# Patient Record
Sex: FEMALE | Race: WHITE | Employment: OTHER
[De-identification: names, ages, dates, MRNs, and addresses within clinical notes are randomized per-mention and may not be internally consistent; named-entity substitution may affect disease eponyms.]

---

## 2017-01-30 ENCOUNTER — OFFICE VISIT (OUTPATIENT)
Dept: CARDIOLOGY | Facility: CLINIC | Age: 82
End: 2017-01-30

## 2017-01-30 VITALS
SYSTOLIC BLOOD PRESSURE: 157 MMHG | HEIGHT: 60 IN | BODY MASS INDEX: 19.04 KG/M2 | DIASTOLIC BLOOD PRESSURE: 84 MMHG | WEIGHT: 97 LBS | RESPIRATION RATE: 16 BRPM | HEART RATE: 62 BPM | OXYGEN SATURATION: 98 %

## 2017-01-30 DIAGNOSIS — Z79.899 ENCOUNTER FOR MONITORING AMIODARONE THERAPY: Primary | ICD-10-CM

## 2017-01-30 DIAGNOSIS — E03.9 HYPOTHYROIDISM, UNSPECIFIED TYPE: ICD-10-CM

## 2017-01-30 DIAGNOSIS — I50.32 CHRONIC DIASTOLIC HF (HEART FAILURE) (HCC): ICD-10-CM

## 2017-01-30 DIAGNOSIS — Z51.81 ENCOUNTER FOR MONITORING AMIODARONE THERAPY: Primary | ICD-10-CM

## 2017-01-30 PROCEDURE — G8427 DOCREV CUR MEDS BY ELIG CLIN: HCPCS | Performed by: FAMILY MEDICINE

## 2017-01-30 PROCEDURE — 4040F PNEUMOC VAC/ADMIN/RCVD: CPT | Performed by: FAMILY MEDICINE

## 2017-01-30 PROCEDURE — 1036F TOBACCO NON-USER: CPT | Performed by: FAMILY MEDICINE

## 2017-01-30 PROCEDURE — G8484 FLU IMMUNIZE NO ADMIN: HCPCS | Performed by: FAMILY MEDICINE

## 2017-01-30 PROCEDURE — G8419 CALC BMI OUT NRM PARAM NOF/U: HCPCS | Performed by: FAMILY MEDICINE

## 2017-01-30 PROCEDURE — 1123F ACP DISCUSS/DSCN MKR DOCD: CPT | Performed by: FAMILY MEDICINE

## 2017-01-30 PROCEDURE — 99213 OFFICE O/P EST LOW 20 MIN: CPT | Performed by: FAMILY MEDICINE

## 2017-01-30 PROCEDURE — 1090F PRES/ABSN URINE INCON ASSESS: CPT | Performed by: FAMILY MEDICINE

## 2017-01-30 PROCEDURE — G8400 PT W/DXA NO RESULTS DOC: HCPCS | Performed by: FAMILY MEDICINE

## 2017-02-02 ENCOUNTER — TELEPHONE (OUTPATIENT)
Dept: CARDIOLOGY | Facility: CLINIC | Age: 82
End: 2017-02-02

## 2017-02-02 RX ORDER — LEVOTHYROXINE SODIUM 0.05 MG/1
50 TABLET ORAL DAILY
Qty: 14 TABLET | Refills: 0 | Status: SHIPPED | COMMUNITY
Start: 2017-02-02 | End: 2017-02-02 | Stop reason: SDUPTHER

## 2017-02-02 RX ORDER — LEVOTHYROXINE SODIUM 0.05 MG/1
50 TABLET ORAL DAILY
Qty: 90 TABLET | Refills: 3 | Status: SHIPPED | OUTPATIENT
Start: 2017-02-02 | End: 2018-01-11

## 2017-02-03 ENCOUNTER — TELEPHONE (OUTPATIENT)
Dept: CARDIOLOGY | Facility: CLINIC | Age: 82
End: 2017-02-03

## 2017-02-07 ENCOUNTER — TELEPHONE (OUTPATIENT)
Dept: CARDIOLOGY | Facility: CLINIC | Age: 82
End: 2017-02-07

## 2017-03-06 ENCOUNTER — OFFICE VISIT (OUTPATIENT)
Dept: PULMONOLOGY | Facility: CLINIC | Age: 82
End: 2017-03-06

## 2017-03-06 VITALS
HEIGHT: 60 IN | RESPIRATION RATE: 16 BRPM | TEMPERATURE: 97.3 F | DIASTOLIC BLOOD PRESSURE: 84 MMHG | OXYGEN SATURATION: 98 % | WEIGHT: 100 LBS | BODY MASS INDEX: 19.63 KG/M2 | SYSTOLIC BLOOD PRESSURE: 145 MMHG | HEART RATE: 64 BPM

## 2017-03-06 DIAGNOSIS — J47.9 BRONCHIECTASIS WITHOUT COMPLICATION (HCC): ICD-10-CM

## 2017-03-06 DIAGNOSIS — R05.3 CHRONIC COUGH: Primary | ICD-10-CM

## 2017-03-06 DIAGNOSIS — A31.0 MYCOBACTERIUM AVIUM-INTRACELLULARE COMPLEX (HCC): ICD-10-CM

## 2017-03-06 DIAGNOSIS — K21.9 GASTROESOPHAGEAL REFLUX DISEASE, ESOPHAGITIS PRESENCE NOT SPECIFIED: ICD-10-CM

## 2017-03-06 DIAGNOSIS — J30.1 ALLERGIC RHINITIS DUE TO POLLEN, UNSPECIFIED RHINITIS SEASONALITY: ICD-10-CM

## 2017-03-06 DIAGNOSIS — T17.908S CHRONIC PULMONARY ASPIRATION, SEQUELA: ICD-10-CM

## 2017-03-06 PROCEDURE — 4040F PNEUMOC VAC/ADMIN/RCVD: CPT | Performed by: INTERNAL MEDICINE

## 2017-03-06 PROCEDURE — 1036F TOBACCO NON-USER: CPT | Performed by: INTERNAL MEDICINE

## 2017-03-06 PROCEDURE — G8484 FLU IMMUNIZE NO ADMIN: HCPCS | Performed by: INTERNAL MEDICINE

## 2017-03-06 PROCEDURE — 99214 OFFICE O/P EST MOD 30 MIN: CPT | Performed by: INTERNAL MEDICINE

## 2017-03-06 PROCEDURE — 1090F PRES/ABSN URINE INCON ASSESS: CPT | Performed by: INTERNAL MEDICINE

## 2017-03-06 PROCEDURE — G8419 CALC BMI OUT NRM PARAM NOF/U: HCPCS | Performed by: INTERNAL MEDICINE

## 2017-03-06 PROCEDURE — G8400 PT W/DXA NO RESULTS DOC: HCPCS | Performed by: INTERNAL MEDICINE

## 2017-03-06 PROCEDURE — 1123F ACP DISCUSS/DSCN MKR DOCD: CPT | Performed by: INTERNAL MEDICINE

## 2017-03-06 PROCEDURE — G8427 DOCREV CUR MEDS BY ELIG CLIN: HCPCS | Performed by: INTERNAL MEDICINE

## 2017-03-06 RX ORDER — NITROFURANTOIN 25; 75 MG/1; MG/1
CAPSULE ORAL
COMMUNITY
Start: 2017-02-23 | End: 2017-10-19 | Stop reason: ALTCHOICE

## 2017-03-06 RX ORDER — BUDESONIDE 0.5 MG/2ML
500 INHALANT ORAL 2 TIMES DAILY
Qty: 60 AMPULE | Refills: 11 | Status: SHIPPED | OUTPATIENT
Start: 2017-03-06 | End: 2018-03-19 | Stop reason: SDUPTHER

## 2017-03-06 RX ORDER — FORMOTEROL FUMARATE 20 UG/2ML
20 SOLUTION RESPIRATORY (INHALATION) 2 TIMES DAILY
Qty: 120 ML | Refills: 11 | Status: SHIPPED | OUTPATIENT
Start: 2017-03-06 | End: 2018-03-19 | Stop reason: SDUPTHER

## 2017-07-17 ENCOUNTER — OFFICE VISIT (OUTPATIENT)
Dept: CARDIOLOGY | Age: 82
End: 2017-07-17
Payer: MEDICARE

## 2017-07-17 VITALS
BODY MASS INDEX: 18.46 KG/M2 | WEIGHT: 97.8 LBS | DIASTOLIC BLOOD PRESSURE: 68 MMHG | SYSTOLIC BLOOD PRESSURE: 128 MMHG | HEIGHT: 61 IN | OXYGEN SATURATION: 97 % | RESPIRATION RATE: 16 BRPM | HEART RATE: 64 BPM

## 2017-07-17 DIAGNOSIS — I48.0 PAROXYSMAL ATRIAL FIBRILLATION (HCC): ICD-10-CM

## 2017-07-17 DIAGNOSIS — I20.9 ANGINA, CLASS III (HCC): Primary | ICD-10-CM

## 2017-07-17 PROCEDURE — 99214 OFFICE O/P EST MOD 30 MIN: CPT | Performed by: FAMILY MEDICINE

## 2017-07-17 PROCEDURE — 4040F PNEUMOC VAC/ADMIN/RCVD: CPT | Performed by: FAMILY MEDICINE

## 2017-07-17 PROCEDURE — G8400 PT W/DXA NO RESULTS DOC: HCPCS | Performed by: FAMILY MEDICINE

## 2017-07-17 PROCEDURE — 1123F ACP DISCUSS/DSCN MKR DOCD: CPT | Performed by: FAMILY MEDICINE

## 2017-07-17 PROCEDURE — G8427 DOCREV CUR MEDS BY ELIG CLIN: HCPCS | Performed by: FAMILY MEDICINE

## 2017-07-17 PROCEDURE — 1090F PRES/ABSN URINE INCON ASSESS: CPT | Performed by: FAMILY MEDICINE

## 2017-07-17 PROCEDURE — G8419 CALC BMI OUT NRM PARAM NOF/U: HCPCS | Performed by: FAMILY MEDICINE

## 2017-07-17 PROCEDURE — 1036F TOBACCO NON-USER: CPT | Performed by: FAMILY MEDICINE

## 2017-07-17 PROCEDURE — G8599 NO ASA/ANTIPLAT THER USE RNG: HCPCS | Performed by: FAMILY MEDICINE

## 2017-07-31 ENCOUNTER — HOSPITAL ENCOUNTER (OUTPATIENT)
Dept: NON INVASIVE DIAGNOSTICS | Age: 82
Discharge: HOME | End: 2017-07-31
Payer: MEDICARE

## 2017-07-31 DIAGNOSIS — I20.9 ANGINA, CLASS III (HCC): ICD-10-CM

## 2017-07-31 PROCEDURE — 78452 HT MUSCLE IMAGE SPECT MULT: CPT

## 2017-07-31 PROCEDURE — A9500 TC99M SESTAMIBI: HCPCS | Performed by: FAMILY MEDICINE

## 2017-07-31 PROCEDURE — 93017 CV STRESS TEST TRACING ONLY: CPT

## 2017-07-31 PROCEDURE — 3430000000 HC RX DIAGNOSTIC RADIOPHARMACEUTICAL: Performed by: FAMILY MEDICINE

## 2017-07-31 PROCEDURE — 6360000002 HC RX W HCPCS: Performed by: FAMILY MEDICINE

## 2017-07-31 RX ADMIN — Medication 10.5 MILLICURIE: at 07:35

## 2017-08-14 PROCEDURE — 6360000002 HC RX W HCPCS: Performed by: FAMILY MEDICINE

## 2017-08-14 RX ADMIN — REGADENOSON 0.4 MG: 0.08 INJECTION, SOLUTION INTRAVENOUS at 10:45

## 2017-08-14 RX ADMIN — Medication 29.1 MILLICURIE: at 10:45

## 2017-08-17 ENCOUNTER — TELEPHONE (OUTPATIENT)
Dept: CARDIOLOGY | Age: 82
End: 2017-08-17

## 2017-09-11 ENCOUNTER — HOSPITAL ENCOUNTER (OUTPATIENT)
Dept: WOMENS IMAGING | Age: 82
Discharge: HOME | End: 2017-09-11
Payer: MEDICARE

## 2017-09-11 DIAGNOSIS — Z12.31 ENCOUNTER FOR SCREENING MAMMOGRAM FOR BREAST CANCER: ICD-10-CM

## 2017-09-11 PROCEDURE — G0202 SCR MAMMO BI INCL CAD: HCPCS

## 2017-09-18 ENCOUNTER — HOSPITAL ENCOUNTER (OUTPATIENT)
Dept: GENERAL RADIOLOGY | Age: 82
Discharge: HOME | End: 2017-09-18
Payer: MEDICARE

## 2017-09-18 ENCOUNTER — OFFICE VISIT (OUTPATIENT)
Dept: PULMONOLOGY | Age: 82
End: 2017-09-18
Payer: MEDICARE

## 2017-09-18 ENCOUNTER — HOSPITAL ENCOUNTER (OUTPATIENT)
Age: 82
Discharge: HOME | End: 2017-09-18
Payer: MEDICARE

## 2017-09-18 VITALS
SYSTOLIC BLOOD PRESSURE: 161 MMHG | DIASTOLIC BLOOD PRESSURE: 69 MMHG | TEMPERATURE: 97.1 F | RESPIRATION RATE: 16 BRPM | OXYGEN SATURATION: 98 % | WEIGHT: 96 LBS | BODY MASS INDEX: 18.12 KG/M2 | HEIGHT: 61 IN | HEART RATE: 64 BPM

## 2017-09-18 DIAGNOSIS — T17.908S CHRONIC PULMONARY ASPIRATION, SEQUELA: ICD-10-CM

## 2017-09-18 DIAGNOSIS — R09.82 POST-NASAL DRIP: ICD-10-CM

## 2017-09-18 DIAGNOSIS — J30.1 ALLERGIC RHINITIS DUE TO POLLEN, UNSPECIFIED RHINITIS SEASONALITY: ICD-10-CM

## 2017-09-18 DIAGNOSIS — J47.9 BRONCHIECTASIS WITHOUT COMPLICATION (HCC): ICD-10-CM

## 2017-09-18 DIAGNOSIS — A31.0 MYCOBACTERIUM AVIUM-INTRACELLULARE COMPLEX (HCC): ICD-10-CM

## 2017-09-18 DIAGNOSIS — R05.3 CHRONIC COUGH: Primary | ICD-10-CM

## 2017-09-18 DIAGNOSIS — K21.9 GASTROESOPHAGEAL REFLUX DISEASE, ESOPHAGITIS PRESENCE NOT SPECIFIED: ICD-10-CM

## 2017-09-18 DIAGNOSIS — R05.3 CHRONIC COUGH: ICD-10-CM

## 2017-09-18 PROCEDURE — 1123F ACP DISCUSS/DSCN MKR DOCD: CPT | Performed by: INTERNAL MEDICINE

## 2017-09-18 PROCEDURE — G8400 PT W/DXA NO RESULTS DOC: HCPCS | Performed by: INTERNAL MEDICINE

## 2017-09-18 PROCEDURE — 99215 OFFICE O/P EST HI 40 MIN: CPT | Performed by: INTERNAL MEDICINE

## 2017-09-18 PROCEDURE — 1036F TOBACCO NON-USER: CPT | Performed by: INTERNAL MEDICINE

## 2017-09-18 PROCEDURE — 1090F PRES/ABSN URINE INCON ASSESS: CPT | Performed by: INTERNAL MEDICINE

## 2017-09-18 PROCEDURE — 71020 XR CHEST STANDARD TWO VW: CPT

## 2017-09-18 PROCEDURE — 4040F PNEUMOC VAC/ADMIN/RCVD: CPT | Performed by: INTERNAL MEDICINE

## 2017-09-18 PROCEDURE — G8599 NO ASA/ANTIPLAT THER USE RNG: HCPCS | Performed by: INTERNAL MEDICINE

## 2017-09-18 PROCEDURE — G8418 CALC BMI BLW LOW PARAM F/U: HCPCS | Performed by: INTERNAL MEDICINE

## 2017-09-18 PROCEDURE — G8427 DOCREV CUR MEDS BY ELIG CLIN: HCPCS | Performed by: INTERNAL MEDICINE

## 2017-09-18 RX ORDER — FLUTICASONE PROPIONATE 50 MCG
2 SPRAY, SUSPENSION (ML) NASAL DAILY
Qty: 1 BOTTLE | Refills: 11 | Status: SHIPPED | OUTPATIENT
Start: 2017-09-18 | End: 2017-10-11

## 2017-10-11 ENCOUNTER — OFFICE VISIT (OUTPATIENT)
Dept: CARDIOLOGY | Age: 82
End: 2017-10-11
Payer: MEDICARE

## 2017-10-11 VITALS
RESPIRATION RATE: 20 BRPM | HEIGHT: 61 IN | OXYGEN SATURATION: 98 % | BODY MASS INDEX: 17.94 KG/M2 | WEIGHT: 95 LBS | DIASTOLIC BLOOD PRESSURE: 70 MMHG | HEART RATE: 62 BPM | SYSTOLIC BLOOD PRESSURE: 155 MMHG

## 2017-10-11 DIAGNOSIS — R55 SYNCOPE AND COLLAPSE: ICD-10-CM

## 2017-10-11 DIAGNOSIS — G45.4 TRANSIENT GLOBAL AMNESIA: Primary | ICD-10-CM

## 2017-10-11 DIAGNOSIS — I48.0 PAROXYSMAL ATRIAL FIBRILLATION (HCC): ICD-10-CM

## 2017-10-11 PROCEDURE — G8599 NO ASA/ANTIPLAT THER USE RNG: HCPCS | Performed by: FAMILY MEDICINE

## 2017-10-11 PROCEDURE — 1090F PRES/ABSN URINE INCON ASSESS: CPT | Performed by: FAMILY MEDICINE

## 2017-10-11 PROCEDURE — 99215 OFFICE O/P EST HI 40 MIN: CPT | Performed by: FAMILY MEDICINE

## 2017-10-11 PROCEDURE — 4040F PNEUMOC VAC/ADMIN/RCVD: CPT | Performed by: FAMILY MEDICINE

## 2017-10-11 PROCEDURE — G8427 DOCREV CUR MEDS BY ELIG CLIN: HCPCS | Performed by: FAMILY MEDICINE

## 2017-10-11 PROCEDURE — G8400 PT W/DXA NO RESULTS DOC: HCPCS | Performed by: FAMILY MEDICINE

## 2017-10-11 PROCEDURE — G8418 CALC BMI BLW LOW PARAM F/U: HCPCS | Performed by: FAMILY MEDICINE

## 2017-10-11 PROCEDURE — 93000 ELECTROCARDIOGRAM COMPLETE: CPT | Performed by: FAMILY MEDICINE

## 2017-10-11 PROCEDURE — 1123F ACP DISCUSS/DSCN MKR DOCD: CPT | Performed by: FAMILY MEDICINE

## 2017-10-11 PROCEDURE — G8484 FLU IMMUNIZE NO ADMIN: HCPCS | Performed by: FAMILY MEDICINE

## 2017-10-11 PROCEDURE — 1036F TOBACCO NON-USER: CPT | Performed by: FAMILY MEDICINE

## 2017-10-12 ENCOUNTER — HOSPITAL ENCOUNTER (OUTPATIENT)
Dept: CT IMAGING | Age: 82
Discharge: HOME | End: 2017-10-12
Payer: MEDICARE

## 2017-10-12 ENCOUNTER — HOSPITAL ENCOUNTER (OUTPATIENT)
Dept: VASCULAR LAB | Age: 82
Discharge: HOME | End: 2017-10-12
Payer: MEDICARE

## 2017-10-12 DIAGNOSIS — I48.0 PAROXYSMAL ATRIAL FIBRILLATION (HCC): ICD-10-CM

## 2017-10-12 DIAGNOSIS — G45.4 TRANSIENT GLOBAL AMNESIA: ICD-10-CM

## 2017-10-12 PROCEDURE — 93880 EXTRACRANIAL BILAT STUDY: CPT

## 2017-10-12 PROCEDURE — 70450 CT HEAD/BRAIN W/O DYE: CPT

## 2017-10-12 NOTE — PROGRESS NOTES
findings 12    Normal sinus rhythm with frequent PACs in a trigeminy pattern    Abnormal resting ECG findings 6/10/2013    Severe sinus bradycardia at 50 bpm.     Anxiety     Chronic back pain     Pt. c/o upper back pain,,,,\"On the sides of my lungs\"    Hiatal hernia     Holter monitor, abnormal 12    Multiple episodes of SVT between 100 and 130 beats per minute most consistent with atrial fibrillation and/or atrial flutter with variable block.  Hypothyroidism     Insomnia     MAC (mycobacterium avium-intracellulare complex)     Normal cardiac stress test 12    low risk study.  Paroxysmal atrial fibrillation ([facility] [address] 2012    Found on holter monitor  and      Severe sinus bradycardia 6/10/13    At least partially drug induced.  Subdural hematoma (HCC)        CURRENT ALLERGIES: Sulfa antibiotics REVIEW OF SYSTEMS: 14 systems were reviewed. Pertinent positives and negatives as above, all else negative. Past Surgical History:   Procedure Laterality Date    BRAIN SURGERY      Had a blood clot on the brain. Fell 10 feet.  CATARACT REMOVAL Bilateral      SECTION      x1    FOOT SURGERY      THYROID SURGERY      tumor removed    Social History:  Social History   Substance Use Topics    Smoking status: Never Smoker    Smokeless tobacco: Never Used    Alcohol use No        CURRENT MEDICATIONS:  Outpatient Prescriptions Marked as Taking for the 10/11/17 encounter (Office Visit) with Gigi Marcelo MD   Medication Sig Dispense Refill    Fluticasone Furoate-Vilanterol (BREO ELLIPTA) 200-25 MCG/INH AEPB Inhale 1 puff into the lungs daily 1 each 11    amiodarone (CORDARONE) 200 MG tablet Take 1 tablet by mouth daily 90 tablet 3    docusate sodium (COLACE) 100 MG capsule Take 1 capsule by mouth 2 times daily 90 capsule 3    nitroGLYCERIN (NITROSTAT) 0.4 MG SL tablet Place 1 tablet under the tongue every 5 minutes as needed for Chest pain 1 bottle.  25 tablet 3    patient,and/or arranging care with nursing and other members of the care team. All of the patients questions were answered. FOLLOW UP:  I told Ms. Serg Arana to call my office if she had any problems, but otherwise told her to Return in about 4 weeks (around 11/8/2017). However, I would be happy to see her sooner should the need arise. Once again, thank you for allowing me to participate in this patients care. Please do not hesitate to contact me could I be of further assistance. Sincerely,  Derick Angulo MD, MS, F.A.C.C.   [facility]ist  [facility], [address] [place] [address]  Phone: [phone], Fax: [phone]

## 2017-10-19 ENCOUNTER — HOSPITAL ENCOUNTER (OUTPATIENT)
Dept: CARDIAC CATH/INVASIVE PROCEDURES | Age: 82
Discharge: HOME | End: 2017-10-19
Payer: MEDICARE

## 2017-10-19 VITALS
RESPIRATION RATE: 16 BRPM | HEART RATE: 69 BPM | OXYGEN SATURATION: 98 % | HEIGHT: 61 IN | WEIGHT: 95.02 LBS | BODY MASS INDEX: 17.94 KG/M2 | TEMPERATURE: 96.7 F | DIASTOLIC BLOOD PRESSURE: 82 MMHG | SYSTOLIC BLOOD PRESSURE: 129 MMHG

## 2017-10-19 PROCEDURE — 33282 HC IMPANTABLE LOOP RECORDER: CPT | Performed by: FAMILY MEDICINE

## 2017-10-19 PROCEDURE — 6370000000 HC RX 637 (ALT 250 FOR IP): Performed by: FAMILY MEDICINE

## 2017-10-19 PROCEDURE — C1764 EVENT RECORDER, CARDIAC: HCPCS

## 2017-10-19 RX ORDER — SODIUM CHLORIDE 0.9 % (FLUSH) 0.9 %
10 SYRINGE (ML) INJECTION EVERY 12 HOURS SCHEDULED
Status: DISCONTINUED | OUTPATIENT
Start: 2017-10-19 | End: 2017-10-20 | Stop reason: HOSPADM

## 2017-10-19 RX ORDER — HYDROCODONE BITARTRATE AND ACETAMINOPHEN 5; 325 MG/1; MG/1
1 TABLET ORAL EVERY 6 HOURS PRN
Qty: 3 TABLET | Refills: 0 | OUTPATIENT
Start: 2017-10-19 | End: 2017-10-26

## 2017-10-19 RX ORDER — ACETAMINOPHEN 325 MG/1
650 TABLET ORAL EVERY 4 HOURS PRN
Status: DISCONTINUED | OUTPATIENT
Start: 2017-10-19 | End: 2017-10-20 | Stop reason: HOSPADM

## 2017-10-19 RX ORDER — OXYCODONE HYDROCHLORIDE AND ACETAMINOPHEN 5; 325 MG/1; MG/1
2 TABLET ORAL EVERY 4 HOURS PRN
Status: DISCONTINUED | OUTPATIENT
Start: 2017-10-19 | End: 2017-10-20 | Stop reason: HOSPADM

## 2017-10-19 RX ORDER — HYDROCODONE BITARTRATE AND ACETAMINOPHEN 5; 325 MG/1; MG/1
1 TABLET ORAL EVERY 6 HOURS PRN
Status: DISCONTINUED | OUTPATIENT
Start: 2017-10-19 | End: 2017-10-19 | Stop reason: CLARIF

## 2017-10-19 RX ORDER — ONDANSETRON 2 MG/ML
4 INJECTION INTRAMUSCULAR; INTRAVENOUS EVERY 6 HOURS PRN
Status: DISCONTINUED | OUTPATIENT
Start: 2017-10-19 | End: 2017-10-20 | Stop reason: HOSPADM

## 2017-10-19 RX ORDER — DOCUSATE SODIUM 100 MG/1
100 CAPSULE, LIQUID FILLED ORAL 2 TIMES DAILY
Status: DISCONTINUED | OUTPATIENT
Start: 2017-10-19 | End: 2017-10-20 | Stop reason: HOSPADM

## 2017-10-19 RX ORDER — OXYCODONE HYDROCHLORIDE AND ACETAMINOPHEN 5; 325 MG/1; MG/1
1 TABLET ORAL EVERY 4 HOURS PRN
Status: DISCONTINUED | OUTPATIENT
Start: 2017-10-19 | End: 2017-10-20 | Stop reason: HOSPADM

## 2017-10-19 RX ORDER — SODIUM CHLORIDE 0.9 % (FLUSH) 0.9 %
10 SYRINGE (ML) INJECTION PRN
Status: DISCONTINUED | OUTPATIENT
Start: 2017-10-19 | End: 2017-10-20 | Stop reason: HOSPADM

## 2017-10-19 RX ADMIN — Medication: at 11:04

## 2017-10-19 ASSESSMENT — PAIN SCALES - GENERAL
PAINLEVEL_OUTOF10: 2
PAINLEVEL_OUTOF10: 7

## 2017-10-19 NOTE — OP NOTE
OPERATIVE REPORT    Patient: Reno Fischer   YOB: 1933       Attending: Colin Brown M.D. Date of Surgery: 10/19/2017   PCP Phys: Maribel Coates MD       SURGEON:  Colin Brown M.D. PROCEDURE:  [employer] LINQ loop recorder    INDICATION:  Recurrent Syncope    NARRATIVE SUMMARY:  Ms. Wendy Rizo was brought to the pre-operative waiting room and after explaining the risks, benefits and alternatives of the procedure and informed written consent was obtained. The patient was prepped and draped in the standard surgical fashion. After adequate sedation Marcaine was used to anesthetize the area over the planned loop recorder placement. An incision was made and once an adequate sized pocket was formed using a dilator. The device was placed subcutaneously in a 10 and 4 O'clock position with the device lettering facing up. Finally, the pocket was closed with demabond. Overall the patient tolerated the procedure well and there were no complications. Blood loss was minimal.    DEVICE DETAIL:  [email or web address] REVEAL LINQ loop recorder, [id number], serial number [id number]. Nelida Angulo MD, MS, F.A.C.C.   [facility]  [address] [place] [address], [facility] [place] [address], [address] [facility]  Phone: [phone], Fax: [phone]

## 2017-10-25 ENCOUNTER — NURSE ONLY (OUTPATIENT)
Dept: CARDIOLOGY | Age: 82
End: 2017-10-25

## 2017-10-25 DIAGNOSIS — Z51.89 VISIT FOR WOUND CHECK: Primary | ICD-10-CM

## 2017-11-09 ENCOUNTER — TELEPHONE (OUTPATIENT)
Dept: CARDIOLOGY | Age: 82
End: 2017-11-09

## 2017-11-09 PROCEDURE — 93299 PR INTERROGATION EVALUATION REMOTE </30 D ILR SYS: CPT | Performed by: FAMILY MEDICINE

## 2017-11-09 PROCEDURE — 93298 REM INTERROG DEV EVAL SCRMS: CPT | Performed by: FAMILY MEDICINE

## 2017-11-16 DIAGNOSIS — Z45.09 ENCOUNTER FOR LOOP RECORDER CHECK: Primary | ICD-10-CM

## 2017-11-16 DIAGNOSIS — R00.1 SEVERE SINUS BRADYCARDIA: ICD-10-CM

## 2017-11-16 DIAGNOSIS — I48.0 PAROXYSMAL ATRIAL FIBRILLATION (HCC): ICD-10-CM

## 2017-11-20 ENCOUNTER — OFFICE VISIT (OUTPATIENT)
Dept: CARDIOLOGY | Age: 82
End: 2017-11-20
Payer: MEDICARE

## 2017-11-20 VITALS
DIASTOLIC BLOOD PRESSURE: 81 MMHG | WEIGHT: 97.8 LBS | RESPIRATION RATE: 20 BRPM | BODY MASS INDEX: 18.46 KG/M2 | OXYGEN SATURATION: 98 % | HEIGHT: 61 IN | HEART RATE: 60 BPM | SYSTOLIC BLOOD PRESSURE: 172 MMHG

## 2017-11-20 DIAGNOSIS — Z51.81 ANTICOAGULATION MANAGEMENT ENCOUNTER: ICD-10-CM

## 2017-11-20 DIAGNOSIS — Z79.01 ANTICOAGULATION MANAGEMENT ENCOUNTER: ICD-10-CM

## 2017-11-20 DIAGNOSIS — I48.0 PAROXYSMAL ATRIAL FIBRILLATION (HCC): Primary | ICD-10-CM

## 2017-11-20 PROCEDURE — G8427 DOCREV CUR MEDS BY ELIG CLIN: HCPCS | Performed by: FAMILY MEDICINE

## 2017-11-20 PROCEDURE — G8400 PT W/DXA NO RESULTS DOC: HCPCS | Performed by: FAMILY MEDICINE

## 2017-11-20 PROCEDURE — 1036F TOBACCO NON-USER: CPT | Performed by: FAMILY MEDICINE

## 2017-11-20 PROCEDURE — G8484 FLU IMMUNIZE NO ADMIN: HCPCS | Performed by: FAMILY MEDICINE

## 2017-11-20 PROCEDURE — G8598 ASA/ANTIPLAT THER USED: HCPCS | Performed by: FAMILY MEDICINE

## 2017-11-20 PROCEDURE — G8418 CALC BMI BLW LOW PARAM F/U: HCPCS | Performed by: FAMILY MEDICINE

## 2017-11-20 PROCEDURE — 1090F PRES/ABSN URINE INCON ASSESS: CPT | Performed by: FAMILY MEDICINE

## 2017-11-20 PROCEDURE — 99214 OFFICE O/P EST MOD 30 MIN: CPT | Performed by: FAMILY MEDICINE

## 2017-11-20 PROCEDURE — 4040F PNEUMOC VAC/ADMIN/RCVD: CPT | Performed by: FAMILY MEDICINE

## 2017-11-20 PROCEDURE — 1123F ACP DISCUSS/DSCN MKR DOCD: CPT | Performed by: FAMILY MEDICINE

## 2017-11-20 NOTE — PROGRESS NOTES
Abnormal resting ECG findings 6/10/2013    Severe sinus bradycardia at 50 bpm.     Anxiety     Chronic back pain     Pt. c/o upper back pain,,,,\"On the sides of my lungs\"    Hiatal hernia     Holter monitor, abnormal 12    Multiple episodes of SVT between 100 and 130 beats per minute most consistent with atrial fibrillation and/or atrial flutter with variable block.  Hypothyroidism     Insomnia     MAC (mycobacterium avium-intracellulare complex)     Normal cardiac stress test 12    low risk study.  Paroxysmal atrial fibrillation ([address] 2012    Found on holter monitor  and      Severe sinus bradycardia 6/10/13    At least partially drug induced.  Status post placement of implantable loop recorder 10/19/2017    LINQ IMPLANTED. [employer]    Subdural hematoma (HCC)        CURRENT ALLERGIES: Sulfa antibiotics REVIEW OF SYSTEMS: 14 systems were reviewed. Pertinent positives and negatives as above, all else negative. Past Surgical History:   Procedure Laterality Date    BRAIN SURGERY      Had a blood clot on the brain. Fell 10 feet.      CATARACT REMOVAL Bilateral      SECTION      x1    FOOT SURGERY      INSERTABLE CARDIAC MONITOR Left 10/19/2017    [facility]) Patience/Dr Angulo    THYROID SURGERY      tumor removed    Social History:  Social History   Substance Use Topics    Smoking status: Never Smoker    Smokeless tobacco: Never Used    Alcohol use No        CURRENT MEDICATIONS:  Outpatient Prescriptions Marked as Taking for the 17 encounter (Office Visit) with Sin Angeles MD   Medication Sig Dispense Refill    budesonide (PULMICORT) 0.5 MG/2ML nebulizer suspension Take 2 mLs by nebulization 2 times daily 60 ampule 11    levothyroxine (SYNTHROID) 50 MCG tablet Take 1 tablet by mouth Daily 90 tablet 3    Fluticasone Furoate-Vilanterol (BREO ELLIPTA) 200-25 MCG/INH AEPB Inhale 1 puff into the lungs daily 1 each 11    amiodarone (CORDARONE) 200 MG tablet Take 1 tablet by mouth daily 90 tablet 3    docusate sodium (COLACE) 100 MG capsule Take 1 capsule by mouth 2 times daily 90 capsule 3    nitroGLYCERIN (NITROSTAT) 0.4 MG SL tablet Place 1 tablet under the tongue every 5 minutes as needed for Chest pain 1 bottle. 25 tablet 3    isosorbide mononitrate (IMDUR) 30 MG CR tablet Take 30 mg by mouth daily.  aspirin  MG EC tablet Take 1 tablet by mouth daily. 30 tablet 3    omeprazole (PRILOSEC) 20 MG capsule Take 20 mg by mouth daily.  hydrocodone-chlorpheniramine (TUSSIONEX PENNKINETIC ER) 10-8 MG/5ML LQCR Take 5 mLs by mouth every 12 hours as needed for Other (severe cough). (Patient taking differently: Take 5 mLs by mouth as needed (severe cough) ) 380 mL 1    folic acid (FOLVITE) 1 MG tablet Take 1 mg by mouth daily.  fludrocortisone (FLORINEF) 0.1 MG tablet Take 0.1 mg by mouth daily. FAMILY HISTORY: family history includes Stroke in her father and mother. PHYSICAL EXAM:   BP (!) 172/81 (Site: Left Arm, Position: Sitting, Cuff Size: Medium Adult)   Pulse 60   Resp 20   Ht 5' 1\" (1.549 m)   Wt 97 lb 12.8 oz (44.4 kg)   SpO2 98%   BMI 18.48 kg/m²  Body mass index is 18.48 kg/m². Constitutional: She is oriented to person, place, and time. She appears well-developed and well-nourished. In no acute distress. HEENT: Normocephalic and atraumatic. No significant JVD was present. Carotid bruit is not present. No mass and no thyromegaly present. No lymphadenopathy present. Cardiovascular: Normal rate and regular rhythm. Normal heart sounds and intact distal pulses. Exam reveals no gallop and no friction rub. A I/VI systolic murmur was heard at the base of the heart without significant radiation. Pulmonary/Chest: Effort normal and breath sounds normal. No respiratory distress. She has no wheezes, rhonchi or rales. Abdominal: Soft, non-tender.  Bowel sounds and aorta are normal. She exhibits no organomegaly, mass or bruit. Extremities: No significant lower extremity edema. No cyanosis, or clubbing. Pulses are 2+ radial/carotid/dorsalis pedis and posterior tibial bilaterally. Neurological: She is alert and oriented to person, place, and time. No evidence of gross cranial nerve deficit. Coordination appeared normal.   Skin: Skin is warm and dry. There is no rash or diaphoresis. Psychiatric: She has a normal mood and affect. Her speech is normal and behavior is normal.      MOST RECENT LABS ON RECORD:   Lab Results   Component Value Date    WBC 5.7 01/30/2017    HGB 13.1 01/30/2017    HCT 40.4 01/30/2017     01/30/2017    ALT 10 01/30/2017    AST 14 01/30/2017     (H) 01/30/2017    K 4.5 01/30/2017     01/30/2017    CREATININE 0.87 01/30/2017    BUN 18 01/30/2017    CO2 28 01/30/2017    TSH 0.09 (L) 01/30/2017       ASSESSMENT:  1. Paroxysmal atrial fibrillation (HCC)    2. Anticoagulation management encounter         PLAN:  · Paroxysmal Atrial Fibrillation: Rhythm Control-LINQ report had shown evidence of Atrial Fibrillation   · Rate Control Agent:(s): · Beta Blocker Therapy: Not indicated due to bradycardia   · Calcium Channel Blocker: Not indicated   · Antianginal medication: Continue Isosorbide Mononitrate (Imdur) 30 mg daily   · Rhythm Control Agent: Continue Amiodarone  200 mg once daily  · Monitoring: Amiodarone Since she is being maintained on Amiodarone, I told her that we will need to closely monitor her for potential side effects. These include monitoring LFTs and TSH at least every 6 months as well as chest x-rays, pulmonary function tests, and eye exams at least on a yearly basis. · Antiplatelet: Stop  mg daily   · Her CHADS2 score is greater than 3 (3.2% stroke risk)  · Anticoagulation: Start Apixaban (Eliquis)2.5 mg twice daily. Because of Ms. Goodwin's CHADS2-VASc2 score of 3/9, I am moderately concerned about her longterm risk of stroke.  Therefore I discussed with her at length the risks, benefits and alternatives to treating with anticoagulation including no treatment, aspirin therapy, Warfarin, Pradaxa, Xarelto and Eliquis. I explained that although any treatments would increase her bleeding risk which unfortunately occasionally can be fatal, treated with anticoagulation would likely cut her ischemic stroke risk in half. After discussing the risks and benefits of each of the medications we ultimately settled on Eliquis 2.5 mg b.i.d. I instructed her to watch for any signs of blood in her stool, urine or black tarry stools and if this developed to stop the medication immediately and call my or your office and/or go to nearest emergency room. Ms. Mariaelena Bolaños verbalized understanding. Ms. Mariaelena Bolaños asked that I talked to her daughter before making any decisions. Therefore I did talk with her daughter over the phone for a couple of minutes about the risk, benefits and alternatives of this medication. Her daughter was in agreement with this medication and says that she should go ahead and start this medication. I also told Ms. Mariaelena Bolaños  to watch for signs of blood in her stool or black tarry stools and stop the medication immediately if this develops as this could be life threatening. · Additional Testing: Continue to monitor LINQ via home monitoring     FOLLOW UP:  I told Ms. Mariaelena Bolaños to call my office if she had any problems, but otherwise told her to Return in about 5 weeks (around 12/25/2017). However, I would be happy to see her sooner should the need arise. Once again, thank you for allowing me to participate in this patients care. Please do not hesitate to contact me could I be of further assistance. Sincerely,  Jeronimo Echeverria. Kev DAIGLE, MS, F.A.C.C. [facility] Cardiology Specialist  [address] [facility], [address] [place] [address]  Phone: [phone], Fax: [phone]    I believe that the risk of significant morbidity and mortality related to the patient's current medical conditions are: Intermediate. 25 minutes were spent with the patient and all of her questions were answered. The documentation recorded by the scribe, accurately and completely reflects the services I personally performed and the decisions made by me. Clark Alvarez.  Lissette Main MD, MS, F.A.C.C. 11/20/2017

## 2017-11-28 ENCOUNTER — TELEPHONE (OUTPATIENT)
Dept: CARDIOLOGY | Age: 82
End: 2017-11-28

## 2017-11-28 DIAGNOSIS — R53.83 FATIGUE, UNSPECIFIED TYPE: Primary | ICD-10-CM

## 2017-11-30 ENCOUNTER — HOSPITAL ENCOUNTER (OUTPATIENT)
Age: 82
Discharge: HOME | End: 2017-11-30
Payer: MEDICARE

## 2017-11-30 DIAGNOSIS — R53.83 FATIGUE, UNSPECIFIED TYPE: ICD-10-CM

## 2017-11-30 LAB
HCT VFR BLD CALC: 36.5 % (ref 36–46)
HEMOGLOBIN: 12 G/DL (ref 12–16)
MCH RBC QN AUTO: 30.2 PG (ref 26–34)
MCHC RBC AUTO-ENTMCNC: 32.8 G/DL (ref 31–37)
MCV RBC AUTO: 92.1 FL (ref 80–100)
PDW BLD-RTO: 14.8 % (ref 12.1–15.2)
PLATELET # BLD: 242 K/UL (ref 140–450)
PMV BLD AUTO: 7.1 FL (ref 6–12)
RBC # BLD: 3.96 M/UL (ref 4–5.2)
WBC # BLD: 6.1 K/UL (ref 3.5–11)

## 2017-11-30 PROCEDURE — 85027 COMPLETE CBC AUTOMATED: CPT

## 2017-11-30 PROCEDURE — 36415 COLL VENOUS BLD VENIPUNCTURE: CPT

## 2017-12-05 ENCOUNTER — TELEPHONE (OUTPATIENT)
Dept: CARDIOLOGY | Age: 82
End: 2017-12-05

## 2017-12-05 NOTE — TELEPHONE ENCOUNTER
Per  labs on 11/30/17 were normal. On 11/28/17 Eliquis was stopped and lab work ordered. Should pt resume Eliquis?

## 2017-12-06 NOTE — TELEPHONE ENCOUNTER
Please let patient know that her bloodwork was fine, no signs of any bleeding, so I really do not think that the Eliquis should be causing any fatigue. However, if she does not want to restart the Eliquis, I can switch her to Xarelto 20 mg daily. Thanks.

## 2017-12-08 PROCEDURE — 93291 INTERROG DEV EVAL SCRMS IP: CPT | Performed by: FAMILY MEDICINE

## 2017-12-11 ENCOUNTER — TELEPHONE (OUTPATIENT)
Dept: CARDIOLOGY | Age: 82
End: 2017-12-11

## 2017-12-14 DIAGNOSIS — I48.0 PAROXYSMAL ATRIAL FIBRILLATION (HCC): ICD-10-CM

## 2017-12-14 DIAGNOSIS — Z45.09 ENCOUNTER FOR LOOP RECORDER CHECK: Primary | ICD-10-CM

## 2017-12-14 DIAGNOSIS — R00.1 SEVERE SINUS BRADYCARDIA: ICD-10-CM

## 2017-12-18 ENCOUNTER — TELEPHONE (OUTPATIENT)
Dept: CARDIOLOGY | Age: 82
End: 2017-12-18

## 2017-12-18 NOTE — TELEPHONE ENCOUNTER
1 month sample supply of Eliquis 2.5 mg given to pt. Pt was told per  to stop by the office to  samples.

## 2018-01-08 ENCOUNTER — OFFICE VISIT (OUTPATIENT)
Dept: CARDIOLOGY | Age: 83
End: 2018-01-08
Payer: MEDICARE

## 2018-01-08 ENCOUNTER — HOSPITAL ENCOUNTER (OUTPATIENT)
Age: 83
Discharge: HOME | End: 2018-01-08
Payer: MEDICARE

## 2018-01-08 VITALS
RESPIRATION RATE: 20 BRPM | OXYGEN SATURATION: 98 % | HEART RATE: 61 BPM | SYSTOLIC BLOOD PRESSURE: 177 MMHG | WEIGHT: 98 LBS | HEIGHT: 60 IN | BODY MASS INDEX: 19.24 KG/M2 | DIASTOLIC BLOOD PRESSURE: 76 MMHG

## 2018-01-08 DIAGNOSIS — Z51.81 ENCOUNTER FOR MONITORING AMIODARONE THERAPY: ICD-10-CM

## 2018-01-08 DIAGNOSIS — I48.0 PAROXYSMAL ATRIAL FIBRILLATION (HCC): ICD-10-CM

## 2018-01-08 DIAGNOSIS — Z79.899 ENCOUNTER FOR MONITORING AMIODARONE THERAPY: ICD-10-CM

## 2018-01-08 DIAGNOSIS — Z79.01 ANTICOAGULATION MANAGEMENT ENCOUNTER: ICD-10-CM

## 2018-01-08 DIAGNOSIS — Z51.81 ANTICOAGULATION MANAGEMENT ENCOUNTER: ICD-10-CM

## 2018-01-08 DIAGNOSIS — R53.83 FATIGUE, UNSPECIFIED TYPE: ICD-10-CM

## 2018-01-08 DIAGNOSIS — R53.83 OTHER FATIGUE: ICD-10-CM

## 2018-01-08 DIAGNOSIS — R53.83 FATIGUE, UNSPECIFIED TYPE: Primary | ICD-10-CM

## 2018-01-08 LAB
ALBUMIN SERPL-MCNC: 4 G/DL (ref 3.5–5.2)
ALBUMIN/GLOBULIN RATIO: 1.3 (ref 1–2.5)
ALP BLD-CCNC: 61 U/L (ref 35–104)
ALT SERPL-CCNC: 16 U/L (ref 5–33)
AST SERPL-CCNC: 20 U/L
BILIRUB SERPL-MCNC: 0.27 MG/DL (ref 0.3–1.2)
BILIRUBIN DIRECT: <0.08 MG/DL
BILIRUBIN, INDIRECT: ABNORMAL MG/DL (ref 0–1)
GLOBULIN: ABNORMAL G/DL (ref 1.5–3.8)
HCT VFR BLD CALC: 38 % (ref 36–46)
HEMOGLOBIN: 12.2 G/DL (ref 12–16)
MCH RBC QN AUTO: 30.1 PG (ref 26–34)
MCHC RBC AUTO-ENTMCNC: 32.1 G/DL (ref 31–37)
MCV RBC AUTO: 93.5 FL (ref 80–100)
PDW BLD-RTO: 15.4 % (ref 12.1–15.2)
PLATELET # BLD: 285 K/UL (ref 140–450)
PMV BLD AUTO: 8 FL (ref 6–12)
RBC # BLD: 4.06 M/UL (ref 4–5.2)
THYROXINE, FREE: 0.82 NG/DL (ref 0.93–1.7)
TOTAL PROTEIN: 7 G/DL (ref 6.4–8.3)
TSH SERPL DL<=0.05 MIU/L-ACNC: 53.06 MIU/L (ref 0.3–5)
WBC # BLD: 6.9 K/UL (ref 3.5–11)

## 2018-01-08 PROCEDURE — 85027 COMPLETE CBC AUTOMATED: CPT

## 2018-01-08 PROCEDURE — 1123F ACP DISCUSS/DSCN MKR DOCD: CPT | Performed by: FAMILY MEDICINE

## 2018-01-08 PROCEDURE — G8427 DOCREV CUR MEDS BY ELIG CLIN: HCPCS | Performed by: FAMILY MEDICINE

## 2018-01-08 PROCEDURE — 36415 COLL VENOUS BLD VENIPUNCTURE: CPT

## 2018-01-08 PROCEDURE — G8420 CALC BMI NORM PARAMETERS: HCPCS | Performed by: FAMILY MEDICINE

## 2018-01-08 PROCEDURE — 4040F PNEUMOC VAC/ADMIN/RCVD: CPT | Performed by: FAMILY MEDICINE

## 2018-01-08 PROCEDURE — 84439 ASSAY OF FREE THYROXINE: CPT

## 2018-01-08 PROCEDURE — G8400 PT W/DXA NO RESULTS DOC: HCPCS | Performed by: FAMILY MEDICINE

## 2018-01-08 PROCEDURE — 84443 ASSAY THYROID STIM HORMONE: CPT

## 2018-01-08 PROCEDURE — 99214 OFFICE O/P EST MOD 30 MIN: CPT | Performed by: FAMILY MEDICINE

## 2018-01-08 PROCEDURE — G8484 FLU IMMUNIZE NO ADMIN: HCPCS | Performed by: FAMILY MEDICINE

## 2018-01-08 PROCEDURE — 1090F PRES/ABSN URINE INCON ASSESS: CPT | Performed by: FAMILY MEDICINE

## 2018-01-08 PROCEDURE — 80076 HEPATIC FUNCTION PANEL: CPT

## 2018-01-08 PROCEDURE — 1036F TOBACCO NON-USER: CPT | Performed by: FAMILY MEDICINE

## 2018-01-08 RX ORDER — AMLODIPINE BESYLATE 5 MG/1
5 TABLET ORAL DAILY
Qty: 90 TABLET | Refills: 3 | Status: SHIPPED | OUTPATIENT
Start: 2018-01-08 | End: 2018-07-09 | Stop reason: ALTCHOICE

## 2018-01-08 NOTE — PROGRESS NOTES
sides of my lungs\"    Hiatal hernia     Holter monitor, abnormal 12    Multiple episodes of SVT between 100 and 130 beats per minute most consistent with atrial fibrillation and/or atrial flutter with variable block.  Hypothyroidism     Insomnia     MAC (mycobacterium avium-intracellulare complex)     Normal cardiac stress test 12    low risk study.  Paroxysmal atrial fibrillation ([address] 2012    Found on holter monitor  and      Severe sinus bradycardia 6/10/13    At least partially drug induced.  Status post placement of implantable loop recorder 10/19/2017    LINQ IMPLANTED. [employer]    Subdural hematoma (HCC)        CURRENT ALLERGIES: Sulfa antibiotics REVIEW OF SYSTEMS: 14 systems were reviewed. Pertinent positives and negatives as above, all else negative. Past Surgical History:   Procedure Laterality Date    BRAIN SURGERY      Had a blood clot on the brain. Fell 10 feet.      CATARACT REMOVAL Bilateral      SECTION      x1    FOOT SURGERY      INSERTABLE CARDIAC MONITOR Left 10/19/2017    [facility]) [facility]/Dr Angulo    THYROID SURGERY      tumor removed    Social History:  Social History   Substance Use Topics    Smoking status: Never Smoker    Smokeless tobacco: Never Used    Alcohol use No        CURRENT MEDICATIONS:  Outpatient Prescriptions Marked as Taking for the 18 encounter (Office Visit) with Christy Longo MD   Medication Sig Dispense Refill    amLODIPine (NORVASC) 5 MG tablet Take 1 tablet by mouth daily 90 tablet 3    apixaban (ELIQUIS) 2.5 MG TABS tablet Take 1 tablet by mouth 2 times daily 180 tablet 3    budesonide (PULMICORT) 0.5 MG/2ML nebulizer suspension Take 2 mLs by nebulization 2 times daily 60 ampule 11    levothyroxine (SYNTHROID) 50 MCG tablet Take 1 tablet by mouth Daily 90 tablet 3    Fluticasone Furoate-Vilanterol (BREO ELLIPTA) 200-25 MCG/INH AEPB Inhale 1 puff into the lungs daily 1 each 11    amiodarone

## 2018-01-09 ENCOUNTER — TELEPHONE (OUTPATIENT)
Dept: CARDIOLOGY | Age: 83
End: 2018-01-09

## 2018-01-10 ENCOUNTER — TELEPHONE (OUTPATIENT)
Dept: CARDIOLOGY | Age: 83
End: 2018-01-10

## 2018-01-11 PROCEDURE — 93298 REM INTERROG DEV EVAL SCRMS: CPT | Performed by: FAMILY MEDICINE

## 2018-01-11 PROCEDURE — 93299 PR INTERROGATION EVALUATION REMOTE </30 D ILR SYS: CPT | Performed by: FAMILY MEDICINE

## 2018-01-11 RX ORDER — LEVOTHYROXINE SODIUM 0.1 MG/1
100 TABLET ORAL DAILY
Qty: 90 TABLET | Refills: 3 | Status: SHIPPED | OUTPATIENT
Start: 2018-01-11 | End: 2018-07-09 | Stop reason: SDUPTHER

## 2018-01-11 NOTE — TELEPHONE ENCOUNTER
Please let patient know that I can start her on the starting dose of thyroxine, the thyroid replacement medication for her problem. I will call in there Rx. This should help improve her problem over the next days to weeks. Thanks.

## 2018-01-15 ENCOUNTER — TELEPHONE (OUTPATIENT)
Dept: CARDIOLOGY | Age: 83
End: 2018-01-15

## 2018-01-16 DIAGNOSIS — I48.0 PAROXYSMAL ATRIAL FIBRILLATION (HCC): ICD-10-CM

## 2018-01-16 DIAGNOSIS — Z45.09 ENCOUNTER FOR LOOP RECORDER CHECK: Primary | ICD-10-CM

## 2018-03-19 ENCOUNTER — OFFICE VISIT (OUTPATIENT)
Dept: PULMONOLOGY | Age: 83
End: 2018-03-19
Payer: MEDICARE

## 2018-03-19 VITALS
BODY MASS INDEX: 16.8 KG/M2 | DIASTOLIC BLOOD PRESSURE: 57 MMHG | WEIGHT: 89 LBS | RESPIRATION RATE: 16 BRPM | HEART RATE: 73 BPM | TEMPERATURE: 96.7 F | SYSTOLIC BLOOD PRESSURE: 124 MMHG | OXYGEN SATURATION: 99 % | HEIGHT: 61 IN

## 2018-03-19 DIAGNOSIS — T17.908S CHRONIC PULMONARY ASPIRATION, SEQUELA: ICD-10-CM

## 2018-03-19 DIAGNOSIS — K21.9 GASTROESOPHAGEAL REFLUX DISEASE, ESOPHAGITIS PRESENCE NOT SPECIFIED: ICD-10-CM

## 2018-03-19 DIAGNOSIS — R09.82 POST-NASAL DRIP: ICD-10-CM

## 2018-03-19 DIAGNOSIS — R05.3 CHRONIC COUGH: ICD-10-CM

## 2018-03-19 DIAGNOSIS — A31.0 MYCOBACTERIUM AVIUM-INTRACELLULARE COMPLEX (HCC): ICD-10-CM

## 2018-03-19 DIAGNOSIS — J47.9 BRONCHIECTASIS WITHOUT COMPLICATION (HCC): Primary | ICD-10-CM

## 2018-03-19 PROCEDURE — G8427 DOCREV CUR MEDS BY ELIG CLIN: HCPCS | Performed by: INTERNAL MEDICINE

## 2018-03-19 PROCEDURE — G8400 PT W/DXA NO RESULTS DOC: HCPCS | Performed by: INTERNAL MEDICINE

## 2018-03-19 PROCEDURE — 4040F PNEUMOC VAC/ADMIN/RCVD: CPT | Performed by: INTERNAL MEDICINE

## 2018-03-19 PROCEDURE — G8484 FLU IMMUNIZE NO ADMIN: HCPCS | Performed by: INTERNAL MEDICINE

## 2018-03-19 PROCEDURE — G8419 CALC BMI OUT NRM PARAM NOF/U: HCPCS | Performed by: INTERNAL MEDICINE

## 2018-03-19 PROCEDURE — 1123F ACP DISCUSS/DSCN MKR DOCD: CPT | Performed by: INTERNAL MEDICINE

## 2018-03-19 PROCEDURE — 1036F TOBACCO NON-USER: CPT | Performed by: INTERNAL MEDICINE

## 2018-03-19 PROCEDURE — 99215 OFFICE O/P EST HI 40 MIN: CPT | Performed by: INTERNAL MEDICINE

## 2018-03-19 PROCEDURE — 1090F PRES/ABSN URINE INCON ASSESS: CPT | Performed by: INTERNAL MEDICINE

## 2018-03-19 RX ORDER — BUDESONIDE 0.5 MG/2ML
500 INHALANT ORAL 2 TIMES DAILY
Qty: 60 AMPULE | Refills: 11 | Status: SHIPPED | OUTPATIENT
Start: 2018-03-19 | End: 2018-03-20 | Stop reason: SDUPTHER

## 2018-03-19 RX ORDER — ALBUTEROL SULFATE 90 UG/1
2 AEROSOL, METERED RESPIRATORY (INHALATION) EVERY 6 HOURS PRN
Qty: 1 INHALER | Refills: 3 | Status: SHIPPED | OUTPATIENT
Start: 2018-03-19 | End: 2018-08-20

## 2018-03-19 RX ORDER — FORMOTEROL FUMARATE 20 UG/2ML
20 SOLUTION RESPIRATORY (INHALATION) 2 TIMES DAILY
Qty: 120 ML | Refills: 11 | Status: SHIPPED | OUTPATIENT
Start: 2018-03-19 | End: 2018-08-20

## 2018-03-19 NOTE — PROGRESS NOTES
daily., Disp: , Rfl:     Fluticasone Furoate-Vilanterol (BREO ELLIPTA) 200-25 MCG/INH AEPB, Inhale 1 puff into the lungs daily, Disp: 1 each, Rfl: 11      Objective:    Physical Exam:  Vitals: BP (!) 124/57   Pulse 73   Temp 96.7 °F (35.9 °C)   Resp 16   Ht 5' 1\" (1.549 m)   Wt 89 lb (40.4 kg)   SpO2 99%   BMI 16.82 kg/m²   Last 3 weights: Wt Readings from Last 3 Encounters:   03/19/18 89 lb (40.4 kg)   01/08/18 98 lb (44.5 kg)   11/20/17 97 lb 12.8 oz (44.4 kg)     Body mass index is 16.82 kg/m². Physical Examination:   PHYSICAL EXAMINATION:  Vitals:    03/19/18 1119   BP: (!) 124/57   Pulse: 73   Resp: 16   Temp: 96.7 °F (35.9 °C)   SpO2: 99%   Weight: 89 lb (40.4 kg)   Height: 5' 1\" (1.549 m)     Constitutional: This is a well developed, well nourished, 17-18.4 - Mild malnutrition / Protein energy malnutrition Grade I 80y.o. year old female who is alert, oriented, cooperative and in no apparent distress. Head:normocephalic and atraumatic. EENT:   MINNIE. No conjunctival injections. Septum was midline, mucosa was without erythema, exudates or cobblestoning. No thrush was noted. Mallampati I (soft palate, uvula, fauces, tonsillar pillars visible)  Neck: Supple without thyromegaly. No elevated JVP. Trachea was midline. No carotid bruits were auscultated. Respiratory: Chest was symmetrical without dullness to percussion. Breath sounds bilaterally were clear to auscultation. There were no wheezes, rhonchi or rales. There is no intercostal retraction or use of accessory muscles. No egophony noted. Cardiovascular: Regular without murmur, clicks, gallops or rubs. There is no left or right ventricular heave. Abdomen: Slightly rounded and soft without organomegaly. No rebound, rigidity or guarding was appreciated. Lymphatic: No lymphadenopathy. Musculoskeletal: Normal curvature of the spine. No gross muscle weakness.     Extremities:  No lower extremity edema, ulcerations, tenderness, varicosities or erythema. Muscle size, tone and strength are normal.  No involuntary movements are noted. Skin:  Warm and dry. Good color, turgor and pigmentation. No lesions or scars. Neurological/Psychiatric: The patient's general behavior, level of consciousness, thought content and emotional status is normal.        Labs:      Chest x-ray in September with no acute process        Assessment:    1. Bronchiectasis without complication ([facility] [address]    2. Mycobacterium avium-intracellulare complex ([facility] [address]    3. Chronic pulmonary aspiration, sequela    4. Chronic cough    5. Gastroesophageal reflux disease, esophagitis presence not specified    6. Post-nasal drip          PLAN:    Refills were provided -Nebulizer machine, budesonide 0.5 mg, formoterol and albuterol MDI  Educated and clarified the medication use. Prilosec to be continued. Continue to use Flonase. Impressed upon the patient importance of using the medications to help improve her symptomatology  Recommend flu vaccination in the fall annually. Patient claims to have had the flu vaccination for the season. Recommendations given regarding pneumococcal vaccinations. Patient is up-to-date with vaccinations from pulmonary perspective. Maintain an active lifestyle. Questions  Patient had were answered to her satisfaction. Home O2 evaluation to be done. Supplemental oxygen was not needed. Chest x-ray was reviewed. We'll see the patient back in 6 months  Thank you for having us involved in the care of your patient. Please call us if you have any questions or concerns.       Afshin Chambers MD             3/19/2018, 12:03 PM

## 2018-03-20 ENCOUNTER — TELEPHONE (OUTPATIENT)
Dept: PULMONOLOGY | Age: 83
End: 2018-03-20

## 2018-03-20 DIAGNOSIS — J44.9 CHRONIC OBSTRUCTIVE PULMONARY DISEASE, UNSPECIFIED COPD TYPE (HCC): Primary | ICD-10-CM

## 2018-03-20 DIAGNOSIS — J47.9 BRONCHIECTASIS WITHOUT COMPLICATION (HCC): ICD-10-CM

## 2018-03-20 RX ORDER — BUDESONIDE 0.5 MG/2ML
500 INHALANT ORAL 2 TIMES DAILY
Qty: 60 AMPULE | Refills: 11 | Status: SHIPPED | OUTPATIENT
Start: 2018-03-20 | End: 2018-08-20

## 2018-04-16 PROCEDURE — 93299 PR REM INTERROG ICPMS/SCRMS <30 D TECH REVIEW: CPT | Performed by: FAMILY MEDICINE

## 2018-04-16 PROCEDURE — 93298 REM INTERROG DEV EVAL SCRMS: CPT | Performed by: FAMILY MEDICINE

## 2018-04-17 ENCOUNTER — TELEPHONE (OUTPATIENT)
Dept: CARDIOLOGY | Age: 83
End: 2018-04-17

## 2018-04-18 ENCOUNTER — NURSE ONLY (OUTPATIENT)
Dept: CARDIOLOGY | Age: 83
End: 2018-04-18
Payer: MEDICARE

## 2018-04-18 DIAGNOSIS — Z45.09 ENCOUNTER FOR LOOP RECORDER CHECK: Primary | ICD-10-CM

## 2018-04-18 DIAGNOSIS — I48.0 PAROXYSMAL ATRIAL FIBRILLATION (HCC): ICD-10-CM

## 2018-05-18 ENCOUNTER — HOSPITAL ENCOUNTER (EMERGENCY)
Age: 83
Discharge: HOME | End: 2018-05-18
Payer: MEDICARE

## 2018-05-18 VITALS
RESPIRATION RATE: 16 BRPM | SYSTOLIC BLOOD PRESSURE: 189 MMHG | HEART RATE: 77 BPM | OXYGEN SATURATION: 98 % | TEMPERATURE: 98.5 F | DIASTOLIC BLOOD PRESSURE: 95 MMHG

## 2018-05-18 DIAGNOSIS — M66.0 RUPTURED BAKERS CYST: Primary | ICD-10-CM

## 2018-05-18 PROCEDURE — 99283 EMERGENCY DEPT VISIT LOW MDM: CPT

## 2018-05-18 PROCEDURE — 6370000000 HC RX 637 (ALT 250 FOR IP): Performed by: PHYSICIAN ASSISTANT

## 2018-05-18 RX ORDER — TRAMADOL HYDROCHLORIDE 50 MG/1
50 TABLET ORAL ONCE
Status: COMPLETED | OUTPATIENT
Start: 2018-05-18 | End: 2018-05-18

## 2018-05-18 RX ORDER — TRAMADOL HYDROCHLORIDE 50 MG/1
50 TABLET ORAL EVERY 6 HOURS PRN
Qty: 20 TABLET | Refills: 0 | Status: SHIPPED | OUTPATIENT
Start: 2018-05-18 | End: 2018-05-23

## 2018-05-18 RX ADMIN — TRAMADOL HYDROCHLORIDE 50 MG: 50 TABLET, FILM COATED ORAL at 21:40

## 2018-05-18 ASSESSMENT — PAIN DESCRIPTION - ORIENTATION: ORIENTATION: RIGHT;LOWER

## 2018-05-18 ASSESSMENT — PAIN DESCRIPTION - DESCRIPTORS: DESCRIPTORS: TIGHTNESS

## 2018-05-18 ASSESSMENT — ENCOUNTER SYMPTOMS
SHORTNESS OF BREATH: 0
CHOKING: 0

## 2018-05-18 ASSESSMENT — PAIN DESCRIPTION - LOCATION: LOCATION: LEG

## 2018-05-18 ASSESSMENT — PAIN SCALES - GENERAL
PAINLEVEL_OUTOF10: 10
PAINLEVEL_OUTOF10: 10

## 2018-05-18 ASSESSMENT — PAIN DESCRIPTION - PAIN TYPE: TYPE: ACUTE PAIN

## 2018-07-06 PROCEDURE — 93298 REM INTERROG DEV EVAL SCRMS: CPT | Performed by: FAMILY MEDICINE

## 2018-07-06 PROCEDURE — 93299 PR INTERROGATION EVALUATION REMOTE </30 D ILR SYS: CPT | Performed by: FAMILY MEDICINE

## 2018-07-09 ENCOUNTER — TELEPHONE (OUTPATIENT)
Dept: CARDIOLOGY | Age: 83
End: 2018-07-09

## 2018-07-09 ENCOUNTER — HOSPITAL ENCOUNTER (OUTPATIENT)
Age: 83
Discharge: HOME | End: 2018-07-09
Payer: MEDICARE

## 2018-07-09 ENCOUNTER — OFFICE VISIT (OUTPATIENT)
Dept: CARDIOLOGY | Age: 83
End: 2018-07-09
Payer: MEDICARE

## 2018-07-09 VITALS
OXYGEN SATURATION: 98 % | BODY MASS INDEX: 19.03 KG/M2 | HEIGHT: 61 IN | DIASTOLIC BLOOD PRESSURE: 75 MMHG | HEART RATE: 66 BPM | SYSTOLIC BLOOD PRESSURE: 170 MMHG | RESPIRATION RATE: 16 BRPM | WEIGHT: 100.8 LBS

## 2018-07-09 DIAGNOSIS — I10 UNCONTROLLED HYPERTENSION: ICD-10-CM

## 2018-07-09 DIAGNOSIS — Z79.01 CURRENT USE OF LONG TERM ANTICOAGULATION: ICD-10-CM

## 2018-07-09 DIAGNOSIS — I48.0 PAF (PAROXYSMAL ATRIAL FIBRILLATION) (HCC): Primary | ICD-10-CM

## 2018-07-09 DIAGNOSIS — Z79.899 ENCOUNTER FOR MONITORING AMIODARONE THERAPY: ICD-10-CM

## 2018-07-09 DIAGNOSIS — Z51.81 ENCOUNTER FOR MONITORING AMIODARONE THERAPY: ICD-10-CM

## 2018-07-09 DIAGNOSIS — I48.0 PAF (PAROXYSMAL ATRIAL FIBRILLATION) (HCC): ICD-10-CM

## 2018-07-09 LAB
THYROXINE, FREE: 0.76 NG/DL (ref 0.93–1.7)
TSH SERPL DL<=0.05 MIU/L-ACNC: 36.92 MIU/L (ref 0.3–5)

## 2018-07-09 PROCEDURE — 84443 ASSAY THYROID STIM HORMONE: CPT

## 2018-07-09 PROCEDURE — G8400 PT W/DXA NO RESULTS DOC: HCPCS | Performed by: FAMILY MEDICINE

## 2018-07-09 PROCEDURE — G8420 CALC BMI NORM PARAMETERS: HCPCS | Performed by: FAMILY MEDICINE

## 2018-07-09 PROCEDURE — 1090F PRES/ABSN URINE INCON ASSESS: CPT | Performed by: FAMILY MEDICINE

## 2018-07-09 PROCEDURE — 36415 COLL VENOUS BLD VENIPUNCTURE: CPT

## 2018-07-09 PROCEDURE — 84439 ASSAY OF FREE THYROXINE: CPT

## 2018-07-09 PROCEDURE — 93000 ELECTROCARDIOGRAM COMPLETE: CPT | Performed by: FAMILY MEDICINE

## 2018-07-09 PROCEDURE — G8428 CUR MEDS NOT DOCUMENT: HCPCS | Performed by: FAMILY MEDICINE

## 2018-07-09 PROCEDURE — 1123F ACP DISCUSS/DSCN MKR DOCD: CPT | Performed by: FAMILY MEDICINE

## 2018-07-09 PROCEDURE — 4040F PNEUMOC VAC/ADMIN/RCVD: CPT | Performed by: FAMILY MEDICINE

## 2018-07-09 PROCEDURE — 99214 OFFICE O/P EST MOD 30 MIN: CPT | Performed by: FAMILY MEDICINE

## 2018-07-09 PROCEDURE — 1036F TOBACCO NON-USER: CPT | Performed by: FAMILY MEDICINE

## 2018-07-09 RX ORDER — LEVOTHYROXINE SODIUM 112 UG/1
112 TABLET ORAL DAILY
Qty: 90 TABLET | Refills: 3 | Status: SHIPPED | OUTPATIENT
Start: 2018-07-09 | End: 2018-07-16 | Stop reason: SDUPTHER

## 2018-07-09 RX ORDER — DILTIAZEM HYDROCHLORIDE 120 MG/1
120 CAPSULE, COATED, EXTENDED RELEASE ORAL DAILY
Qty: 90 CAPSULE | Refills: 3 | Status: SHIPPED | OUTPATIENT
Start: 2018-07-09 | End: 2018-08-20

## 2018-07-12 ENCOUNTER — NURSE ONLY (OUTPATIENT)
Dept: CARDIOLOGY | Age: 83
End: 2018-07-12
Payer: MEDICARE

## 2018-07-12 DIAGNOSIS — Z45.09 ENCOUNTER FOR LOOP RECORDER CHECK: Primary | ICD-10-CM

## 2018-07-12 DIAGNOSIS — I48.0 PAROXYSMAL ATRIAL FIBRILLATION (HCC): ICD-10-CM

## 2018-07-17 ENCOUNTER — TELEPHONE (OUTPATIENT)
Dept: CARDIOLOGY | Age: 83
End: 2018-07-17

## 2018-07-17 RX ORDER — LEVOTHYROXINE SODIUM 112 UG/1
112 TABLET ORAL DAILY
Qty: 90 TABLET | Refills: 3 | Status: ON HOLD | OUTPATIENT
Start: 2018-07-17 | End: 2019-01-02 | Stop reason: DRUGHIGH

## 2018-07-19 ENCOUNTER — TELEPHONE (OUTPATIENT)
Dept: CARDIOLOGY | Age: 83
End: 2018-07-19

## 2018-07-19 RX ORDER — FLUDROCORTISONE ACETATE 0.1 MG/1
0.1 TABLET ORAL DAILY
COMMUNITY
End: 2018-08-27 | Stop reason: ALTCHOICE

## 2018-07-19 RX ORDER — ISOSORBIDE MONONITRATE 30 MG/1
30 TABLET, EXTENDED RELEASE ORAL DAILY
Status: ON HOLD | COMMUNITY
End: 2018-07-27 | Stop reason: HOSPADM

## 2018-07-19 RX ORDER — FOLIC ACID 1 MG/1
1 TABLET ORAL DAILY
COMMUNITY
End: 2018-08-20

## 2018-07-19 NOTE — TELEPHONE ENCOUNTER
Per  ok for Serene to stop Amiodarone but would like her to come in for Tikosyn Loading next wek.  Spoke with Jennifer Thompson and she is coming in Tuesday July 24 at 3pm.

## 2018-07-24 ENCOUNTER — HOSPITAL ENCOUNTER (INPATIENT)
Age: 83
LOS: 3 days | Discharge: HOME | DRG: 310 | End: 2018-07-27
Attending: FAMILY MEDICINE | Admitting: FAMILY MEDICINE
Payer: MEDICARE

## 2018-07-24 PROBLEM — I48.0 PAF (PAROXYSMAL ATRIAL FIBRILLATION) (HCC): Status: ACTIVE | Noted: 2018-07-24

## 2018-07-24 LAB
EKG ATRIAL RATE: 56 BPM
EKG ATRIAL RATE: 59 BPM
EKG P AXIS: 85 DEGREES
EKG P AXIS: 93 DEGREES
EKG P-R INTERVAL: 192 MS
EKG P-R INTERVAL: 206 MS
EKG Q-T INTERVAL: 468 MS
EKG Q-T INTERVAL: 495 MS
EKG QRS DURATION: 82 MS
EKG QRS DURATION: 86 MS
EKG QTC CALCULATION (BAZETT): 463 MS
EKG QTC CALCULATION (BAZETT): 478 MS
EKG R AXIS: 138 DEGREES
EKG R AXIS: 60 DEGREES
EKG T AXIS: 126 DEGREES
EKG T AXIS: 53 DEGREES
EKG VENTRICULAR RATE: 56 BPM
EKG VENTRICULAR RATE: 59 BPM

## 2018-07-24 PROCEDURE — 2580000003 HC RX 258: Performed by: FAMILY MEDICINE

## 2018-07-24 PROCEDURE — 6370000000 HC RX 637 (ALT 250 FOR IP): Performed by: FAMILY MEDICINE

## 2018-07-24 PROCEDURE — 94664 DEMO&/EVAL PT USE INHALER: CPT

## 2018-07-24 PROCEDURE — 99222 1ST HOSP IP/OBS MODERATE 55: CPT | Performed by: FAMILY MEDICINE

## 2018-07-24 PROCEDURE — 93005 ELECTROCARDIOGRAM TRACING: CPT

## 2018-07-24 PROCEDURE — 1200000000 HC SEMI PRIVATE

## 2018-07-24 PROCEDURE — 6360000002 HC RX W HCPCS: Performed by: FAMILY MEDICINE

## 2018-07-24 RX ORDER — DOFETILIDE 0.25 MG/1
500 CAPSULE ORAL EVERY 12 HOURS SCHEDULED
Status: DISCONTINUED | OUTPATIENT
Start: 2018-07-24 | End: 2018-07-25

## 2018-07-24 RX ORDER — OMEPRAZOLE 20 MG/1
20 CAPSULE, DELAYED RELEASE ORAL DAILY
Status: DISCONTINUED | OUTPATIENT
Start: 2018-07-25 | End: 2018-07-27 | Stop reason: HOSPADM

## 2018-07-24 RX ORDER — BUDESONIDE 0.5 MG/2ML
500 INHALANT ORAL 2 TIMES DAILY
Status: DISCONTINUED | OUTPATIENT
Start: 2018-07-24 | End: 2018-07-24

## 2018-07-24 RX ORDER — LEVOTHYROXINE SODIUM 112 UG/1
112 TABLET ORAL DAILY
Status: DISCONTINUED | OUTPATIENT
Start: 2018-07-24 | End: 2018-07-27 | Stop reason: HOSPADM

## 2018-07-24 RX ORDER — FLUDROCORTISONE ACETATE 0.1 MG/1
0.1 TABLET ORAL DAILY
Status: DISCONTINUED | OUTPATIENT
Start: 2018-07-24 | End: 2018-07-24

## 2018-07-24 RX ORDER — FORMOTEROL FUMARATE 20 UG/2ML
20 SOLUTION RESPIRATORY (INHALATION) 2 TIMES DAILY
Status: DISCONTINUED | OUTPATIENT
Start: 2018-07-24 | End: 2018-07-24

## 2018-07-24 RX ORDER — NITROGLYCERIN 0.4 MG/1
0.4 TABLET SUBLINGUAL EVERY 5 MIN PRN
Status: DISCONTINUED | OUTPATIENT
Start: 2018-07-24 | End: 2018-07-27 | Stop reason: HOSPADM

## 2018-07-24 RX ORDER — ISOSORBIDE MONONITRATE 30 MG/1
30 TABLET, EXTENDED RELEASE ORAL DAILY
Status: DISCONTINUED | OUTPATIENT
Start: 2018-07-24 | End: 2018-07-25

## 2018-07-24 RX ORDER — FLUTICASONE FUROATE AND VILANTEROL 200; 25 UG/1; UG/1
1 POWDER RESPIRATORY (INHALATION) DAILY
Status: DISCONTINUED | OUTPATIENT
Start: 2018-07-24 | End: 2018-07-24 | Stop reason: CLARIF

## 2018-07-24 RX ORDER — FOLIC ACID 1 MG/1
1 TABLET ORAL DAILY
Status: DISCONTINUED | OUTPATIENT
Start: 2018-07-24 | End: 2018-07-27 | Stop reason: HOSPADM

## 2018-07-24 RX ORDER — PANTOPRAZOLE SODIUM 20 MG/1
20 TABLET, DELAYED RELEASE ORAL
Status: DISCONTINUED | OUTPATIENT
Start: 2018-07-25 | End: 2018-07-24

## 2018-07-24 RX ORDER — HYDROCODONE BITARTRATE AND ACETAMINOPHEN 5; 325 MG/1; MG/1
1 TABLET ORAL EVERY 6 HOURS PRN
COMMUNITY
End: 2018-08-12

## 2018-07-24 RX ORDER — DOCUSATE SODIUM 100 MG/1
100 CAPSULE, LIQUID FILLED ORAL 2 TIMES DAILY
Status: DISCONTINUED | OUTPATIENT
Start: 2018-07-24 | End: 2018-07-27 | Stop reason: HOSPADM

## 2018-07-24 RX ORDER — SODIUM CHLORIDE 0.9 % (FLUSH) 0.9 %
10 SYRINGE (ML) INJECTION PRN
Status: DISCONTINUED | OUTPATIENT
Start: 2018-07-24 | End: 2018-07-27 | Stop reason: HOSPADM

## 2018-07-24 RX ORDER — SODIUM CHLORIDE 0.9 % (FLUSH) 0.9 %
10 SYRINGE (ML) INJECTION EVERY 12 HOURS SCHEDULED
Status: DISCONTINUED | OUTPATIENT
Start: 2018-07-24 | End: 2018-07-27 | Stop reason: HOSPADM

## 2018-07-24 RX ORDER — FLUDROCORTISONE ACETATE 0.1 MG/1
0.1 TABLET ORAL DAILY
Status: DISCONTINUED | OUTPATIENT
Start: 2018-07-24 | End: 2018-07-27 | Stop reason: HOSPADM

## 2018-07-24 RX ORDER — ALBUTEROL SULFATE 90 UG/1
2 AEROSOL, METERED RESPIRATORY (INHALATION) EVERY 6 HOURS PRN
Status: DISCONTINUED | OUTPATIENT
Start: 2018-07-24 | End: 2018-07-27 | Stop reason: HOSPADM

## 2018-07-24 RX ADMIN — DOCUSATE SODIUM 100 MG: 100 CAPSULE, LIQUID FILLED ORAL at 20:48

## 2018-07-24 RX ADMIN — APIXABAN 2.5 MG: 2.5 TABLET, FILM COATED ORAL at 12:47

## 2018-07-24 RX ADMIN — ISOSORBIDE MONONITRATE 30 MG: 30 TABLET, EXTENDED RELEASE ORAL at 18:44

## 2018-07-24 RX ADMIN — Medication 10 ML: at 20:48

## 2018-07-24 RX ADMIN — ENOXAPARIN SODIUM 40 MG: 40 INJECTION, SOLUTION INTRAVENOUS; SUBCUTANEOUS at 11:08

## 2018-07-24 RX ADMIN — FOLIC ACID 1 MG: 1 TABLET ORAL at 12:47

## 2018-07-24 RX ADMIN — MOMETASONE FUROATE AND FORMOTEROL FUMARATE DIHYDRATE 2 PUFF: 200; 5 AEROSOL RESPIRATORY (INHALATION) at 20:39

## 2018-07-24 RX ADMIN — DOFETILIDE 500 MCG: 0.25 CAPSULE ORAL at 11:03

## 2018-07-24 RX ADMIN — DOFETILIDE 500 MCG: 0.25 CAPSULE ORAL at 20:48

## 2018-07-24 RX ADMIN — FLUDROCORTISONE ACETATE 0.1 MG: 0.1 TABLET ORAL at 18:44

## 2018-07-24 RX ADMIN — APIXABAN 2.5 MG: 2.5 TABLET, FILM COATED ORAL at 20:48

## 2018-07-24 RX ADMIN — Medication 10 ML: at 11:28

## 2018-07-24 RX ADMIN — LEVOTHYROXINE SODIUM 112 MCG: 112 TABLET ORAL at 12:47

## 2018-07-24 ASSESSMENT — PAIN SCALES - GENERAL: PAINLEVEL_OUTOF10: 0

## 2018-07-24 NOTE — PLAN OF CARE
Called [facility] mail order about medications. Discussed it with Dr Michelle Collado. List complete.  Patient unable to give accurate list.  Sai Goldman

## 2018-07-24 NOTE — PROGRESS NOTES
[facility] to see naz Pate.

## 2018-07-24 NOTE — H&P
Jerel Ramos CMA am scribing for and in the presence of Dr. Reinaldo Latif    Patient: Babita Murrieta  : 1933  Date of Admission: 2018  Primary Care Physician: Raina Schultz  Today's Date: 2018    REASON FOR CONSULTATION: paroxysmal atrial fibrillation with failed amiodarone therapy    HPI:  Ms. Yakelin Moctezuma is a 80 y.o. female who has been electively admitted to the hospital for dofetilide loading because of recurrent, symptomatic, paroxysmal atrial fibrillation. She had been taking Amiodarone up until last week but has since been stopped due to side effects including uncontrolled hypothyroidism. Exercise Tolerance: She reports having a a good exercise tolerance. Ms. Yakelin Moctezuma says that she can walk more than 1 block without developing chest discomfort and/or significant shortness of breath. Ms. Yakelin Moctezuma has a history of paroxysmal atrial fibrillation for several years and has had past hospitalizations for this in the past. Having said this, Ms. Yakelin Moctezuma denies any current symptoms of chest pain, chest pressure or other symptoms other than some tiredness. She denied any current or recent chest pain, abdominal pain, bleeding problems, problems with her medications or any other concerns at this time. Past Medical History:   Diagnosis Date    Abnormal resting ECG findings 12    Normal sinus rhythm with frequent PACs in a trigeminy pattern    Abnormal resting ECG findings 6/10/2013    Severe sinus bradycardia at 50 bpm.     Anxiety     Chronic back pain     Pt. c/o upper back pain,,,,\"On the sides of my lungs\"    Hiatal hernia     Holter monitor, abnormal 12    Multiple episodes of SVT between 100 and 130 beats per minute most consistent with atrial fibrillation and/or atrial flutter with variable block.  Hypothyroidism     Insomnia     MAC (mycobacterium avium-intracellulare complex)     Normal cardiac stress test 12    low risk study.     [facility]    Found on holter monitor  and      Severe sinus bradycardia 6/10/13    At least partially drug induced.  Status post placement of implantable loop recorder 10/19/2017    LINQ IMPLANTED. [employer]    Subdural hematoma (HCC)        CURRENT ALLERGIES: Sulfa antibiotics REVIEW OF SYSTEMS: 14 systems were reviewed. Pertinent positives and negatives as above, all else negative. Past Surgical History:   Procedure Laterality Date    BRAIN SURGERY      Had a blood clot on the brain. Fell 10 feet.  CATARACT REMOVAL Bilateral      SECTION      x1    FOOT SURGERY      INSERTABLE CARDIAC MONITOR Left 10/19/2017    [facility]) [facility]/Dr Angulo    THYROID SURGERY      tumor removed    Social History:  Social History   Substance Use Topics    Smoking status: Never Smoker    Smokeless tobacco: Never Used    Alcohol use No        CURRENT MEDICATIONS:  No outpatient prescriptions have been marked as taking for the 18 encounter [facility] Encounter). FAMILY HISTORY: family history includes Stroke in her father and mother. PHYSICAL EXAM:   There were no vitals taken for this visit. There is no height or weight on file to calculate BMI. Constitutional: She is oriented to person, place, and time. She appears well-developed and well-nourished. In no acute distress. HEENT: Normocephalic and atraumatic. No JVD present. Carotid bruit is not present. No mass and no thyromegaly present. No lymphadenopathy present. Cardiovascular: Tachycardic with an irregularly, irregular rhythm, normal heart sounds and intact distal pulses. Exam reveals no gallop and no friction rub. A I/VI systolic murmur was heard at the apex of the heart without significant radiation. Pulmonary/Chest: Effort normal and breath sounds normal. No respiratory distress. She has no wheezes, rhonchi or rales. Abdominal: Soft, non-tender.  Bowel sounds and aorta are normal. She exhibits no organomegaly, less than 500 ms. If his QTC goes over 500 after the first dose will plan on making a dosing adjustment, but after that we will have to discontinue Tikosyn loading if her QTc goes over 500 ms. We will plan on loading the medication over 3 days. Finally, I would like to have her BMP and magnesium levels repeated daily. Once again, thank you for allowing me to participate in this patients care. Please do not hesitate to contact me could I be of further assistance. Sincerely,  Shira Shaw. Kev DAIGLE, MS, F.A.C.C. [facility]   [address]  [address] [facility]  Phone: [phone], Fax: [phone]    I believe the potential risk of significant complications, morbidity, or mortality from the patients current medical problems are: intermediate-high    The documentation recorded by the scribe, accurately and completely reflects the services I personally performed and the decisions made by me. Davis Reyes.  Corey Anton MD, MS, F.A.C.C. 7/24/2018

## 2018-07-25 LAB
ANION GAP SERPL CALCULATED.3IONS-SCNC: 11 MMOL/L (ref 9–17)
BUN BLDV-MCNC: 17 MG/DL (ref 8–23)
BUN/CREAT BLD: 18 (ref 9–20)
CALCIUM SERPL-MCNC: 8.9 MG/DL (ref 8.6–10.4)
CHLORIDE BLD-SCNC: 107 MMOL/L (ref 98–107)
CO2: 28 MMOL/L (ref 20–31)
CREAT SERPL-MCNC: 0.93 MG/DL (ref 0.5–0.9)
EKG ATRIAL RATE: 61 BPM
EKG ATRIAL RATE: 62 BPM
EKG P AXIS: 69 DEGREES
EKG P AXIS: 86 DEGREES
EKG P-R INTERVAL: 206 MS
EKG P-R INTERVAL: 214 MS
EKG Q-T INTERVAL: 490 MS
EKG Q-T INTERVAL: 495 MS
EKG QRS DURATION: 84 MS
EKG QRS DURATION: 90 MS
EKG QTC CALCULATION (BAZETT): 498 MS
EKG QTC CALCULATION (BAZETT): 499 MS
EKG R AXIS: 46 DEGREES
EKG R AXIS: 51 DEGREES
EKG T AXIS: 53 DEGREES
EKG T AXIS: 58 DEGREES
EKG VENTRICULAR RATE: 61 BPM
EKG VENTRICULAR RATE: 62 BPM
GFR AFRICAN AMERICAN: >60 ML/MIN
GFR NON-AFRICAN AMERICAN: 57 ML/MIN
GFR SERPL CREATININE-BSD FRML MDRD: ABNORMAL ML/MIN/{1.73_M2}
GFR SERPL CREATININE-BSD FRML MDRD: ABNORMAL ML/MIN/{1.73_M2}
GLUCOSE BLD-MCNC: 107 MG/DL (ref 70–99)
MAGNESIUM: 2.3 MG/DL (ref 1.6–2.6)
POTASSIUM SERPL-SCNC: 3.5 MMOL/L (ref 3.7–5.3)
SODIUM BLD-SCNC: 146 MMOL/L (ref 135–144)

## 2018-07-25 PROCEDURE — 2580000003 HC RX 258: Performed by: FAMILY MEDICINE

## 2018-07-25 PROCEDURE — 83735 ASSAY OF MAGNESIUM: CPT

## 2018-07-25 PROCEDURE — 93005 ELECTROCARDIOGRAM TRACING: CPT

## 2018-07-25 PROCEDURE — 94640 AIRWAY INHALATION TREATMENT: CPT

## 2018-07-25 PROCEDURE — 80048 BASIC METABOLIC PNL TOTAL CA: CPT

## 2018-07-25 PROCEDURE — 6370000000 HC RX 637 (ALT 250 FOR IP): Performed by: FAMILY MEDICINE

## 2018-07-25 PROCEDURE — 36415 COLL VENOUS BLD VENIPUNCTURE: CPT

## 2018-07-25 PROCEDURE — 1200000000 HC SEMI PRIVATE

## 2018-07-25 PROCEDURE — 99233 SBSQ HOSP IP/OBS HIGH 50: CPT | Performed by: FAMILY MEDICINE

## 2018-07-25 RX ORDER — ACETAMINOPHEN 500 MG
1000 TABLET ORAL EVERY 6 HOURS PRN
Status: DISCONTINUED | OUTPATIENT
Start: 2018-07-25 | End: 2018-07-27 | Stop reason: HOSPADM

## 2018-07-25 RX ORDER — DOFETILIDE 0.25 MG/1
250 CAPSULE ORAL EVERY 12 HOURS SCHEDULED
Status: DISCONTINUED | OUTPATIENT
Start: 2018-07-25 | End: 2018-07-27 | Stop reason: HOSPADM

## 2018-07-25 RX ORDER — IBUPROFEN 400 MG/1
400 TABLET ORAL EVERY 6 HOURS PRN
Status: DISCONTINUED | OUTPATIENT
Start: 2018-07-25 | End: 2018-07-27 | Stop reason: HOSPADM

## 2018-07-25 RX ADMIN — ISOSORBIDE MONONITRATE 30 MG: 30 TABLET, EXTENDED RELEASE ORAL at 09:17

## 2018-07-25 RX ADMIN — MOMETASONE FUROATE AND FORMOTEROL FUMARATE DIHYDRATE 2 PUFF: 200; 5 AEROSOL RESPIRATORY (INHALATION) at 09:10

## 2018-07-25 RX ADMIN — FOLIC ACID 1 MG: 1 TABLET ORAL at 09:16

## 2018-07-25 RX ADMIN — DOFETILIDE 250 MCG: 0.25 CAPSULE ORAL at 09:16

## 2018-07-25 RX ADMIN — DOCUSATE SODIUM 100 MG: 100 CAPSULE, LIQUID FILLED ORAL at 21:04

## 2018-07-25 RX ADMIN — LEVOTHYROXINE SODIUM 112 MCG: 112 TABLET ORAL at 06:42

## 2018-07-25 RX ADMIN — APIXABAN 2.5 MG: 2.5 TABLET, FILM COATED ORAL at 21:03

## 2018-07-25 RX ADMIN — DOFETILIDE 250 MCG: 0.25 CAPSULE ORAL at 21:04

## 2018-07-25 RX ADMIN — FLUDROCORTISONE ACETATE 0.1 MG: 0.1 TABLET ORAL at 09:16

## 2018-07-25 RX ADMIN — Medication 10 ML: at 09:19

## 2018-07-25 RX ADMIN — ACETAMINOPHEN 1000 MG: 500 TABLET ORAL at 23:21

## 2018-07-25 RX ADMIN — APIXABAN 2.5 MG: 2.5 TABLET, FILM COATED ORAL at 09:16

## 2018-07-25 RX ADMIN — DOCUSATE SODIUM 100 MG: 100 CAPSULE, LIQUID FILLED ORAL at 09:17

## 2018-07-25 RX ADMIN — MOMETASONE FUROATE AND FORMOTEROL FUMARATE DIHYDRATE 2 PUFF: 200; 5 AEROSOL RESPIRATORY (INHALATION) at 19:26

## 2018-07-25 RX ADMIN — Medication 10 ML: at 21:04

## 2018-07-25 RX ADMIN — ACETAMINOPHEN 1000 MG: 500 TABLET ORAL at 15:39

## 2018-07-25 ASSESSMENT — PAIN DESCRIPTION - LOCATION
LOCATION: HEAD
LOCATION: HEAD

## 2018-07-25 ASSESSMENT — PAIN DESCRIPTION - DESCRIPTORS: DESCRIPTORS: HEADACHE

## 2018-07-25 ASSESSMENT — PAIN SCALES - GENERAL
PAINLEVEL_OUTOF10: 0
PAINLEVEL_OUTOF10: 6
PAINLEVEL_OUTOF10: 10
PAINLEVEL_OUTOF10: 0

## 2018-07-25 ASSESSMENT — PAIN DESCRIPTION - PAIN TYPE
TYPE: ACUTE PAIN
TYPE: ACUTE PAIN

## 2018-07-25 ASSESSMENT — PAIN DESCRIPTION - ORIENTATION: ORIENTATION: POSTERIOR

## 2018-07-25 NOTE — PROGRESS NOTES
Writer showed Dr. Claudia Velarde patients EKG while he was up on the 3rd floor. Verified next dose of Tikosyn.

## 2018-07-25 NOTE — CONSULTS
Mary Rogers am scribing for and in the presence of Dr. Eric Flores    Patient: Nighat Naik   : 1933  Date of Admission: 2018  Primary Care Physician: Hamilton Irene  Today's Date: 2018    REASON FOR CONSULTATION: Paroxysmal atrial fibrillation, Tikosyn loading    SUBJECTIVE: Ms. Jadyn Sesay reports feeling relatively ok today but does say she had a moderate to severe headache this morning that had went down the back of her neck. She denies any chest pain or chest pressure overnight. She denies any problems with her medications or any other concerns this time. OBJECTIVE:    CURRENT HOSPITAL MEDS:    dofetilide (TIKOSYN) capsule 250 mcg 2 times per day   acetaminophen (TYLENOL) tablet 1,000 mg Q6H PRN   pneumococcal 13-valent conjugate (PREVNAR) injection 0.5 mL Once   sodium chloride flush 0.9 % injection 10 mL 2 times per day   sodium chloride flush 0.9 % injection 10 mL PRN   magnesium hydroxide (MILK OF MAGNESIA) 400 MG/5ML suspension 30 mL Daily PRN   nitroGLYCERIN (NITROSTAT) SL tablet 0.4 mg Q5 Min PRN   docusate sodium (COLACE) capsule 100 mg BID   apixaban (ELIQUIS) tablet 2.5 mg BID   albuterol sulfate  (90 Base) MCG/ACT inhaler 2 puff Q6H PRN   levothyroxine (SYNTHROID) tablet 137 mcg Daily   folic acid (FOLVITE) tablet 1 mg Daily   omeprazole (PRILOSEC) delayed release capsule 20 mg Daily   isosorbide mononitrate (IMDUR) extended release tablet 30 mg Daily   fludrocortisone (FLORINEF) tablet 0.1 mg Daily   mometasone-formoterol (DULERA) 200-5 MCG/ACT inhaler 2 puff BID       PREVIOUS OUTPATIENT MEDS:  No current facility-administered medications on file prior to encounter.       Current Outpatient Prescriptions on File Prior to Encounter   Medication Sig Dispense Refill    levothyroxine (SYNTHROID) 112 MCG tablet Take 1 tablet by mouth Daily 90 tablet 3    diltiazem (CARDIZEM CD) 120 MG extended release capsule Take 1 capsule by mouth daily 90 capsule 3    apixaban don't hesitate to contact me could I be of further assistance. · Headache  · Anti-anginal: Hold Imdur to see if this is culprit to her headaches. · Pain Relief: I ordered Tylenol and Aleve to help better control this as needed. Sincerely,  Derek Braden. Kev DAIGLE, MS, F.A.C.C. [facility]  [address] [place] [address], [address] [place] [address]  Phone: [phone], Fax: [phone]    I believe that the risk of significant morbidity and mortality related to the patient's current medical conditions are: intermediate-high    The documentation recorded by the scribe, accurately and completely reflects the services I personally performed and the decisions made by me. Narayan Lee.  Ward Essex MD, MS, F.A.C.C. 7/25/2018

## 2018-07-26 LAB
ANION GAP SERPL CALCULATED.3IONS-SCNC: 9 MMOL/L (ref 9–17)
BUN BLDV-MCNC: 16 MG/DL (ref 8–23)
BUN/CREAT BLD: 21 (ref 9–20)
CALCIUM SERPL-MCNC: 8.7 MG/DL (ref 8.6–10.4)
CHLORIDE BLD-SCNC: 109 MMOL/L (ref 98–107)
CO2: 27 MMOL/L (ref 20–31)
CREAT SERPL-MCNC: 0.75 MG/DL (ref 0.5–0.9)
EKG ATRIAL RATE: 59 BPM
EKG ATRIAL RATE: 64 BPM
EKG P AXIS: 76 DEGREES
EKG P AXIS: 90 DEGREES
EKG P-R INTERVAL: 206 MS
EKG P-R INTERVAL: 208 MS
EKG Q-T INTERVAL: 384 MS
EKG Q-T INTERVAL: 496 MS
EKG QRS DURATION: 90 MS
EKG QRS DURATION: 90 MS
EKG QTC CALCULATION (BAZETT): 380 MS
EKG QTC CALCULATION (BAZETT): 511 MS
EKG R AXIS: 44 DEGREES
EKG R AXIS: 45 DEGREES
EKG T AXIS: 48 DEGREES
EKG T AXIS: 50 DEGREES
EKG VENTRICULAR RATE: 59 BPM
EKG VENTRICULAR RATE: 64 BPM
GFR AFRICAN AMERICAN: >60 ML/MIN
GFR NON-AFRICAN AMERICAN: >60 ML/MIN
GFR SERPL CREATININE-BSD FRML MDRD: ABNORMAL ML/MIN/{1.73_M2}
GFR SERPL CREATININE-BSD FRML MDRD: ABNORMAL ML/MIN/{1.73_M2}
GLUCOSE BLD-MCNC: 87 MG/DL (ref 70–99)
MAGNESIUM: 2.4 MG/DL (ref 1.6–2.6)
POTASSIUM SERPL-SCNC: 3.6 MMOL/L (ref 3.7–5.3)
SODIUM BLD-SCNC: 145 MMOL/L (ref 135–144)

## 2018-07-26 PROCEDURE — 99232 SBSQ HOSP IP/OBS MODERATE 35: CPT | Performed by: FAMILY MEDICINE

## 2018-07-26 PROCEDURE — 93005 ELECTROCARDIOGRAM TRACING: CPT

## 2018-07-26 PROCEDURE — 6370000000 HC RX 637 (ALT 250 FOR IP): Performed by: FAMILY MEDICINE

## 2018-07-26 PROCEDURE — 94640 AIRWAY INHALATION TREATMENT: CPT

## 2018-07-26 PROCEDURE — 2580000003 HC RX 258: Performed by: FAMILY MEDICINE

## 2018-07-26 PROCEDURE — 36415 COLL VENOUS BLD VENIPUNCTURE: CPT

## 2018-07-26 PROCEDURE — 83735 ASSAY OF MAGNESIUM: CPT

## 2018-07-26 PROCEDURE — 1200000000 HC SEMI PRIVATE

## 2018-07-26 PROCEDURE — 80048 BASIC METABOLIC PNL TOTAL CA: CPT

## 2018-07-26 RX ORDER — POTASSIUM CHLORIDE 20 MEQ/1
20 TABLET, EXTENDED RELEASE ORAL 2 TIMES DAILY WITH MEALS
Status: COMPLETED | OUTPATIENT
Start: 2018-07-26 | End: 2018-07-27

## 2018-07-26 RX ADMIN — APIXABAN 2.5 MG: 2.5 TABLET, FILM COATED ORAL at 08:23

## 2018-07-26 RX ADMIN — DOFETILIDE 250 MCG: 0.25 CAPSULE ORAL at 08:23

## 2018-07-26 RX ADMIN — MAGNESIUM HYDROXIDE 30 ML: 400 SUSPENSION ORAL at 07:00

## 2018-07-26 RX ADMIN — Medication 10 ML: at 08:25

## 2018-07-26 RX ADMIN — Medication 10 ML: at 20:58

## 2018-07-26 RX ADMIN — DOFETILIDE 250 MCG: 0.25 CAPSULE ORAL at 20:56

## 2018-07-26 RX ADMIN — LEVOTHYROXINE SODIUM 112 MCG: 112 TABLET ORAL at 06:57

## 2018-07-26 RX ADMIN — POTASSIUM CHLORIDE 20 MEQ: 20 TABLET, EXTENDED RELEASE ORAL at 18:54

## 2018-07-26 RX ADMIN — FOLIC ACID 1 MG: 1 TABLET ORAL at 08:23

## 2018-07-26 RX ADMIN — APIXABAN 2.5 MG: 2.5 TABLET, FILM COATED ORAL at 20:56

## 2018-07-26 RX ADMIN — MOMETASONE FUROATE AND FORMOTEROL FUMARATE DIHYDRATE 2 PUFF: 200; 5 AEROSOL RESPIRATORY (INHALATION) at 20:27

## 2018-07-26 RX ADMIN — MOMETASONE FUROATE AND FORMOTEROL FUMARATE DIHYDRATE 2 PUFF: 200; 5 AEROSOL RESPIRATORY (INHALATION) at 09:54

## 2018-07-26 RX ADMIN — DOCUSATE SODIUM 100 MG: 100 CAPSULE, LIQUID FILLED ORAL at 20:56

## 2018-07-26 RX ADMIN — FLUDROCORTISONE ACETATE 0.1 MG: 0.1 TABLET ORAL at 08:23

## 2018-07-26 RX ADMIN — DOCUSATE SODIUM 100 MG: 100 CAPSULE, LIQUID FILLED ORAL at 08:23

## 2018-07-26 RX ADMIN — IBUPROFEN 400 MG: 400 TABLET, FILM COATED ORAL at 06:57

## 2018-07-26 ASSESSMENT — PAIN DESCRIPTION - ORIENTATION: ORIENTATION: POSTERIOR

## 2018-07-26 ASSESSMENT — PAIN SCALES - GENERAL
PAINLEVEL_OUTOF10: 8
PAINLEVEL_OUTOF10: 0

## 2018-07-26 ASSESSMENT — PAIN DESCRIPTION - PAIN TYPE: TYPE: ACUTE PAIN

## 2018-07-26 ASSESSMENT — PAIN DESCRIPTION - LOCATION: LOCATION: HEAD

## 2018-07-26 ASSESSMENT — PAIN DESCRIPTION - DESCRIPTORS: DESCRIPTORS: HEADACHE

## 2018-07-26 NOTE — PLAN OF CARE
Problem: SAFETY  Goal: Free from accidental physical injury  Outcome: Ongoing  Pt's bed in lowest position, wheels locked, call light and bedside table within reach. Pt is A&O, safely ambulates in room independently. Room remains free of clutter. Pt uses call light appropriately. Problem: DAILY CARE  Goal: Daily care needs are met  Outcome: Ongoing  Pt is independent in room. Problem: PAIN  Goal: Patient's pain/discomfort is manageable  Outcome: Ongoing  Pain assessments done frequently. Pt has some complaints of posterior headache. Medication provided with relief. Will continue to provide pain assessments. Problem: SKIN INTEGRITY  Goal: Skin integrity is maintained or improved  Outcome: Ongoing  Pt can turn and reposition self. Problem: Cardiac Output - Decreased:  Goal: Hemodynamic stability will improve  Hemodynamic stability will improve   Outcome: Ongoing  Foot of bed elevated. Pt's VS, I&O, and telemetry monitored.

## 2018-07-26 NOTE — PLAN OF CARE
Problem: SAFETY  Goal: Free from accidental physical injury  Outcome: Ongoing  Patient is alert and oriented and has demonstrated the use of using the call light for assistance before getting up. Education has been provided to defer the use of the bed alarm and/or restraint free alarm as the patient has shown competency in calling for assistance and verbalizing the risk. Problem: DAILY CARE  Goal: Daily care needs are met  Outcome: Ongoing  Daily care needs are met with assistance with maximum encouragement being given       Problem: PAIN  Goal: Patient's pain/discomfort is manageable  Outcome: Ongoing  Patient is able to verbalize when in pain with the use of a 0-10 scale. Pain medication is administered as ordered with relief. Pain is assessed upon routine assessments and as needed. Problem: SKIN INTEGRITY  Goal: Skin integrity is maintained or improved  Outcome: Ongoing  Patient is able to turn self while in the bed and can self regulate the bed as well. There are no new open areas or redness noted upon assessments. Will continue to assess       Problem: KNOWLEDGE DEFICIT  Goal: Patient/S.O. demonstrates understanding of disease process, treatment plan, medications, and discharge instructions. Outcome: Ongoing  Patient understands disease process, treatment plan, and medications.  Will continue to assess

## 2018-07-26 NOTE — PROGRESS NOTES
Dusty Whitt am scribing for and in the presence of Dr. Corey Anton    Patient: Brenda Hinojosa  : 1933  Date of Admission: 2018  Primary Care Physician: Jarocho Fraga  Today's Date: 2018    REASON FOR CONSULTATION: Paroxysmal atrial fibrillation, Tikosyn loading    SUBJECTIVE: Ms. Casey Donovan reports feeling well today other than she did have a headache again but daughter did say she was a little stressed out because her phone had broken and Ms. Casey Donovan  Confirmed this but says she is feeling better since she got a new phone. She denies any chest pain, palpitations or chest pressure overnight. She denies any problems with her medications or any other concerns this time. OBJECTIVE:    CURRENT HOSPITAL MEDS:    potassium chloride (KLOR-CON M) extended release tablet 20 mEq BID WC   dofetilide (TIKOSYN) capsule 250 mcg 2 times per day   acetaminophen (TYLENOL) tablet 1,000 mg Q6H PRN   ibuprofen (ADVIL;MOTRIN) tablet 400 mg Q6H PRN   pneumococcal 13-valent conjugate (PREVNAR) injection 0.5 mL Once   sodium chloride flush 0.9 % injection 10 mL 2 times per day   sodium chloride flush 0.9 % injection 10 mL PRN   magnesium hydroxide (MILK OF MAGNESIA) 400 MG/5ML suspension 30 mL Daily PRN   nitroGLYCERIN (NITROSTAT) SL tablet 0.4 mg Q5 Min PRN   docusate sodium (COLACE) capsule 100 mg BID   apixaban (ELIQUIS) tablet 2.5 mg BID   albuterol sulfate  (90 Base) MCG/ACT inhaler 2 puff Q6H PRN   levothyroxine (SYNTHROID) tablet 199 mcg Daily   folic acid (FOLVITE) tablet 1 mg Daily   omeprazole (PRILOSEC) delayed release capsule 20 mg Daily   fludrocortisone (FLORINEF) tablet 0.1 mg Daily   mometasone-formoterol (DULERA) 200-5 MCG/ACT inhaler 2 puff BID       PREVIOUS OUTPATIENT MEDS:  No current facility-administered medications on file prior to encounter.       Current Outpatient Prescriptions on File Prior to Encounter   Medication Sig Dispense Refill    levothyroxine (SYNTHROID) 112 MCG gallop and no friction rub. No murmur was heard. Pulmonary/Chest: Effort normal and breath sounds normal. No respiratory distress. She has no wheezes, rhonchi or rales. Abdominal: Soft, non-tender. Bowel sounds and aorta are normal. She exhibits no organomegaly, mass or bruit. Extremities: No edema, cyanosis, or clubbing. Pulses are 2+ radial/carotid/dorsalis pedis and posterior tibial bilaterally. Neurological: She is alert and oriented to person, place, and time. No evidence of gross cranial nerve deficit. Coordination appeared normal.   Skin: Skin is warm and dry. There is no rash or diaphoresis. Psychiatric: She has a normal mood and affect. Her speech is normal and behavior is normal.      MOST RECENT LABS ON RECORD:   Lab Results   Component Value Date    WBC 6.9 01/08/2018    HGB 12.2 01/08/2018    HCT 38.0 01/08/2018     01/08/2018    ALT 16 01/08/2018    AST 20 01/08/2018     (H) 07/26/2018    K 3.6 (L) 07/26/2018     (H) 07/26/2018    CREATININE 0.75 07/26/2018    BUN 16 07/26/2018    CO2 27 07/26/2018    TSH 36.92 (H) 07/09/2018         ASSESSMENT:   Patient Active Problem List   Diagnosis    Paroxysmal atrial fibrillation (HCC)    Chest pressure    Mycobacterium avium-intracellulare complex (HCC)    Chronic cough    Dermatitis    Allergic rhinitis    Severe sinus bradycardia    Abnormal resting ECG findings    Recurrent chest pain    Chronic pulmonary aspiration    GERD (gastroesophageal reflux disease)    Influenza A with respiratory manifestations    Syncope and collapse    Slow transit constipation    Anticoagulation management encounter    Other fatigue    [facility]    Visit for monitoring Tikosyn therapy    Adverse effect of amiodarone     PLAN: From a cardiovascular standpoint I am relatively pleased with Ms. Esmer Olivares  current condition. She appears to be tolerating her Tikosyn well without any significant arrhythmias.  Her corrected QT remains less than 500 ms and therefore will plan on continuing her current dose without changes. Assuming she does not develop any problems between now and tomorrow, I think it should be safe to discharge Ms. Josh Lucas home tomorrow once she has his 7th scheduled dose of Tikosyn around 8 am and has completed her 2 hour post dosing ECG. She did say when she is discharged she would like the Tikosyn sent to her [address]. Once again, thank you for allowing me to explain this patients care. Please don't hesitate to contact me could I be of further assistance. She also had some mild hypokalemia so I gave her 40 meq of KCL orally. Sincerely,  Kane Kirkland. Kev DAIGLE, MS, F.A.C.C. [facility]  [address] [place] [address]  Phone: [phone], Fax: [phone]    I believe that the risk of significant morbidity and mortality related to the patient's current medical conditions are: intermediate-high    The documentation recorded by the scribe, accurately and completely reflects the services I personally performed and the decisions made by me. Kota Adjutant.  Shon Lucas MD, MS, F.A.C.C. 7/26/2018

## 2018-07-26 NOTE — PROGRESS NOTES
Nutrition Assessment    Type and Reason for Visit: Initial    Nutrition Recommendations:   Continue current diet. Malnutrition Assessment:  · Malnutrition Status: At risk for malnutrition  · Context: Acute illness or injury  · Findings of the 6 clinical characteristics of malnutrition (Minimum of 2 out of 6 clinical characteristics is required to make the diagnosis of moderate or severe Protein Calorie Malnutrition based on AND/ASPEN Guidelines):  1. Energy Intake-Greater than 75%,      2. Weight Loss-No significant weight loss,    3. Fat Loss-No significant subcutaneous fat loss,    4. Muscle Loss-Moderate muscle mass loss, Clavicles (pectoralis and deltoids)  5. Fluid Accumulation-No significant fluid accumulation,    6.  Strength-Not measured    Nutrition Diagnosis:   · Problem: No nutrition diagnosis at this time  · Etiology: related to       Signs and symptoms:  as evidenced by      Nutrition Assessment:  · Subjective Assessment: She does not follow a diet at home and declined cardiac diet education. Pt states of typically eating 2 meals per day. She denied any weight changes. NKFA. She reports of eating a lot of vegetables. UBW #.    · Nutrition-Focused Physical Findings: Pt with evident visual S/Sx of malnutrition  · Wound Type: None  · Current Nutrition Therapies:  · Oral Diet Orders: Cardiac   · Oral Diet intake: %, Select  · Anthropometric Measures:  · Ht: 5' 1\" (154.9 cm)   · Current Body Wt: 106 lb 8 oz (48.3 kg)  · Admission Body Wt: 105 lb 1.6 oz (47.7 kg)  · Usual Body Wt: 100 lb (45.4 kg) (#)  · % Weight Change: 17.8#/20% gain,  4 months  · Ideal Body Wt: 105 lb (47.6 kg), % Ideal Body 102%  · BMI Classification: BMI 18.5 - 24.9 Normal Weight  Wt Readings from Last 10 Encounters:   07/26/18 106 lb 8 oz (48.3 kg)   07/09/18 100 lb 12.8 oz (45.7 kg)   03/19/18 89 lb (40.4 kg)   01/08/18 98 lb (44.5 kg)   11/20/17 97 lb 12.8 oz (44.4 kg)   10/19/17 95 lb 0.3 oz (43.1 kg) 10/11/17 95 lb (43.1 kg)   09/18/17 96 lb (43.5 kg)   07/17/17 97 lb 12.8 oz (44.4 kg)   03/06/17 100 lb (45.4 kg)     Recent Labs      07/25/18   1415  07/26/18   0543   NA  146*  145*   K  3.5*  3.6*   CL  107  109*   CO2  28  27   BUN  17  16   CREATININE  0.93*  0.75   GLUCOSE  107*  87   GFR                              Lab Results   Component Value Date    LABALBU 4.0 01/08/2018    Estimated Intake vs Estimated Needs: Intake Improving    Nutrition Risk Level: Moderate  Pt with good PO recorded. Na and Cl are elevated, K+ is low. Pt reports eating protein foods. F/u as needed.    Nutrition Interventions:      Continued Inpatient Monitoring, Education declined, Coordination of Care    Nutrition Evaluation:   · Evaluation: Goals set   · Goals: PO > 75% of meals    · Monitoring: Meal Intake, Weight, Pertinent Labs    Electronically signed by Jaye Sánchez RD, LEANDRO on 7/26/18 at 10:47 AM    Contact Number: [phone]

## 2018-07-27 VITALS
HEIGHT: 61 IN | WEIGHT: 102.2 LBS | TEMPERATURE: 98.1 F | SYSTOLIC BLOOD PRESSURE: 143 MMHG | RESPIRATION RATE: 16 BRPM | OXYGEN SATURATION: 97 % | DIASTOLIC BLOOD PRESSURE: 73 MMHG | BODY MASS INDEX: 19.3 KG/M2 | HEART RATE: 61 BPM

## 2018-07-27 LAB
ANION GAP SERPL CALCULATED.3IONS-SCNC: 8 MMOL/L (ref 9–17)
BUN BLDV-MCNC: 21 MG/DL (ref 8–23)
BUN/CREAT BLD: 28 (ref 9–20)
CALCIUM SERPL-MCNC: 8.7 MG/DL (ref 8.6–10.4)
CHLORIDE BLD-SCNC: 110 MMOL/L (ref 98–107)
CO2: 24 MMOL/L (ref 20–31)
CREAT SERPL-MCNC: 0.75 MG/DL (ref 0.5–0.9)
EKG ATRIAL RATE: 59 BPM
EKG ATRIAL RATE: 65 BPM
EKG P AXIS: 74 DEGREES
EKG P AXIS: 82 DEGREES
EKG P-R INTERVAL: 188 MS
EKG P-R INTERVAL: 216 MS
EKG Q-T INTERVAL: 462 MS
EKG Q-T INTERVAL: 496 MS
EKG QRS DURATION: 82 MS
EKG QRS DURATION: 90 MS
EKG QTC CALCULATION (BAZETT): 480 MS
EKG QTC CALCULATION (BAZETT): 491 MS
EKG R AXIS: 41 DEGREES
EKG R AXIS: 49 DEGREES
EKG T AXIS: 38 DEGREES
EKG T AXIS: 54 DEGREES
EKG VENTRICULAR RATE: 59 BPM
EKG VENTRICULAR RATE: 65 BPM
GFR AFRICAN AMERICAN: >60 ML/MIN
GFR NON-AFRICAN AMERICAN: >60 ML/MIN
GFR SERPL CREATININE-BSD FRML MDRD: ABNORMAL ML/MIN/{1.73_M2}
GFR SERPL CREATININE-BSD FRML MDRD: ABNORMAL ML/MIN/{1.73_M2}
GLUCOSE BLD-MCNC: 81 MG/DL (ref 70–99)
MAGNESIUM: 2.6 MG/DL (ref 1.6–2.6)
POTASSIUM SERPL-SCNC: 4.6 MMOL/L (ref 3.7–5.3)
SODIUM BLD-SCNC: 142 MMOL/L (ref 135–144)

## 2018-07-27 PROCEDURE — 2580000003 HC RX 258: Performed by: FAMILY MEDICINE

## 2018-07-27 PROCEDURE — G0009 ADMIN PNEUMOCOCCAL VACCINE: HCPCS | Performed by: FAMILY MEDICINE

## 2018-07-27 PROCEDURE — 6360000002 HC RX W HCPCS: Performed by: FAMILY MEDICINE

## 2018-07-27 PROCEDURE — 93005 ELECTROCARDIOGRAM TRACING: CPT

## 2018-07-27 PROCEDURE — 6370000000 HC RX 637 (ALT 250 FOR IP): Performed by: FAMILY MEDICINE

## 2018-07-27 PROCEDURE — 80048 BASIC METABOLIC PNL TOTAL CA: CPT

## 2018-07-27 PROCEDURE — 99238 HOSP IP/OBS DSCHRG MGMT 30/<: CPT | Performed by: FAMILY MEDICINE

## 2018-07-27 PROCEDURE — 90670 PCV13 VACCINE IM: CPT | Performed by: FAMILY MEDICINE

## 2018-07-27 PROCEDURE — 94640 AIRWAY INHALATION TREATMENT: CPT

## 2018-07-27 PROCEDURE — 83735 ASSAY OF MAGNESIUM: CPT

## 2018-07-27 PROCEDURE — 36415 COLL VENOUS BLD VENIPUNCTURE: CPT

## 2018-07-27 RX ORDER — DOFETILIDE 0.25 MG/1
250 CAPSULE ORAL 2 TIMES DAILY
Qty: 14 CAPSULE | Refills: 0 | Status: SHIPPED | OUTPATIENT
Start: 2018-07-27 | End: 2018-08-20

## 2018-07-27 RX ORDER — DOFETILIDE 0.25 MG/1
250 CAPSULE ORAL 2 TIMES DAILY
Qty: 180 CAPSULE | Refills: 3 | Status: SHIPPED | OUTPATIENT
Start: 2018-07-27 | End: 2018-08-27 | Stop reason: SDUPTHER

## 2018-07-27 RX ORDER — DOFETILIDE 0.25 MG/1
250 CAPSULE ORAL EVERY 12 HOURS SCHEDULED
Qty: 60 CAPSULE | Refills: 3 | Status: SHIPPED | OUTPATIENT
Start: 2018-07-27 | End: 2018-08-20

## 2018-07-27 RX ADMIN — DOCUSATE SODIUM 100 MG: 100 CAPSULE, LIQUID FILLED ORAL at 09:38

## 2018-07-27 RX ADMIN — FOLIC ACID 1 MG: 1 TABLET ORAL at 09:38

## 2018-07-27 RX ADMIN — FLUDROCORTISONE ACETATE 0.1 MG: 0.1 TABLET ORAL at 09:38

## 2018-07-27 RX ADMIN — Medication 10 ML: at 09:38

## 2018-07-27 RX ADMIN — IBUPROFEN 400 MG: 400 TABLET, FILM COATED ORAL at 07:42

## 2018-07-27 RX ADMIN — POTASSIUM CHLORIDE 20 MEQ: 20 TABLET, EXTENDED RELEASE ORAL at 07:42

## 2018-07-27 RX ADMIN — PNEUMOCOCCAL 13-VALENT CONJUGATE VACCINE 0.5 ML: 2.2; 2.2; 2.2; 2.2; 2.2; 4.4; 2.2; 2.2; 2.2; 2.2; 2.2; 2.2; 2.2 INJECTION, SUSPENSION INTRAMUSCULAR at 09:49

## 2018-07-27 RX ADMIN — MOMETASONE FUROATE AND FORMOTEROL FUMARATE DIHYDRATE 2 PUFF: 200; 5 AEROSOL RESPIRATORY (INHALATION) at 11:22

## 2018-07-27 RX ADMIN — LEVOTHYROXINE SODIUM 112 MCG: 112 TABLET ORAL at 07:42

## 2018-07-27 RX ADMIN — DOFETILIDE 250 MCG: 0.25 CAPSULE ORAL at 09:38

## 2018-07-27 RX ADMIN — APIXABAN 2.5 MG: 2.5 TABLET, FILM COATED ORAL at 09:38

## 2018-07-27 ASSESSMENT — PAIN SCALES - GENERAL
PAINLEVEL_OUTOF10: 3
PAINLEVEL_OUTOF10: 0
PAINLEVEL_OUTOF10: 3

## 2018-07-27 ASSESSMENT — PAIN DESCRIPTION - PAIN TYPE
TYPE: ACUTE PAIN
TYPE: ACUTE PAIN

## 2018-07-27 ASSESSMENT — PAIN DESCRIPTION - LOCATION
LOCATION: HEAD
LOCATION: HEAD

## 2018-07-27 ASSESSMENT — PAIN DESCRIPTION - DESCRIPTORS: DESCRIPTORS: HEADACHE

## 2018-07-27 NOTE — PLAN OF CARE
Problem: SAFETY  Goal: Free from accidental physical injury  Outcome: Ongoing  Bed alarm on. Bed in low position and wheels locked. Call light in reach. Falling star posted on door. Yellow bracelet on. Non skid socks on. Side rails up. Will continue to assess. Problem: DAILY CARE  Goal: Daily care needs are met  Outcome: Ongoing  Daily cares assessed and needs met according to patient condition. Patient reminded to ask for help when needed. Problem: PAIN  Goal: Patient's pain/discomfort is manageable  Assess pain every 4 hours and prn using a 0-10 scale. Teach patient adverse complication of uncontrolled pain. Plan patients day so that aggravating activities coincide with peak of analgesic. Patients should be medicated before procedures and activities that incite pain to prevent spikes in pain. Provide patient with distractions of choice such as TV, music or reading. Discuss with patient what a tolerable pain rating would be and work towards that and not just eliminating all pain. Problem: Cardiac Output - Decreased:  Goal: Hemodynamic stability will improve  Hemodynamic stability will improve   Outcome: Ongoing  Vitals are stable. Monitor shows normal sinus at 74. Will continue to monitor.

## 2018-07-27 NOTE — DISCHARGE SUMMARY
Physician Discharge Summary     Patient ID:  Bettina Mae  [id number]  26 y.o.  2/13/1933    Admit date: 7/24/2018    Discharge date and time: 7/27/2018 12:10 PM     Admitting Physician: Ruddy Rayo MD     Discharge Physician: Dr. Bhupendra Arriaza    Admission Diagnoses: PA[facility]oxysmal [facility] fibrillation) ([facility] [I48.0]  [facility] [[facility]]    Discharge Diagnoses: Persistent atrial fibrillation, symptomatic    Admission Condition: Good    Discharged Condition: Good    Indication for Admission: Persistent symptomatic atrial fibrillation    Hospital Course: The patient was admitted electively for Tikosyn loading. She appeared to tolerate the new medication well without any significant side effects. The patient was discharged to home in good condition. Consults: Cardiology    Significant Diagnostic Studies: None    Discharge Exam:  See progress note.     Disposition: home    Patient Instructions:   Current Facility-Administered Medications   Medication Dose Route Frequency Provider Last Rate Last Dose    dofetilide (TIKOSYN) capsule 250 mcg  250 mcg Oral 2 times per day Ruddy Rayo MD   250 mcg at 07/27/18 3928    acetaminophen (TYLENOL) tablet 1,000 mg  1,000 mg Oral Q6H PRN Ruddy Rayo MD   1,000 mg at 07/25/18 2321    ibuprofen (ADVIL;MOTRIN) tablet 400 mg  400 mg Oral Q6H PRN Ruddy Rayo MD   400 mg at 07/27/18 4319    sodium chloride flush 0.9 % injection 10 mL  10 mL Intravenous 2 times per day Ruddy Rayo MD   10 mL at 07/27/18 5980    sodium chloride flush 0.9 % injection 10 mL  10 mL Intravenous PRN Ruddy Rayo MD   10 mL at 07/24/18 1128    magnesium hydroxide (MILK OF MAGNESIA) 400 MG/5ML suspension 30 mL  30 mL Oral Daily PRN Ruddy Rayo MD   30 mL at 07/26/18 0700    nitroGLYCERIN (NITROSTAT) SL tablet 0.4 mg  0.4 mg Sublingual Q5 Min PRN Ruddy Rayo MD        docusate sodium (COLACE) capsule 100 mg  100 mg Oral BID Ruddy Rayo MD   100 mg at 07/27/18 7304  apixaban (ELIQUIS) tablet 2.5 mg  2.5 mg Oral BID Daisy Ott MD   2.5 mg at 07/27/18 0401    albuterol sulfate  (90 Base) MCG/ACT inhaler 2 puff  2 puff Inhalation Q6H PRN Daisy Ott MD        levothyroxine (SYNTHROID) tablet 112 mcg  112 mcg Oral Daily Daisy Ott MD   112 mcg at 81/98/12 5072    folic acid (FOLVITE) tablet 1 mg  1 mg Oral Daily Daisy Ott MD   1 mg at 07/27/18 0726    omeprazole (PRILOSEC) delayed release capsule 20 mg  20 mg Oral Daily Daisy Ott MD        fludrocortisone (FLORINEF) tablet 0.1 mg  0.1 mg Oral Daily Daisy Ott MD   0.1 mg at 07/27/18 2546    mometasone-formoterol (DULERA) 200-5 MCG/ACT inhaler 2 puff  2 puff Inhalation BID Daisy Ott MD   2 puff at 07/27/18 1122     Current Outpatient Prescriptions   Medication Sig Dispense Refill    dofetilide (TIKOSYN) 250 MCG capsule Take 1 capsule by mouth every 12 hours 60 capsule 3    HYDROcodone-acetaminophen (NORCO) 5-325 MG per tablet Take 1 tablet by mouth every 6 hours as needed for Pain. Hershal Call levothyroxine (SYNTHROID) 112 MCG tablet Take 1 tablet by mouth Daily 90 tablet 3    diltiazem (CARDIZEM CD) 120 MG extended release capsule Take 1 capsule by mouth daily 90 capsule 3    apixaban (ELIQUIS) 2.5 MG TABS tablet Take 1 tablet by mouth 2 times daily 180 tablet 3    docusate sodium (COLACE) 100 MG capsule Take 1 capsule by mouth 2 times daily 90 capsule 3    dofetilide (TIKOSYN) 250 MCG capsule Take 1 capsule by mouth 2 times daily 14 capsule 0    dofetilide (TIKOSYN) 250 MCG capsule Take 1 capsule by mouth 2 times daily 180 capsule 3    folic acid (FOLVITE) 1 MG tablet Take 1 mg by mouth daily      fludrocortisone (FLORINEF) 0.1 MG tablet Take 0.1 mg by mouth daily      budesonide (PULMICORT) 0.5 MG/2ML nebulizer suspension Take 2 mLs by nebulization 2 times daily J47.9 Bronchiectasis without complication 60 ampule 11    formoterol (PERFOROMIST) 20 MCG/2ML nebulizer solution Take 2 mLs by nebulization 2 times daily for 60 doses 120 mL 11    albuterol sulfate HFA (PROAIR HFA) 108 (90 Base) MCG/ACT inhaler Inhale 2 puffs into the lungs every 6 hours as needed for Wheezing 1 Inhaler 3    Fluticasone Furoate-Vilanterol (BREO ELLIPTA) 200-25 MCG/INH AEPB Inhale 1 puff into the lungs daily 1 each 11    omeprazole (PRILOSEC) 20 MG capsule Take 20 mg by mouth daily. Activity: As Tolerated    Follow-up: Dr. Jenn Leonard in 1 month and with Dr. Da Early as previously scheduled.      SignedTerence Ripa  7/27/2018  1:55 PM

## 2018-07-27 NOTE — PROGRESS NOTES
Jac Angelo am scribing for and in the presence of Dr. Rika Altamirano    Patient: Alex Short  : 1933  Date of Admission: 2018  Primary Care Physician: Melissa Cochran  Today's Date: 2018    REASON FOR CONSULTATION: Paroxysmal atrial fibrillation, Tikosyn loading    SUBJECTIVE: Ms. Kenneth Tobias reports feeling well today. She denies any chest pain, palpitations or chest pressure overnight. She reports being ready to go home and denied concerns at this time. OBJECTIVE:    CURRENT HOSPITAL MEDS:    dofetilide (TIKOSYN) capsule 250 mcg 2 times per day   acetaminophen (TYLENOL) tablet 1,000 mg Q6H PRN   ibuprofen (ADVIL;MOTRIN) tablet 400 mg Q6H PRN   pneumococcal 13-valent conjugate (PREVNAR) injection 0.5 mL Once   sodium chloride flush 0.9 % injection 10 mL 2 times per day   sodium chloride flush 0.9 % injection 10 mL PRN   magnesium hydroxide (MILK OF MAGNESIA) 400 MG/5ML suspension 30 mL Daily PRN   nitroGLYCERIN (NITROSTAT) SL tablet 0.4 mg Q5 Min PRN   docusate sodium (COLACE) capsule 100 mg BID   apixaban (ELIQUIS) tablet 2.5 mg BID   albuterol sulfate  (90 Base) MCG/ACT inhaler 2 puff Q6H PRN   levothyroxine (SYNTHROID) tablet 754 mcg Daily   folic acid (FOLVITE) tablet 1 mg Daily   omeprazole (PRILOSEC) delayed release capsule 20 mg Daily   fludrocortisone (FLORINEF) tablet 0.1 mg Daily   mometasone-formoterol (DULERA) 200-5 MCG/ACT inhaler 2 puff BID       PREVIOUS OUTPATIENT MEDS:  No current facility-administered medications on file prior to encounter.       Current Outpatient Prescriptions on File Prior to Encounter   Medication Sig Dispense Refill    levothyroxine (SYNTHROID) 112 MCG tablet Take 1 tablet by mouth Daily 90 tablet 3    diltiazem (CARDIZEM CD) 120 MG extended release capsule Take 1 capsule by mouth daily 90 capsule 3    apixaban (ELIQUIS) 2.5 MG TABS tablet Take 1 tablet by mouth 2 times daily 180 tablet 3    docusate sodium (COLACE) 100 MG capsule Take 1 capsule by mouth 2 times daily 90 capsule 3    folic acid (FOLVITE) 1 MG tablet Take 1 mg by mouth daily      isosorbide mononitrate (IMDUR) 30 MG extended release tablet Take 30 mg by mouth daily      fludrocortisone (FLORINEF) 0.1 MG tablet Take 0.1 mg by mouth daily      budesonide (PULMICORT) 0.5 MG/2ML nebulizer suspension Take 2 mLs by nebulization 2 times daily J47.9 Bronchiectasis without complication 60 ampule 11    formoterol (PERFOROMIST) 20 MCG/2ML nebulizer solution Take 2 mLs by nebulization 2 times daily for 60 doses 120 mL 11    albuterol sulfate HFA (PROAIR HFA) 108 (90 Base) MCG/ACT inhaler Inhale 2 puffs into the lungs every 6 hours as needed for Wheezing 1 Inhaler 3    Fluticasone Furoate-Vilanterol (BREO ELLIPTA) 200-25 MCG/INH AEPB Inhale 1 puff into the lungs daily 1 each 11    nitroGLYCERIN (NITROSTAT) 0.4 MG SL tablet Place 1 tablet under the tongue every 5 minutes as needed for Chest pain 1 bottle. 25 tablet 3    omeprazole (PRILOSEC) 20 MG capsule Take 20 mg by mouth daily. FAMILY HISTORY: Reviewed    PHYSICAL EXAM:   BP (!) 143/73   Pulse 61   Temp 98.1 °F (36.7 °C) (Temporal)   Resp 16   Ht 5' 1\" (1.549 m)   Wt 102 lb 3.2 oz (46.4 kg)   SpO2 97%   BMI 19.31 kg/m²  Body mass index is 19.31 kg/m². Constitutional: She is oriented to person, place, and time. She appears well-developed and well-nourished. In no acute distress. HEENT: Normocephalic and atraumatic. No JVD present. Carotid bruit is not present. No mass and no thyromegaly present. No lymphadenopathy present. Cardiovascular: Normal rate, regular rhythm, normal heart sounds and intact distal pulses. Exam reveals no gallop and no friction rub. No significant heart murmur was heard. Pulmonary/Chest: Effort normal and breath sounds normal. No respiratory distress. She has no wheezes, rhonchi or rales. Abdominal: Soft, non-tender.  Bowel sounds and aorta are normal. She exhibits no organomegaly, mass or bruit. Extremities: No edema, cyanosis, or clubbing. Pulses are 2+ radial/carotid/dorsalis pedis and posterior tibial bilaterally. Neurological: She is alert and oriented to person, place, and time. No evidence of gross cranial nerve deficit. Coordination appeared normal.   Skin: Skin is warm and dry. There is no rash or diaphoresis. Psychiatric: She has a normal mood and affect. Her speech is normal and behavior is normal.      MOST RECENT LABS ON RECORD:   Lab Results   Component Value Date    WBC 6.9 01/08/2018    HGB 12.2 01/08/2018    HCT 38.0 01/08/2018     01/08/2018    ALT 16 01/08/2018    AST 20 01/08/2018     07/27/2018    K 4.6 07/27/2018     (H) 07/27/2018    CREATININE 0.75 07/27/2018    BUN 21 07/27/2018    CO2 24 07/27/2018    TSH 36.92 (H) 07/09/2018         ASSESSMENT:   Patient Active Problem List   Diagnosis    Paroxysmal atrial fibrillation (HCC)    Chest pressure    Mycobacterium avium-intracellulare complex (HCC)    Chronic cough    Dermatitis    Allergic rhinitis    Severe sinus bradycardia    Abnormal resting ECG findings    Recurrent chest pain    Chronic pulmonary aspiration    GERD (gastroesophageal reflux disease)    Influenza A with respiratory manifestations    Syncope and collapse    Slow transit constipation    Anticoagulation management encounter    Other fatigue    PAF ([facility]oxysmal atrial fibrillation) ([facility] [address]    Visit for monitoring Tikosyn therapy    Adverse effect of amiodarone       PLAN: From a cardiovascular standpoint I am relatively pleased with how Ms. Josie Alfonso has tolerated her Tikosyn loading. Her corrected QT remains less than 500 ms and therefore will plan on continuing her current dose of 250 mcg po bid without changes. I also stopped her Imdur as I really do not think that she likely needs this and was having moderate headaches early in her admission.  I asked her to follow up with me in about 1 month and with her primary care physician as previously scheduled. Once again, thank you for allowing me to explain this patients care. Please don't hesitate to contact me could I be of further assistance. Sincerely,  Kane Angulo MD, MS, F.A.C.C. [facility]   [address], [address] [facility]  Phone: [phone], Fax: [phone]    I believe that the risk of significant morbidity and mortality related to the patient's current medical conditions are: intermediate-high    The documentation recorded by the scribe, accurately and completely reflects the services I personally performed and the decisions made by me. Aster Castro.  Shon Lucas MD, MS, F.A.C.C. 7/27/2018

## 2018-08-12 ENCOUNTER — APPOINTMENT (OUTPATIENT)
Dept: CT IMAGING | Age: 83
End: 2018-08-12
Payer: MEDICARE

## 2018-08-12 ENCOUNTER — APPOINTMENT (OUTPATIENT)
Dept: GENERAL RADIOLOGY | Age: 83
End: 2018-08-12
Payer: MEDICARE

## 2018-08-12 ENCOUNTER — HOSPITAL ENCOUNTER (EMERGENCY)
Age: 83
Discharge: ANOTHER HOSPITAL | End: 2018-08-12
Payer: MEDICARE

## 2018-08-12 ENCOUNTER — HOSPITAL ENCOUNTER (INPATIENT)
Age: 83
LOS: 4 days | Discharge: HOME | DRG: 200 | End: 2018-08-16
Attending: EMERGENCY MEDICINE | Admitting: SURGERY
Payer: MEDICARE

## 2018-08-12 VITALS
SYSTOLIC BLOOD PRESSURE: 144 MMHG | TEMPERATURE: 98.5 F | OXYGEN SATURATION: 99 % | DIASTOLIC BLOOD PRESSURE: 64 MMHG | HEART RATE: 78 BPM | RESPIRATION RATE: 17 BRPM

## 2018-08-12 DIAGNOSIS — S22.41XA CLOSED FRACTURE OF MULTIPLE RIBS OF RIGHT SIDE, INITIAL ENCOUNTER: ICD-10-CM
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
## 2018-08-12 DIAGNOSIS — S27.0XXA TRAUMATIC PNEUMOTHORAX, INITIAL ENCOUNTER: ICD-10-CM

## 2018-08-12 DIAGNOSIS — S27.321A CONTUSION OF RIGHT LUNG, INITIAL ENCOUNTER: ICD-10-CM

## 2018-08-12 DIAGNOSIS — S22.41XA CLOSED FRACTURE OF MULTIPLE RIBS OF RIGHT SIDE, INITIAL ENCOUNTER: Primary | ICD-10-CM

## 2018-08-12 PROBLEM — J93.9 PNEUMOTHORAX ON RIGHT: Status: ACTIVE | Noted: 2018-08-12

## 2018-08-12 LAB
ABO/RH: NORMAL
ALLEN TEST: ABNORMAL
ANION GAP SERPL CALCULATED.3IONS-SCNC: 11 MMOL/L (ref 9–17)
ANION GAP SERPL CALCULATED.3IONS-SCNC: 8 MMOL/L (ref 9–17)
ANTIBODY SCREEN: NEGATIVE
ARM BAND NUMBER: NORMAL
BLOOD BANK SPECIMEN: ABNORMAL
BUN BLDV-MCNC: 12 MG/DL (ref 8–23)
BUN BLDV-MCNC: 13 MG/DL (ref 8–23)
BUN/CREAT BLD: 13 (ref 9–20)
CALCIUM SERPL-MCNC: 9.3 MG/DL (ref 8.6–10.4)
CARBOXYHEMOGLOBIN: 0.6 % (ref 0–5)
CHLORIDE BLD-SCNC: 104 MMOL/L (ref 98–107)
CHLORIDE BLD-SCNC: 107 MMOL/L (ref 98–107)
CO2: 24 MMOL/L (ref 20–31)
CO2: 29 MMOL/L (ref 20–31)
CREAT SERPL-MCNC: 0.83 MG/DL (ref 0.5–0.9)
CREAT SERPL-MCNC: 0.98 MG/DL (ref 0.5–0.9)
ETHANOL PERCENT: <0.01 %
ETHANOL: <10 MG/DL
EXPIRATION DATE: NORMAL
FIO2: ABNORMAL
GFR AFRICAN AMERICAN: >60 ML/MIN
GFR AFRICAN AMERICAN: >60 ML/MIN
GFR NON-AFRICAN AMERICAN: 54 ML/MIN
GFR NON-AFRICAN AMERICAN: >60 ML/MIN
GFR SERPL CREATININE-BSD FRML MDRD: ABNORMAL ML/MIN/{1.73_M2}
GLUCOSE BLD-MCNC: 103 MG/DL (ref 70–99)
GLUCOSE BLD-MCNC: 97 MG/DL (ref 70–99)
HCG QUALITATIVE: POSITIVE
HCG QUANTITATIVE: 6 IU/L
HCO3 VENOUS: 27.2 MMOL/L (ref 24–30)
HCT VFR BLD CALC: 34.7 % (ref 36.3–47.1)
HCT VFR BLD CALC: 34.9 % (ref 36.3–47.1)
HEMOGLOBIN: 11 G/DL (ref 11.9–15.1)
HEMOGLOBIN: 11.3 G/DL (ref 11.9–15.1)
INR BLD: 0.9
MCH RBC QN AUTO: 30.4 PG (ref 25.2–33.5)
MCH RBC QN AUTO: 30.8 PG (ref 25.2–33.5)
MCHC RBC AUTO-ENTMCNC: 31.5 G/DL (ref 28.4–34.8)
MCHC RBC AUTO-ENTMCNC: 32.6 G/DL (ref 28.4–34.8)
MCV RBC AUTO: 94.6 FL (ref 82.6–102.9)
MCV RBC AUTO: 96.4 FL (ref 82.6–102.9)
METHEMOGLOBIN: ABNORMAL % (ref 0–1.5)
MODE: ABNORMAL
NEGATIVE BASE EXCESS, VEN: ABNORMAL MMOL/L (ref 0–2)
NOTIFICATION TIME: ABNORMAL
NOTIFICATION: ABNORMAL
NRBC AUTOMATED: 0 PER 100 WBC
NRBC AUTOMATED: 0 PER 100 WBC
O2 DEVICE/FLOW/%: ABNORMAL
O2 SAT, VEN: 40.2 % (ref 60–85)
OXYHEMOGLOBIN: ABNORMAL % (ref 95–98)
PARTIAL THROMBOPLASTIN TIME: 25.4 SEC (ref 20.5–30.5)
PATIENT TEMP: 37
PCO2, VEN, TEMP ADJ: ABNORMAL MMHG (ref 39–55)
PCO2, VEN: 49.2 (ref 39–55)
PDW BLD-RTO: 14.2 % (ref 11.8–14.4)
PDW BLD-RTO: 14.3 % (ref 11.8–14.4)
PEEP/CPAP: ABNORMAL
PH VENOUS: 7.36 (ref 7.32–7.42)
PH, VEN, TEMP ADJ: ABNORMAL (ref 7.32–7.42)
PLATELET # BLD: 192 K/UL (ref 138–453)
PLATELET # BLD: 198 K/UL (ref 138–453)
PMV BLD AUTO: 9.2 FL (ref 8.1–13.5)
PMV BLD AUTO: 9.7 FL (ref 8.1–13.5)
PO2, VEN, TEMP ADJ: ABNORMAL MMHG (ref 30–50)
PO2, VEN: 25.2 (ref 30–50)
POSITIVE BASE EXCESS, VEN: 1.7 MMOL/L (ref 0–2)
POTASSIUM SERPL-SCNC: 3.6 MMOL/L (ref 3.7–5.3)
POTASSIUM SERPL-SCNC: 4.5 MMOL/L (ref 3.7–5.3)
PROTHROMBIN TIME: 10.2 SEC (ref 9–12)
PSV: ABNORMAL
PT. POSITION: ABNORMAL
RBC # BLD: 3.62 M/UL (ref 3.95–5.11)
RBC # BLD: 3.67 M/UL (ref 3.95–5.11)
RESPIRATORY RATE: ABNORMAL
SAMPLE SITE: ABNORMAL
SET RATE: ABNORMAL
SODIUM BLD-SCNC: 139 MMOL/L (ref 135–144)
SODIUM BLD-SCNC: 144 MMOL/L (ref 135–144)
TEXT FOR RESPIRATORY: ABNORMAL
TOTAL HB: ABNORMAL G/DL (ref 12–16)
TOTAL RATE: ABNORMAL
VT: ABNORMAL
WBC # BLD: 7.5 K/UL (ref 3.5–11.3)
WBC # BLD: 7.8 K/UL (ref 3.5–11.3)

## 2018-08-12 PROCEDURE — G0390 TRAUMA RESPONS W/HOSP CRITI: HCPCS

## 2018-08-12 PROCEDURE — 96375 TX/PRO/DX INJ NEW DRUG ADDON: CPT

## 2018-08-12 PROCEDURE — 85027 COMPLETE CBC AUTOMATED: CPT

## 2018-08-12 PROCEDURE — 36415 COLL VENOUS BLD VENIPUNCTURE: CPT

## 2018-08-12 PROCEDURE — 6360000004 HC RX CONTRAST MEDICATION: Performed by: PHYSICIAN ASSISTANT

## 2018-08-12 PROCEDURE — 96376 TX/PRO/DX INJ SAME DRUG ADON: CPT

## 2018-08-12 PROCEDURE — 32551 INSERTION OF CHEST TUBE: CPT

## 2018-08-12 PROCEDURE — 72125 CT NECK SPINE W/O DYE: CPT

## 2018-08-12 PROCEDURE — 82947 ASSAY GLUCOSE BLOOD QUANT: CPT

## 2018-08-12 PROCEDURE — 2060000000 HC ICU INTERMEDIATE R&B

## 2018-08-12 PROCEDURE — 96374 THER/PROPH/DIAG INJ IV PUSH: CPT

## 2018-08-12 PROCEDURE — 71260 CT THORAX DX C+: CPT

## 2018-08-12 PROCEDURE — 80048 BASIC METABOLIC PNL TOTAL CA: CPT

## 2018-08-12 PROCEDURE — 86850 RBC ANTIBODY SCREEN: CPT

## 2018-08-12 PROCEDURE — 86901 BLOOD TYPING SEROLOGIC RH(D): CPT

## 2018-08-12 PROCEDURE — G0480 DRUG TEST DEF 1-7 CLASSES: HCPCS

## 2018-08-12 PROCEDURE — 73110 X-RAY EXAM OF WRIST: CPT

## 2018-08-12 PROCEDURE — 6370000000 HC RX 637 (ALT 250 FOR IP): Performed by: STUDENT IN AN ORGANIZED HEALTH CARE EDUCATION/TRAINING PROGRAM

## 2018-08-12 PROCEDURE — 6370000000 HC RX 637 (ALT 250 FOR IP): Performed by: EMERGENCY MEDICINE

## 2018-08-12 PROCEDURE — 2580000003 HC RX 258: Performed by: PHYSICIAN ASSISTANT

## 2018-08-12 PROCEDURE — 80051 ELECTROLYTE PANEL: CPT

## 2018-08-12 PROCEDURE — 6360000002 HC RX W HCPCS: Performed by: EMERGENCY MEDICINE

## 2018-08-12 PROCEDURE — 84703 CHORIONIC GONADOTROPIN ASSAY: CPT

## 2018-08-12 PROCEDURE — 82565 ASSAY OF CREATININE: CPT

## 2018-08-12 PROCEDURE — 86900 BLOOD TYPING SEROLOGIC ABO: CPT

## 2018-08-12 PROCEDURE — 74177 CT ABD & PELVIS W/CONTRAST: CPT

## 2018-08-12 PROCEDURE — 71045 X-RAY EXAM CHEST 1 VIEW: CPT

## 2018-08-12 PROCEDURE — 85730 THROMBOPLASTIN TIME PARTIAL: CPT

## 2018-08-12 PROCEDURE — 99284 EMERGENCY DEPT VISIT MOD MDM: CPT

## 2018-08-12 PROCEDURE — 70450 CT HEAD/BRAIN W/O DYE: CPT

## 2018-08-12 PROCEDURE — 82805 BLOOD GASES W/O2 SATURATION: CPT

## 2018-08-12 PROCEDURE — 6360000002 HC RX W HCPCS: Performed by: PHYSICIAN ASSISTANT

## 2018-08-12 PROCEDURE — 2500000003 HC RX 250 WO HCPCS: Performed by: PHYSICIAN ASSISTANT

## 2018-08-12 PROCEDURE — 99285 EMERGENCY DEPT VISIT HI MDM: CPT

## 2018-08-12 PROCEDURE — 84520 ASSAY OF UREA NITROGEN: CPT

## 2018-08-12 PROCEDURE — 85610 PROTHROMBIN TIME: CPT

## 2018-08-12 PROCEDURE — 84702 CHORIONIC GONADOTROPIN TEST: CPT

## 2018-08-12 PROCEDURE — 73130 X-RAY EXAM OF HAND: CPT

## 2018-08-12 RX ORDER — LORAZEPAM 2 MG/ML
INJECTION INTRAMUSCULAR
Status: DISCONTINUED
Start: 2018-08-12 | End: 2018-08-12 | Stop reason: HOSPADM

## 2018-08-12 RX ORDER — ONDANSETRON 4 MG/1
4 TABLET, ORALLY DISINTEGRATING ORAL ONCE
Status: COMPLETED | OUTPATIENT
Start: 2018-08-12 | End: 2018-08-12

## 2018-08-12 RX ORDER — ONDANSETRON 2 MG/ML
4 INJECTION INTRAMUSCULAR; INTRAVENOUS EVERY 6 HOURS PRN
Status: DISCONTINUED | OUTPATIENT
Start: 2018-08-12 | End: 2018-08-16 | Stop reason: HOSPADM

## 2018-08-12 RX ORDER — SODIUM CHLORIDE 0.9 % (FLUSH) 0.9 %
10 SYRINGE (ML) INJECTION PRN
Status: DISCONTINUED | OUTPATIENT
Start: 2018-08-12 | End: 2018-08-16 | Stop reason: HOSPADM

## 2018-08-12 RX ORDER — OMEPRAZOLE 20 MG/1
20 CAPSULE, DELAYED RELEASE ORAL DAILY
COMMUNITY
End: 2018-08-27 | Stop reason: SDUPTHER

## 2018-08-12 RX ORDER — OXYCODONE HYDROCHLORIDE 5 MG/1
10 TABLET ORAL EVERY 4 HOURS PRN
Status: DISCONTINUED | OUTPATIENT
Start: 2018-08-12 | End: 2018-08-16 | Stop reason: HOSPADM

## 2018-08-12 RX ORDER — FENTANYL CITRATE 50 UG/ML
25 INJECTION, SOLUTION INTRAMUSCULAR; INTRAVENOUS ONCE
Status: COMPLETED | OUTPATIENT
Start: 2018-08-12 | End: 2018-08-12

## 2018-08-12 RX ORDER — DOCUSATE SODIUM 100 MG/1
100 CAPSULE, LIQUID FILLED ORAL 2 TIMES DAILY
Status: DISCONTINUED | OUTPATIENT
Start: 2018-08-12 | End: 2018-08-16 | Stop reason: HOSPADM

## 2018-08-12 RX ORDER — ACETAMINOPHEN 325 MG/1
650 TABLET ORAL EVERY 4 HOURS PRN
Status: DISCONTINUED | OUTPATIENT
Start: 2018-08-12 | End: 2018-08-13

## 2018-08-12 RX ORDER — OXYCODONE HYDROCHLORIDE 5 MG/1
5 TABLET ORAL EVERY 4 HOURS PRN
Status: DISCONTINUED | OUTPATIENT
Start: 2018-08-12 | End: 2018-08-16 | Stop reason: HOSPADM

## 2018-08-12 RX ORDER — 0.9 % SODIUM CHLORIDE 0.9 %
250 INTRAVENOUS SOLUTION INTRAVENOUS ONCE
Status: COMPLETED | OUTPATIENT
Start: 2018-08-12 | End: 2018-08-12

## 2018-08-12 RX ORDER — DILTIAZEM HYDROCHLORIDE 120 MG/1
120 CAPSULE, EXTENDED RELEASE ORAL 2 TIMES DAILY
COMMUNITY
End: 2018-08-27 | Stop reason: DRUGHIGH

## 2018-08-12 RX ORDER — LORAZEPAM 1 MG/1
TABLET ORAL DAILY PRN
Status: COMPLETED | OUTPATIENT
Start: 2018-08-12 | End: 2018-08-12

## 2018-08-12 RX ORDER — FOLIC ACID 1 MG/1
1 TABLET ORAL DAILY
COMMUNITY
End: 2018-08-27

## 2018-08-12 RX ORDER — SODIUM CHLORIDE 0.9 % (FLUSH) 0.9 %
10 SYRINGE (ML) INJECTION EVERY 12 HOURS SCHEDULED
Status: DISCONTINUED | OUTPATIENT
Start: 2018-08-12 | End: 2018-08-16 | Stop reason: HOSPADM

## 2018-08-12 RX ORDER — LIDOCAINE HYDROCHLORIDE AND EPINEPHRINE 10; 10 MG/ML; UG/ML
20 INJECTION, SOLUTION INFILTRATION; PERINEURAL ONCE
Status: COMPLETED | OUTPATIENT
Start: 2018-08-12 | End: 2018-08-12

## 2018-08-12 RX ORDER — FENTANYL CITRATE 50 UG/ML
INJECTION, SOLUTION INTRAMUSCULAR; INTRAVENOUS
Status: DISCONTINUED
Start: 2018-08-12 | End: 2018-08-13

## 2018-08-12 RX ORDER — FENTANYL CITRATE 50 UG/ML
INJECTION, SOLUTION INTRAMUSCULAR; INTRAVENOUS DAILY PRN
Status: COMPLETED | OUTPATIENT
Start: 2018-08-12 | End: 2018-08-12

## 2018-08-12 RX ORDER — FENTANYL CITRATE 50 UG/ML
INJECTION, SOLUTION INTRAMUSCULAR; INTRAVENOUS
Status: DISCONTINUED
Start: 2018-08-12 | End: 2018-08-12 | Stop reason: HOSPADM

## 2018-08-12 RX ORDER — LEVOTHYROXINE SODIUM 0.1 MG/1
100 TABLET ORAL DAILY
COMMUNITY
End: 2018-08-27 | Stop reason: DRUGHIGH

## 2018-08-12 RX ORDER — LORAZEPAM 2 MG/ML
1 INJECTION INTRAMUSCULAR ONCE
Status: COMPLETED | OUTPATIENT
Start: 2018-08-12 | End: 2018-08-12

## 2018-08-12 RX ORDER — FLUDROCORTISONE ACETATE 0.1 MG/1
0.1 TABLET ORAL DAILY
COMMUNITY
End: 2018-08-27 | Stop reason: ALTCHOICE

## 2018-08-12 RX ADMIN — FENTANYL CITRATE 25 MCG: 50 INJECTION, SOLUTION INTRAMUSCULAR; INTRAVENOUS at 14:08

## 2018-08-12 RX ADMIN — OXYCODONE HYDROCHLORIDE 5 MG: 5 TABLET ORAL at 20:42

## 2018-08-12 RX ADMIN — SODIUM CHLORIDE 250 ML: 9 INJECTION, SOLUTION INTRAVENOUS at 14:24

## 2018-08-12 RX ADMIN — LIDOCAINE HYDROCHLORIDE AND EPINEPHRINE 20 ML: 10; 10 INJECTION, SOLUTION INFILTRATION; PERINEURAL at 16:31

## 2018-08-12 RX ADMIN — ONDANSETRON 4 MG: 4 TABLET, ORALLY DISINTEGRATING ORAL at 14:08

## 2018-08-12 RX ADMIN — LORAZEPAM 1 MG: 2 INJECTION INTRAMUSCULAR; INTRAVENOUS at 16:15

## 2018-08-12 RX ADMIN — FENTANYL CITRATE 25 MCG: 50 INJECTION INTRAMUSCULAR; INTRAVENOUS at 16:08

## 2018-08-12 RX ADMIN — LORAZEPAM 1 MG: 1 TABLET ORAL at 16:04

## 2018-08-12 RX ADMIN — FENTANYL CITRATE 25 MCG: 50 INJECTION, SOLUTION INTRAMUSCULAR; INTRAVENOUS at 16:16

## 2018-08-12 RX ADMIN — IOPAMIDOL 50 ML: 612 INJECTION, SOLUTION INTRAVENOUS at 13:43

## 2018-08-12 ASSESSMENT — ENCOUNTER SYMPTOMS
NAUSEA: 0
COUGH: 0
DIARRHEA: 0
EYES NEGATIVE: 1
ABDOMINAL PAIN: 0
CONSTIPATION: 0
ALLERGIC/IMMUNOLOGIC NEGATIVE: 1
GASTROINTESTINAL NEGATIVE: 1
WHEEZING: 0
RHINORRHEA: 0
SORE THROAT: 0
EYE DISCHARGE: 0
BLOOD IN STOOL: 0
EYE REDNESS: 0
SHORTNESS OF BREATH: 0
SHORTNESS OF BREATH: 1
BACK PAIN: 0
CHEST TIGHTNESS: 0
VOMITING: 0

## 2018-08-12 ASSESSMENT — PAIN DESCRIPTION - LOCATION: LOCATION: RIB CAGE

## 2018-08-12 ASSESSMENT — PAIN SCALES - GENERAL
PAINLEVEL_OUTOF10: 10
PAINLEVEL_OUTOF10: 0
PAINLEVEL_OUTOF10: 8
PAINLEVEL_OUTOF10: 8

## 2018-08-12 ASSESSMENT — PAIN DESCRIPTION - ORIENTATION: ORIENTATION: RIGHT

## 2018-08-12 ASSESSMENT — PAIN DESCRIPTION - DESCRIPTORS: DESCRIPTORS: SHARP

## 2018-08-12 NOTE — ED PROVIDER NOTES
[address]  ED  eMERGENCY dEPARTMENT eNCOUnter   Attending Attestation     Pt Name: Karley Burgess  MRN: [id number]  [place] 1/1/1880  Date of evaluation: 8/12/18       Karley Burgess is a 80 y.o. female who presents with No chief complaint on file. History: Patient presents with fall last night. Patient presented to outside facility today and had pneumothorax and right rib 8 and 9 fractures. Patient head CT had neck abdomen and pelvis which were negative. Patient has no other complaints. Exam: heart regular rhythm are regular. Lungs are clear to auscultation on the left however diminished on the right. Patient has chest tube on the right. Abdomen is soft and nontender. Patient is awake alert and  acting appropriately. Plan for trauma admission for further evaluation and CT management. I performed a history and physical examination of the patient and discussed management with the resident. I reviewed the residents note and agree with the documented findings and plan of care. Any areas of disagreement are noted on the chart. I was personally present for the key portions of any procedures. I have documented in the chart those procedures where I was not present during the key portions. I have personally reviewed all images and agree with the resident's interpretation. I have reviewed the emergency nurses triage note. I agree with the chief complaint, past medical history, past surgical history, allergies, medications, social and family history as documented unless otherwise noted below. Documentation of the HPI, Physical Exam and Medical Decision Making performed by medical students or scribes is based on my personal performance of the HPI, PE and MDM.  For Phys Assistant/ Nurse Practitioner cases/documentation I have had a face to face evaluation of this patient and have completed at least one if not all key elements of the E/M (history, physical exam,

## 2018-08-12 NOTE — ED NOTES
Chest tube to right chest, #16 [place]. Connected to open suction.       Esdras Aldridge RN  08/12/18 1609

## 2018-08-12 NOTE — PROGRESS NOTES
Trauma Tertiary Survey    Admit Date: 8/12/2018  Hospital day 0    [facility]     No past medical history on file. Scheduled Meds:   [id number]         Continuous Infusions:  PRN Meds:    Subjective:     Patient has complaints of chest wall pain. .  Pain is moderate, worsens with movement/deep breath, and some relief by rest.      Objective:     Patient Vitals for the past 8 hrs:   BP Pulse Resp SpO2   08/12/18 1932 (!) 158/55 69 21 97 %       No intake/output data recorded. No intake/output data recorded. Radiology: In PACS from [place]  - Right 9th/10th rib fracture  - Right pneumothorax - CT placed      PHYSICAL EXAM:   GCS:  4 - Opens eyes on own   6 - Follows simple motor commands  5 - Alert and oriented    Pupil size:  Left 2 mm Right 2 mm  Pupil reaction: Yes  Wiggles fingers: Left Yes Right Yes  Hand grasp:   Left normal   Right normal  Wiggles toes: Left Yes    Right Yes  Plantar flexion: Left normal  Right normal    General Appearance: alert and oriented to person, place and time, well developed and well- nourished, in no acute distress  Skin: warm and dry, no rash or erythema  Head: normocephalic and atraumatic  Eyes: pupils 3mm equal, round, and reactive to light, extraocular eye movements intact, conjunctivae normal  ENT: tympanic membrane in tact b/l, nose without deformity, nasal mucosa and turbinates normal without polyps  Neck: supple and non-tender without mass  Pulmonary/Chest: Right Chest tube in place.  Clear to auscultation bilaterally- no wheezes, rales or rhonchi, normal air movement, no respiratory distress  Cardiovascular: normal rate, regular rhythm, per monitor  Vascular: , 2+ pulses b/l radial, femoral, DP, PT  Abdomen: soft, non-tender, non-distended,   Pelvis: Stable  Extremities: no cyanosis, clubbing or edema  Musculoskeletal: normal range of motion, no joint swelling, deformity or tenderness  Neurologic: reflexes normal and symmetric, no cranial nerve deficit, gait, coordination

## 2018-08-12 NOTE — ED PROVIDER NOTES
Endocrine: Negative for polydipsia, polyphagia and polyuria. Genitourinary: Negative for decreased urine volume, difficulty urinating, dysuria, frequency and hematuria. Musculoskeletal: Positive for arthralgias. Negative for back pain and myalgias. Skin: Negative for pallor and rash. Allergic/Immunologic: Negative for food allergies and immunocompromised state. Neurological: Negative for dizziness, syncope, weakness and light-headedness. Hematological: Negative for adenopathy. Does not bruise/bleed easily. Psychiatric/Behavioral: Negative for behavioral problems and suicidal ideas. The patient is not nervous/anxious. Except as noted above the remainder of the review of systems was reviewed and negative. PAST MEDICAL HISTORY     Past Medical History:   Diagnosis Date    Abnormal resting ECG findings 12    Normal sinus rhythm with frequent PACs in a trigeminy pattern    Abnormal resting ECG findings 6/10/2013    Severe sinus bradycardia at 50 bpm.     Anxiety     Chronic back pain     Pt. c/o upper back pain,,,,\"On the sides of my lungs\"    Hiatal hernia     Holter monitor, abnormal 12    Multiple episodes of SVT between 100 and 130 beats per minute most consistent with atrial fibrillation and/or atrial flutter with variable block.  Hypothyroidism     Insomnia     MAC (mycobacterium avium-intracellulare complex)     Normal cardiac stress test 12    low risk study.  Paroxysmal atrial fibrillation ([address] 2012    Found on holter monitor  and      Severe sinus bradycardia 6/10/13    At least partially drug induced.  Status post placement of implantable loop recorder 10/19/2017    LINQ IMPLANTED. [employer]    Subdural hematoma ([address]          SURGICAL HISTORY       Past Surgical History:   Procedure Laterality Date    BRAIN SURGERY      Had a blood clot on the brain. Fell 10 feet.      CATARACT REMOVAL Bilateral      SECTION      x1    Not Asked     Other Topics Concern    None     Social History Narrative    None       SCREENINGS             PHYSICAL EXAM    (up to 7 for level 4, 8 or more for level 5)     ED Triage Vitals   BP Temp Temp src Pulse Resp SpO2 Height Weight   08/12/18 1231 08/12/18 1228 -- 08/12/18 1228 08/12/18 1228 08/12/18 1228 -- --   (!) 139/57 98.5 °F (36.9 °C)  76 19 99 %         Physical Exam   Constitutional: She is oriented to person, place, and time. She appears well-developed and well-nourished. No distress. HENT:   Head: Normocephalic and atraumatic. Right Ear: External ear normal.   Left Ear: External ear normal.   Mouth/Throat: Oropharynx is clear and moist. No oropharyngeal exudate. Eyes: Pupils are equal, round, and reactive to light. Conjunctivae and EOM are normal. Right eye exhibits no discharge. Left eye exhibits no discharge. No scleral icterus. Neck: Normal range of motion and full passive range of motion without pain. Neck supple. No spinous process tenderness and no muscular tenderness present. No neck rigidity. No tracheal deviation, no edema, no erythema and normal range of motion present. Cardiovascular: Normal rate, regular rhythm and intact distal pulses. Exam reveals no gallop and no friction rub. No murmur heard. Pulmonary/Chest: Effort normal. No accessory muscle usage or stridor. No respiratory distress. She has decreased breath sounds. She has no wheezes. She has no rhonchi. She has no rales. She exhibits tenderness, bony tenderness and crepitus. Abdominal: Soft. Bowel sounds are normal. She exhibits no distension. There is tenderness. There is no rigidity, no rebound, no guarding, no tenderness at McBurney's point and negative Randall's sign. Musculoskeletal: Normal range of motion. She exhibits tenderness. She exhibits no edema or deformity. Patient has full range of motion of bilateral upper and lower extremities.   With intact distal pulses sensation Refill less cervical, thoracic, abdominal, and pelvic regions. 20-30 percent right pneumothorax. No acute abdomen or pelvic findings. August 12, 2018 at 1211 PDT: I discussed the findings over phone with [facility]. Electronically Signed By: Phoenix Prather   on  08/12/2018  15:20      XR WRIST RIGHT (MIN 3 VIEWS)   Final Result   Impression:     No acute osseous findings. Chondrocalcinosis. Multifocal nonspecific arthrosis. Electronically Signed By: Phoenix Prather   on  08/12/2018  15:06      XR HAND RIGHT (MIN 3 VIEWS)   Final Result   Impression:     No acute osseous findings. Chondrocalcinosis. Multifocal nonspecific arthrosis. Electronically Signed By: Phoenix Prather   on  08/12/2018  15:05      CT CERVICAL SPINE WO CONTRAST   Final Result   Impression:     No acute fracture. Degenerative discs. Partially imaged right pneumothorax. Nonspecific infiltrate in the right apical lung. Mild-to-moderate right subcutaneous gas throughout the right cervical region. Electronically Signed By: Phoenix Prather   on  08/12/2018  15:03      CT Head WO Contrast   Final Result   Impression:     No acute intracranial findings. Electronically Signed By: Phoenix Prather   on  08/12/2018  14:16      XR CHEST PORTABLE    (Results Pending)         ED BEDSIDE ULTRASOUND:   Performed by ED Physician - none    LABS:  Labs Reviewed   CBC - Abnormal; Notable for the following:        Result Value    RBC 3.67 (*)     Hemoglobin 11.3 (*)     Hematocrit 34.7 (*)     All other components within normal limits   BASIC METABOLIC PANEL - Abnormal; Notable for the following:     CREATININE 0.98 (*)     Anion Gap 8 (*)     GFR Non- 54 (*)     All other components within normal limits       All other labs were within normal range or not returned as of this dictation.     EMERGENCY DEPARTMENT COURSE and DIFFERENTIAL DIAGNOSIS/MDM:   Vitals:    Vitals: 08/12/18 1531 08/12/18 1604 08/12/18 1608 08/12/18 1613   BP: (!) 184/81 (!) 170/80 (!) 164/69 (!) 159/63   Pulse:  70 90 84   Resp:  18 18 20   Temp:       SpO2: 100% 100% 100% 100%         MDM  80-year-old female [place] presents after fall 1 day ago. Reports no loss of consciousness or head injury. On exam, she does have significant tenderness over the right lateral ribs. There is also skin tear to the right hand and wrist.  At this point this it was a mechanical fall after she tripped I am going to get a CT had CT C-spine. I'm also going to get a CT chest had and pelvis with contrast given her trauma on anticoagulant. And her tenderness on exam.  She is ambulatory. I have been back in with patient. Her pain is being controlled. We are waiting on CT imaging. I was finally able to pull up the CT chest on my own it does appear that she has a pneumothorax. Patient is on O2 at 2 L. She has no shortness of breath and is satting 100%. I called and spoke with Dr. Juarez Mcmanus, emergency room physician regarding patient's workup. In need for trauma evaluation. He agrees to accept the patient is a transfer. Chest tube will be in place prior to her departure. Patient tolerated chest tube without any complications. CRITICAL CARE: There was a high probability of clinically significant/life threatening deterioration in this patient's condition which required my urgent intervention. Total critical care time was 45 minutes. This excludes any time for separately reportable procedures. Procedures    FINAL IMPRESSION      1. Fall, initial encounter    2. Closed fracture of multiple ribs of right side, initial encounter    3. Traumatic pneumothorax, initial encounter    4. Contusion of right lung, initial encounter          DISPOSITION/PLAN   DISPOSITION        PATIENT REFERRED TO:  No follow-up provider specified.     DISCHARGE MEDICATIONS:  New Prescriptions    No medications on file
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
## 2018-08-13 ENCOUNTER — APPOINTMENT (OUTPATIENT)
Dept: GENERAL RADIOLOGY | Age: 83
DRG: 200 | End: 2018-08-13
Payer: MEDICARE

## 2018-08-13 LAB
ABSOLUTE EOS #: 0.12 K/UL (ref 0–0.44)
ABSOLUTE IMMATURE GRANULOCYTE: <0.03 K/UL (ref 0–0.3)
ABSOLUTE LYMPH #: 2.04 K/UL (ref 1.1–3.7)
ABSOLUTE MONO #: 0.49 K/UL (ref 0.1–1.2)
ANION GAP SERPL CALCULATED.3IONS-SCNC: 13 MMOL/L (ref 9–17)
BASOPHILS # BLD: 0 % (ref 0–2)
BASOPHILS ABSOLUTE: <0.03 K/UL (ref 0–0.2)
BUN BLDV-MCNC: 12 MG/DL (ref 8–23)
BUN/CREAT BLD: ABNORMAL (ref 9–20)
CALCIUM SERPL-MCNC: 8.1 MG/DL (ref 8.6–10.4)
CHLORIDE BLD-SCNC: 103 MMOL/L (ref 98–107)
CO2: 23 MMOL/L (ref 20–31)
CREAT SERPL-MCNC: 0.78 MG/DL (ref 0.5–0.9)
DIFFERENTIAL TYPE: ABNORMAL
EOSINOPHILS RELATIVE PERCENT: 2 % (ref 1–4)
GFR AFRICAN AMERICAN: >60 ML/MIN
GFR NON-AFRICAN AMERICAN: >60 ML/MIN
GFR SERPL CREATININE-BSD FRML MDRD: ABNORMAL ML/MIN/{1.73_M2}
GFR SERPL CREATININE-BSD FRML MDRD: ABNORMAL ML/MIN/{1.73_M2}
GLUCOSE BLD-MCNC: 81 MG/DL (ref 70–99)
HCT VFR BLD CALC: 34.2 % (ref 36.3–47.1)
HEMOGLOBIN: 10.8 G/DL (ref 11.9–15.1)
IMMATURE GRANULOCYTES: 0 %
INR BLD: 1
LYMPHOCYTES # BLD: 30 % (ref 24–43)
MCH RBC QN AUTO: 30.5 PG (ref 25.2–33.5)
MCHC RBC AUTO-ENTMCNC: 31.6 G/DL (ref 28.4–34.8)
MCV RBC AUTO: 96.6 FL (ref 82.6–102.9)
MONOCYTES # BLD: 7 % (ref 3–12)
NRBC AUTOMATED: 0 PER 100 WBC
PARTIAL THROMBOPLASTIN TIME: 25.6 SEC (ref 20.5–30.5)
PDW BLD-RTO: 14.3 % (ref 11.8–14.4)
PLATELET # BLD: 196 K/UL (ref 138–453)
PLATELET ESTIMATE: ABNORMAL
PMV BLD AUTO: 10 FL (ref 8.1–13.5)
POTASSIUM SERPL-SCNC: 3.7 MMOL/L (ref 3.7–5.3)
PROTHROMBIN TIME: 10.6 SEC (ref 9–12)
RBC # BLD: 3.54 M/UL (ref 3.95–5.11)
RBC # BLD: ABNORMAL 10*6/UL
SEG NEUTROPHILS: 61 % (ref 36–65)
SEGMENTED NEUTROPHILS ABSOLUTE COUNT: 4.07 K/UL (ref 1.5–8.1)
SODIUM BLD-SCNC: 139 MMOL/L (ref 135–144)
WBC # BLD: 6.8 K/UL (ref 3.5–11.3)
WBC # BLD: ABNORMAL 10*3/UL

## 2018-08-13 PROCEDURE — 6360000002 HC RX W HCPCS: Performed by: STUDENT IN AN ORGANIZED HEALTH CARE EDUCATION/TRAINING PROGRAM

## 2018-08-13 PROCEDURE — G8988 SELF CARE GOAL STATUS: HCPCS

## 2018-08-13 PROCEDURE — G8979 MOBILITY GOAL STATUS: HCPCS

## 2018-08-13 PROCEDURE — 6370000000 HC RX 637 (ALT 250 FOR IP): Performed by: STUDENT IN AN ORGANIZED HEALTH CARE EDUCATION/TRAINING PROGRAM

## 2018-08-13 PROCEDURE — 85610 PROTHROMBIN TIME: CPT

## 2018-08-13 PROCEDURE — 71045 X-RAY EXAM CHEST 1 VIEW: CPT

## 2018-08-13 PROCEDURE — 97162 PT EVAL MOD COMPLEX 30 MIN: CPT

## 2018-08-13 PROCEDURE — G8978 MOBILITY CURRENT STATUS: HCPCS

## 2018-08-13 PROCEDURE — 85730 THROMBOPLASTIN TIME PARTIAL: CPT

## 2018-08-13 PROCEDURE — 2060000000 HC ICU INTERMEDIATE R&B

## 2018-08-13 PROCEDURE — 80048 BASIC METABOLIC PNL TOTAL CA: CPT

## 2018-08-13 PROCEDURE — 76937 US GUIDE VASCULAR ACCESS: CPT

## 2018-08-13 PROCEDURE — G8987 SELF CARE CURRENT STATUS: HCPCS

## 2018-08-13 PROCEDURE — 85025 COMPLETE CBC W/AUTO DIFF WBC: CPT

## 2018-08-13 PROCEDURE — 97166 OT EVAL MOD COMPLEX 45 MIN: CPT

## 2018-08-13 PROCEDURE — 97530 THERAPEUTIC ACTIVITIES: CPT

## 2018-08-13 PROCEDURE — 36415 COLL VENOUS BLD VENIPUNCTURE: CPT

## 2018-08-13 PROCEDURE — 97535 SELF CARE MNGMENT TRAINING: CPT

## 2018-08-13 PROCEDURE — 2580000003 HC RX 258: Performed by: STUDENT IN AN ORGANIZED HEALTH CARE EDUCATION/TRAINING PROGRAM

## 2018-08-13 RX ORDER — ACETAMINOPHEN 325 MG/1
650 TABLET ORAL EVERY 4 HOURS
Status: DISCONTINUED | OUTPATIENT
Start: 2018-08-13 | End: 2018-08-16 | Stop reason: HOSPADM

## 2018-08-13 RX ORDER — FLUDROCORTISONE ACETATE 0.1 MG/1
0.1 TABLET ORAL DAILY
Status: DISCONTINUED | OUTPATIENT
Start: 2018-08-13 | End: 2018-08-16 | Stop reason: HOSPADM

## 2018-08-13 RX ORDER — KETOROLAC TROMETHAMINE 15 MG/ML
15 INJECTION, SOLUTION INTRAMUSCULAR; INTRAVENOUS EVERY 6 HOURS
Status: DISCONTINUED | OUTPATIENT
Start: 2018-08-13 | End: 2018-08-15

## 2018-08-13 RX ORDER — FOLIC ACID 1 MG/1
1 TABLET ORAL DAILY
Status: DISCONTINUED | OUTPATIENT
Start: 2018-08-13 | End: 2018-08-16 | Stop reason: HOSPADM

## 2018-08-13 RX ORDER — PANTOPRAZOLE SODIUM 40 MG/1
40 TABLET, DELAYED RELEASE ORAL
Status: DISCONTINUED | OUTPATIENT
Start: 2018-08-13 | End: 2018-08-16 | Stop reason: HOSPADM

## 2018-08-13 RX ORDER — LEVOTHYROXINE SODIUM 0.1 MG/1
100 TABLET ORAL DAILY
Status: DISCONTINUED | OUTPATIENT
Start: 2018-08-13 | End: 2018-08-16 | Stop reason: HOSPADM

## 2018-08-13 RX ORDER — DILTIAZEM HYDROCHLORIDE 60 MG/1
120 CAPSULE, EXTENDED RELEASE ORAL 2 TIMES DAILY
Status: DISCONTINUED | OUTPATIENT
Start: 2018-08-13 | End: 2018-08-16 | Stop reason: HOSPADM

## 2018-08-13 RX ORDER — PROMETHAZINE HYDROCHLORIDE 25 MG/ML
12.5 INJECTION, SOLUTION INTRAMUSCULAR; INTRAVENOUS EVERY 6 HOURS PRN
Status: DISCONTINUED | OUTPATIENT
Start: 2018-08-13 | End: 2018-08-16 | Stop reason: HOSPADM

## 2018-08-13 RX ADMIN — DOCUSATE SODIUM 100 MG: 100 CAPSULE ORAL at 09:23

## 2018-08-13 RX ADMIN — Medication 10 ML: at 20:33

## 2018-08-13 RX ADMIN — ACETAMINOPHEN 650 MG: 325 TABLET ORAL at 11:55

## 2018-08-13 RX ADMIN — OXYCODONE HYDROCHLORIDE 5 MG: 5 TABLET ORAL at 03:43

## 2018-08-13 RX ADMIN — OXYCODONE HYDROCHLORIDE 10 MG: 5 TABLET ORAL at 13:25

## 2018-08-13 RX ADMIN — KETOROLAC TROMETHAMINE 15 MG: 15 INJECTION, SOLUTION INTRAMUSCULAR; INTRAVENOUS at 11:56

## 2018-08-13 RX ADMIN — PROMETHAZINE HYDROCHLORIDE 12.5 MG: 25 INJECTION, SOLUTION INTRAMUSCULAR; INTRAVENOUS at 15:50

## 2018-08-13 RX ADMIN — APIXABAN 2.5 MG: 2.5 TABLET, FILM COATED ORAL at 20:32

## 2018-08-13 RX ADMIN — OXYCODONE HYDROCHLORIDE 5 MG: 5 TABLET ORAL at 09:22

## 2018-08-13 RX ADMIN — DILTIAZEM HYDROCHLORIDE 120 MG: 60 CAPSULE, EXTENDED RELEASE ORAL at 10:14

## 2018-08-13 RX ADMIN — APIXABAN 2.5 MG: 2.5 TABLET, FILM COATED ORAL at 11:56

## 2018-08-13 RX ADMIN — DILTIAZEM HYDROCHLORIDE 120 MG: 60 CAPSULE, EXTENDED RELEASE ORAL at 21:53

## 2018-08-13 RX ADMIN — Medication 10 ML: at 09:22

## 2018-08-13 RX ADMIN — FLUDROCORTISONE ACETATE 0.1 MG: 0.1 TABLET ORAL at 09:23

## 2018-08-13 RX ADMIN — LEVOTHYROXINE SODIUM 100 MCG: 100 TABLET ORAL at 09:23

## 2018-08-13 RX ADMIN — PANTOPRAZOLE SODIUM 40 MG: 40 TABLET, DELAYED RELEASE ORAL at 06:36

## 2018-08-13 RX ADMIN — ACETAMINOPHEN 650 MG: 325 TABLET ORAL at 20:32

## 2018-08-13 RX ADMIN — ACETAMINOPHEN 650 MG: 325 TABLET ORAL at 09:23

## 2018-08-13 RX ADMIN — DOCUSATE SODIUM 100 MG: 100 CAPSULE ORAL at 20:31

## 2018-08-13 RX ADMIN — FOLIC ACID 1 MG: 1 TABLET ORAL at 09:35

## 2018-08-13 ASSESSMENT — PAIN SCALES - GENERAL
PAINLEVEL_OUTOF10: 8
PAINLEVEL_OUTOF10: 6
PAINLEVEL_OUTOF10: 8
PAINLEVEL_OUTOF10: 5
PAINLEVEL_OUTOF10: 8
PAINLEVEL_OUTOF10: 6
PAINLEVEL_OUTOF10: 10
PAINLEVEL_OUTOF10: 6

## 2018-08-13 ASSESSMENT — PAIN DESCRIPTION - PROGRESSION
CLINICAL_PROGRESSION: NOT CHANGED

## 2018-08-13 ASSESSMENT — PAIN DESCRIPTION - PAIN TYPE
TYPE: ACUTE PAIN
TYPE: ACUTE PAIN

## 2018-08-13 ASSESSMENT — PAIN DESCRIPTION - ONSET: ONSET: ON-GOING

## 2018-08-13 ASSESSMENT — PAIN DESCRIPTION - LOCATION
LOCATION: RIB CAGE
LOCATION: SHOULDER
LOCATION: RIB CAGE

## 2018-08-13 ASSESSMENT — PAIN DESCRIPTION - FREQUENCY: FREQUENCY: CONTINUOUS

## 2018-08-13 ASSESSMENT — PAIN SCALES - WONG BAKER: WONGBAKER_NUMERICALRESPONSE: 8

## 2018-08-13 ASSESSMENT — PAIN DESCRIPTION - DESCRIPTORS: DESCRIPTORS: ACHING

## 2018-08-13 ASSESSMENT — PAIN DESCRIPTION - ORIENTATION
ORIENTATION: RIGHT
ORIENTATION: RIGHT

## 2018-08-13 NOTE — PLAN OF CARE
Problem: Falls - Risk of:  Goal: Will remain free from falls  Will remain free from falls   Outcome: Ongoing  Pt remains free of falls at this time. Bed locked in lowest position, siderails x2, call light in reach. Non-skid footwear applied. Pt ambulates in room with steady gait with help by staff. Encouraged pt to call for assistance as needed for safety, pt calls out appropriately. Falling star posted outside of room. Will continue to monitor.

## 2018-08-13 NOTE — ED PROVIDER NOTES
[facility] 4B STEPDOWN  Emergency Department Encounter  Emergency Medicine Resident     Pt Name: Jody Frias  MRN: [id number]  Elenitagfveronica 2/13/1933  Date of evaluation: 8/12/18  PCP:  Rocio Dela Cruz MD    CHIEF COMPLAINT       No chief complaint on file. HISTORY OF PRESENT ILLNESS  (Location/Symptom, Timing/Onset, Context/Setting, Quality, Duration, Modifying Factors, Severity.)      Jody Frias is a 80 y.o. female who presents   As a trauma prior to the via EMS as a transfer from formerly [facility] after a mechanical fall. Patient reportedly fell yesterday evening in the car rash, ambulated to the emergency department. Imaging studies at the initial facility were remarkable for rib fractures to the eighth and ninth ribs with a right-sided pneumothorax. Right-sided chest tube was placed and patient was transferred to a facility for higher level of care. PAST MEDICAL / SURGICAL / SOCIAL / FAMILY HISTORY      has no past medical history on file. has no past surgical history on file. Social History     Social History    Marital status:      Spouse name: N/A    Number of children: N/A    Years of education: N/A     Occupational History    Not on file. Social History Main Topics    Smoking status: Not on file    Smokeless tobacco: Not on file    Alcohol use Not on file    Drug use: Unknown    Sexual activity: Not on file     Other Topics Concern    Not on file     Social History Narrative    No narrative on file       No family history on file. Allergies:  Patient has no allergy information on record. Home Medications:  Prior to Admission medications    Not on File       REVIEW OF SYSTEMS    (2-9 systems for level 4, 10 or more for level 5)      Review of Systems   Constitutional: Negative. HENT: Negative. Eyes: Negative. Respiratory: Positive for shortness of breath. Cardiovascular: Positive for chest pain. Gastrointestinal: Negative.     Endocrine: Carboxyhemoglobin 0.6 0 - 5 %    Methemoglobin NOT REPORTED 0.0 - 1.5 %    Pt Temp 37.0     pH, Tavon, Temp Adj NOT REPORTED 7.320 - 7.420    pCO2, Tavon, Temp Adj NOT REPORTED 39 - 55 mmHg    pO2, Tavon, Temp Adj NOT REPORTED 30 - 50 mmHg    O2 Device/Flow/% NOT REPORTED     Respiratory Rate NOT REPORTED     Reynaldo Test NOT REPORTED     Sample Site NOT REPORTED     Pt. Position NOT REPORTED     Mode NOT REPORTED     Set Rate NOT REPORTED     Total Rate NOT REPORTED     VT NOT REPORTED     FIO2 TRAUMA     Peep/Cpap NOT REPORTED     PSV NOT REPORTED     Text for Respiratory NOT REPORTED     NOTIFICATION NOT REPORTED     NOTIFICATION TIME NOT REPORTED     Blood Bank Specimen BILL FOR SERVICES PERFORMED     hCG Qual POSITIVE (A) NEG    BUN 12 8 - 23 mg/dL    CREATININE 0.83 0.50 - 0.90 mg/dL    GFR Non-African American >60 >60 mL/min    GFR African American >60 >60 mL/min    GFR Comment          GFR Staging NOT REPORTED     Ethanol <10 <10 mg/dL    Ethanol percent <0.010 %    Protime 10.2 9.0 - 12.0 sec    INR 0.9     PTT 25.4 20.5 - 30.5 sec   Type and Screen   Result Value Ref Range    Expiration Date 08/15/2018     Arm Band Number [id number]     ABO/Rh O POSITIVE     Antibody Screen NEGATIVE        IMPRESSION:   Trauma priority, transfer from outlying facility. Mechanical fall from standing height with resultant rib fractures to the seventh, eighth and ninth ribs on the right side and a small pneumothorax. Chest tube was placed before transferred to our facility. On arrival at our facility, vital signs unremarkable, patient is oriented to person but not place or time. Patient's eyes open spontaneously and she follows commands. Normal heart sounds,  Diminished lung sounds on the right on auscultation, abdomen is soft, nontender, nondistended, patient has intact bilateral radial and DP pulses. Trauma team at bedside. Patient will be admitted for further evaluation. RADIOLOGY:  No results found.       FINAL IMPRESSION

## 2018-08-13 NOTE — FLOWSHEET NOTE
[facility]     Emergency/Trauma Note    PATIENT NAME: Michaela Parker    Shift date: 8/12/2018  Shift day: Sunday   Shift # 2    Room # 5479/0822-60   Name: Michaela Parker            Age: 80 y.o. Gender: female          Evangelical: [address]:     Trauma/Incident type: Adult Trauma Priority  Admit Date & Time: 8/12/2018  6:17 PM  TRAUMA NAME:         PATIENT/EVENT DESCRIPTION:  Michaela Parker is a 80 y.o. female who arrived via EMS from home as a trauma priority. Patient was awake and alert and was able to communicate to the trauma team. Per medics, patient fell last, has fractured ribs. . Pt to be admitted to 041/0415-01. SPIRITUAL ASSESSMENT/INTERVENTION:   responded to page but was unable to get to patient.  met patient in room 415. Patient's significant other Ronen Last ([phone]) and patient's daughter Maria Elena Kidd ([phone]) were present.  nurtured hope and provided presence and support. PATIENT BELONGINGS:  No belongings noted    ANY BELONGINGS OF SIGNIFICANT VALUE NOTED:  None    REGISTRATION STAFF NOTIFIED? Yes      WHAT IS YOUR SPIRITUAL CARE PLAN FOR THIS PATIENT?:   Lindsay Duran will remain available for spiritual and emotional support as needed.     Electronically signed by Dontrell Key, on 8/12/2018 at 8:31 PM.  Miguel Angel Oliveros  [phone]

## 2018-08-13 NOTE — PROGRESS NOTES
Physical Therapy    Facility/Department: [facility] 4B STEPDOWN  Initial Assessment    NAME: Jody Frias  : 1933  MRN: [id number]    Date of Service: 2018    Discharge Recommendations:  Home with assist PRN        Patient Diagnosis(es): The primary encounter diagnosis was Closed fracture of multiple ribs of right side, initial encounter. A diagnosis of Traumatic pneumothorax, initial encounter was also pertinent to this visit. has no past medical history on file. has no past surgical history on file. Restrictions  Restrictions/Precautions  Restrictions/Precautions: Fall Risk  Position Activity Restriction  Other position/activity restrictions: Up with assist  Vision/Hearing  Vision: Impaired  Vision Exceptions: Wears glasses for reading  Hearing: Within functional limits     Subjective  General  Patient assessed for rehabilitation services?: Yes  Response To Previous Treatment: Not applicable  Family / Caregiver Present: Yes  Follows Commands: Within Functional Limits  Pain Screening  Patient Currently in Pain: Yes  Pain Assessment  Pain Assessment: 0-10  Pain Level: 5  Pain Location: Rib cage  Pain Orientation: Right  Vital Signs  Patient Currently in Pain: Yes       Orientation  Orientation  Overall Orientation Status: Within Normal Limits    Social/Functional History  Social/Functional History  Lives With: Significant other (pt reports living with a gentleman/friend who is 80 y.o.   Pt reports friend is retired and in good health)  Type of Home: [address] [place] [address],[address]: One level  Home Access: Stairs to enter without rails  Entrance Stairs - Number of Steps: 2  Entrance Stairs - Rails: None  Bathroom Shower/Tub: Tub/Shower unit  Bathroom Toilet: Standard  Bathroom Equipment: Shower chair  Bathroom Accessibility: Accessible  Receives Help From: Family (Significant other supportive)  ADL Assistance: Independent  Homemaking Assistance: Independent  Homemaking Responsibilities: Yes  Ambulation Assistance: Independent  Transfer Assistance: Independent  Active : Yes  Patient's  Info: Pt reports driving   Mode of Transportation: Car  Occupation: Retired  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)  Leisure & Hobbies: go to the [employer], play bingo  Objective          AROM RLE (degrees)  RLE AROM: WNL  AROM LLE (degrees)  LLE AROM : WNL  AROM RUE (degrees)  RUE AROM : WNL  AROM LUE (degrees)  LUE AROM : WNL  Strength RLE  Strength RLE: WFL  Strength LLE  Strength LLE: WFL  Strength RUE  Strength RUE: WFL  Strength LUE  Strength LUE: WFL     Sensation  Overall Sensation Status: WFL  Bed mobility  Rolling to Left: Contact guard assistance  Rolling to Right: Contact guard assistance  Supine to Sit: Contact guard assistance  Sit to Supine: Contact guard assistance  Scooting: Contact guard assistance  Transfers  Sit to Stand: Contact guard assistance  Stand to sit: Contact guard assistance  Ambulation  Ambulation?: Yes  Ambulation 1  Surface: level tile  Device: No Device  Assistance: Contact guard assistance  Distance: am,b 100 ft without a device x CGA      Balance  Posture: Good  Sitting - Static: Good  Sitting - Dynamic: Fair  Standing - Static: Fair  Standing - Dynamic: Fair        Assessment   Body structures, Functions, Activity limitations: Decreased functional mobility ; Decreased endurance  Assessment: The pt ambulated with some unsteadiness but was excited at the chance to be able to get up and walk  Prognosis: Good  Decision Making: Medium Complexity  Patient Education: PT  POC, Goals  REQUIRES PT FOLLOW UP: Yes  Activity Tolerance  Activity Tolerance: Patient limited by fatigue         Plan   Plan  Times per week: 5-6x wk  Current Treatment Recommendations: Strengthening, Transfer Training, Endurance Training, Gait Training, Functional Mobility Training, Stair training, Safety Education & Training  Safety Devices  Type of devices: Bed alarm in place, Call

## 2018-08-13 NOTE — PROGRESS NOTES
time to complete;Stand by assistance  UE Bathing: Setup; Increased time to complete;Stand by assistance  LE Bathing: Setup; Increased time to complete;Stand by assistance  UE Dressing: Setup; Increased time to complete;Stand by assistance  LE Dressing: Setup; Increased time to complete;Stand by assistance  Toileting: Setup; Increased time to complete; Independent    Tone RUE  RUE Tone: Normotonic  Tone LUE  LUE Tone: Normotonic  Coordination  Movements Are Fluid And Coordinated: No  Coordination and Movement description: Decreased speed     Bed mobility  Supine to Sit: Minimal assistance (pt required assistance sitting up and needed vc's to use hands to help progress body forwards)  Sit to Supine: Contact guard assistance (pt required vc's to move more towards middle of bed)  Scooting: Contact guard assistance    Transfers  Sit to stand: Contact guard assistance  Stand to sit: Contact guard assistance     Cognition  Overall Cognitive Status: WFL        Sensation  Overall Sensation Status: WFL      LUE AROM : WFL  RUE AROM : WFL  LUE Strength  Gross LUE Strength: WFL  L Hand Grasp: 5/5  L Hand Release: 5/5  RUE Strength  Gross RUE Strength: WFL  R Hand Grasp: 5/5  R Hand Release: 5/5    Assessment   Performance deficits / Impairments: Decreased ADL status; Decreased high-level IADLs;Decreased functional mobility ; Decreased safe awareness;Decreased endurance;Decreased vision/visual deficit  Prognosis: Good  Decision Making: Medium Complexity  Patient Education: OT POC, discharge recs, funct mob/funct transfer techs, safety awareness, good return. REQUIRES OT FOLLOW UP: Yes  Activity Tolerance  Activity Tolerance: Patient Tolerated treatment well  Safety Devices  Safety Devices in place: Yes  Type of devices: Nurse notified; Left in bed;Call light within reach; All fall risk precautions in place; Patient at risk for falls; Bed alarm in place  Restraints  Initially in place: No         Plan   Plan  Times per week: 2: independently demo dynamic standing during ADL/IADL task. Short term goal 3: independently demo safety awareness during ADL/IADL task. Short term goal 4: independently demo funct mob/funct transfer to increase funct participation. Short term goal 5: identify 2 non-pharmacological strategies to use for pain relief during daily tasks with vc's prn. Short term goal 6: demo proper use of incentive spirometer 2/3 sessions with vc's prn. Therapy Time   Individual Concurrent Group Co-treatment   Time In 0776         Time Out 1122         Minutes 39              Discharge recommendations discussed with patient during initial evaluation.       Óscar Llanos, OTS

## 2018-08-14 ENCOUNTER — APPOINTMENT (OUTPATIENT)
Dept: GENERAL RADIOLOGY | Age: 83
DRG: 200 | End: 2018-08-14
Payer: MEDICARE

## 2018-08-14 LAB
BUN BLDV-MCNC: 17 MG/DL (ref 8–23)
CREAT SERPL-MCNC: 0.74 MG/DL (ref 0.5–0.9)
GFR AFRICAN AMERICAN: >60 ML/MIN
GFR NON-AFRICAN AMERICAN: >60 ML/MIN
GFR SERPL CREATININE-BSD FRML MDRD: NORMAL ML/MIN/{1.73_M2}
GFR SERPL CREATININE-BSD FRML MDRD: NORMAL ML/MIN/{1.73_M2}
TROPONIN INTERP: NORMAL
TROPONIN T: <0.03 NG/ML

## 2018-08-14 PROCEDURE — 6360000002 HC RX W HCPCS: Performed by: STUDENT IN AN ORGANIZED HEALTH CARE EDUCATION/TRAINING PROGRAM

## 2018-08-14 PROCEDURE — 2580000003 HC RX 258: Performed by: STUDENT IN AN ORGANIZED HEALTH CARE EDUCATION/TRAINING PROGRAM

## 2018-08-14 PROCEDURE — 73030 X-RAY EXAM OF SHOULDER: CPT

## 2018-08-14 PROCEDURE — 36415 COLL VENOUS BLD VENIPUNCTURE: CPT

## 2018-08-14 PROCEDURE — 93005 ELECTROCARDIOGRAM TRACING: CPT

## 2018-08-14 PROCEDURE — 6370000000 HC RX 637 (ALT 250 FOR IP): Performed by: STUDENT IN AN ORGANIZED HEALTH CARE EDUCATION/TRAINING PROGRAM

## 2018-08-14 PROCEDURE — 84520 ASSAY OF UREA NITROGEN: CPT

## 2018-08-14 PROCEDURE — 84484 ASSAY OF TROPONIN QUANT: CPT

## 2018-08-14 PROCEDURE — 94762 N-INVAS EAR/PLS OXIMTRY CONT: CPT

## 2018-08-14 PROCEDURE — 82565 ASSAY OF CREATININE: CPT

## 2018-08-14 PROCEDURE — 71045 X-RAY EXAM CHEST 1 VIEW: CPT

## 2018-08-14 PROCEDURE — 2060000000 HC ICU INTERMEDIATE R&B

## 2018-08-14 RX ORDER — NITROGLYCERIN 0.4 MG/1
0.4 TABLET SUBLINGUAL EVERY 5 MIN PRN
Status: DISCONTINUED | OUTPATIENT
Start: 2018-08-14 | End: 2018-08-15

## 2018-08-14 RX ORDER — 0.9 % SODIUM CHLORIDE 0.9 %
500 INTRAVENOUS SOLUTION INTRAVENOUS ONCE
Status: COMPLETED | OUTPATIENT
Start: 2018-08-14 | End: 2018-08-14

## 2018-08-14 RX ADMIN — KETOROLAC TROMETHAMINE 15 MG: 15 INJECTION, SOLUTION INTRAMUSCULAR; INTRAVENOUS at 17:45

## 2018-08-14 RX ADMIN — OXYCODONE HYDROCHLORIDE 10 MG: 5 TABLET ORAL at 11:56

## 2018-08-14 RX ADMIN — DILTIAZEM HYDROCHLORIDE 120 MG: 60 CAPSULE, EXTENDED RELEASE ORAL at 08:30

## 2018-08-14 RX ADMIN — KETOROLAC TROMETHAMINE 15 MG: 15 INJECTION, SOLUTION INTRAMUSCULAR; INTRAVENOUS at 23:33

## 2018-08-14 RX ADMIN — APIXABAN 2.5 MG: 2.5 TABLET, FILM COATED ORAL at 08:30

## 2018-08-14 RX ADMIN — PANTOPRAZOLE SODIUM 40 MG: 40 TABLET, DELAYED RELEASE ORAL at 06:05

## 2018-08-14 RX ADMIN — ACETAMINOPHEN 650 MG: 325 TABLET ORAL at 08:30

## 2018-08-14 RX ADMIN — ACETAMINOPHEN 650 MG: 325 TABLET ORAL at 23:33

## 2018-08-14 RX ADMIN — SODIUM CHLORIDE 500 ML: 9 INJECTION, SOLUTION INTRAVENOUS at 12:58

## 2018-08-14 RX ADMIN — NITROGLYCERIN 0.4 MG: 0.4 TABLET SUBLINGUAL at 12:26

## 2018-08-14 RX ADMIN — KETOROLAC TROMETHAMINE 15 MG: 15 INJECTION, SOLUTION INTRAMUSCULAR; INTRAVENOUS at 06:05

## 2018-08-14 RX ADMIN — DOCUSATE SODIUM 100 MG: 100 CAPSULE ORAL at 20:48

## 2018-08-14 RX ADMIN — FLUDROCORTISONE ACETATE 0.1 MG: 0.1 TABLET ORAL at 08:30

## 2018-08-14 RX ADMIN — DILTIAZEM HYDROCHLORIDE 120 MG: 60 CAPSULE, EXTENDED RELEASE ORAL at 20:49

## 2018-08-14 RX ADMIN — LEVOTHYROXINE SODIUM 100 MCG: 100 TABLET ORAL at 06:05

## 2018-08-14 RX ADMIN — Medication 10 ML: at 20:48

## 2018-08-14 RX ADMIN — DOCUSATE SODIUM 100 MG: 100 CAPSULE ORAL at 08:30

## 2018-08-14 RX ADMIN — KETOROLAC TROMETHAMINE 15 MG: 15 INJECTION, SOLUTION INTRAMUSCULAR; INTRAVENOUS at 00:14

## 2018-08-14 RX ADMIN — ACETAMINOPHEN 650 MG: 325 TABLET ORAL at 20:49

## 2018-08-14 RX ADMIN — FOLIC ACID 1 MG: 1 TABLET ORAL at 08:30

## 2018-08-14 RX ADMIN — Medication 10 ML: at 08:30

## 2018-08-14 RX ADMIN — APIXABAN 2.5 MG: 2.5 TABLET, FILM COATED ORAL at 20:48

## 2018-08-14 ASSESSMENT — PAIN DESCRIPTION - PROGRESSION

## 2018-08-14 ASSESSMENT — PAIN SCALES - GENERAL
PAINLEVEL_OUTOF10: 5
PAINLEVEL_OUTOF10: 5
PAINLEVEL_OUTOF10: 6
PAINLEVEL_OUTOF10: 7
PAINLEVEL_OUTOF10: 8
PAINLEVEL_OUTOF10: 5
PAINLEVEL_OUTOF10: 10
PAINLEVEL_OUTOF10: 5

## 2018-08-14 ASSESSMENT — PAIN DESCRIPTION - LOCATION: LOCATION: SHOULDER

## 2018-08-14 ASSESSMENT — PAIN DESCRIPTION - PAIN TYPE: TYPE: ACUTE PAIN

## 2018-08-14 NOTE — PROGRESS NOTES
Was notified by nurse that pt was having chest pain. Pt states this is the normal chest pain that she has had before. Nurse states that she takes nitro at home before. Nurse ordered Nitro and gave to pt. Pt states the CP subsided some, but began to c/o of a headache. Pt began to become hypotensive, and continued to be bradycardic so the nurse had her lay flat. EKG was ordered which showed sinus robbin. Troponin was ordered and awaiting results. PE:    HEENT: PERRLA, EOMI b/l  CARD: RRR, normal S1 and S2, no m/r/g, 2+ radial and DP pulses B/L  PULM: CTAB, no w/r/r, symmetric chest rise  GI: Normotensive BS x4 Quadrants, soft, nondistended, non-tender, no rebound, guarding, or rigidity    Plan:  1. EKG evaluated  2. Troponin  3. 500mL bolus NS given  4.  Monitor Vitals

## 2018-08-14 NOTE — PLAN OF CARE
Problem: Falls - Risk of:  Goal: Will remain free from falls  Will remain free from falls   Outcome: Ongoing  Pt remains free of falls at this time. Bed locked in lowest position, siderails x2, call light in reach. Bed alarm on. Non-skid footwear applied. Pt ambulates in room with steady gait with help. Encouraged pt to call for assistance as needed for safety. Falling star posted outside of room. Will continue to monitor.

## 2018-08-14 NOTE — CONSULTS
WBC  6.8   --    HGB  10.8*   --    HCT  34.2*   --    PLT  196   --    INR  1.0   --    NA  139   --    K  3.7   --    BUN  12  17   CREATININE  0.78  0.74   GLUCOSE  81   --         Radiology:   -3V right shoulder demonstrating no acute fractures. High-riding humeral head indicative of rotator cuff arthropathy. A/P: 80 y.o. female being seen for worsening right shoulder pain after mechanical fall 8/12    -No urgent/Immediate ortho surgical intervention  -Pain likely due to R chronic degenerative rotator cuff arthropathy that has worsened acutely after mechanical fall.   -Weight bearing as tolerated  -PT/OT to eval and treat  -Pain control per primary  -Discussed with patient need for strict ice pain/swelling  -DVT ppx: EPC  -F/u with ortho in [place] as needed  -Please page Ortho with any questions or concerns    Reviewed Subjective section with patient who did agree and confirmed everything documented. Discussed plan and patient expressed understanding of diagnosis and prognosis with plan as stated. All questions answered. Candy Rueda DO   Orthopedic Surgery Resident PGY-1  [facility] [address] [address], [facility]    PGY-4 addendum:    Patient seen and examined. Agree with above. TTP to anterosuperior shoulder with humeral head diego-superior. Pt NVI to extremity. Active forward elevation and abduction to <90 degrees. Pt relays that she has been unable to lift her arm over her head for many years but it has worsened and her strength has decreased further over the past 4 months. Will perform CT of R shoulder to further evaluate calcific lesion. DVT ppx and medical management per primary.     Nam Broussard,    Orthopedic Surgery, PGY-4  [facility] [address] [address]

## 2018-08-14 NOTE — PLAN OF CARE
Problem: Falls - Risk of:  Goal: Will remain free from falls  Will remain free from falls   Outcome: Ongoing  Pt remains free of falls at this time. Bed locked in lowest position, siderails x2, call light in reach. Non-skid footwear applied. Encouraged pt to call for assistance as needed for safety. Falling star posted outside of room. Will continue to monitor. Problem: Pain:  Goal: Pain level will decrease  Pain level will decrease   Outcome: Ongoing  Pt able to communicate pain as needed, uses call light appropriately. Pt satisfied with pain interventions.

## 2018-08-15 ENCOUNTER — APPOINTMENT (OUTPATIENT)
Dept: CT IMAGING | Age: 83
DRG: 200 | End: 2018-08-15
Payer: MEDICARE

## 2018-08-15 ENCOUNTER — APPOINTMENT (OUTPATIENT)
Dept: GENERAL RADIOLOGY | Age: 83
DRG: 200 | End: 2018-08-15
Payer: MEDICARE

## 2018-08-15 LAB
BUN BLDV-MCNC: 20 MG/DL (ref 8–23)
CREAT SERPL-MCNC: 0.86 MG/DL (ref 0.5–0.9)
EKG ATRIAL RATE: 56 BPM
EKG P AXIS: 72 DEGREES
EKG P-R INTERVAL: 192 MS
EKG Q-T INTERVAL: 444 MS
EKG QRS DURATION: 88 MS
EKG QTC CALCULATION (BAZETT): 428 MS
EKG R AXIS: 52 DEGREES
EKG T AXIS: 33 DEGREES
EKG VENTRICULAR RATE: 56 BPM
GFR AFRICAN AMERICAN: >60 ML/MIN
GFR NON-AFRICAN AMERICAN: >60 ML/MIN
GFR SERPL CREATININE-BSD FRML MDRD: NORMAL ML/MIN/{1.73_M2}
GFR SERPL CREATININE-BSD FRML MDRD: NORMAL ML/MIN/{1.73_M2}
TROPONIN INTERP: NORMAL
TROPONIN INTERP: NORMAL
TROPONIN T: <0.03 NG/ML
TROPONIN T: <0.03 NG/ML

## 2018-08-15 PROCEDURE — 36415 COLL VENOUS BLD VENIPUNCTURE: CPT

## 2018-08-15 PROCEDURE — 84484 ASSAY OF TROPONIN QUANT: CPT

## 2018-08-15 PROCEDURE — 94762 N-INVAS EAR/PLS OXIMTRY CONT: CPT

## 2018-08-15 PROCEDURE — 2580000003 HC RX 258: Performed by: STUDENT IN AN ORGANIZED HEALTH CARE EDUCATION/TRAINING PROGRAM

## 2018-08-15 PROCEDURE — 2060000000 HC ICU INTERMEDIATE R&B

## 2018-08-15 PROCEDURE — 71045 X-RAY EXAM CHEST 1 VIEW: CPT

## 2018-08-15 PROCEDURE — 84520 ASSAY OF UREA NITROGEN: CPT

## 2018-08-15 PROCEDURE — 73200 CT UPPER EXTREMITY W/O DYE: CPT

## 2018-08-15 PROCEDURE — 6360000002 HC RX W HCPCS: Performed by: STUDENT IN AN ORGANIZED HEALTH CARE EDUCATION/TRAINING PROGRAM

## 2018-08-15 PROCEDURE — 97530 THERAPEUTIC ACTIVITIES: CPT

## 2018-08-15 PROCEDURE — 97116 GAIT TRAINING THERAPY: CPT

## 2018-08-15 PROCEDURE — 6370000000 HC RX 637 (ALT 250 FOR IP): Performed by: STUDENT IN AN ORGANIZED HEALTH CARE EDUCATION/TRAINING PROGRAM

## 2018-08-15 PROCEDURE — 82565 ASSAY OF CREATININE: CPT

## 2018-08-15 RX ADMIN — ACETAMINOPHEN 650 MG: 325 TABLET ORAL at 08:51

## 2018-08-15 RX ADMIN — Medication 10 ML: at 19:43

## 2018-08-15 RX ADMIN — ACETAMINOPHEN 650 MG: 325 TABLET ORAL at 04:18

## 2018-08-15 RX ADMIN — FLUDROCORTISONE ACETATE 0.1 MG: 0.1 TABLET ORAL at 08:53

## 2018-08-15 RX ADMIN — LEVOTHYROXINE SODIUM 100 MCG: 100 TABLET ORAL at 05:41

## 2018-08-15 RX ADMIN — ACETAMINOPHEN 650 MG: 325 TABLET ORAL at 16:19

## 2018-08-15 RX ADMIN — PANTOPRAZOLE SODIUM 40 MG: 40 TABLET, DELAYED RELEASE ORAL at 05:41

## 2018-08-15 RX ADMIN — ACETAMINOPHEN 650 MG: 325 TABLET ORAL at 12:33

## 2018-08-15 RX ADMIN — DOCUSATE SODIUM 100 MG: 100 CAPSULE ORAL at 08:52

## 2018-08-15 RX ADMIN — APIXABAN 2.5 MG: 2.5 TABLET, FILM COATED ORAL at 08:52

## 2018-08-15 RX ADMIN — DILTIAZEM HYDROCHLORIDE 120 MG: 60 CAPSULE, EXTENDED RELEASE ORAL at 19:40

## 2018-08-15 RX ADMIN — DILTIAZEM HYDROCHLORIDE 120 MG: 60 CAPSULE, EXTENDED RELEASE ORAL at 08:53

## 2018-08-15 RX ADMIN — KETOROLAC TROMETHAMINE 15 MG: 15 INJECTION, SOLUTION INTRAMUSCULAR; INTRAVENOUS at 12:33

## 2018-08-15 RX ADMIN — ACETAMINOPHEN 650 MG: 325 TABLET ORAL at 20:45

## 2018-08-15 RX ADMIN — MAGNESIUM HYDROXIDE 30 ML: 400 SUSPENSION ORAL at 12:33

## 2018-08-15 RX ADMIN — APIXABAN 2.5 MG: 2.5 TABLET, FILM COATED ORAL at 19:40

## 2018-08-15 RX ADMIN — FOLIC ACID 1 MG: 1 TABLET ORAL at 08:52

## 2018-08-15 RX ADMIN — DOCUSATE SODIUM 100 MG: 100 CAPSULE ORAL at 19:40

## 2018-08-15 RX ADMIN — PANTOPRAZOLE SODIUM 40 MG: 40 TABLET, DELAYED RELEASE ORAL at 08:53

## 2018-08-15 ASSESSMENT — PAIN SCALES - GENERAL
PAINLEVEL_OUTOF10: 0
PAINLEVEL_OUTOF10: 8
PAINLEVEL_OUTOF10: 2
PAINLEVEL_OUTOF10: 0
PAINLEVEL_OUTOF10: 6
PAINLEVEL_OUTOF10: 0
PAINLEVEL_OUTOF10: 3

## 2018-08-15 NOTE — PROGRESS NOTES
PROGRESS NOTE    PATIENT NAME: Ruslan Velasquez RECORD NO. [id number]  DATE: 8/15/2018  SURGEON:  Juanjo May    PRIMARY CARE PHYSICIAN: Juliana Gamez MD    HD: # 3    ASSESSMENT    Patient Active Problem List   Diagnosis    Fall    Pneumothorax on right       MEDICAL DECISION MAKING AND PLAN    1. Neuro  Continue yi, toradol     2. CV  HR 52-66, BP /27-94  Complaining of chest pain was given nitro; hypotensive and bradycardiac, KG & troponin negative. 3. Pulm  L sided small apical pneumothorax with chest tube in place  Chest tube on water seal overnight with minimal output. Likely removal of chest tube today    4. GI  General diet  Colace, MoM    5. Ortho  Right degenerative rotator cuff. F/up CT R shoulder    6. ID  Afebrile, no leukocytosis       7. DVT prophylaxis   Eliquis      8. Renal  Urine output: 605ml    9. Discharge Needs:  PT and OT          SUBJECTIVE    Graham Chan is doing well today. Tolerating diet well. Denies any chest pain or shoulder pain today. Pt denies headache, lightheadedness, fevers, chills, SOB, cough, abd pain, N/V, N/T, weakness. OBJECTIVE    VITALS: Temp: Temp: 97.8 °F (36.6 °C)Temp  Av.9 °F (36.6 °C)  Min: 97.4 °F (36.3 °C)  Max: 98.2 °F (14.5 °C) BP Systolic (68KQP), NHB:809 , Min:49 , IDU:789   Diastolic (51HJJ), GUV:16, Min:27, Max:94   Pulse Pulse  Av.2  Min: 52  Max: 66 Resp Resp  Avg: 15.7  Min: 13  Max: 21 Pulse ox SpO2  Av.2 %  Min: 94 %  Max: 98 %    General appearance - alert, well appearing, and in no distress  HEENT: Normocephalic, Atraumatic, PERRL and Trachea midline  Chest - clear to auscultation, no wheezes, rales or rhonchi, symmetric air entry.  Mild crepitus noted to right upper lobe  Cardiovascular - normal rate, regular rhythm, normal S1, S2, no murmurs, rubs, clicks or gallops  Abdomen - soft, nontender, nondistended, no masses or organomegaly   Neurological - Alert and oriented and Normal speech  Integumentary -

## 2018-08-16 ENCOUNTER — APPOINTMENT (OUTPATIENT)
Dept: GENERAL RADIOLOGY | Age: 83
DRG: 200 | End: 2018-08-16
Payer: MEDICARE

## 2018-08-16 VITALS
SYSTOLIC BLOOD PRESSURE: 124 MMHG | WEIGHT: 108.03 LBS | HEART RATE: 70 BPM | TEMPERATURE: 99.3 F | OXYGEN SATURATION: 98 % | DIASTOLIC BLOOD PRESSURE: 82 MMHG | BODY MASS INDEX: 20.4 KG/M2 | HEIGHT: 61 IN | RESPIRATION RATE: 12 BRPM

## 2018-08-16 PROCEDURE — 94762 N-INVAS EAR/PLS OXIMTRY CONT: CPT

## 2018-08-16 PROCEDURE — 6370000000 HC RX 637 (ALT 250 FOR IP): Performed by: STUDENT IN AN ORGANIZED HEALTH CARE EDUCATION/TRAINING PROGRAM

## 2018-08-16 PROCEDURE — 97110 THERAPEUTIC EXERCISES: CPT

## 2018-08-16 PROCEDURE — 71045 X-RAY EXAM CHEST 1 VIEW: CPT

## 2018-08-16 PROCEDURE — 97116 GAIT TRAINING THERAPY: CPT

## 2018-08-16 PROCEDURE — 99231 SBSQ HOSP IP/OBS SF/LOW 25: CPT | Performed by: ORTHOPAEDIC SURGERY

## 2018-08-16 RX ORDER — HYDROCODONE BITARTRATE AND ACETAMINOPHEN 5; 325 MG/1; MG/1
1 TABLET ORAL EVERY 6 HOURS PRN
Qty: 10 TABLET | Refills: 0 | Status: SHIPPED | OUTPATIENT
Start: 2018-08-16 | End: 2018-08-19

## 2018-08-16 RX ADMIN — DILTIAZEM HYDROCHLORIDE 120 MG: 60 CAPSULE, EXTENDED RELEASE ORAL at 08:58

## 2018-08-16 RX ADMIN — ACETAMINOPHEN 650 MG: 325 TABLET ORAL at 04:04

## 2018-08-16 RX ADMIN — ACETAMINOPHEN 650 MG: 325 TABLET ORAL at 11:40

## 2018-08-16 RX ADMIN — ACETAMINOPHEN 650 MG: 325 TABLET ORAL at 08:57

## 2018-08-16 RX ADMIN — PANTOPRAZOLE SODIUM 40 MG: 40 TABLET, DELAYED RELEASE ORAL at 08:57

## 2018-08-16 RX ADMIN — FLUDROCORTISONE ACETATE 0.1 MG: 0.1 TABLET ORAL at 08:57

## 2018-08-16 RX ADMIN — APIXABAN 2.5 MG: 2.5 TABLET, FILM COATED ORAL at 08:57

## 2018-08-16 RX ADMIN — DOCUSATE SODIUM 100 MG: 100 CAPSULE ORAL at 08:57

## 2018-08-16 RX ADMIN — OXYCODONE HYDROCHLORIDE 5 MG: 5 TABLET ORAL at 09:09

## 2018-08-16 RX ADMIN — LEVOTHYROXINE SODIUM 100 MCG: 100 TABLET ORAL at 06:24

## 2018-08-16 RX ADMIN — FOLIC ACID 1 MG: 1 TABLET ORAL at 08:57

## 2018-08-16 ASSESSMENT — PAIN SCALES - GENERAL
PAINLEVEL_OUTOF10: 5
PAINLEVEL_OUTOF10: 0

## 2018-08-16 NOTE — PROGRESS NOTES
CLINICAL PHARMACY NOTE: MEDS TO [address] [place], [place] [address] Select Patient?: No  Total # of Prescriptions Filled: 1   The following medications were delivered to the patient:  · HYDROCODONE-ACETAMINOPHEN 5-325 MG  Total # of Interventions Completed: 0  Time Spent (min): 30    Additional Documentation:

## 2018-08-16 NOTE — CARE COORDINATION
Discharge [facility] Case Management Department  Written by: Aayush Pitt RN    Patient Name: Cleve Fontanez  Attending Provider: Jada Rosario MD  Admit Date: 2018  6:17 PM  MRN: [id number]  Account: [de-identified]                     : 1933  Discharge Date: 2018      Disposition: home with family. Denies any home care needs. 2nd IMM signed.     Aayush Pitt RN

## 2018-08-16 NOTE — PROGRESS NOTES
Balance  Posture: Good  Sitting - Static: Good  Sitting - Dynamic: Good  Standing - Static: Good  Standing - Dynamic: Good;-  Comments: Standing balance assessed with no AD, standing balance activities reaching across midline and out of VIKI with SBA, no LOB   Exercises  Supine Exercises: Ankle Pumps, Gluteal sets, Hamstring Sets, Heel Slides, Hip ABD/ADD, Quad Sets, SLR. Reps: 10-15x   Seated LE exercise program: Long Arc Quads, hip abduction/adduction, heel/toe raises, and marches. Reps: 15x   Comments: IS x5 to 1000        Assessment   Body structures, Functions, Activity limitations: Decreased functional mobility ; Decreased endurance  Assessment: Patient amb 300ft with no AD and SBA. Pt demo's good tolerance to activity and good safety awareness. Should be safe to DC home with assistance as needed.    Prognosis: Good  Patient Education: importance of amb, use of IS  REQUIRES PT FOLLOW UP: Yes  Activity Tolerance  Activity Tolerance: Patient Tolerated treatment well     Goals  Short term goals  Time Frame for Short term goals: 10 visits  Short term goal 1: transfers with SBA  Short term goal 2: amb 250 ft without a device x SBA  Short term goal 3: ascend/descend 4 steps with SBA  Short term goal 4: exercise program x SBA  Patient Goals   Patient goals : Return home    Plan    Plan  Times per week: 5-6x wk  Current Treatment Recommendations: Strengthening, Transfer Training, Endurance Training, Gait Training, Functional Mobility Training, Stair training, Safety Education & Training  Safety Devices  Type of devices: Call light within reach, Left in bed, Nurse notified, All fall risk precautions in place, Gait belt  Restraints  Initially in place: No     Therapy Time   Individual Concurrent Group Co-treatment   Time In 1020         Time Out 1058         Minutes [id number] [address]

## 2018-08-20 ENCOUNTER — HOSPITAL ENCOUNTER (OUTPATIENT)
Dept: GENERAL RADIOLOGY | Age: 83
Discharge: HOME | End: 2018-08-22
Payer: MEDICARE

## 2018-08-20 ENCOUNTER — OFFICE VISIT (OUTPATIENT)
Dept: CARDIOLOGY | Age: 83
End: 2018-08-20
Payer: MEDICARE

## 2018-08-20 ENCOUNTER — HOSPITAL ENCOUNTER (OUTPATIENT)
Age: 83
Discharge: HOME | End: 2018-08-22
Payer: MEDICARE

## 2018-08-20 ENCOUNTER — HOSPITAL ENCOUNTER (OUTPATIENT)
Age: 83
Discharge: HOME | End: 2018-08-20
Payer: MEDICARE

## 2018-08-20 VITALS
OXYGEN SATURATION: 97 % | HEIGHT: 61 IN | DIASTOLIC BLOOD PRESSURE: 78 MMHG | HEART RATE: 72 BPM | SYSTOLIC BLOOD PRESSURE: 155 MMHG | RESPIRATION RATE: 16 BRPM | WEIGHT: 99.2 LBS | BODY MASS INDEX: 18.73 KG/M2

## 2018-08-20 DIAGNOSIS — Z79.899 VISIT FOR MONITORING TIKOSYN THERAPY: ICD-10-CM
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
## 2018-08-20 DIAGNOSIS — Z51.81 VISIT FOR MONITORING TIKOSYN THERAPY: ICD-10-CM

## 2018-08-20 DIAGNOSIS — I48.0 PAF (PAROXYSMAL ATRIAL FIBRILLATION) (HCC): ICD-10-CM

## 2018-08-20 DIAGNOSIS — R55 SYNCOPE AND COLLAPSE: ICD-10-CM

## 2018-08-20 DIAGNOSIS — I48.0 PAF (PAROXYSMAL ATRIAL FIBRILLATION) (HCC): Primary | ICD-10-CM

## 2018-08-20 LAB — FOLATE: >20 NG/ML

## 2018-08-20 PROCEDURE — 1101F PT FALLS ASSESS-DOCD LE1/YR: CPT | Performed by: FAMILY MEDICINE

## 2018-08-20 PROCEDURE — 1090F PRES/ABSN URINE INCON ASSESS: CPT | Performed by: FAMILY MEDICINE

## 2018-08-20 PROCEDURE — 1111F DSCHRG MED/CURRENT MED MERGE: CPT | Performed by: FAMILY MEDICINE

## 2018-08-20 PROCEDURE — 1123F ACP DISCUSS/DSCN MKR DOCD: CPT | Performed by: FAMILY MEDICINE

## 2018-08-20 PROCEDURE — G8427 DOCREV CUR MEDS BY ELIG CLIN: HCPCS | Performed by: FAMILY MEDICINE

## 2018-08-20 PROCEDURE — G8420 CALC BMI NORM PARAMETERS: HCPCS | Performed by: FAMILY MEDICINE

## 2018-08-20 PROCEDURE — 4040F PNEUMOC VAC/ADMIN/RCVD: CPT | Performed by: FAMILY MEDICINE

## 2018-08-20 PROCEDURE — 93000 ELECTROCARDIOGRAM COMPLETE: CPT | Performed by: FAMILY MEDICINE

## 2018-08-20 PROCEDURE — 71046 X-RAY EXAM CHEST 2 VIEWS: CPT

## 2018-08-20 PROCEDURE — 36415 COLL VENOUS BLD VENIPUNCTURE: CPT

## 2018-08-20 PROCEDURE — 82746 ASSAY OF FOLIC ACID SERUM: CPT

## 2018-08-20 PROCEDURE — G8400 PT W/DXA NO RESULTS DOC: HCPCS | Performed by: FAMILY MEDICINE

## 2018-08-20 PROCEDURE — 1036F TOBACCO NON-USER: CPT | Performed by: FAMILY MEDICINE

## 2018-08-20 PROCEDURE — 99215 OFFICE O/P EST HI 40 MIN: CPT | Performed by: FAMILY MEDICINE

## 2018-08-20 RX ORDER — HYDROCODONE BITARTRATE AND ACETAMINOPHEN 5; 325 MG/1; MG/1
1 TABLET ORAL EVERY 6 HOURS PRN
COMMUNITY
End: 2018-11-02 | Stop reason: SDUPTHER

## 2018-08-20 RX ORDER — DILTIAZEM HYDROCHLORIDE 120 MG/1
120 CAPSULE, COATED, EXTENDED RELEASE ORAL DAILY
Qty: 90 CAPSULE | Refills: 3 | Status: SHIPPED | OUTPATIENT
Start: 2018-08-20 | End: 2018-08-27 | Stop reason: DRUGHIGH

## 2018-08-20 NOTE — PROGRESS NOTES
release capsule Take 1 capsule by mouth daily 90 capsule 3    apixaban (ELIQUIS) 2.5 MG TABS tablet Take 1 tablet by mouth 2 times daily 180 tablet 3    omeprazole (PRILOSEC) 20 MG capsule Take 20 mg by mouth daily. FAMILY HISTORY: family history includes Stroke in her father and mother. PHYSICAL EXAM:   BP (!) 155/78 (Site: Right Arm, Position: Sitting, Cuff Size: Medium Adult)   Pulse 72   Resp 16   Ht 5' 1\" (1.549 m)   Wt 99 lb 3.2 oz (45 kg)   SpO2 97%   BMI 18.74 kg/m²  Body mass index is 18.74 kg/m². Constitutional: She is oriented to person, place, and time. She appears well-developed and well-nourished. In no acute distress. HEENT: Normocephalic and atraumatic. No significant JVD was present. Carotid bruit is not present. No mass and no thyromegaly present. No lymphadenopathy present. Cardiovascular: Normal rate and regular rhythm. Normal heart sounds and intact distal pulses. Exam reveals no gallop and no friction rub. A II/VI systolic murmur was heard at the intercostal space of the heart without significant radiation. Pulmonary/Chest: Effort normal and breath sounds normal. No respiratory distress. She has no wheezes, rhonchi or rales. Crackles heard  Abdominal: Soft, non-tender. Bowel sounds and aorta are normal. She exhibits no organomegaly, mass or bruit. Extremities: Trace-1+ lower extremity pitting pedal edema. 1/2 way up to the knees bilaterally  No cyanosis, or clubbing. Pulses are 2+ radial/carotid/ and 1+ dorsalis pedis and posterior tibial bilaterally. Neurological: She is alert and oriented to person, place, and time. No evidence of gross cranial nerve deficit. Coordination appeared normal.   Skin: Skin is warm and dry. There is no rash or diaphoresis. Psychiatric: She has a normal mood and affect.  Her speech is normal and behavior is normal.      MOST RECENT LABS ON RECORD:   Lab Results   Component Value Date    WBC 7.8 08/12/2018    HGB 11.3 (L) 08/12/2018 HCT 34.7 (L) 08/12/2018     08/12/2018    ALT 16 01/08/2018    AST 20 01/08/2018     08/12/2018    K 4.5 08/12/2018     08/12/2018    CREATININE 0.98 (H) 08/12/2018    BUN 13 08/12/2018    CO2 29 08/12/2018    TSH 36.92 (H) 07/09/2018       ASSESSMENT:  1. PAF (paroxysmal atrial fibrillation) ([facility] [address]    2. Visit for monitoring Tikosyn therapy    3. Fall, subsequent encounter       PLAN:  Essential Hypertension: Uncontrolled    · Calcium Channel Blocker: Continue Cardizem 120 mg once daily to help decrease her blood pressure. · Stop Florinef  · Counseling: I asked her to check her blood pressure over the next couple of days and asked her to call into the office with her blood pressure measurements. · Paroxysmal Atrial Fibrillation: Rhythm Control- EKG today shows normal sinus rythym    · Rate Control Agent:(s): · Beta Blocker Therapy: Not indicated due to bradycardia   · Calcium Channel Blocker: Continue Cardizem  mg once daily   · Rhythm Control Agent: Continue Dofetilide (Tikosyn)  250 mcg  twice daily  · Monitoring: Tikosyn. Since she will be maintained on Tikosyn I will continue to monitor her ECG's every 6 months and make sure his QTc remains less than 500 ms. \",   · Her CHADS2 score is greater than 3 (3.2% stroke risk)  · Anticoagulation: Continue Apixaban (Eliquis) 2.5 mg twice daily. I instructed her to watch for any signs of blood in her stool, urine or black tarry stools and if this developed to stop the medication immediately and call my or your office and/or go to nearest emergency room. · Counseling: Will plan to continue monitoring her LINQ via home monitoring. · History of Syncope/Near Syncope: Controlled now. Probably related to her paroxysmal atrial fibrillation   · Pharmacological Management: Stop fludocortisone (Florinef)       Finally, I recommended that she continue her other medications and follow up with you as previously scheduled.     FOLLOW UP:  I told

## 2018-08-21 ENCOUNTER — TELEPHONE (OUTPATIENT)
Dept: CARDIOLOGY | Age: 83
End: 2018-08-21

## 2018-08-21 DIAGNOSIS — Z79.899 VISIT FOR MONITORING TIKOSYN THERAPY: ICD-10-CM

## 2018-08-21 DIAGNOSIS — I48.0 PAF (PAROXYSMAL ATRIAL FIBRILLATION) (HCC): ICD-10-CM

## 2018-08-21 DIAGNOSIS — Z51.81 VISIT FOR MONITORING TIKOSYN THERAPY: ICD-10-CM

## 2018-08-22 NOTE — DISCHARGE SUMMARY
DISCHARGE SUMMARY:    PATIENT NAME:  Jeronimo Bergeron: 2/13/1933  MEDICAL RECORD NO. [id number]  DATE: 08/22/18  PRIMARY CARE PHYSICIAN: Radha Maier MD  ADMIT DATE: 8/12/18  DISPOSITION:  Home with family  DISCHARGE DATE:   8/16/18  ADMITTING DIAGNOSIS: Fall    DIAGNOSIS:   Patient Active Problem List   Diagnosis    Paroxysmal atrial fibrillation (HCC)    Chest pressure    Mycobacterium avium-intracellulare complex (HCC)    Chronic cough    Dermatitis    Allergic rhinitis    Severe sinus bradycardia    Abnormal resting ECG findings    Recurrent chest pain    Chronic pulmonary aspiration    GERD (gastroesophageal reflux disease)    Influenza A with respiratory manifestations    Syncope and collapse    Slow transit constipation    Anticoagulation management encounter    Other fatigue    [facility]) [facility] [address]    Visit for monitoring Tikosyn therapy    Adverse effect of amiodarone    Fall    Multiple closed fractures of ribs of right side    Pneumothorax, traumatic    Right pulmonary contusion    Fall    Pneumothorax on right    Right shoulder pain       CONSULTANTS:  orthopedic surgery    PROCEDURES: None    HOSPITAL COURSE:   Erma Mckay is a 80 y.o. female who was admitted as a transfer from [place] s/p [facility]. Imagining revealed right sided pneumothorax and rib fx of 8th and 9th. Right sided chest tube was placed. Chest tube was removed day 4 and apical pneumothorax was resolved. Patient was weaned off nasal cannula to saturation on room air. Incidental 8mm right lower lobe nodule as noted on chest CT, instructed to follow up with PCP. Labs and imaging were followed daily. At time of discharge, Erma Mckay was tolerating a regular diet, having bowel movements, ambulating on own accord with assistance, had adequate analgesia on oral pain medications, and had no signs of symptoms of complications.   She was deemed medically stable and

## 2018-08-27 ENCOUNTER — OFFICE VISIT (OUTPATIENT)
Dept: CARDIOLOGY | Age: 83
End: 2018-08-27
Payer: MEDICARE

## 2018-08-27 VITALS
SYSTOLIC BLOOD PRESSURE: 166 MMHG | DIASTOLIC BLOOD PRESSURE: 83 MMHG | WEIGHT: 95 LBS | HEART RATE: 70 BPM | HEIGHT: 61 IN | OXYGEN SATURATION: 97 % | BODY MASS INDEX: 17.94 KG/M2 | RESPIRATION RATE: 16 BRPM

## 2018-08-27 DIAGNOSIS — Z51.81 VISIT FOR MONITORING TIKOSYN THERAPY: ICD-10-CM

## 2018-08-27 DIAGNOSIS — I48.91 ATRIAL FIBRILLATION WITH RVR (HCC): ICD-10-CM

## 2018-08-27 DIAGNOSIS — Z79.899 VISIT FOR MONITORING TIKOSYN THERAPY: ICD-10-CM

## 2018-08-27 DIAGNOSIS — I48.0 PAF (PAROXYSMAL ATRIAL FIBRILLATION) (HCC): Primary | ICD-10-CM
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
## 2018-08-27 PROCEDURE — 99214 OFFICE O/P EST MOD 30 MIN: CPT | Performed by: FAMILY MEDICINE

## 2018-08-27 PROCEDURE — 1111F DSCHRG MED/CURRENT MED MERGE: CPT | Performed by: FAMILY MEDICINE

## 2018-08-27 PROCEDURE — 1090F PRES/ABSN URINE INCON ASSESS: CPT | Performed by: FAMILY MEDICINE

## 2018-08-27 PROCEDURE — 1101F PT FALLS ASSESS-DOCD LE1/YR: CPT | Performed by: FAMILY MEDICINE

## 2018-08-27 PROCEDURE — 1036F TOBACCO NON-USER: CPT | Performed by: FAMILY MEDICINE

## 2018-08-27 PROCEDURE — G8419 CALC BMI OUT NRM PARAM NOF/U: HCPCS | Performed by: FAMILY MEDICINE

## 2018-08-27 PROCEDURE — G8400 PT W/DXA NO RESULTS DOC: HCPCS | Performed by: FAMILY MEDICINE

## 2018-08-27 PROCEDURE — 1123F ACP DISCUSS/DSCN MKR DOCD: CPT | Performed by: FAMILY MEDICINE

## 2018-08-27 PROCEDURE — 4040F PNEUMOC VAC/ADMIN/RCVD: CPT | Performed by: FAMILY MEDICINE

## 2018-08-27 PROCEDURE — G8427 DOCREV CUR MEDS BY ELIG CLIN: HCPCS | Performed by: FAMILY MEDICINE

## 2018-08-27 RX ORDER — DILTIAZEM HYDROCHLORIDE 240 MG/1
240 CAPSULE, EXTENDED RELEASE ORAL DAILY
Qty: 90 CAPSULE | Refills: 3 | Status: SHIPPED | OUTPATIENT
Start: 2018-08-27 | End: 2018-10-23

## 2018-08-27 RX ORDER — DOFETILIDE 0.25 MG/1
250 CAPSULE ORAL 2 TIMES DAILY
Qty: 180 CAPSULE | Refills: 3 | Status: SHIPPED | OUTPATIENT
Start: 2018-08-27 | End: 2018-09-04 | Stop reason: SDUPTHER

## 2018-08-27 NOTE — PROGRESS NOTES
Abnormal resting ECG findings 6/10/2013    Severe sinus bradycardia at 50 bpm.     Anxiety     Chronic back pain     Pt. c/o upper back pain,,,,\"On the sides of my lungs\"    Hiatal hernia     Holter monitor, abnormal 12    Multiple episodes of SVT between 100 and 130 beats per minute most consistent with atrial fibrillation and/or atrial flutter with variable block.  Hypothyroidism     Insomnia     MAC (mycobacterium avium-intracellulare complex)     Normal cardiac stress test 12    low risk study.  Paroxysmal atrial fibrillation ([address] 2012    Found on holter monitor  and      Severe sinus bradycardia 6/10/13    At least partially drug induced.  Status post placement of implantable loop recorder 10/19/2017    LINQ IMPLANTED. [employer]    Subdural hematoma (HCC)        CURRENT ALLERGIES: Sulfur and Sulfa antibiotics REVIEW OF SYSTEMS: 14 systems were reviewed. Pertinent positives and negatives as above, all else negative. Past Surgical History:   Procedure Laterality Date    BRAIN SURGERY      Had a blood clot on the brain. Fell 10 feet.  CATARACT REMOVAL Bilateral      SECTION      x1    FOOT SURGERY      INSERTABLE CARDIAC MONITOR Left 10/19/2017    [facility]) [facility]/Dr Angulo    THYROID SURGERY      tumor removed    Social History:  Social History   Substance Use Topics    Smoking status: Never Smoker    Smokeless tobacco: Never Used    Alcohol use No        CURRENT MEDICATIONS:  Outpatient Prescriptions Marked as Taking for the 18 encounter (Office Visit) with Peter Guallpa MD   Medication Sig Dispense Refill    HYDROcodone-acetaminophen (NORCO) 5-325 MG per tablet Take 1 tablet by mouth every 6 hours as needed for Pain. Kendall Baez       apixaban (ELIQUIS) 2.5 MG TABS tablet Take 1 tablet by mouth 2 times daily 180 tablet 3    diltiazem (CARDIZEM CD) 120 MG extended release capsule Take 1 capsule by mouth daily 90 capsule 3    dofetilide (TIKOSYN) 250 MCG capsule Take 1 capsule by mouth 2 times daily 180 capsule 3    omeprazole (PRILOSEC) 20 MG capsule Take 20 mg by mouth daily. FAMILY HISTORY: family history includes Stroke in her father and mother. PHYSICAL EXAM:   BP (!) 166/83 (Site: Right Arm, Position: Sitting, Cuff Size: Medium Adult)   Pulse 70   Resp 16   Ht 5' 1\" (1.549 m)   Wt 95 lb (43.1 kg)   SpO2 97%   BMI 17.95 kg/m²  Body mass index is 17.95 kg/m². Constitutional: She is oriented to person, place, and time. She appears well-developed and well-nourished. In no acute distress. HEENT: Normocephalic and atraumatic. No significant JVD was present. Carotid bruit is not present. No mass and no thyromegaly present. No lymphadenopathy present. Cardiovascular: Normal rate and regular rhythm. Normal heart sounds and intact distal pulses. Exam reveals no gallop and no friction rub. A II/VI systolic murmur was heard at the intercostal space of the heart without significant radiation. Pulmonary/Chest: Effort normal and breath sounds normal. No respiratory distress. She has no wheezes, rhonchi or rales. Crackles heard  Abdominal: Soft, non-tender. Bowel sounds and aorta are normal. She exhibits no organomegaly, mass or bruit. Extremities: Trace-1+ lower extremity pitting pedal edema. 1/2 way up to the knees bilaterally  No cyanosis, or clubbing. Pulses are 2+ radial/carotid/ and 1+ dorsalis pedis and posterior tibial bilaterally. Neurological: She is alert and oriented to person, place, and time. No evidence of gross cranial nerve deficit. Coordination appeared normal.   Skin: Skin is warm and dry. There is no rash or diaphoresis. Psychiatric: She has a normal mood and affect.  Her speech is normal and behavior is normal.      MOST RECENT LABS ON RECORD:   Lab Results   Component Value Date    WBC 6.8 08/13/2018    HGB 10.8 (L) 08/13/2018    HCT 34.2 (L) 08/13/2018     08/13/2018    ALT 16 01/08/2018    AST 20 01/08/2018  08/13/2018    K 3.7 08/13/2018     08/13/2018    CREATININE 0.86 08/15/2018    BUN 20 08/15/2018    CO2 23 08/13/2018    TSH 36.92 (H) 07/09/2018    INR 1.0 08/13/2018       ASSESSMENT:  1. PAF (paroxysmal atrial fibrillation) ([facility] [address]    2. Visit for monitoring Tikosyn therapy    3. Atrial fibrillation with RVR ([facility])       PLAN:  · Paroxysmal Atrial Fibrillation: Rhythm Control- EKG today shows normal sinus rythym. Unfortunately she had a recent event of  atrial fibrillation with RVR on her recent LINQ. Uncertain whether this led to symptoms or not. · Rate Control Agent:(s): · Beta Blocker Therapy: Not indicated due to bradycardia   · Calcium Channel Blocker: Increase Cardizem CD from 120 mg to 240 mg once daily for rate control. · Rhythm Control Agent: Continue Dofetilide (Tikosyn)  250 mcg  twice daily  · Monitoring: Tikosyn. Since she will be maintained on Tikosyn I will continue to monitor her ECG's every 6 months and make sure his QTc remains less than 500 ms. \",   · Her CHADS2 score is greater than 3 (3.2% stroke risk)  · Anticoagulation: Continue Apixaban (Eliquis) 2.5 mg twice daily. I instructed her to watch for any signs of blood in her stool, urine or black tarry stools and if this developed to stop the medication immediately and call my or your office and/or go to nearest emergency room. · Counseling: Will plan to continue monitoring her LINQ via home monitoring. Essential Hypertension: Uncontrolled    · Calcium Channel Blocker: Increase Cardizem 120 mg once daily to 240 mg once daily to help decrease her blood pressure. · Counseling: I asked her to check her blood pressure over the next couple of days and asked her to call into the office with her blood pressure measurements. Finally, I recommended that she continue her other medications and follow up with you as previously scheduled. FOLLOW UP:  I told Ms. Mehnaz Ivory to call my office if she had any problems, but otherwise

## 2018-08-29 ENCOUNTER — TELEPHONE (OUTPATIENT)
Dept: CARDIOLOGY | Age: 83
End: 2018-08-29

## 2018-08-29 NOTE — TELEPHONE ENCOUNTER
PA needed for Tikosyn. Costing patient $359 for a 90 days supply. Spoke with [facility] PA team at [phone] and started PA. [facility] Ref # [id number]. Pt notifed.

## 2018-08-31 ENCOUNTER — TELEPHONE (OUTPATIENT)
Dept: CARDIOLOGY | Age: 83
End: 2018-08-31

## 2018-08-31 NOTE — TELEPHONE ENCOUNTER
[facility]livia denied the Tier requset for Tikosyn. [place] with [facility] ([phone])PADMINI did an urgent appeals and they will let us know by the end of the day if it was approved. Ref #:[id number]. Spoke with pt daughter,Brittani at [phone] and let her know the status with Tikosyn. Mary Carmen Justin stated that she would pay the $359 if needed.

## 2018-09-04 ENCOUNTER — TELEPHONE (OUTPATIENT)
Dept: CARDIOLOGY | Age: 83
End: 2018-09-04

## 2018-09-04 RX ORDER — DOFETILIDE 0.25 MG/1
250 CAPSULE ORAL 2 TIMES DAILY
Qty: 180 CAPSULE | Refills: 3 | Status: SHIPPED | OUTPATIENT
Start: 2018-09-04 | End: 2018-12-27 | Stop reason: SDUPTHER

## 2018-09-05 ENCOUNTER — TELEPHONE (OUTPATIENT)
Dept: CARDIOLOGY | Age: 83
End: 2018-09-05
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
## 2018-09-27 PROCEDURE — 93299 PR REM INTERROG ICPMS/SCRMS <30 D TECH REVIEW: CPT | Performed by: FAMILY MEDICINE

## 2018-09-27 PROCEDURE — 93298 REM INTERROG DEV EVAL SCRMS: CPT | Performed by: FAMILY MEDICINE

## 2018-10-01 ENCOUNTER — TELEPHONE (OUTPATIENT)
Dept: CARDIOLOGY | Age: 83
End: 2018-10-01

## 2018-10-03 ENCOUNTER — NURSE ONLY (OUTPATIENT)
Dept: CARDIOLOGY | Age: 83
End: 2018-10-03
Payer: MEDICARE

## 2018-10-03 DIAGNOSIS — Z45.09 ENCOUNTER FOR LOOP RECORDER CHECK: Primary | ICD-10-CM

## 2018-10-03 DIAGNOSIS — R55 SYNCOPE, UNSPECIFIED SYNCOPE TYPE: ICD-10-CM

## 2018-10-08 ENCOUNTER — OFFICE VISIT (OUTPATIENT)
Dept: CARDIOLOGY | Age: 83
End: 2018-10-08
Payer: MEDICARE

## 2018-10-08 VITALS
OXYGEN SATURATION: 97 % | HEART RATE: 57 BPM | SYSTOLIC BLOOD PRESSURE: 111 MMHG | HEIGHT: 60 IN | DIASTOLIC BLOOD PRESSURE: 67 MMHG | WEIGHT: 99 LBS | BODY MASS INDEX: 19.44 KG/M2

## 2018-10-08 DIAGNOSIS — I48.0 PAF (PAROXYSMAL ATRIAL FIBRILLATION) (HCC): ICD-10-CM

## 2018-10-08 DIAGNOSIS — I50.32 CHRONIC DIASTOLIC CONGESTIVE HEART FAILURE (HCC): Primary | ICD-10-CM

## 2018-10-08 DIAGNOSIS — I10 ESSENTIAL HYPERTENSION: ICD-10-CM

## 2018-10-08 PROCEDURE — 99214 OFFICE O/P EST MOD 30 MIN: CPT | Performed by: FAMILY MEDICINE

## 2018-10-08 PROCEDURE — G8427 DOCREV CUR MEDS BY ELIG CLIN: HCPCS | Performed by: FAMILY MEDICINE

## 2018-10-08 PROCEDURE — G8400 PT W/DXA NO RESULTS DOC: HCPCS | Performed by: FAMILY MEDICINE

## 2018-10-08 PROCEDURE — 1123F ACP DISCUSS/DSCN MKR DOCD: CPT | Performed by: FAMILY MEDICINE

## 2018-10-08 PROCEDURE — 1101F PT FALLS ASSESS-DOCD LE1/YR: CPT | Performed by: FAMILY MEDICINE

## 2018-10-08 PROCEDURE — G8484 FLU IMMUNIZE NO ADMIN: HCPCS | Performed by: FAMILY MEDICINE

## 2018-10-08 PROCEDURE — 1036F TOBACCO NON-USER: CPT | Performed by: FAMILY MEDICINE

## 2018-10-08 PROCEDURE — 4040F PNEUMOC VAC/ADMIN/RCVD: CPT | Performed by: FAMILY MEDICINE

## 2018-10-08 PROCEDURE — G8420 CALC BMI NORM PARAMETERS: HCPCS | Performed by: FAMILY MEDICINE

## 2018-10-08 PROCEDURE — 1090F PRES/ABSN URINE INCON ASSESS: CPT | Performed by: FAMILY MEDICINE

## 2018-10-19 ENCOUNTER — TELEPHONE (OUTPATIENT)
Dept: CARDIOLOGY | Age: 83
End: 2018-10-19

## 2018-10-19 NOTE — TELEPHONE ENCOUNTER
Per  ask pt if any symptoms. Spoke  With patient and she stated she has been having chest discomfort,not feeling well, and fatigue.

## 2018-10-23 ENCOUNTER — OFFICE VISIT (OUTPATIENT)
Dept: CARDIOLOGY | Age: 83
End: 2018-10-23
Payer: MEDICARE

## 2018-10-23 VITALS
SYSTOLIC BLOOD PRESSURE: 170 MMHG | WEIGHT: 99.2 LBS | HEIGHT: 61 IN | DIASTOLIC BLOOD PRESSURE: 79 MMHG | HEART RATE: 73 BPM | BODY MASS INDEX: 18.73 KG/M2 | OXYGEN SATURATION: 98 %

## 2018-10-23 DIAGNOSIS — Z45.09 ENCOUNTER FOR LOOP RECORDER CHECK: ICD-10-CM

## 2018-10-23 DIAGNOSIS — I50.32 CHRONIC DIASTOLIC CONGESTIVE HEART FAILURE (HCC): Primary | ICD-10-CM

## 2018-10-23 DIAGNOSIS — I10 ESSENTIAL HYPERTENSION: ICD-10-CM

## 2018-10-23 DIAGNOSIS — I48.0 PAF (PAROXYSMAL ATRIAL FIBRILLATION) (HCC): ICD-10-CM

## 2018-10-23 DIAGNOSIS — R07.89 CHEST TIGHTNESS: ICD-10-CM

## 2018-10-23 DIAGNOSIS — R53.83 FATIGUE, UNSPECIFIED TYPE: ICD-10-CM

## 2018-10-23 DIAGNOSIS — G47.33 OBSTRUCTIVE SLEEP APNEA SYNDROME: ICD-10-CM

## 2018-10-23 PROCEDURE — 4040F PNEUMOC VAC/ADMIN/RCVD: CPT | Performed by: FAMILY MEDICINE

## 2018-10-23 PROCEDURE — G8400 PT W/DXA NO RESULTS DOC: HCPCS | Performed by: FAMILY MEDICINE

## 2018-10-23 PROCEDURE — 99214 OFFICE O/P EST MOD 30 MIN: CPT | Performed by: FAMILY MEDICINE

## 2018-10-23 PROCEDURE — 1090F PRES/ABSN URINE INCON ASSESS: CPT | Performed by: FAMILY MEDICINE

## 2018-10-23 PROCEDURE — 1036F TOBACCO NON-USER: CPT | Performed by: FAMILY MEDICINE

## 2018-10-23 PROCEDURE — 1123F ACP DISCUSS/DSCN MKR DOCD: CPT | Performed by: FAMILY MEDICINE

## 2018-10-23 PROCEDURE — G8420 CALC BMI NORM PARAMETERS: HCPCS | Performed by: FAMILY MEDICINE

## 2018-10-23 PROCEDURE — G8484 FLU IMMUNIZE NO ADMIN: HCPCS | Performed by: FAMILY MEDICINE

## 2018-10-23 PROCEDURE — G8427 DOCREV CUR MEDS BY ELIG CLIN: HCPCS | Performed by: FAMILY MEDICINE

## 2018-10-23 PROCEDURE — 93291 INTERROG DEV EVAL SCRMS IP: CPT | Performed by: FAMILY MEDICINE

## 2018-10-23 PROCEDURE — 1101F PT FALLS ASSESS-DOCD LE1/YR: CPT | Performed by: FAMILY MEDICINE

## 2018-10-23 RX ORDER — NITROGLYCERIN 0.4 MG/1
0.4 TABLET SUBLINGUAL EVERY 5 MIN PRN
Qty: 25 TABLET | Refills: 3 | Status: CANCELLED | OUTPATIENT
Start: 2018-10-23

## 2018-10-23 RX ORDER — DILTIAZEM HYDROCHLORIDE 240 MG/1
240 CAPSULE, EXTENDED RELEASE ORAL DAILY
Qty: 90 CAPSULE | Refills: 3 | Status: SHIPPED | OUTPATIENT
Start: 2018-10-23 | End: 2018-11-30

## 2018-10-23 RX ORDER — DILTIAZEM HYDROCHLORIDE 240 MG/1
240 CAPSULE, EXTENDED RELEASE ORAL DAILY
Qty: 90 CAPSULE | Refills: 3 | Status: CANCELLED | OUTPATIENT
Start: 2018-10-23

## 2018-10-23 NOTE — PROGRESS NOTES
I, Alexander Key am scribing for and in the presence of Adina Payan MD.    Patient: Leonel Chester  : 1933  Date of Visit: 2018    REASON FOR VISIT / CONSULTATION: Other (HX: PAF, HTN, CHF, LINQ. Pt is here to follow up on her tachycardia alerts. She had weakness and was very tired. She had chest pressure that lasted about an hour during the time of the episode. She says she gets this pressure a couple times a week during walking. Nitro helps. Small amount of SOB walking up/down stairs. Denies palpitations, lightheadedness/dizziness.)      Dear Yamilet Theodore,    I had the pleasure of seeing your patient Leonel Chester in my office today. As you know, Ms. Nidia Graham is a 80 y.o. female with a history of paroxysmal atrial fibrillation as well as intermittent episodes of lightheadedness and dizziness as well as several episodes of syncope and near syncope of unknown etiology. A Holter monitor showed several breakthrough brief episodes of  atrial fibrillation with RVR. Therefore I started her on Amiodarone 200 mg daily. Her last LINQ remote interrogation in  had shown about 1.1% percent of atrial fibrillation. Unfortunately, she developed some significant thyroid issues leading her amiodarone to be stop and instead started on Tikosyn therapy 2018 for PAF. Unfortunately, she was admitted on 18 to [facility] for a punctured lung and broken ribs due to her shoes and slipping and falling while trying to walk out of her house. Apparently this led to then need for a chest tube for what sounds to be a hemothorax which ultimately was removed. LINQ alert on 2018 showed atrial fibrillation. LINQ alert on 10/16/2018 showed atrial fibrillations. Since the last time I saw Ms. Nidia Graham, she says she is getting more tired and weak and that this is very bothersome to her. She says she has been having lightheadedness/dizziness a couple times a week but denies any falls.  She also reports having short episodes of chest pressure every couple months the most recent time when she could not find her keys and she got very worked up. This led to a LINQ alert on 10/16/18 with HR as high as 188 and it appeared to be a 4 minute run of  atrial fibrillation with RVR. She was having chest pressure at this time. She took a nitro and laid down and she says it went away. She gets really bothered by a rapid heart rate a couple times a week. She continues to remain active. She denies any abdominal pain or bleeding issues. She denies any shortness of breath. She denies any problems with medication. Past Medical History:   Diagnosis Date    Abnormal resting ECG findings 12    Normal sinus rhythm with frequent PACs in a trigeminy pattern    Abnormal resting ECG findings 6/10/2013    Severe sinus bradycardia at 50 bpm.     Anxiety     Chronic back pain     Pt. c/o upper back pain,,,,\"On the sides of my lungs\"    Hiatal hernia     Holter monitor, abnormal 12    Multiple episodes of SVT between 100 and 130 beats per minute most consistent with atrial fibrillation and/or atrial flutter with variable block.  Hypothyroidism     Insomnia     MAC (mycobacterium avium-intracellulare complex)     Normal cardiac stress test 12    low risk study.  Paroxysmal atrial fibrillation ([facility] [address] 2012    Found on holter monitor  and      Severe sinus bradycardia 6/10/13    At least partially drug induced.  Status post placement of implantable loop recorder 10/19/2017    LINQ IMPLANTED. [employer]    Subdural hematoma (HCC)        CURRENT ALLERGIES: Sulfur and Sulfa antibiotics REVIEW OF SYSTEMS: 14 systems were reviewed. Pertinent positives and negatives as above, all else negative. Past Surgical History:   Procedure Laterality Date    BRAIN SURGERY      Had a blood clot on the brain. Fell 10 feet.      CATARACT REMOVAL Bilateral      SECTION      x1    FOOT SURGERY      INSERTABLE CARDIAC MONITOR Left 10/19/2017    [facility]/Dr Angulo    THYROID SURGERY      tumor removed    Social History:  Social History   Substance Use Topics    Smoking status: Never Smoker    Smokeless tobacco: Never Used    Alcohol use No        CURRENT MEDICATIONS:  Outpatient Prescriptions Marked as Taking for the 10/23/18 encounter (Office Visit) with Lynda Cantu MD   Medication Sig Dispense Refill    dofetilide (TIKOSYN) 250 MCG capsule Take 1 capsule by mouth 2 times daily 180 capsule 3    diltiazem (DILACOR XR) 240 MG extended release capsule Take 1 capsule by mouth daily (Patient taking differently: Take 120 mg by mouth daily ) 90 capsule 3    HYDROcodone-acetaminophen (NORCO) 5-325 MG per tablet Take 1 tablet by mouth every 6 hours as needed for Pain. [place]  apixaban (ELIQUIS) 2.5 MG TABS tablet Take 1 tablet by mouth 2 times daily 180 tablet 3    levothyroxine (SYNTHROID) 112 MCG tablet Take 1 tablet by mouth Daily (Patient taking differently: Take 100 mcg by mouth Daily ) 90 tablet 3    omeprazole (PRILOSEC) 20 MG capsule Take 20 mg by mouth daily. FAMILY HISTORY: family history includes Stroke in her father and mother. PHYSICAL EXAM:   BP (!) 170/79 (Site: Left Upper Arm, Position: Sitting, Cuff Size: Medium Adult)   Pulse 73   Ht 5' 1\" (1.549 m)   Wt 99 lb 3.2 oz (45 kg)   SpO2 98%   BMI 18.74 kg/m²  Body mass index is 18.74 kg/m². Constitutional: She is oriented to person, place, and time. She appears well-developed and well-nourished. In no acute distress. HEENT: Normocephalic and atraumatic. No significant JVD was present. Carotid bruit is not present. No mass and no thyromegaly present. No lymphadenopathy present. Cardiovascular: Normal rate and regular rhythm. Normal heart sounds and intact distal pulses. Exam reveals no gallop and no friction rub. Soft diastolic murmur heard next to the apex.    Pulmonary/Chest: Effort normal and breath sounds normal. No respiratory distress. She has no wheezes, rhonchi or rales. Crackles heard  Abdominal: Soft, non-tender. Bowel sounds and aorta are normal. She exhibits no organomegaly, mass or bruit. Extremities: Trace lower extremity edema. 1/2 way up to the knees bilaterally  No cyanosis, or clubbing. Pulses are 2+ radial/carotid/ and 2+ dorsalis pedis and posterior tibial bilaterally. Compression stockings in place. Neurological: She is alert and oriented to person, place, and time. No evidence of gross cranial nerve deficit. Coordination appeared normal.   Skin: Skin is warm and dry. There is no rash or diaphoresis. Psychiatric: She has a normal mood and affect. Her speech is normal and behavior is normal.      MOST RECENT LABS ON RECORD:   Lab Results   Component Value Date    WBC 6.8 08/13/2018    HGB 10.8 (L) 08/13/2018    HCT 34.2 (L) 08/13/2018     08/13/2018    ALT 16 01/08/2018    AST 20 01/08/2018     08/13/2018    K 3.7 08/13/2018     08/13/2018    CREATININE 0.86 08/15/2018    BUN 20 08/15/2018    CO2 23 08/13/2018    TSH 36.92 (H) 07/09/2018    INR 1.0 08/13/2018       ASSESSMENT:  1. Chronic diastolic congestive heart failure ([facility] [address]    2. PAF (paroxysmal atrial fibrillation) ([facility] [address]    3. Essential hypertension    4. Encounter for loop recorder check    5. Fatigue, unspecified type    6. Chest tightness       PLAN:  Chronic Diastolic Heart Failure: Currently well controlled  Beta Blocker: Not indicated. Normal ejection fraction      ACE Inibitor/ARB: Not indicated      Diuretics: Not indicated      Nonpharmacologic management of Heart Failure: I told her to continue wearing lower extremity compression stockings and I advised her to try and keep his legs up whenever possible and to limit salt in her diet. Laboratory testing: None   Additional Testing: I ordered an Echocardiogram to assess Ms. Goodwin's ejection fraction and to look for significant valvular heart disease as a source of Ms. Carmen Britton symptoms. Paroxysmal Atrial Fibrillation: Rhythm Control  Beta Blocker: Not indicated       Calcium Channel Blocker: Continue diltiazem 240 mg daily. Take an extra diltiazem 120 mg, if she is having chest pressure. Rhythm Control Agent: Continue Dofetilide (Tikosyn) 250 mcg twice daily   Monitoring: Dofetilide (Tikosyn Since she will be maintained on Tikosyn I will continue to monitor her ECG's every 6 months and make sure his QTc remains less than 500 ms. Stroke Risk: Her CHADS2-VASc score is 3/9 (3.2% stroke risk)  Anticoagulation: Continue Apixaban (Eliquis) 2.5 mg every 12 hours. I also reminded her to watch for signs of blood in her stool or black tarry stools and stop the medication immediately if this develops as this could be life threatening. Counseling: Will plan to continue monitoring her LINQ via home monitoring. Additional Testing: I Ordered an Echocardiogram to assess Ms. Goodwin's ejection fraction and to look for significant valvular heart disease as a source of Ms. Michelle Levine symptoms    Essential Hypertension: Borderline Controlled. In the past it has been better controlled. Will monitor for now. ACE Inibitor/ARB: Not indicated      Diuretics: Not indicated      Calcium Channel Blocker: Continue diltiazem  240 mg daily. Take an extra diltiazem 120 mg, if she is having chest pressure. Beta Blocker: Not indicated. Normal ejection fraction      Additional Testing: I Ordered an Echocardiogram to the structure of Ms. Michelle Levine heart including for left ventricular hypertrophy and her ejection fraction as well as for significant valvular heart disease     · Chest tightness  · Though Ms. Michelle Levine is having chest tightness every couple of months and says nitroglycerin helps, I do worry that this can make things worse since her stress test on 08/16/2017 was normal. I believe this is caused by her tachycardia.  I would prefer her to take an extra diltiazem 120 mg if this continues to occur and I have increased

## 2018-10-25 ENCOUNTER — HOSPITAL ENCOUNTER (OUTPATIENT)
Dept: NON INVASIVE DIAGNOSTICS | Age: 83
Discharge: HOME | End: 2018-10-25
Payer: MEDICARE

## 2018-10-25 DIAGNOSIS — I10 ESSENTIAL HYPERTENSION: ICD-10-CM

## 2018-10-25 DIAGNOSIS — R07.89 CHEST TIGHTNESS: ICD-10-CM

## 2018-10-25 DIAGNOSIS — R53.83 FATIGUE, UNSPECIFIED TYPE: ICD-10-CM

## 2018-10-25 DIAGNOSIS — Z45.09 ENCOUNTER FOR LOOP RECORDER CHECK: ICD-10-CM

## 2018-10-25 DIAGNOSIS — I50.32 CHRONIC DIASTOLIC CONGESTIVE HEART FAILURE (HCC): ICD-10-CM

## 2018-10-25 DIAGNOSIS — I48.0 PAF (PAROXYSMAL ATRIAL FIBRILLATION) (HCC): ICD-10-CM

## 2018-10-25 LAB
LV EF: 60 %
LVEF MODALITY: NORMAL

## 2018-10-25 PROCEDURE — 93306 TTE W/DOPPLER COMPLETE: CPT

## 2018-10-26 ENCOUNTER — TELEPHONE (OUTPATIENT)
Dept: CARDIOLOGY | Age: 83
End: 2018-10-26

## 2018-11-02 ENCOUNTER — TELEPHONE (OUTPATIENT)
Dept: CARDIOLOGY | Age: 83
End: 2018-11-02

## 2018-11-02 ENCOUNTER — OFFICE VISIT (OUTPATIENT)
Dept: PRIMARY CARE CLINIC | Age: 83
End: 2018-11-02
Payer: MEDICARE

## 2018-11-02 VITALS
HEART RATE: 60 BPM | DIASTOLIC BLOOD PRESSURE: 64 MMHG | RESPIRATION RATE: 16 BRPM | HEIGHT: 61 IN | WEIGHT: 102 LBS | TEMPERATURE: 96.6 F | BODY MASS INDEX: 19.26 KG/M2 | SYSTOLIC BLOOD PRESSURE: 137 MMHG

## 2018-11-02 DIAGNOSIS — Z23 NEED FOR INFLUENZA VACCINATION: ICD-10-CM

## 2018-11-02 DIAGNOSIS — M15.9 GENERALIZED OA: ICD-10-CM

## 2018-11-02 DIAGNOSIS — Z91.81 AT HIGH RISK FOR FALLS: ICD-10-CM

## 2018-11-02 DIAGNOSIS — M75.41 IMPINGEMENT SYNDROME OF RIGHT SHOULDER: Primary | ICD-10-CM

## 2018-11-02 PROCEDURE — 4040F PNEUMOC VAC/ADMIN/RCVD: CPT | Performed by: NURSE PRACTITIONER

## 2018-11-02 PROCEDURE — G8420 CALC BMI NORM PARAMETERS: HCPCS | Performed by: NURSE PRACTITIONER

## 2018-11-02 PROCEDURE — G8482 FLU IMMUNIZE ORDER/ADMIN: HCPCS | Performed by: NURSE PRACTITIONER

## 2018-11-02 PROCEDURE — 1123F ACP DISCUSS/DSCN MKR DOCD: CPT | Performed by: NURSE PRACTITIONER

## 2018-11-02 PROCEDURE — G0008 ADMIN INFLUENZA VIRUS VAC: HCPCS | Performed by: NURSE PRACTITIONER

## 2018-11-02 PROCEDURE — 90686 IIV4 VACC NO PRSV 0.5 ML IM: CPT | Performed by: NURSE PRACTITIONER

## 2018-11-02 PROCEDURE — 99203 OFFICE O/P NEW LOW 30 MIN: CPT | Performed by: NURSE PRACTITIONER

## 2018-11-02 PROCEDURE — 1090F PRES/ABSN URINE INCON ASSESS: CPT | Performed by: NURSE PRACTITIONER

## 2018-11-02 PROCEDURE — 1036F TOBACCO NON-USER: CPT | Performed by: NURSE PRACTITIONER

## 2018-11-02 PROCEDURE — G8427 DOCREV CUR MEDS BY ELIG CLIN: HCPCS | Performed by: NURSE PRACTITIONER

## 2018-11-02 PROCEDURE — 1101F PT FALLS ASSESS-DOCD LE1/YR: CPT | Performed by: NURSE PRACTITIONER

## 2018-11-02 PROCEDURE — G8400 PT W/DXA NO RESULTS DOC: HCPCS | Performed by: NURSE PRACTITIONER

## 2018-11-02 RX ORDER — HYDROCODONE BITARTRATE AND ACETAMINOPHEN 5; 325 MG/1; MG/1
1 TABLET ORAL EVERY 6 HOURS PRN
Qty: 20 TABLET | Refills: 0 | Status: SHIPPED | OUTPATIENT
Start: 2018-11-02 | End: 2018-11-12

## 2018-11-02 ASSESSMENT — PATIENT HEALTH QUESTIONNAIRE - PHQ9
2. FEELING DOWN, DEPRESSED OR HOPELESS: 0
SUM OF ALL RESPONSES TO PHQ QUESTIONS 1-9: 0
SUM OF ALL RESPONSES TO PHQ9 QUESTIONS 1 & 2: 0
SUM OF ALL RESPONSES TO PHQ QUESTIONS 1-9: 0
1. LITTLE INTEREST OR PLEASURE IN DOING THINGS: 0

## 2018-11-02 ASSESSMENT — ENCOUNTER SYMPTOMS
SHORTNESS OF BREATH: 0
DIARRHEA: 0
COUGH: 0
VOMITING: 0
CONSTIPATION: 0
SORE THROAT: 0
NAUSEA: 0
RHINORRHEA: 0
WHEEZING: 0

## 2018-11-02 NOTE — PATIENT INSTRUCTIONS
Patient Education        Influenza (Flu) Vaccine (Inactivated or Recombinant): What You Need to Know  Why get vaccinated? Influenza (\"flu\") is a contagious disease that spreads around the [place] every winter, usually between October and May. Flu is caused by influenza viruses and is spread mainly by coughing, sneezing, and close contact. Anyone can get flu. Flu strikes suddenly and can last several days. Symptoms vary by age, but can include:  · Fever/chills. · Sore throat. · Muscle aches. · Fatigue. · Cough. · Headache. · Runny or stuffy nose. Flu can also lead to pneumonia and blood infections, and cause diarrhea and seizures in children. If you have a medical condition, such as heart or lung disease, flu can make it worse. Flu is more dangerous for some people. Infants and young children, people 72years of age and older, pregnant women, and people with certain health conditions or a weakened immune system are at greatest risk. Each year thousands of people in the [facility] die from flu, and many more are hospitalized. Flu vaccine can:  · Keep you from getting flu. · Make flu less severe if you do get it. · Keep you from spreading flu to your family and other people. Inactivated and recombinant flu vaccines  A dose of flu vaccine is recommended every flu season. Children 6 months through 6years of age may need two doses during the same flu season. Everyone else needs only one dose each flu season. Some inactivated flu vaccines contain a very small amount of a mercury-based preservative called thimerosal. Studies have not shown thimerosal in vaccines to be harmful, but flu vaccines that do not contain thimerosal are available. There is no live flu virus in flu shots. They cannot cause the flu. There are many flu viruses, and they are always changing.  Each year a new flu vaccine is made to protect against three or four viruses that are likely to cause disease in the upcoming flu [phone]. [facility] does not give medical advice. The National Vaccine Injury Compensation Program  The National Vaccine Injury Compensation Program (VICP) is a federal program that was created to compensate people who may have been injured by certain vaccines. Persons who believe they may have been injured by a vaccine can learn about the program and about filing a claim by calling 4-792.703.4287 or visiting the [employer] website at [email or web address]. There is a time limit to file a claim for compensation. How can I learn more? · Ask your healthcare provider. He or she can give you the vaccine package insert or suggest other sources of information. · Call your local or state health department. · Contact the Centers for Disease Control and Prevention (CDC):  ¨ Call 2-981.271.1188 (1-800-CDC-INFO) or  ¨ Visit CDC's website at www.cdc.gov/flu  Vaccine Information Statement  Inactivated Influenza Vaccine  8/7/2015)  42 PHYLICIA Farias 068VF-74  Department of Health and Human Services  Centers for Disease Control and Prevention  Many Vaccine Information Statements are available in Syriac and other languages. See www.immunize.org/vis. Muchas hojas de información sobre vacunas están disponibles en español y en otros idiomas. Visite www.immunize.org/vis. Care instructions adapted under license by [facility] ([facility]). If you have questions about a medical condition or this instruction, always ask your healthcare professional. [place] [address] any warranty or liability for your use of this information.

## 2018-11-21 ENCOUNTER — TELEPHONE (OUTPATIENT)
Dept: CARDIOLOGY | Age: 83
End: 2018-11-21

## 2018-11-21 PROCEDURE — 93298 REM INTERROG DEV EVAL SCRMS: CPT | Performed by: FAMILY MEDICINE

## 2018-11-30 ENCOUNTER — NURSE ONLY (OUTPATIENT)
Dept: CARDIOLOGY | Age: 83
End: 2018-11-30
Payer: MEDICARE

## 2018-11-30 ENCOUNTER — OFFICE VISIT (OUTPATIENT)
Dept: CARDIOLOGY | Age: 83
End: 2018-11-30
Payer: MEDICARE

## 2018-11-30 VITALS
BODY MASS INDEX: 19.11 KG/M2 | HEART RATE: 61 BPM | DIASTOLIC BLOOD PRESSURE: 79 MMHG | RESPIRATION RATE: 16 BRPM | SYSTOLIC BLOOD PRESSURE: 139 MMHG | HEIGHT: 61 IN | WEIGHT: 101.2 LBS | OXYGEN SATURATION: 99 %

## 2018-11-30 DIAGNOSIS — Z45.09 ENCOUNTER FOR LOOP RECORDER CHECK: Primary | ICD-10-CM

## 2018-11-30 DIAGNOSIS — Z01.810 PREOP CARDIOVASCULAR EXAM: ICD-10-CM

## 2018-11-30 DIAGNOSIS — I10 ESSENTIAL HYPERTENSION: ICD-10-CM

## 2018-11-30 DIAGNOSIS — I48.0 PAF (PAROXYSMAL ATRIAL FIBRILLATION) (HCC): ICD-10-CM

## 2018-11-30 DIAGNOSIS — R00.1 SEVERE SINUS BRADYCARDIA: ICD-10-CM

## 2018-11-30 DIAGNOSIS — I50.32 CHRONIC DIASTOLIC CONGESTIVE HEART FAILURE (HCC): Primary | ICD-10-CM

## 2018-11-30 PROCEDURE — G8482 FLU IMMUNIZE ORDER/ADMIN: HCPCS | Performed by: FAMILY MEDICINE

## 2018-11-30 PROCEDURE — 1090F PRES/ABSN URINE INCON ASSESS: CPT | Performed by: FAMILY MEDICINE

## 2018-11-30 PROCEDURE — 1123F ACP DISCUSS/DSCN MKR DOCD: CPT | Performed by: FAMILY MEDICINE

## 2018-11-30 PROCEDURE — G8420 CALC BMI NORM PARAMETERS: HCPCS | Performed by: FAMILY MEDICINE

## 2018-11-30 PROCEDURE — G8427 DOCREV CUR MEDS BY ELIG CLIN: HCPCS | Performed by: FAMILY MEDICINE

## 2018-11-30 PROCEDURE — 1036F TOBACCO NON-USER: CPT | Performed by: FAMILY MEDICINE

## 2018-11-30 PROCEDURE — 1101F PT FALLS ASSESS-DOCD LE1/YR: CPT | Performed by: FAMILY MEDICINE

## 2018-11-30 PROCEDURE — 4040F PNEUMOC VAC/ADMIN/RCVD: CPT | Performed by: FAMILY MEDICINE

## 2018-11-30 PROCEDURE — 99215 OFFICE O/P EST HI 40 MIN: CPT | Performed by: FAMILY MEDICINE

## 2018-11-30 PROCEDURE — G8400 PT W/DXA NO RESULTS DOC: HCPCS | Performed by: FAMILY MEDICINE

## 2018-11-30 RX ORDER — DILTIAZEM HYDROCHLORIDE 360 MG/1
360 CAPSULE, EXTENDED RELEASE ORAL DAILY
Qty: 90 CAPSULE | Refills: 3
Start: 2018-11-30 | End: 2018-12-27 | Stop reason: SDUPTHER

## 2018-11-30 RX ORDER — DILTIAZEM HYDROCHLORIDE 240 MG/1
360 CAPSULE, EXTENDED RELEASE ORAL DAILY
Qty: 90 CAPSULE | Refills: 3 | Status: CANCELLED
Start: 2018-11-30

## 2018-11-30 NOTE — PROGRESS NOTES
Prescriptions Marked as Taking for the 11/30/18 encounter (Office Visit) with Paula Pringle MD   Medication Sig Dispense Refill    diltiazem (DILACOR XR) 240 MG extended release capsule Take 1 capsule by mouth daily 90 capsule 3    dofetilide (TIKOSYN) 250 MCG capsule Take 1 capsule by mouth 2 times daily 180 capsule 3    apixaban (ELIQUIS) 2.5 MG TABS tablet Take 1 tablet by mouth 2 times daily 180 tablet 3    levothyroxine (SYNTHROID) 112 MCG tablet Take 1 tablet by mouth Daily (Patient taking differently: Take 100 mcg by mouth Daily ) 90 tablet 3    omeprazole (PRILOSEC) 20 MG capsule Take 20 mg by mouth daily. FAMILY HISTORY: family history includes Stroke in her father and mother. PHYSICAL EXAM:   /79 (Site: Left Upper Arm, Position: Sitting, Cuff Size: Small Adult)   Pulse 61   Resp 16   Ht 5' 1\" (1.549 m)   Wt 101 lb 3.2 oz (45.9 kg)   SpO2 99%   BMI 19.12 kg/m²  Body mass index is 19.12 kg/m². Constitutional: She is oriented to person, place, and time. She appears well-developed and well-nourished. In no acute distress. HEENT: Normocephalic and atraumatic. No significant JVD was present. Carotid bruit is not present. No mass and no thyromegaly present. No lymphadenopathy present. Cardiovascular: Normal rate, regular rhythm, normal heart sounds. Exam reveals no gallop and no friction rubs. A I/VI systolic murmur was heard maximally at the 2nd right intercostal space. Pulmonary/Chest: Effort normal and breath sounds normal. No respiratory distress. She has no wheezes, rhonchi or rales. Abdominal: Soft, non-tender. Bowel sounds and aorta are normal. She exhibits no organomegaly, mass or bruit. Extremities: Trace lower extremity edema. 1/2 way up to the knees bilaterally  No cyanosis, or clubbing. Pulses are 2+ radial/carotid/ and 2+ dorsalis pedis and posterior tibial bilaterally. Compression stockings in place.   Neurological: She is alert and oriented to person, place,

## 2018-12-18 RX ORDER — OMEPRAZOLE 20 MG/1
20 CAPSULE, DELAYED RELEASE ORAL DAILY
Qty: 90 CAPSULE | Refills: 3 | Status: SHIPPED | OUTPATIENT
Start: 2018-12-18 | End: 2019-11-24 | Stop reason: SDUPTHER

## 2018-12-26 NOTE — TELEPHONE ENCOUNTER
Received tachy alert for 176 bpm on 12/22/2018 on patients home monitoring. Scanned alert into media for you to view. Looked in chart and showed that she has appt with Dr David Mcclellan for possible ablation on 1/9/2018. Is there anything else you would like to do before she goes to see Dr David Mcclellan?  Thanks so much

## 2018-12-27 DIAGNOSIS — Z51.81 VISIT FOR MONITORING TIKOSYN THERAPY: ICD-10-CM

## 2018-12-27 DIAGNOSIS — I48.0 PAF (PAROXYSMAL ATRIAL FIBRILLATION) (HCC): ICD-10-CM
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
## 2018-12-27 DIAGNOSIS — Z79.899 VISIT FOR MONITORING TIKOSYN THERAPY: ICD-10-CM

## 2018-12-27 DIAGNOSIS — R55 SYNCOPE AND COLLAPSE: ICD-10-CM

## 2018-12-27 RX ORDER — METOPROLOL SUCCINATE 25 MG/1
25 TABLET, EXTENDED RELEASE ORAL DAILY
Qty: 90 TABLET | Refills: 3 | Status: CANCELLED | OUTPATIENT
Start: 2018-12-27

## 2018-12-27 NOTE — TELEPHONE ENCOUNTER
Please let patient know that we should probably start Toprol XL 25 mg daily to try and get her fast heart rates better controlled. If she is willing, call in Rx. Thanks.

## 2018-12-28 ENCOUNTER — APPOINTMENT (OUTPATIENT)
Dept: GENERAL RADIOLOGY | Age: 83
End: 2018-12-28
Payer: MEDICARE

## 2018-12-28 ENCOUNTER — TELEPHONE (OUTPATIENT)
Dept: CARDIOLOGY CLINIC | Age: 83
End: 2018-12-28

## 2018-12-28 ENCOUNTER — HOSPITAL ENCOUNTER (EMERGENCY)
Age: 83
Discharge: HOME | End: 2018-12-28
Attending: EMERGENCY MEDICINE
Payer: MEDICARE

## 2018-12-28 VITALS
DIASTOLIC BLOOD PRESSURE: 54 MMHG | RESPIRATION RATE: 21 BRPM | BODY MASS INDEX: 18.89 KG/M2 | OXYGEN SATURATION: 100 % | TEMPERATURE: 97.6 F | WEIGHT: 100 LBS | SYSTOLIC BLOOD PRESSURE: 115 MMHG | HEART RATE: 43 BPM

## 2018-12-28 DIAGNOSIS — R00.1 BRADYCARDIA: Primary | ICD-10-CM

## 2018-12-28 LAB
-: ABNORMAL
ABSOLUTE EOS #: 0.1 K/UL (ref 0–0.44)
ABSOLUTE IMMATURE GRANULOCYTE: 0.04 K/UL (ref 0–0.3)
ABSOLUTE LYMPH #: 2.56 K/UL (ref 1.1–3.7)
ABSOLUTE MONO #: 0.51 K/UL (ref 0.1–1.2)
AMORPHOUS: ABNORMAL
ANION GAP SERPL CALCULATED.3IONS-SCNC: 15 MMOL/L (ref 9–17)
BACTERIA: ABNORMAL
BASOPHILS # BLD: 1 % (ref 0–2)
BASOPHILS ABSOLUTE: 0.04 K/UL (ref 0–0.2)
BILIRUBIN URINE: NEGATIVE
BNP INTERPRETATION: ABNORMAL
BUN BLDV-MCNC: 33 MG/DL (ref 8–23)
BUN/CREAT BLD: 22 (ref 9–20)
CALCIUM SERPL-MCNC: 8.9 MG/DL (ref 8.6–10.4)
CASTS UA: ABNORMAL /LPF
CHLORIDE BLD-SCNC: 106 MMOL/L (ref 98–107)
CO2: 21 MMOL/L (ref 20–31)
COLOR: YELLOW
COMMENT UA: ABNORMAL
CREAT SERPL-MCNC: 1.52 MG/DL (ref 0.5–0.9)
CRYSTALS, UA: ABNORMAL /HPF
DIFFERENTIAL TYPE: ABNORMAL
EKG ATRIAL RATE: 49 BPM
EKG P AXIS: 86 DEGREES
EKG P-R INTERVAL: 208 MS
EKG Q-T INTERVAL: 534 MS
EKG QRS DURATION: 80 MS
EKG QTC CALCULATION (BAZETT): 482 MS
EKG R AXIS: 68 DEGREES
EKG T AXIS: 68 DEGREES
EKG VENTRICULAR RATE: 49 BPM
EOSINOPHILS RELATIVE PERCENT: 1 % (ref 1–4)
EPITHELIAL CELLS UA: ABNORMAL /HPF (ref 0–25)
GFR AFRICAN AMERICAN: 39 ML/MIN
GFR NON-AFRICAN AMERICAN: 33 ML/MIN
GFR SERPL CREATININE-BSD FRML MDRD: ABNORMAL ML/MIN/{1.73_M2}
GFR SERPL CREATININE-BSD FRML MDRD: ABNORMAL ML/MIN/{1.73_M2}
GLUCOSE BLD-MCNC: 166 MG/DL (ref 70–99)
GLUCOSE URINE: NEGATIVE
HCT VFR BLD CALC: 34.5 % (ref 36.3–47.1)
HEMOGLOBIN: 11 G/DL (ref 11.9–15.1)
IMMATURE GRANULOCYTES: 1 %
INR BLD: 1 (ref 0.9–1.2)
KETONES, URINE: ABNORMAL
LEUKOCYTE ESTERASE, URINE: NEGATIVE
LYMPHOCYTES # BLD: 29 % (ref 24–43)
MCH RBC QN AUTO: 30.2 PG (ref 25.2–33.5)
MCHC RBC AUTO-ENTMCNC: 31.9 G/DL (ref 28.4–34.8)
MCV RBC AUTO: 94.8 FL (ref 82.6–102.9)
MONOCYTES # BLD: 6 % (ref 3–12)
MUCUS: ABNORMAL
NITRITE, URINE: NEGATIVE
NRBC AUTOMATED: 0 PER 100 WBC
OTHER OBSERVATIONS UA: ABNORMAL
PARTIAL THROMBOPLASTIN TIME: 29 SEC (ref 23.2–34.4)
PDW BLD-RTO: 14.6 % (ref 11.8–14.4)
PH UA: 5.5 (ref 5–9)
PLATELET # BLD: 243 K/UL (ref 138–453)
PLATELET ESTIMATE: ABNORMAL
PMV BLD AUTO: 9.5 FL (ref 8.1–13.5)
POTASSIUM SERPL-SCNC: 4.6 MMOL/L (ref 3.7–5.3)
PRO-BNP: 1813 PG/ML
PROTEIN UA: NEGATIVE
PROTHROMBIN TIME: 10.8 SEC (ref 9.7–12.2)
RBC # BLD: 3.64 M/UL (ref 3.95–5.11)
RBC # BLD: ABNORMAL 10*6/UL
RBC UA: ABNORMAL /HPF (ref 0–2)
RENAL EPITHELIAL, UA: ABNORMAL /HPF
SEG NEUTROPHILS: 62 % (ref 36–65)
SEGMENTED NEUTROPHILS ABSOLUTE COUNT: 5.54 K/UL (ref 1.5–8.1)
SODIUM BLD-SCNC: 142 MMOL/L (ref 135–144)
SPECIFIC GRAVITY UA: >1.03 (ref 1.01–1.02)
TRICHOMONAS: ABNORMAL
TROPONIN INTERP: NORMAL
TROPONIN T: <0.03 NG/ML
TROPONIN, HIGH SENSITIVITY: NORMAL NG/L (ref 0–14)
TURBIDITY: CLEAR
URINE HGB: NEGATIVE
UROBILINOGEN, URINE: NORMAL
WBC # BLD: 8.8 K/UL (ref 3.5–11.3)
WBC # BLD: ABNORMAL 10*3/UL
WBC UA: ABNORMAL /HPF (ref 0–5)
YEAST: ABNORMAL

## 2018-12-28 PROCEDURE — 80048 BASIC METABOLIC PNL TOTAL CA: CPT

## 2018-12-28 PROCEDURE — 84484 ASSAY OF TROPONIN QUANT: CPT

## 2018-12-28 PROCEDURE — 93005 ELECTROCARDIOGRAM TRACING: CPT

## 2018-12-28 PROCEDURE — 96374 THER/PROPH/DIAG INJ IV PUSH: CPT

## 2018-12-28 PROCEDURE — 85730 THROMBOPLASTIN TIME PARTIAL: CPT

## 2018-12-28 PROCEDURE — 6360000002 HC RX W HCPCS: Performed by: EMERGENCY MEDICINE

## 2018-12-28 PROCEDURE — 83880 ASSAY OF NATRIURETIC PEPTIDE: CPT

## 2018-12-28 PROCEDURE — 81001 URINALYSIS AUTO W/SCOPE: CPT

## 2018-12-28 PROCEDURE — 99285 EMERGENCY DEPT VISIT HI MDM: CPT

## 2018-12-28 PROCEDURE — 85610 PROTHROMBIN TIME: CPT

## 2018-12-28 PROCEDURE — 71045 X-RAY EXAM CHEST 1 VIEW: CPT

## 2018-12-28 PROCEDURE — 85025 COMPLETE CBC W/AUTO DIFF WBC: CPT

## 2018-12-28 PROCEDURE — 6370000000 HC RX 637 (ALT 250 FOR IP): Performed by: EMERGENCY MEDICINE

## 2018-12-28 RX ORDER — ONDANSETRON 2 MG/ML
4 INJECTION INTRAMUSCULAR; INTRAVENOUS ONCE
Status: COMPLETED | OUTPATIENT
Start: 2018-12-28 | End: 2018-12-28

## 2018-12-28 RX ORDER — ACETAMINOPHEN 325 MG/1
650 TABLET ORAL ONCE
Status: COMPLETED | OUTPATIENT
Start: 2018-12-28 | End: 2018-12-28

## 2018-12-28 RX ADMIN — ONDANSETRON 4 MG: 2 INJECTION INTRAMUSCULAR; INTRAVENOUS at 01:38

## 2018-12-28 RX ADMIN — ACETAMINOPHEN 650 MG: 325 TABLET, FILM COATED ORAL at 01:37

## 2018-12-28 ASSESSMENT — PAIN SCALES - GENERAL: PAINLEVEL_OUTOF10: 2

## 2018-12-28 ASSESSMENT — PAIN DESCRIPTION - DESCRIPTORS: DESCRIPTORS: DISCOMFORT

## 2018-12-28 ASSESSMENT — PAIN DESCRIPTION - LOCATION: LOCATION: CHEST

## 2018-12-28 ASSESSMENT — PAIN DESCRIPTION - PAIN TYPE: TYPE: ACUTE PAIN

## 2018-12-28 NOTE — TELEPHONE ENCOUNTER
Spoke with Dr Everett Cabrales about all this information via telephone and he had stated that he would suggest a pacemaker for her however since patient would like to be seen by Dr Everett Cabrales first before making any decisions, appt was made to discuss this option further next week.

## 2018-12-28 NOTE — TELEPHONE ENCOUNTER
Call from Dr Madeline Silva office, this lady was in ED this week -will see Dr Madeline Silva @ 8:40 Monday morning, they are asking if Dr Dusty Marcelo would be able to put a pacer in her the 1st week of January.   Please call [facility] office back at [phone] with Dr Dusty Marcelo schedule for next week

## 2018-12-29 RX ORDER — DILTIAZEM HYDROCHLORIDE 360 MG/1
360 CAPSULE, EXTENDED RELEASE ORAL DAILY
Qty: 90 CAPSULE | Refills: 3 | Status: SHIPPED | OUTPATIENT
Start: 2018-12-29 | End: 2019-02-01 | Stop reason: SDUPTHER

## 2018-12-29 RX ORDER — DOFETILIDE 0.25 MG/1
250 CAPSULE ORAL 2 TIMES DAILY
Qty: 180 CAPSULE | Refills: 3 | Status: SHIPPED | OUTPATIENT
Start: 2018-12-29 | End: 2019-11-24 | Stop reason: SDUPTHER

## 2018-12-31 ENCOUNTER — OFFICE VISIT (OUTPATIENT)
Dept: CARDIOLOGY | Age: 83
End: 2018-12-31
Payer: MEDICARE

## 2018-12-31 ENCOUNTER — NURSE ONLY (OUTPATIENT)
Dept: CARDIOLOGY | Age: 83
End: 2018-12-31

## 2018-12-31 VITALS
SYSTOLIC BLOOD PRESSURE: 156 MMHG | HEART RATE: 62 BPM | WEIGHT: 103 LBS | OXYGEN SATURATION: 98 % | BODY MASS INDEX: 19.45 KG/M2 | RESPIRATION RATE: 16 BRPM | HEIGHT: 61 IN | DIASTOLIC BLOOD PRESSURE: 84 MMHG

## 2018-12-31 DIAGNOSIS — R55 SYNCOPE AND COLLAPSE: ICD-10-CM

## 2018-12-31 DIAGNOSIS — I48.0 PAF (PAROXYSMAL ATRIAL FIBRILLATION) (HCC): ICD-10-CM

## 2018-12-31 DIAGNOSIS — I50.32 CHRONIC DIASTOLIC CONGESTIVE HEART FAILURE (HCC): ICD-10-CM

## 2018-12-31 DIAGNOSIS — I10 ESSENTIAL HYPERTENSION: ICD-10-CM

## 2018-12-31 DIAGNOSIS — I49.5 TACHY-BRADY SYNDROME (HCC): Primary | ICD-10-CM

## 2018-12-31 DIAGNOSIS — Z45.09 ENCOUNTER FOR LOOP RECORDER CHECK: Primary | ICD-10-CM

## 2018-12-31 PROCEDURE — G8427 DOCREV CUR MEDS BY ELIG CLIN: HCPCS | Performed by: FAMILY MEDICINE

## 2018-12-31 PROCEDURE — 1036F TOBACCO NON-USER: CPT | Performed by: FAMILY MEDICINE

## 2018-12-31 PROCEDURE — G8400 PT W/DXA NO RESULTS DOC: HCPCS | Performed by: FAMILY MEDICINE

## 2018-12-31 PROCEDURE — 99215 OFFICE O/P EST HI 40 MIN: CPT | Performed by: FAMILY MEDICINE

## 2018-12-31 PROCEDURE — 1123F ACP DISCUSS/DSCN MKR DOCD: CPT | Performed by: FAMILY MEDICINE

## 2018-12-31 PROCEDURE — 4040F PNEUMOC VAC/ADMIN/RCVD: CPT | Performed by: FAMILY MEDICINE

## 2018-12-31 PROCEDURE — 1101F PT FALLS ASSESS-DOCD LE1/YR: CPT | Performed by: FAMILY MEDICINE

## 2018-12-31 PROCEDURE — G8482 FLU IMMUNIZE ORDER/ADMIN: HCPCS | Performed by: FAMILY MEDICINE

## 2018-12-31 PROCEDURE — G8420 CALC BMI NORM PARAMETERS: HCPCS | Performed by: FAMILY MEDICINE

## 2018-12-31 PROCEDURE — 1090F PRES/ABSN URINE INCON ASSESS: CPT | Performed by: FAMILY MEDICINE

## 2018-12-31 NOTE — PROGRESS NOTES
Liam Ramirez am scribing for and in the presence of Mani Judge MD.    Patient: Penny Koroma  : 1933  Date of Visit: 2018    REASON FOR VISIT / CONSULTATION: Follow-up (HX: CHF, Bradycardia Pt is here for follow up and states she was in ER 18 for bradycardia and is feeling better since then. On Saturday she was sick to stomach a funny feeling did not last long she also states tired with exertion. Denies: CP, SOB, lightheaded/dizziness)      Dear Stephanie Godfrey,    I had the pleasure of seeing your patient Penny Koroma in my office today. As you know, Ms. Francisca Denver is a 80 y.o. female with a history of paroxysmal atrial fibrillation as well as intermittent episodes of lightheadedness and dizziness as well as several episodes of syncope and near syncope of unknown etiology. A Holter monitor showed several breakthrough brief episodes of  atrial fibrillation with RVR. Therefore I started her on Amiodarone 200 mg daily. Her last LINQ remote interrogation in  had shown about 1.1% percent of atrial fibrillation. Unfortunately, she developed some significant thyroid issues leading her amiodarone to be stop and instead started on Tikosyn therapy 2018 for PAF. Unfortunately, she was admitted on 18 to [facility] for a punctured lung and broken ribs due to her shoes and slipping and falling while trying to walk out of her house. Apparently this led to then need for a chest tube for what sounds to be a hemothorax which ultimately was removed. LINQ alert on 2018 showed atrial fibrillation. LINQ alert on 10/16/2018 showed atrial fibrillation increased from 2% to 8.7% with frequent RVR in the 160's that has been associated with lightheadedness and dizziness. LINQ alert on 18 showed A-Fib. Echocardiogram done 10/25/18 EF 65% evidence of moderate diastolic dysfunction is seen. Since the last time I saw Ms. Francisca Denver, she has been more frequently tired with exertion and

## 2018-12-31 NOTE — TELEPHONE ENCOUNTER
I can.  Offer to the patient the option of seeing me first or just having the procedure scheduled. She is on Eliquis and it will need to be stopped two days before the procedure.

## 2019-01-01 ASSESSMENT — ENCOUNTER SYMPTOMS
GASTROINTESTINAL NEGATIVE: 1
RESPIRATORY NEGATIVE: 1

## 2019-01-02 ENCOUNTER — ANESTHESIA EVENT (OUTPATIENT)
Dept: OPERATING ROOM | Age: 84
End: 2019-01-02
Payer: MEDICARE

## 2019-01-02 ENCOUNTER — APPOINTMENT (OUTPATIENT)
Dept: GENERAL RADIOLOGY | Age: 84
End: 2019-01-02
Attending: INTERNAL MEDICINE
Payer: MEDICARE

## 2019-01-02 ENCOUNTER — HOSPITAL ENCOUNTER (OUTPATIENT)
Age: 84
Discharge: HOME | End: 2019-01-03
Attending: INTERNAL MEDICINE | Admitting: INTERNAL MEDICINE
Payer: MEDICARE

## 2019-01-02 ENCOUNTER — ANESTHESIA (OUTPATIENT)
Dept: OPERATING ROOM | Age: 84
End: 2019-01-02
Payer: MEDICARE

## 2019-01-02 VITALS
RESPIRATION RATE: 19 BRPM | DIASTOLIC BLOOD PRESSURE: 58 MMHG | SYSTOLIC BLOOD PRESSURE: 112 MMHG | TEMPERATURE: 98.6 F | OXYGEN SATURATION: 100 %

## 2019-01-02 DIAGNOSIS — I48.0 PAF (PAROXYSMAL ATRIAL FIBRILLATION) (HCC): ICD-10-CM

## 2019-01-02 DIAGNOSIS — Z51.81 VISIT FOR MONITORING TIKOSYN THERAPY: ICD-10-CM

## 2019-01-02 DIAGNOSIS — R55 SYNCOPE AND COLLAPSE: ICD-10-CM

## 2019-01-02 DIAGNOSIS — Z41.9 SURGERY, ELECTIVE: Primary | ICD-10-CM

## 2019-01-02 DIAGNOSIS — Z79.899 VISIT FOR MONITORING TIKOSYN THERAPY: ICD-10-CM
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
## 2019-01-02 PROBLEM — I49.5 SSS (SICK SINUS SYNDROME) (HCC): Status: ACTIVE | Noted: 2019-01-02

## 2019-01-02 PROCEDURE — 71045 X-RAY EXAM CHEST 1 VIEW: CPT

## 2019-01-02 PROCEDURE — 33208 INSRT HEART PM ATRIAL & VENT: CPT | Performed by: INTERNAL MEDICINE

## 2019-01-02 PROCEDURE — 3700000001 HC ADD 15 MINUTES (ANESTHESIA): Performed by: INTERNAL MEDICINE

## 2019-01-02 PROCEDURE — 33286 RMVL SUBQ CAR RHYTHM MNTR: CPT | Performed by: INTERNAL MEDICINE

## 2019-01-02 PROCEDURE — 3600000004 HC SURGERY LEVEL 4 BASE: Performed by: INTERNAL MEDICINE

## 2019-01-02 PROCEDURE — 3700000000 HC ANESTHESIA ATTENDED CARE: Performed by: INTERNAL MEDICINE

## 2019-01-02 PROCEDURE — 6360000002 HC RX W HCPCS: Performed by: INTERNAL MEDICINE

## 2019-01-02 PROCEDURE — 7100000001 HC PACU RECOVERY - ADDTL 15 MIN: Performed by: INTERNAL MEDICINE

## 2019-01-02 PROCEDURE — 6370000000 HC RX 637 (ALT 250 FOR IP): Performed by: INTERNAL MEDICINE

## 2019-01-02 PROCEDURE — C1898 LEAD, PMKR, OTHER THAN TRANS: HCPCS | Performed by: INTERNAL MEDICINE

## 2019-01-02 PROCEDURE — C1894 INTRO/SHEATH, NON-LASER: HCPCS | Performed by: INTERNAL MEDICINE

## 2019-01-02 PROCEDURE — 2500000003 HC RX 250 WO HCPCS: Performed by: INTERNAL MEDICINE

## 2019-01-02 PROCEDURE — 2500000003 HC RX 250 WO HCPCS: Performed by: NURSE ANESTHETIST, CERTIFIED REGISTERED

## 2019-01-02 PROCEDURE — 94761 N-INVAS EAR/PLS OXIMETRY MLT: CPT

## 2019-01-02 PROCEDURE — C1785 PMKR, DUAL, RATE-RESP: HCPCS | Performed by: INTERNAL MEDICINE

## 2019-01-02 PROCEDURE — 76000 FLUOROSCOPY <1 HR PHYS/QHP: CPT

## 2019-01-02 PROCEDURE — 2709999900 HC NON-CHARGEABLE SUPPLY: Performed by: INTERNAL MEDICINE

## 2019-01-02 PROCEDURE — 6360000002 HC RX W HCPCS: Performed by: NURSE ANESTHETIST, CERTIFIED REGISTERED

## 2019-01-02 PROCEDURE — 2580000003 HC RX 258: Performed by: INTERNAL MEDICINE

## 2019-01-02 PROCEDURE — 7100000000 HC PACU RECOVERY - FIRST 15 MIN: Performed by: INTERNAL MEDICINE

## 2019-01-02 PROCEDURE — 3600000014 HC SURGERY LEVEL 4 ADDTL 15MIN: Performed by: INTERNAL MEDICINE

## 2019-01-02 DEVICE — IMPLANTABLE DEVICE: Type: IMPLANTABLE DEVICE | Site: CHEST | Status: FUNCTIONAL

## 2019-01-02 DEVICE — LEAD PACE 5.7FR L52CM VENT SIL PLAT INSUL BPLR STEROID: Type: IMPLANTABLE DEVICE | Site: CHEST | Status: FUNCTIONAL

## 2019-01-02 DEVICE — LEAD PACE 5.7FR L45CM VENT SIL PLAT INSUL BPLR STR: Type: IMPLANTABLE DEVICE | Site: CHEST | Status: FUNCTIONAL

## 2019-01-02 RX ORDER — DILTIAZEM HYDROCHLORIDE 180 MG/1
360 CAPSULE, COATED, EXTENDED RELEASE ORAL DAILY
Status: DISCONTINUED | OUTPATIENT
Start: 2019-01-02 | End: 2019-01-02

## 2019-01-02 RX ORDER — SODIUM CHLORIDE 0.9 % (FLUSH) 0.9 %
10 SYRINGE (ML) INJECTION PRN
Status: DISCONTINUED | OUTPATIENT
Start: 2019-01-02 | End: 2019-01-02 | Stop reason: SDUPTHER

## 2019-01-02 RX ORDER — LEVOTHYROXINE SODIUM 0.1 MG/1
100 TABLET ORAL DAILY
Status: DISCONTINUED | OUTPATIENT
Start: 2019-01-02 | End: 2019-01-03 | Stop reason: HOSPADM

## 2019-01-02 RX ORDER — DILTIAZEM HYDROCHLORIDE 120 MG/1
120 CAPSULE, COATED, EXTENDED RELEASE ORAL DAILY
Status: DISCONTINUED | OUTPATIENT
Start: 2019-01-02 | End: 2019-01-03 | Stop reason: HOSPADM

## 2019-01-02 RX ORDER — ACETAMINOPHEN 10 MG/ML
INJECTION, SOLUTION INTRAVENOUS PRN
Status: DISCONTINUED | OUTPATIENT
Start: 2019-01-02 | End: 2019-01-02 | Stop reason: SDUPTHER

## 2019-01-02 RX ORDER — LEVOTHYROXINE SODIUM 0.05 MG/1
100 TABLET ORAL DAILY
COMMUNITY
End: 2019-03-06

## 2019-01-02 RX ORDER — SODIUM CHLORIDE 0.9 % (FLUSH) 0.9 %
10 SYRINGE (ML) INJECTION PRN
Status: DISCONTINUED | OUTPATIENT
Start: 2019-01-02 | End: 2019-01-03 | Stop reason: HOSPADM

## 2019-01-02 RX ORDER — SODIUM CHLORIDE 9 MG/ML
INJECTION, SOLUTION INTRAVENOUS CONTINUOUS
Status: DISCONTINUED | OUTPATIENT
Start: 2019-01-02 | End: 2019-01-03 | Stop reason: HOSPADM

## 2019-01-02 RX ORDER — LIDOCAINE HYDROCHLORIDE 10 MG/ML
INJECTION, SOLUTION EPIDURAL; INFILTRATION; INTRACAUDAL; PERINEURAL PRN
Status: DISCONTINUED | OUTPATIENT
Start: 2019-01-02 | End: 2019-01-02 | Stop reason: SDUPTHER

## 2019-01-02 RX ORDER — HYDROCODONE BITARTRATE AND ACETAMINOPHEN 5; 325 MG/1; MG/1
1 TABLET ORAL EVERY 4 HOURS PRN
Status: DISCONTINUED | OUTPATIENT
Start: 2019-01-02 | End: 2019-01-03 | Stop reason: HOSPADM

## 2019-01-02 RX ORDER — DILTIAZEM HYDROCHLORIDE 120 MG/1
240 CAPSULE, COATED, EXTENDED RELEASE ORAL DAILY
Status: DISCONTINUED | OUTPATIENT
Start: 2019-01-02 | End: 2019-01-03 | Stop reason: HOSPADM

## 2019-01-02 RX ORDER — SODIUM CHLORIDE 0.9 % (FLUSH) 0.9 %
10 SYRINGE (ML) INJECTION EVERY 12 HOURS SCHEDULED
Status: DISCONTINUED | OUTPATIENT
Start: 2019-01-02 | End: 2019-01-02 | Stop reason: SDUPTHER

## 2019-01-02 RX ORDER — PROPOFOL 10 MG/ML
INJECTION, EMULSION INTRAVENOUS CONTINUOUS PRN
Status: DISCONTINUED | OUTPATIENT
Start: 2019-01-02 | End: 2019-01-02 | Stop reason: SDUPTHER

## 2019-01-02 RX ORDER — PANTOPRAZOLE SODIUM 40 MG/1
40 TABLET, DELAYED RELEASE ORAL
Status: DISCONTINUED | OUTPATIENT
Start: 2019-01-03 | End: 2019-01-02

## 2019-01-02 RX ORDER — HEPARIN SODIUM 1000 [USP'U]/ML
INJECTION, SOLUTION INTRAVENOUS; SUBCUTANEOUS PRN
Status: DISCONTINUED | OUTPATIENT
Start: 2019-01-02 | End: 2019-01-02 | Stop reason: HOSPADM

## 2019-01-02 RX ORDER — DOFETILIDE 0.25 MG/1
250 CAPSULE ORAL 2 TIMES DAILY
Status: DISCONTINUED | OUTPATIENT
Start: 2019-01-02 | End: 2019-01-03 | Stop reason: HOSPADM

## 2019-01-02 RX ORDER — BUPIVACAINE HYDROCHLORIDE 2.5 MG/ML
INJECTION, SOLUTION EPIDURAL; INFILTRATION; INTRACAUDAL PRN
Status: DISCONTINUED | OUTPATIENT
Start: 2019-01-02 | End: 2019-01-02 | Stop reason: HOSPADM

## 2019-01-02 RX ORDER — OMEPRAZOLE 20 MG/1
20 CAPSULE, DELAYED RELEASE ORAL DAILY
Status: DISCONTINUED | OUTPATIENT
Start: 2019-01-02 | End: 2019-01-03 | Stop reason: HOSPADM

## 2019-01-02 RX ORDER — ACETAMINOPHEN 325 MG/1
650 TABLET ORAL EVERY 4 HOURS PRN
Status: DISCONTINUED | OUTPATIENT
Start: 2019-01-02 | End: 2019-01-03 | Stop reason: HOSPADM

## 2019-01-02 RX ORDER — ACETAMINOPHEN 325 MG/1
650 TABLET ORAL EVERY 4 HOURS PRN
Status: DISCONTINUED | OUTPATIENT
Start: 2019-01-02 | End: 2019-01-02 | Stop reason: SDUPTHER

## 2019-01-02 RX ORDER — SODIUM CHLORIDE 0.9 % (FLUSH) 0.9 %
10 SYRINGE (ML) INJECTION EVERY 12 HOURS SCHEDULED
Status: DISCONTINUED | OUTPATIENT
Start: 2019-01-02 | End: 2019-01-03 | Stop reason: HOSPADM

## 2019-01-02 RX ADMIN — DOFETILIDE 250 MCG: 0.25 CAPSULE ORAL at 21:03

## 2019-01-02 RX ADMIN — WATER 1 G: 1 INJECTION INTRAMUSCULAR; INTRAVENOUS; SUBCUTANEOUS at 16:35

## 2019-01-02 RX ADMIN — SODIUM CHLORIDE: 9 INJECTION, SOLUTION INTRAVENOUS at 06:40

## 2019-01-02 RX ADMIN — HYDROCODONE BITARTRATE AND ACETAMINOPHEN 1 TABLET: 5; 325 TABLET ORAL at 21:03

## 2019-01-02 RX ADMIN — DILTIAZEM HYDROCHLORIDE 120 MG: 120 CAPSULE, COATED, EXTENDED RELEASE ORAL at 14:51

## 2019-01-02 RX ADMIN — DOFETILIDE 250 MCG: 0.25 CAPSULE ORAL at 14:51

## 2019-01-02 RX ADMIN — ACETAMINOPHEN 1000 MG: 10 INJECTION, SOLUTION INTRAVENOUS at 09:30

## 2019-01-02 RX ADMIN — PROPOFOL 75 MCG/KG/MIN: 10 INJECTION, EMULSION INTRAVENOUS at 07:50

## 2019-01-02 RX ADMIN — SODIUM CHLORIDE: 9 INJECTION, SOLUTION INTRAVENOUS at 16:35

## 2019-01-02 RX ADMIN — HYDROCODONE BITARTRATE AND ACETAMINOPHEN 1 TABLET: 5; 325 TABLET ORAL at 16:35

## 2019-01-02 RX ADMIN — DILTIAZEM HYDROCHLORIDE 240 MG: 120 CAPSULE, COATED, EXTENDED RELEASE ORAL at 14:51

## 2019-01-02 RX ADMIN — WATER 1 G: 1 INJECTION INTRAMUSCULAR; INTRAVENOUS; SUBCUTANEOUS at 23:52

## 2019-01-02 RX ADMIN — LIDOCAINE HYDROCHLORIDE 5 ML: 10 INJECTION, SOLUTION EPIDURAL; INFILTRATION; INTRACAUDAL; PERINEURAL at 07:50

## 2019-01-02 RX ADMIN — HYDROMORPHONE HYDROCHLORIDE 0.5 MG: 1 INJECTION, SOLUTION INTRAMUSCULAR; INTRAVENOUS; SUBCUTANEOUS at 14:51

## 2019-01-02 RX ADMIN — HYDROCODONE BITARTRATE AND ACETAMINOPHEN 1 TABLET: 5; 325 TABLET ORAL at 11:31

## 2019-01-02 RX ADMIN — DEXTROSE MONOHYDRATE 1 G: 5 INJECTION INTRAVENOUS at 07:38

## 2019-01-02 ASSESSMENT — PAIN SCALES - GENERAL
PAINLEVEL_OUTOF10: 0
PAINLEVEL_OUTOF10: 3
PAINLEVEL_OUTOF10: 6
PAINLEVEL_OUTOF10: 6
PAINLEVEL_OUTOF10: 3
PAINLEVEL_OUTOF10: 0
PAINLEVEL_OUTOF10: 5
PAINLEVEL_OUTOF10: 6
PAINLEVEL_OUTOF10: 9
PAINLEVEL_OUTOF10: 6
PAINLEVEL_OUTOF10: 10
PAINLEVEL_OUTOF10: 2
PAINLEVEL_OUTOF10: 7

## 2019-01-02 ASSESSMENT — PAIN DESCRIPTION - LOCATION
LOCATION: INCISION
LOCATION: NECK
LOCATION: CHEST
LOCATION: NECK
LOCATION: NECK

## 2019-01-02 ASSESSMENT — PAIN DESCRIPTION - PAIN TYPE
TYPE: ACUTE PAIN
TYPE: CHRONIC PAIN
TYPE: CHRONIC PAIN
TYPE: ACUTE PAIN;SURGICAL PAIN
TYPE: CHRONIC PAIN
TYPE: SURGICAL PAIN
TYPE: CHRONIC PAIN
TYPE: ACUTE PAIN

## 2019-01-02 ASSESSMENT — PAIN DESCRIPTION - ORIENTATION
ORIENTATION: LEFT;UPPER
ORIENTATION: POSTERIOR
ORIENTATION: LEFT

## 2019-01-02 ASSESSMENT — PAIN DESCRIPTION - DESCRIPTORS
DESCRIPTORS: ACHING
DESCRIPTORS: ACHING;SHARP
DESCRIPTORS: ACHING
DESCRIPTORS: SORE

## 2019-01-02 ASSESSMENT — PAIN DESCRIPTION - FREQUENCY
FREQUENCY: CONTINUOUS

## 2019-01-03 ENCOUNTER — APPOINTMENT (OUTPATIENT)
Dept: CT IMAGING | Age: 84
End: 2019-01-03
Attending: INTERNAL MEDICINE
Payer: MEDICARE

## 2019-01-03 ENCOUNTER — APPOINTMENT (OUTPATIENT)
Dept: GENERAL RADIOLOGY | Age: 84
End: 2019-01-03
Attending: INTERNAL MEDICINE
Payer: MEDICARE

## 2019-01-03 VITALS
TEMPERATURE: 97.4 F | HEART RATE: 60 BPM | WEIGHT: 104 LBS | RESPIRATION RATE: 18 BRPM | HEIGHT: 60 IN | DIASTOLIC BLOOD PRESSURE: 56 MMHG | BODY MASS INDEX: 20.42 KG/M2 | OXYGEN SATURATION: 92 % | SYSTOLIC BLOOD PRESSURE: 111 MMHG

## 2019-01-03 LAB
ANION GAP SERPL CALCULATED.3IONS-SCNC: 10 MMOL/L (ref 9–17)
BUN BLDV-MCNC: 19 MG/DL (ref 8–23)
BUN/CREAT BLD: 25 (ref 9–20)
CALCIUM SERPL-MCNC: 8.2 MG/DL (ref 8.6–10.4)
CHLORIDE BLD-SCNC: 111 MMOL/L (ref 98–107)
CO2: 23 MMOL/L (ref 20–31)
CREAT SERPL-MCNC: 0.76 MG/DL (ref 0.5–0.9)
GFR AFRICAN AMERICAN: >60 ML/MIN
GFR NON-AFRICAN AMERICAN: >60 ML/MIN
GFR SERPL CREATININE-BSD FRML MDRD: ABNORMAL ML/MIN/{1.73_M2}
GFR SERPL CREATININE-BSD FRML MDRD: ABNORMAL ML/MIN/{1.73_M2}
GLUCOSE BLD-MCNC: 94 MG/DL (ref 70–99)
HCT VFR BLD CALC: 30.8 % (ref 36–46)
HEMOGLOBIN: 10 G/DL (ref 12–16)
MCH RBC QN AUTO: 29.4 PG (ref 26–34)
MCHC RBC AUTO-ENTMCNC: 32.5 G/DL (ref 31–37)
MCV RBC AUTO: 90.4 FL (ref 80–100)
NRBC AUTOMATED: ABNORMAL PER 100 WBC
PDW BLD-RTO: 15.8 % (ref 12.1–15.2)
PLATELET # BLD: 242 K/UL (ref 140–450)
PMV BLD AUTO: ABNORMAL FL (ref 6–12)
POTASSIUM SERPL-SCNC: 4.1 MMOL/L (ref 3.7–5.3)
RBC # BLD: 3.4 M/UL (ref 4–5.2)
SODIUM BLD-SCNC: 144 MMOL/L (ref 135–144)
WBC # BLD: 7.1 K/UL (ref 3.5–11)

## 2019-01-03 PROCEDURE — 6370000000 HC RX 637 (ALT 250 FOR IP): Performed by: INTERNAL MEDICINE

## 2019-01-03 PROCEDURE — 99225 PR SBSQ OBSERVATION CARE/DAY 25 MINUTES: CPT | Performed by: INTERNAL MEDICINE

## 2019-01-03 PROCEDURE — 36415 COLL VENOUS BLD VENIPUNCTURE: CPT

## 2019-01-03 PROCEDURE — 72125 CT NECK SPINE W/O DYE: CPT

## 2019-01-03 PROCEDURE — 71046 X-RAY EXAM CHEST 2 VIEWS: CPT

## 2019-01-03 PROCEDURE — 6360000002 HC RX W HCPCS: Performed by: INTERNAL MEDICINE

## 2019-01-03 PROCEDURE — 2580000003 HC RX 258: Performed by: INTERNAL MEDICINE

## 2019-01-03 PROCEDURE — 94761 N-INVAS EAR/PLS OXIMETRY MLT: CPT

## 2019-01-03 PROCEDURE — 80048 BASIC METABOLIC PNL TOTAL CA: CPT

## 2019-01-03 PROCEDURE — 85027 COMPLETE CBC AUTOMATED: CPT

## 2019-01-03 RX ORDER — HYDROCODONE BITARTRATE AND ACETAMINOPHEN 5; 325 MG/1; MG/1
1 TABLET ORAL EVERY 4 HOURS PRN
Qty: 15 TABLET | Refills: 0 | Status: SHIPPED | OUTPATIENT
Start: 2019-01-03 | End: 2019-01-10

## 2019-01-03 RX ADMIN — HYDROMORPHONE HYDROCHLORIDE 0.5 MG: 1 INJECTION, SOLUTION INTRAMUSCULAR; INTRAVENOUS; SUBCUTANEOUS at 03:15

## 2019-01-03 RX ADMIN — DILTIAZEM HYDROCHLORIDE 120 MG: 120 CAPSULE, COATED, EXTENDED RELEASE ORAL at 09:58

## 2019-01-03 RX ADMIN — Medication 10 ML: at 09:58

## 2019-01-03 RX ADMIN — DOFETILIDE 250 MCG: 0.25 CAPSULE ORAL at 09:58

## 2019-01-03 RX ADMIN — HYDROCODONE BITARTRATE AND ACETAMINOPHEN 1 TABLET: 5; 325 TABLET ORAL at 02:10

## 2019-01-03 RX ADMIN — DILTIAZEM HYDROCHLORIDE 240 MG: 120 CAPSULE, COATED, EXTENDED RELEASE ORAL at 09:58

## 2019-01-03 RX ADMIN — LEVOTHYROXINE SODIUM 100 MCG: 100 TABLET ORAL at 05:15

## 2019-01-03 RX ADMIN — OMEPRAZOLE 20 MG: 20 CAPSULE, DELAYED RELEASE ORAL at 05:14

## 2019-01-03 ASSESSMENT — PAIN SCALES - GENERAL
PAINLEVEL_OUTOF10: 6
PAINLEVEL_OUTOF10: 10

## 2019-01-03 ASSESSMENT — PAIN DESCRIPTION - PAIN TYPE: TYPE: CHRONIC PAIN

## 2019-01-03 ASSESSMENT — PAIN DESCRIPTION - ORIENTATION: ORIENTATION: POSTERIOR

## 2019-01-03 ASSESSMENT — PAIN DESCRIPTION - LOCATION: LOCATION: NECK

## 2019-01-03 ASSESSMENT — PAIN DESCRIPTION - FREQUENCY: FREQUENCY: CONTINUOUS

## 2019-01-03 ASSESSMENT — PAIN DESCRIPTION - DESCRIPTORS: DESCRIPTORS: ACHING

## 2019-01-04 ENCOUNTER — TELEPHONE (OUTPATIENT)
Dept: PRIMARY CARE CLINIC | Age: 84
End: 2019-01-04

## 2019-01-07 ENCOUNTER — HOSPITAL ENCOUNTER (OUTPATIENT)
Dept: GENERAL RADIOLOGY | Age: 84
Discharge: HOME | End: 2019-01-09
Payer: MEDICARE

## 2019-01-07 ENCOUNTER — TELEPHONE (OUTPATIENT)
Dept: PRIMARY CARE CLINIC | Age: 84
End: 2019-01-07

## 2019-01-07 ENCOUNTER — OFFICE VISIT (OUTPATIENT)
Dept: PRIMARY CARE CLINIC | Age: 84
End: 2019-01-07
Payer: MEDICARE

## 2019-01-07 ENCOUNTER — HOSPITAL ENCOUNTER (OUTPATIENT)
Age: 84
Discharge: HOME | End: 2019-01-07
Payer: MEDICARE

## 2019-01-07 ENCOUNTER — HOSPITAL ENCOUNTER (OUTPATIENT)
Age: 84
Discharge: HOME | End: 2019-01-09
Payer: MEDICARE

## 2019-01-07 VITALS
RESPIRATION RATE: 14 BRPM | HEART RATE: 71 BPM | TEMPERATURE: 98.4 F | BODY MASS INDEX: 19.62 KG/M2 | DIASTOLIC BLOOD PRESSURE: 68 MMHG | HEIGHT: 61 IN | WEIGHT: 103.9 LBS | SYSTOLIC BLOOD PRESSURE: 135 MMHG

## 2019-01-07 DIAGNOSIS — K59.00 CONSTIPATION, UNSPECIFIED CONSTIPATION TYPE: ICD-10-CM

## 2019-01-07 DIAGNOSIS — I48.0 PAF (PAROXYSMAL ATRIAL FIBRILLATION) (HCC): Primary | ICD-10-CM

## 2019-01-07 DIAGNOSIS — K59.00 CONSTIPATION, UNSPECIFIED CONSTIPATION TYPE: Primary | ICD-10-CM

## 2019-01-07 DIAGNOSIS — R10.30 LOWER ABDOMINAL PAIN: ICD-10-CM

## 2019-01-07 PROBLEM — I48.91 ATRIAL FIBRILLATION WITH RVR (HCC): Status: RESOLVED | Noted: 2018-08-27 | Resolved: 2019-01-07

## 2019-01-07 LAB
-: ABNORMAL
AMORPHOUS: ABNORMAL
BACTERIA: ABNORMAL
BILIRUBIN URINE: NEGATIVE
CASTS UA: ABNORMAL /LPF
COLOR: YELLOW
COMMENT UA: ABNORMAL
CRYSTALS, UA: ABNORMAL /HPF
EPITHELIAL CELLS UA: ABNORMAL /HPF (ref 0–25)
GLUCOSE URINE: NEGATIVE
KETONES, URINE: NEGATIVE
LEUKOCYTE ESTERASE, URINE: NEGATIVE
MUCUS: ABNORMAL
NITRITE, URINE: NEGATIVE
OTHER OBSERVATIONS UA: ABNORMAL
PH UA: 7 (ref 5–9)
PROTEIN UA: NEGATIVE
RBC UA: ABNORMAL /HPF (ref 0–2)
RENAL EPITHELIAL, UA: ABNORMAL /HPF
SPECIFIC GRAVITY UA: <1.005 (ref 1.01–1.02)
TRICHOMONAS: ABNORMAL
TURBIDITY: CLEAR
URINE HGB: NEGATIVE
UROBILINOGEN, URINE: NORMAL
WBC UA: ABNORMAL /HPF (ref 0–5)
YEAST: ABNORMAL

## 2019-01-07 PROCEDURE — 1036F TOBACCO NON-USER: CPT | Performed by: NURSE PRACTITIONER

## 2019-01-07 PROCEDURE — 1101F PT FALLS ASSESS-DOCD LE1/YR: CPT | Performed by: NURSE PRACTITIONER

## 2019-01-07 PROCEDURE — 4040F PNEUMOC VAC/ADMIN/RCVD: CPT | Performed by: NURSE PRACTITIONER

## 2019-01-07 PROCEDURE — 1111F DSCHRG MED/CURRENT MED MERGE: CPT | Performed by: NURSE PRACTITIONER

## 2019-01-07 PROCEDURE — G8400 PT W/DXA NO RESULTS DOC: HCPCS | Performed by: NURSE PRACTITIONER

## 2019-01-07 PROCEDURE — 1090F PRES/ABSN URINE INCON ASSESS: CPT | Performed by: NURSE PRACTITIONER

## 2019-01-07 PROCEDURE — G8420 CALC BMI NORM PARAMETERS: HCPCS | Performed by: NURSE PRACTITIONER

## 2019-01-07 PROCEDURE — 1123F ACP DISCUSS/DSCN MKR DOCD: CPT | Performed by: NURSE PRACTITIONER

## 2019-01-07 PROCEDURE — G8482 FLU IMMUNIZE ORDER/ADMIN: HCPCS | Performed by: NURSE PRACTITIONER

## 2019-01-07 PROCEDURE — 74022 RADEX COMPL AQT ABD SERIES: CPT

## 2019-01-07 PROCEDURE — G8427 DOCREV CUR MEDS BY ELIG CLIN: HCPCS | Performed by: NURSE PRACTITIONER

## 2019-01-07 PROCEDURE — 81001 URINALYSIS AUTO W/SCOPE: CPT

## 2019-01-07 PROCEDURE — 99214 OFFICE O/P EST MOD 30 MIN: CPT | Performed by: NURSE PRACTITIONER

## 2019-01-07 RX ORDER — POLYETHYLENE GLYCOL 3350 17 G/17G
17 POWDER, FOR SOLUTION ORAL DAILY
Qty: 510 G | Refills: 2 | Status: SHIPPED | OUTPATIENT
Start: 2019-01-07 | End: 2019-02-19 | Stop reason: SDUPTHER

## 2019-01-07 ASSESSMENT — ENCOUNTER SYMPTOMS
VOMITING: 0
SORE THROAT: 0
HEMATOCHEZIA: 0
TROUBLE SWALLOWING: 0
ABDOMINAL PAIN: 1
BACK PAIN: 0
RECTAL PAIN: 0
SHORTNESS OF BREATH: 0
DIARRHEA: 0
NAUSEA: 0
CONSTIPATION: 1
COUGH: 0
WHEEZING: 0
BLOATING: 0
FLATUS: 0

## 2019-01-08 ENCOUNTER — TELEPHONE (OUTPATIENT)
Dept: CARDIOLOGY | Age: 84
End: 2019-01-08

## 2019-01-08 ENCOUNTER — OFFICE VISIT (OUTPATIENT)
Dept: CARDIOLOGY CLINIC | Age: 84
End: 2019-01-08

## 2019-01-08 VITALS — DIASTOLIC BLOOD PRESSURE: 70 MMHG | SYSTOLIC BLOOD PRESSURE: 130 MMHG

## 2019-01-08 DIAGNOSIS — E03.9 HYPOTHYROIDISM, UNSPECIFIED TYPE: Primary | ICD-10-CM

## 2019-01-08 DIAGNOSIS — I49.5 SSS (SICK SINUS SYNDROME) (HCC): Primary | ICD-10-CM

## 2019-01-08 PROCEDURE — 99024 POSTOP FOLLOW-UP VISIT: CPT | Performed by: INTERNAL MEDICINE

## 2019-01-09 ENCOUNTER — HOSPITAL ENCOUNTER (OUTPATIENT)
Age: 84
Discharge: HOME | End: 2019-01-09
Payer: MEDICARE

## 2019-01-09 DIAGNOSIS — E03.9 HYPOTHYROIDISM, UNSPECIFIED TYPE: ICD-10-CM

## 2019-01-09 LAB
THYROXINE, FREE: 1.03 NG/DL (ref 0.93–1.7)
TSH SERPL DL<=0.05 MIU/L-ACNC: 6.46 MIU/L (ref 0.3–5)

## 2019-01-09 PROCEDURE — 84439 ASSAY OF FREE THYROXINE: CPT

## 2019-01-09 PROCEDURE — 36415 COLL VENOUS BLD VENIPUNCTURE: CPT

## 2019-01-09 PROCEDURE — 84443 ASSAY THYROID STIM HORMONE: CPT

## 2019-01-10 ENCOUNTER — TELEPHONE (OUTPATIENT)
Dept: CARDIOLOGY | Age: 84
End: 2019-01-10

## 2019-01-14 ENCOUNTER — OFFICE VISIT (OUTPATIENT)
Dept: CARDIOLOGY | Age: 84
End: 2019-01-14
Payer: MEDICARE

## 2019-01-14 VITALS
SYSTOLIC BLOOD PRESSURE: 146 MMHG | HEART RATE: 83 BPM | WEIGHT: 103.6 LBS | DIASTOLIC BLOOD PRESSURE: 84 MMHG | BODY MASS INDEX: 19.56 KG/M2 | OXYGEN SATURATION: 97 % | HEIGHT: 61 IN

## 2019-01-14 DIAGNOSIS — I48.0 PAF (PAROXYSMAL ATRIAL FIBRILLATION) (HCC): ICD-10-CM

## 2019-01-14 DIAGNOSIS — I50.32 CHRONIC DIASTOLIC CONGESTIVE HEART FAILURE (HCC): ICD-10-CM

## 2019-01-14 DIAGNOSIS — I49.5 TACHY-BRADY SYNDROME (HCC): Primary | ICD-10-CM

## 2019-01-14 DIAGNOSIS — T82.837A PACEMAKER POCKET HEMATOMA, INITIAL ENCOUNTER: ICD-10-CM

## 2019-01-14 DIAGNOSIS — Z95.0 STATUS POST PLACEMENT OF CARDIAC PACEMAKER: ICD-10-CM

## 2019-01-14 PROCEDURE — 1123F ACP DISCUSS/DSCN MKR DOCD: CPT | Performed by: FAMILY MEDICINE

## 2019-01-14 PROCEDURE — G8400 PT W/DXA NO RESULTS DOC: HCPCS | Performed by: FAMILY MEDICINE

## 2019-01-14 PROCEDURE — 1101F PT FALLS ASSESS-DOCD LE1/YR: CPT | Performed by: FAMILY MEDICINE

## 2019-01-14 PROCEDURE — G8482 FLU IMMUNIZE ORDER/ADMIN: HCPCS | Performed by: FAMILY MEDICINE

## 2019-01-14 PROCEDURE — 1090F PRES/ABSN URINE INCON ASSESS: CPT | Performed by: FAMILY MEDICINE

## 2019-01-14 PROCEDURE — 99213 OFFICE O/P EST LOW 20 MIN: CPT | Performed by: FAMILY MEDICINE

## 2019-01-14 PROCEDURE — G8420 CALC BMI NORM PARAMETERS: HCPCS | Performed by: FAMILY MEDICINE

## 2019-01-14 PROCEDURE — G8427 DOCREV CUR MEDS BY ELIG CLIN: HCPCS | Performed by: FAMILY MEDICINE

## 2019-01-14 PROCEDURE — 1036F TOBACCO NON-USER: CPT | Performed by: FAMILY MEDICINE

## 2019-01-14 PROCEDURE — 4040F PNEUMOC VAC/ADMIN/RCVD: CPT | Performed by: FAMILY MEDICINE

## 2019-01-14 RX ORDER — HYDROCODONE BITARTRATE AND ACETAMINOPHEN 5; 325 MG/1; MG/1
1 TABLET ORAL EVERY 8 HOURS PRN
Qty: 10 TABLET | Refills: 0 | Status: SHIPPED | OUTPATIENT
Start: 2019-01-14 | End: 2019-01-21

## 2019-02-01 RX ORDER — DILTIAZEM HYDROCHLORIDE 360 MG/1
360 CAPSULE, EXTENDED RELEASE ORAL DAILY
Qty: 90 CAPSULE | Refills: 3 | Status: SHIPPED | OUTPATIENT
Start: 2019-02-01 | End: 2020-03-11 | Stop reason: DRUGHIGH

## 2019-02-06 ENCOUNTER — OFFICE VISIT (OUTPATIENT)
Dept: CARDIOLOGY | Age: 84
End: 2019-02-06
Payer: MEDICARE

## 2019-02-06 VITALS
OXYGEN SATURATION: 98 % | HEIGHT: 61 IN | HEART RATE: 72 BPM | SYSTOLIC BLOOD PRESSURE: 125 MMHG | BODY MASS INDEX: 19.94 KG/M2 | RESPIRATION RATE: 16 BRPM | WEIGHT: 105.6 LBS | DIASTOLIC BLOOD PRESSURE: 73 MMHG

## 2019-02-06 DIAGNOSIS — I50.32 CHRONIC DIASTOLIC CONGESTIVE HEART FAILURE (HCC): Primary | ICD-10-CM

## 2019-02-06 DIAGNOSIS — I48.0 PAF (PAROXYSMAL ATRIAL FIBRILLATION) (HCC): ICD-10-CM

## 2019-02-06 DIAGNOSIS — I49.5 SSS (SICK SINUS SYNDROME) (HCC): ICD-10-CM

## 2019-02-06 DIAGNOSIS — I10 ESSENTIAL HYPERTENSION: ICD-10-CM

## 2019-02-06 DIAGNOSIS — Z95.0 PACEMAKER: ICD-10-CM

## 2019-02-06 PROCEDURE — 4040F PNEUMOC VAC/ADMIN/RCVD: CPT | Performed by: FAMILY MEDICINE

## 2019-02-06 PROCEDURE — 99214 OFFICE O/P EST MOD 30 MIN: CPT | Performed by: FAMILY MEDICINE

## 2019-02-06 PROCEDURE — G8482 FLU IMMUNIZE ORDER/ADMIN: HCPCS | Performed by: FAMILY MEDICINE

## 2019-02-06 PROCEDURE — G8427 DOCREV CUR MEDS BY ELIG CLIN: HCPCS | Performed by: FAMILY MEDICINE

## 2019-02-06 PROCEDURE — 1036F TOBACCO NON-USER: CPT | Performed by: FAMILY MEDICINE

## 2019-02-06 PROCEDURE — 1090F PRES/ABSN URINE INCON ASSESS: CPT | Performed by: FAMILY MEDICINE

## 2019-02-06 PROCEDURE — G8400 PT W/DXA NO RESULTS DOC: HCPCS | Performed by: FAMILY MEDICINE

## 2019-02-06 PROCEDURE — G8420 CALC BMI NORM PARAMETERS: HCPCS | Performed by: FAMILY MEDICINE

## 2019-02-06 PROCEDURE — 1123F ACP DISCUSS/DSCN MKR DOCD: CPT | Performed by: FAMILY MEDICINE

## 2019-02-06 PROCEDURE — 1101F PT FALLS ASSESS-DOCD LE1/YR: CPT | Performed by: FAMILY MEDICINE

## 2019-02-06 RX ORDER — METOPROLOL SUCCINATE 50 MG/1
50 TABLET, EXTENDED RELEASE ORAL DAILY
Qty: 90 TABLET | Refills: 3 | Status: SHIPPED | OUTPATIENT
Start: 2019-02-06 | End: 2019-02-19 | Stop reason: ALTCHOICE

## 2019-02-19 ENCOUNTER — OFFICE VISIT (OUTPATIENT)
Dept: PRIMARY CARE CLINIC | Age: 84
End: 2019-02-19
Payer: MEDICARE

## 2019-02-19 VITALS
WEIGHT: 106 LBS | DIASTOLIC BLOOD PRESSURE: 78 MMHG | HEIGHT: 61 IN | HEART RATE: 75 BPM | BODY MASS INDEX: 20.01 KG/M2 | RESPIRATION RATE: 14 BRPM | TEMPERATURE: 97.9 F | SYSTOLIC BLOOD PRESSURE: 138 MMHG

## 2019-02-19 DIAGNOSIS — K59.00 CONSTIPATION, UNSPECIFIED CONSTIPATION TYPE: ICD-10-CM

## 2019-02-19 PROCEDURE — 1090F PRES/ABSN URINE INCON ASSESS: CPT | Performed by: NURSE PRACTITIONER

## 2019-02-19 PROCEDURE — G8420 CALC BMI NORM PARAMETERS: HCPCS | Performed by: NURSE PRACTITIONER

## 2019-02-19 PROCEDURE — 1101F PT FALLS ASSESS-DOCD LE1/YR: CPT | Performed by: NURSE PRACTITIONER

## 2019-02-19 PROCEDURE — 1123F ACP DISCUSS/DSCN MKR DOCD: CPT | Performed by: NURSE PRACTITIONER

## 2019-02-19 PROCEDURE — G8427 DOCREV CUR MEDS BY ELIG CLIN: HCPCS | Performed by: NURSE PRACTITIONER

## 2019-02-19 PROCEDURE — 1036F TOBACCO NON-USER: CPT | Performed by: NURSE PRACTITIONER

## 2019-02-19 PROCEDURE — G8482 FLU IMMUNIZE ORDER/ADMIN: HCPCS | Performed by: NURSE PRACTITIONER

## 2019-02-19 PROCEDURE — 4040F PNEUMOC VAC/ADMIN/RCVD: CPT | Performed by: NURSE PRACTITIONER

## 2019-02-19 PROCEDURE — 99213 OFFICE O/P EST LOW 20 MIN: CPT | Performed by: NURSE PRACTITIONER

## 2019-02-19 RX ORDER — POLYETHYLENE GLYCOL 3350 17 G/17G
34 POWDER, FOR SOLUTION ORAL DAILY
Qty: 510 G | Refills: 5 | Status: SHIPPED | OUTPATIENT
Start: 2019-02-19 | End: 2019-05-20

## 2019-02-19 ASSESSMENT — PATIENT HEALTH QUESTIONNAIRE - PHQ9
1. LITTLE INTEREST OR PLEASURE IN DOING THINGS: 0
2. FEELING DOWN, DEPRESSED OR HOPELESS: 0
SUM OF ALL RESPONSES TO PHQ QUESTIONS 1-9: 0
SUM OF ALL RESPONSES TO PHQ9 QUESTIONS 1 & 2: 0
SUM OF ALL RESPONSES TO PHQ QUESTIONS 1-9: 0

## 2019-02-19 ASSESSMENT — ENCOUNTER SYMPTOMS
CONSTIPATION: 1
BLOATING: 1
VOMITING: 0
FLATUS: 0
DIARRHEA: 0
NAUSEA: 0

## 2019-02-28 ENCOUNTER — TELEPHONE (OUTPATIENT)
Dept: PRIMARY CARE CLINIC | Age: 84
End: 2019-02-28

## 2019-03-06 ENCOUNTER — HOSPITAL ENCOUNTER (OUTPATIENT)
Age: 84
Discharge: HOME | End: 2019-03-06
Payer: MEDICARE

## 2019-03-06 ENCOUNTER — OFFICE VISIT (OUTPATIENT)
Dept: PRIMARY CARE CLINIC | Age: 84
End: 2019-03-06
Payer: MEDICARE

## 2019-03-06 ENCOUNTER — TELEPHONE (OUTPATIENT)
Dept: PRIMARY CARE CLINIC | Age: 84
End: 2019-03-06

## 2019-03-06 VITALS
HEART RATE: 52 BPM | BODY MASS INDEX: 20.35 KG/M2 | WEIGHT: 107.8 LBS | SYSTOLIC BLOOD PRESSURE: 145 MMHG | DIASTOLIC BLOOD PRESSURE: 114 MMHG | TEMPERATURE: 97.8 F | HEIGHT: 61 IN | RESPIRATION RATE: 18 BRPM | OXYGEN SATURATION: 99 %

## 2019-03-06 DIAGNOSIS — R53.83 OTHER FATIGUE: ICD-10-CM

## 2019-03-06 DIAGNOSIS — D64.9 ANEMIA, UNSPECIFIED TYPE: ICD-10-CM

## 2019-03-06 DIAGNOSIS — J01.00 ACUTE NON-RECURRENT MAXILLARY SINUSITIS: Primary | ICD-10-CM

## 2019-03-06 LAB
ABSOLUTE EOS #: 0.21 K/UL (ref 0–0.44)
ABSOLUTE IMMATURE GRANULOCYTE: <0.03 K/UL (ref 0–0.3)
ABSOLUTE LYMPH #: 1.66 K/UL (ref 1.1–3.7)
ABSOLUTE MONO #: 0.55 K/UL (ref 0.1–1.2)
ANION GAP SERPL CALCULATED.3IONS-SCNC: 10 MMOL/L (ref 9–17)
BASOPHILS # BLD: 1 % (ref 0–2)
BASOPHILS ABSOLUTE: 0.03 K/UL (ref 0–0.2)
BUN BLDV-MCNC: 17 MG/DL (ref 8–23)
BUN/CREAT BLD: 20 (ref 9–20)
CALCIUM SERPL-MCNC: 9.7 MG/DL (ref 8.6–10.4)
CHLORIDE BLD-SCNC: 105 MMOL/L (ref 98–107)
CO2: 26 MMOL/L (ref 20–31)
CREAT SERPL-MCNC: 0.84 MG/DL (ref 0.5–0.9)
DIFFERENTIAL TYPE: ABNORMAL
EOSINOPHILS RELATIVE PERCENT: 3 % (ref 1–4)
FERRITIN: 30 UG/L (ref 13–150)
FOLATE: 14 NG/ML
GFR AFRICAN AMERICAN: >60 ML/MIN
GFR NON-AFRICAN AMERICAN: >60 ML/MIN
GFR SERPL CREATININE-BSD FRML MDRD: NORMAL ML/MIN/{1.73_M2}
GFR SERPL CREATININE-BSD FRML MDRD: NORMAL ML/MIN/{1.73_M2}
GLUCOSE BLD-MCNC: 93 MG/DL (ref 70–99)
HCT VFR BLD CALC: 37.6 % (ref 36.3–47.1)
HEMOGLOBIN: 11.8 G/DL (ref 11.9–15.1)
IMMATURE GRANULOCYTES: 0 %
IRON SATURATION: 19 % (ref 20–55)
IRON: 68 UG/DL (ref 37–145)
LYMPHOCYTES # BLD: 27 % (ref 24–43)
MCH RBC QN AUTO: 29.6 PG (ref 25.2–33.5)
MCHC RBC AUTO-ENTMCNC: 31.4 G/DL (ref 28.4–34.8)
MCV RBC AUTO: 94.5 FL (ref 82.6–102.9)
MONOCYTES # BLD: 9 % (ref 3–12)
NRBC AUTOMATED: 0 PER 100 WBC
PDW BLD-RTO: 14.1 % (ref 11.8–14.4)
PLATELET # BLD: 231 K/UL (ref 138–453)
PLATELET ESTIMATE: ABNORMAL
PMV BLD AUTO: 9.9 FL (ref 8.1–13.5)
POTASSIUM SERPL-SCNC: 4.3 MMOL/L (ref 3.7–5.3)
RBC # BLD: 3.98 M/UL (ref 3.95–5.11)
RBC # BLD: ABNORMAL 10*6/UL
SEG NEUTROPHILS: 60 % (ref 36–65)
SEGMENTED NEUTROPHILS ABSOLUTE COUNT: 3.72 K/UL (ref 1.5–8.1)
SODIUM BLD-SCNC: 141 MMOL/L (ref 135–144)
THYROXINE, FREE: 0.96 NG/DL (ref 0.93–1.7)
TOTAL IRON BINDING CAPACITY: 366 UG/DL (ref 250–450)
TSH SERPL DL<=0.05 MIU/L-ACNC: 5.77 MIU/L (ref 0.3–5)
UNSATURATED IRON BINDING CAPACITY: 298.2 UG/DL (ref 112–347)
VITAMIN B-12: 340 PG/ML (ref 232–1245)
WBC # BLD: 6.2 K/UL (ref 3.5–11.3)
WBC # BLD: ABNORMAL 10*3/UL

## 2019-03-06 PROCEDURE — 1123F ACP DISCUSS/DSCN MKR DOCD: CPT | Performed by: NURSE PRACTITIONER

## 2019-03-06 PROCEDURE — 83540 ASSAY OF IRON: CPT

## 2019-03-06 PROCEDURE — 84443 ASSAY THYROID STIM HORMONE: CPT

## 2019-03-06 PROCEDURE — 85025 COMPLETE CBC W/AUTO DIFF WBC: CPT

## 2019-03-06 PROCEDURE — 36415 COLL VENOUS BLD VENIPUNCTURE: CPT

## 2019-03-06 PROCEDURE — 84439 ASSAY OF FREE THYROXINE: CPT

## 2019-03-06 PROCEDURE — 4040F PNEUMOC VAC/ADMIN/RCVD: CPT | Performed by: NURSE PRACTITIONER

## 2019-03-06 PROCEDURE — 83550 IRON BINDING TEST: CPT

## 2019-03-06 PROCEDURE — G8420 CALC BMI NORM PARAMETERS: HCPCS | Performed by: NURSE PRACTITIONER

## 2019-03-06 PROCEDURE — 82746 ASSAY OF FOLIC ACID SERUM: CPT

## 2019-03-06 PROCEDURE — 80048 BASIC METABOLIC PNL TOTAL CA: CPT

## 2019-03-06 PROCEDURE — 99214 OFFICE O/P EST MOD 30 MIN: CPT | Performed by: NURSE PRACTITIONER

## 2019-03-06 PROCEDURE — 1090F PRES/ABSN URINE INCON ASSESS: CPT | Performed by: NURSE PRACTITIONER

## 2019-03-06 PROCEDURE — 82728 ASSAY OF FERRITIN: CPT

## 2019-03-06 PROCEDURE — G8427 DOCREV CUR MEDS BY ELIG CLIN: HCPCS | Performed by: NURSE PRACTITIONER

## 2019-03-06 PROCEDURE — G8482 FLU IMMUNIZE ORDER/ADMIN: HCPCS | Performed by: NURSE PRACTITIONER

## 2019-03-06 PROCEDURE — 1036F TOBACCO NON-USER: CPT | Performed by: NURSE PRACTITIONER

## 2019-03-06 PROCEDURE — 82607 VITAMIN B-12: CPT

## 2019-03-06 PROCEDURE — 1101F PT FALLS ASSESS-DOCD LE1/YR: CPT | Performed by: NURSE PRACTITIONER

## 2019-03-06 RX ORDER — LEVOTHYROXINE SODIUM 112 UG/1
112 TABLET ORAL DAILY
COMMUNITY
Start: 2019-03-04 | End: 2019-05-01 | Stop reason: SDUPTHER

## 2019-03-06 RX ORDER — AMOXICILLIN AND CLAVULANATE POTASSIUM 875; 125 MG/1; MG/1
1 TABLET, FILM COATED ORAL 2 TIMES DAILY
Qty: 14 TABLET | Refills: 0 | Status: SHIPPED | OUTPATIENT
Start: 2019-03-06 | End: 2019-03-13 | Stop reason: SINTOL

## 2019-03-06 ASSESSMENT — ENCOUNTER SYMPTOMS
NAUSEA: 0
SWOLLEN GLANDS: 0
SORE THROAT: 1
ABDOMINAL PAIN: 0
COUGH: 1
DIARRHEA: 0
RHINORRHEA: 1
SINUS PAIN: 1
BLOOD IN STOOL: 0
ANAL BLEEDING: 0
WHEEZING: 0
TROUBLE SWALLOWING: 0
VOMITING: 0

## 2019-03-13 ENCOUNTER — OFFICE VISIT (OUTPATIENT)
Dept: CARDIOLOGY | Age: 84
End: 2019-03-13
Payer: MEDICARE

## 2019-03-13 VITALS
HEART RATE: 64 BPM | SYSTOLIC BLOOD PRESSURE: 137 MMHG | HEIGHT: 61 IN | RESPIRATION RATE: 16 BRPM | WEIGHT: 108 LBS | DIASTOLIC BLOOD PRESSURE: 72 MMHG | OXYGEN SATURATION: 98 % | BODY MASS INDEX: 20.39 KG/M2

## 2019-03-13 DIAGNOSIS — I48.0 PAF (PAROXYSMAL ATRIAL FIBRILLATION) (HCC): ICD-10-CM

## 2019-03-13 DIAGNOSIS — I50.32 CHRONIC DIASTOLIC HEART FAILURE (HCC): Primary | ICD-10-CM

## 2019-03-13 DIAGNOSIS — I10 ESSENTIAL HYPERTENSION: ICD-10-CM

## 2019-03-13 PROCEDURE — 1101F PT FALLS ASSESS-DOCD LE1/YR: CPT | Performed by: FAMILY MEDICINE

## 2019-03-13 PROCEDURE — G8482 FLU IMMUNIZE ORDER/ADMIN: HCPCS | Performed by: FAMILY MEDICINE

## 2019-03-13 PROCEDURE — 99214 OFFICE O/P EST MOD 30 MIN: CPT | Performed by: FAMILY MEDICINE

## 2019-03-13 PROCEDURE — G8427 DOCREV CUR MEDS BY ELIG CLIN: HCPCS | Performed by: FAMILY MEDICINE

## 2019-03-13 PROCEDURE — G8420 CALC BMI NORM PARAMETERS: HCPCS | Performed by: FAMILY MEDICINE

## 2019-03-13 PROCEDURE — 1090F PRES/ABSN URINE INCON ASSESS: CPT | Performed by: FAMILY MEDICINE

## 2019-03-13 PROCEDURE — 4040F PNEUMOC VAC/ADMIN/RCVD: CPT | Performed by: FAMILY MEDICINE

## 2019-03-13 PROCEDURE — 1123F ACP DISCUSS/DSCN MKR DOCD: CPT | Performed by: FAMILY MEDICINE

## 2019-03-13 PROCEDURE — 1036F TOBACCO NON-USER: CPT | Performed by: FAMILY MEDICINE

## 2019-03-14 RX ORDER — METOPROLOL SUCCINATE 25 MG/1
25 TABLET, EXTENDED RELEASE ORAL DAILY
Qty: 30 TABLET | Refills: 0 | Status: SHIPPED | OUTPATIENT
Start: 2019-03-14 | End: 2019-03-16

## 2019-03-16 RX ORDER — METOPROLOL SUCCINATE 25 MG/1
25 TABLET, EXTENDED RELEASE ORAL DAILY
Qty: 90 TABLET | Refills: 3 | Status: SHIPPED | OUTPATIENT
Start: 2019-03-16 | End: 2019-04-01

## 2019-03-20 ENCOUNTER — TELEPHONE (OUTPATIENT)
Dept: PRIMARY CARE CLINIC | Age: 84
End: 2019-03-20

## 2019-03-20 DIAGNOSIS — K59.09 CHRONIC CONSTIPATION: Primary | ICD-10-CM

## 2019-03-26 ENCOUNTER — OFFICE VISIT (OUTPATIENT)
Dept: PRIMARY CARE CLINIC | Age: 84
End: 2019-03-26
Payer: MEDICARE

## 2019-03-26 VITALS
WEIGHT: 104 LBS | SYSTOLIC BLOOD PRESSURE: 102 MMHG | HEIGHT: 61 IN | HEART RATE: 69 BPM | DIASTOLIC BLOOD PRESSURE: 59 MMHG | TEMPERATURE: 97.8 F | BODY MASS INDEX: 19.63 KG/M2

## 2019-03-26 DIAGNOSIS — K59.00 CONSTIPATION, UNSPECIFIED CONSTIPATION TYPE: Primary | ICD-10-CM

## 2019-03-26 PROCEDURE — 1090F PRES/ABSN URINE INCON ASSESS: CPT | Performed by: NURSE PRACTITIONER

## 2019-03-26 PROCEDURE — G8427 DOCREV CUR MEDS BY ELIG CLIN: HCPCS | Performed by: NURSE PRACTITIONER

## 2019-03-26 PROCEDURE — G8420 CALC BMI NORM PARAMETERS: HCPCS | Performed by: NURSE PRACTITIONER

## 2019-03-26 PROCEDURE — 1036F TOBACCO NON-USER: CPT | Performed by: NURSE PRACTITIONER

## 2019-03-26 PROCEDURE — 4040F PNEUMOC VAC/ADMIN/RCVD: CPT | Performed by: NURSE PRACTITIONER

## 2019-03-26 PROCEDURE — 99213 OFFICE O/P EST LOW 20 MIN: CPT | Performed by: NURSE PRACTITIONER

## 2019-03-26 PROCEDURE — G8482 FLU IMMUNIZE ORDER/ADMIN: HCPCS | Performed by: NURSE PRACTITIONER

## 2019-03-26 PROCEDURE — 1123F ACP DISCUSS/DSCN MKR DOCD: CPT | Performed by: NURSE PRACTITIONER

## 2019-03-26 ASSESSMENT — ENCOUNTER SYMPTOMS: CONSTIPATION: 1

## 2019-03-27 ASSESSMENT — ENCOUNTER SYMPTOMS
ABDOMINAL PAIN: 1
NAUSEA: 0
COUGH: 0
SHORTNESS OF BREATH: 0
ANAL BLEEDING: 0
BLOOD IN STOOL: 0
DIARRHEA: 1
ABDOMINAL DISTENTION: 0
RECTAL PAIN: 0
VOMITING: 0
WHEEZING: 0

## 2019-04-01 ENCOUNTER — OFFICE VISIT (OUTPATIENT)
Dept: CARDIOLOGY | Age: 84
End: 2019-04-01
Payer: MEDICARE

## 2019-04-01 VITALS
DIASTOLIC BLOOD PRESSURE: 61 MMHG | SYSTOLIC BLOOD PRESSURE: 117 MMHG | WEIGHT: 104.2 LBS | HEIGHT: 60 IN | HEART RATE: 63 BPM | BODY MASS INDEX: 20.46 KG/M2

## 2019-04-01 DIAGNOSIS — I48.0 PAF (PAROXYSMAL ATRIAL FIBRILLATION) (HCC): ICD-10-CM

## 2019-04-01 DIAGNOSIS — R53.83 FATIGUE, UNSPECIFIED TYPE: ICD-10-CM

## 2019-04-01 DIAGNOSIS — Z95.0 BIVENTRICULAR CARDIAC PACEMAKER IN SITU: ICD-10-CM

## 2019-04-01 DIAGNOSIS — I10 ESSENTIAL HYPERTENSION: ICD-10-CM

## 2019-04-01 DIAGNOSIS — I49.5 TACHYCARDIA-BRADYCARDIA SYNDROME (HCC): ICD-10-CM

## 2019-04-01 DIAGNOSIS — I50.32 CHRONIC DIASTOLIC CONGESTIVE HEART FAILURE (HCC): Primary | ICD-10-CM

## 2019-04-01 PROCEDURE — 99214 OFFICE O/P EST MOD 30 MIN: CPT | Performed by: FAMILY MEDICINE

## 2019-04-01 PROCEDURE — G8427 DOCREV CUR MEDS BY ELIG CLIN: HCPCS | Performed by: FAMILY MEDICINE

## 2019-04-01 PROCEDURE — G8420 CALC BMI NORM PARAMETERS: HCPCS | Performed by: FAMILY MEDICINE

## 2019-04-01 PROCEDURE — 1036F TOBACCO NON-USER: CPT | Performed by: FAMILY MEDICINE

## 2019-04-01 PROCEDURE — 4040F PNEUMOC VAC/ADMIN/RCVD: CPT | Performed by: FAMILY MEDICINE

## 2019-04-01 PROCEDURE — 1123F ACP DISCUSS/DSCN MKR DOCD: CPT | Performed by: FAMILY MEDICINE

## 2019-04-01 PROCEDURE — 1090F PRES/ABSN URINE INCON ASSESS: CPT | Performed by: FAMILY MEDICINE

## 2019-04-01 RX ORDER — METOPROLOL SUCCINATE 50 MG/1
50 TABLET, EXTENDED RELEASE ORAL DAILY
Qty: 90 TABLET | Refills: 3 | Status: SHIPPED | OUTPATIENT
Start: 2019-04-01 | End: 2019-06-03 | Stop reason: SDUPTHER

## 2019-04-01 NOTE — PROGRESS NOTES
I, Markos Booker am scribing for and in the presence of Jam Rodriguez MD.    Patient: Marcio Allen  : 1933  Date of Visit: 2019    REASON FOR VISIT / CONSULTATION: Other (HX: CHF, PAF, HTN, Pacer, Tachy-Bradycardia Syndrome. Pt states she has increased fatigue and weakness about a month ago. Once a week she has a dull chest pain lasting a few minutes. SOB ambulating stairs. Denies: palpitations, lightheadedness/dizziness. )      Dear Praneeth Ybarra, CAMRON - CNP,    I had the pleasure of seeing your patient Marcio Allen in my office today. As you know, Ms. Cheryl Benjamin is a 80 y.o. female with a history of paroxysmal atrial fibrillation as well as intermittent episodes of lightheadedness and dizziness as well as several episodes of syncope and near syncope of unknown etiology. A Holter monitor showed several breakthrough brief episodes of  atrial fibrillation with RVR. Therefore I started her on Amiodarone 200 mg daily. Her last LINQ remote interrogation in  had shown about 1.1% percent of atrial fibrillation. Unfortunately, she developed some significant thyroid issues leading her amiodarone to be stop and instead started on Tikosyn therapy 2018 for PAF. Unfortunately, she was admitted on 18 to [facility] for a punctured lung and broken ribs due to her shoes and slipping and falling while trying to walk out of her house. Apparently this led to then need for a chest tube for what sounds to be a hemothorax which ultimately was removed. LINQ alert on 2018 showed atrial fibrillation. LINQ alert on 10/16/2018 showed atrial fibrillation increased from 2% to 8.7% with frequent RVR in the 160's that has been associated with lightheadedness and dizziness. LINQ alert on 18 showed A-Fib. Echocardiogram done 10/25/18 EF 65% evidence of moderate diastolic dysfunction is seen. 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  She had 6.3% atrial fibrillation burden on 2/6/19. Since the last time I saw Ms. Nan Sargent, she has been having increased fatigue and weakness for about a month. She is sleeping a lot, waking up a couple times through the night. She says that she wakes up not feeling rested at all. She has not had palpitations, but sometimes feels her heart slow down. She denies chest pain or pressure, shortness of breath, palpitations, lightheadedness, dizziness, any falls or near falls. She denies any abdominal pain, or concerns with any medications. Exercise Tolerance: She reports having a good exercise tolerance. Ms. Nan Sargent says that she could walk more than 3-4 block without developing chest discomfort and/or significant shortness of breath. Past Medical History:   Diagnosis Date    Abnormal resting ECG findings 1/25/12    Normal sinus rhythm with frequent PACs in a trigeminy pattern    Abnormal resting ECG findings 6/10/2013    Severe sinus bradycardia at 50 bpm.     Anxiety     Chronic back pain     Pt. c/o upper back pain,,,,\"On the sides of my lungs\"    Hiatal hernia     Holter monitor, abnormal 1/27/12    Multiple episodes of SVT between 100 and 130 beats per minute most consistent with atrial fibrillation and/or atrial flutter with variable block.  Hypothyroidism     Insomnia     MAC (mycobacterium avium-intracellulare complex)     Normal cardiac stress test 1/26/12    low risk study.  Paroxysmal atrial fibrillation ([facility] [address] 2/7/2012    Found on holter monitor 2/12 and 12/12     Severe sinus bradycardia 6/10/13    At least partially drug induced.  Status post placement of implantable loop recorder 10/19/2017    LINQ IMPLANTED. [employer]    Subdural hematoma (HCC)        CURRENT ALLERGIES: Sulfur and Sulfa antibiotics REVIEW OF SYSTEMS: 14 systems were reviewed. Pertinent positives and negatives as above, all else negative.      Past Surgical History:   Procedure Laterality Date    BRAIN SURGERY      Had a blood clot on the brain. Fell 10 feet.  CATARACT REMOVAL Bilateral      SECTION      x1    FOOT SURGERY      INSERTABLE CARDIAC MONITOR Left 10/19/2017    [facility]) [place]/Dr Angulo    PACEMAKER INSERTION Left 2019    Dr. Macias Hamper N/A 2019    PACEMAKER INSERTION PERMANENT [facility]      tumor removed    Social History:  Social History     Tobacco Use    Smoking status: Never Smoker    Smokeless tobacco: Never Used   Substance Use Topics    Alcohol use: No    Drug use: No        CURRENT MEDICATIONS:  Outpatient Medications Marked as Taking for the 19 encounter (Office Visit) with Charles Dodson MD   Medication Sig Dispense Refill    metoprolol succinate (TOPROL XL) 25 MG extended release tablet Take 1 tablet by mouth daily 90 tablet 3    levothyroxine (SYNTHROID) 112 MCG tablet       polyethylene glycol (GLYCOLAX) powder Take 34 g by mouth daily 510 g 5    magnesium citrate solution Take 296 mLs by mouth once for 1 dose 296 mL 0    diltiazem (CARDIZEM CD) 360 MG extended release capsule Take 1 capsule by mouth daily 90 capsule 3    apixaban (ELIQUIS) 2.5 MG TABS tablet Take 1 tablet by mouth 2 times daily 180 tablet 3    VOLTAREN 1 % GEL Apply topically 2 times daily Apply to both feet      dofetilide (TIKOSYN) 250 MCG capsule Take 1 capsule by mouth 2 times daily 180 capsule 3    omeprazole (PRILOSEC) 20 MG delayed release capsule Take 1 capsule by mouth daily 90 capsule 3       FAMILY HISTORY: family history includes Stroke in her father and mother. PHYSICAL EXAM:   /61 (Site: Right Upper Arm, Position: Sitting, Cuff Size: Small Adult)   Pulse 63   Ht 5' (1.524 m)   Wt 104 lb 3.2 oz (47.3 kg)   BMI 20.35 kg/m²  Body mass index is 20.35 kg/m². Constitutional: She is oriented to person, place, and time. She appears well-developed and well-nourished. In no acute distress.    HEENT: Normocephalic and atraumatic. No significant JVD was present. Carotid bruit is not present. No mass and no thyromegaly present. No lymphadenopathy present. Cardiovascular: Normal rate, irregularly irregular rhythm, normal heart sounds. Exam reveals no gallop and no friction rubs. A I/VI systolic murmur was heard maximally at the apex. Pulmonary/Chest: Effort normal and breath sounds normal. No respiratory distress. She has no wheezes, rhonchi or rales. Rhonchi present Abdominal: Soft, non-tender. Bowel sounds and aorta are normal. She exhibits no organomegaly, mass or bruit. Extremities: Trace lower extremity edema. 1/2 way up to the knees bilaterally  No cyanosis, or clubbing. Pulses are 2+ radial/carotid/ and 2+ dorsalis pedis and posterior tibial bilaterally. Compression stockings in place. Neurological: She is alert and oriented to person, place, and time. No evidence of gross cranial nerve deficit. Coordination appeared normal.   Skin: Skin is warm and dry. There is no rash or diaphoresis. Psychiatric: She has a normal mood and affect. Her speech is normal and behavior is normal.      MOST RECENT LABS ON RECORD:   Lab Results   Component Value Date    WBC 6.2 03/06/2019    HGB 11.8 (L) 03/06/2019    HCT 37.6 03/06/2019     03/06/2019    ALT 16 01/08/2018    AST 20 01/08/2018     03/06/2019    K 4.3 03/06/2019     03/06/2019    CREATININE 0.84 03/06/2019    BUN 17 03/06/2019    CO2 26 03/06/2019    TSH 5.77 (H) 03/06/2019    INR 1.0 12/28/2018       ASSESSMENT:  1. Chronic diastolic congestive heart failure ([address]    2. PAF (paroxysmal atrial fibrillation) ([address]    3. Essential hypertension    4. Fatigue, unspecified type    5. Biventricular cardiac pacemaker in situ    6.  Tachycardia-bradycardia syndrome ([address]       PLAN:   Chronic Diastolic Heart Failure: Currently well controlled  Beta Blocker Therapy: Increase Metoprolol succinate (Toprol XL)  50 mg  daily I discussed the potential side effects of this medication including lightheadedness and dizziness and told her to call the office if this occurs. ACE Inibitor/ARB: Not indicated      Diuretics: Not indicated       Nonpharmacologic management of Heart Failure: I told her to continue wearing lower extremity compression stockings and I advised her to try and keep his legs up whenever possible and to limit salt in her diet. Paroxysmal Atrial Fibrillation: Rhythm Control. atrial fibrillation burden of 7.8% in 3/19 with some high rates but appears to not feel this unless this is causing her fatigue. Beta Blocker Therapy: Increase Metoprolol succinate (Toprol XL)  50 mg  daily I discussed the potential side effects of this medication including lightheadedness and dizziness and told her to call the office if this occurs. Calcium Channel Blocker: Continue diltiazem 360 mg daily. Rhythm Control Agent: Continue Dofetilide (Tikosyn) 250 mcg twice daily   Monitoring: Dofetilide (Tikosyn Since she will be maintained on Tikosyn I will continue to monitor her ECG's every 6 months and make sure his QTc remains less than 500 ms. Stroke Risk: Her CHADS2-VASc score is 3/9 (3.2% stroke risk)  Anticoagulation: Continue Apixaban (Eliquis) 2.5 mg every 12 hours. I also reminded her to watch for signs of blood in her stool or black tarry stools and stop the medication immediately if this develops as this could be life threatening. Essential Hypertension: Controlled. · Calcium Channel Blocker: Continue diltiazem CD to 360 mg daily. · Beta Blocker Therapy: Increase Metoprolol succinate (Toprol XL)  50 mg  daily I discussed the potential side effects of this medication including lightheadedness and dizziness and told her to call the office if this occurs. · Increased Fatigue: possibly related to  atrial fibrillation with RVR vs medications vs alternative causes. Increase Toprol XL as above and re-assess.  Also discussed good sleep hygiene including trying

## 2019-04-01 NOTE — PATIENT INSTRUCTIONS
SURVEY:    You may be receiving a survey from [employer] regarding your visit today. Please complete the survey to enable us to provide the highest quality of care to you and your family. If you cannot score us a very good on any question, please call the office to discuss how we could have made your experience a very good one. Thank you.

## 2019-04-03 ENCOUNTER — OFFICE VISIT (OUTPATIENT)
Dept: GASTROENTEROLOGY | Age: 84
End: 2019-04-03
Payer: MEDICARE

## 2019-04-03 VITALS
WEIGHT: 107.1 LBS | RESPIRATION RATE: 18 BRPM | HEIGHT: 61 IN | BODY MASS INDEX: 20.22 KG/M2 | DIASTOLIC BLOOD PRESSURE: 64 MMHG | SYSTOLIC BLOOD PRESSURE: 116 MMHG | HEART RATE: 78 BPM | TEMPERATURE: 97.5 F

## 2019-04-03 DIAGNOSIS — K62.5 BRIGHT RED RECTAL BLEEDING: ICD-10-CM

## 2019-04-03 DIAGNOSIS — K64.8 INTERNAL HEMORRHOIDS: ICD-10-CM

## 2019-04-03 DIAGNOSIS — K59.00 CONSTIPATION, UNSPECIFIED CONSTIPATION TYPE: Primary | ICD-10-CM

## 2019-04-03 DIAGNOSIS — K57.30 DIVERTICULOSIS OF LARGE INTESTINE WITHOUT HEMORRHAGE: ICD-10-CM

## 2019-04-03 PROCEDURE — G8420 CALC BMI NORM PARAMETERS: HCPCS | Performed by: INTERNAL MEDICINE

## 2019-04-03 PROCEDURE — 99204 OFFICE O/P NEW MOD 45 MIN: CPT | Performed by: INTERNAL MEDICINE

## 2019-04-03 PROCEDURE — 4040F PNEUMOC VAC/ADMIN/RCVD: CPT | Performed by: INTERNAL MEDICINE

## 2019-04-03 PROCEDURE — 1090F PRES/ABSN URINE INCON ASSESS: CPT | Performed by: INTERNAL MEDICINE

## 2019-04-03 PROCEDURE — G8427 DOCREV CUR MEDS BY ELIG CLIN: HCPCS | Performed by: INTERNAL MEDICINE

## 2019-04-03 PROCEDURE — 1123F ACP DISCUSS/DSCN MKR DOCD: CPT | Performed by: INTERNAL MEDICINE

## 2019-04-03 PROCEDURE — 1036F TOBACCO NON-USER: CPT | Performed by: INTERNAL MEDICINE

## 2019-04-03 NOTE — PROGRESS NOTES
Colt Mercado is a 80 y.o. female   YOB: 1933    Blood pressure 116/64, pulse 78, temperature 97.5 °F (36.4 °C), temperature source Tympanic, resp. rate 18, height 5' 1\" (1.549 m), weight 107 lb 1.6 oz (48.6 kg). Body mass index is 20.24 kg/m². Ms. Brett Bates is an elderly woman with long-standing constipation who was recently advised to start MiraLAX by her PCP. Although his notes indicate that she was to start on 17 g (1 capful) which was subsequently raised to 34 g when she failed to respond, she misunderstood the recommendation to be 1 tablespoon rather than 1 capful. When she did not respond to dosage increased, albeit not at the intensive level, she was advised to start docusate and developed diarrhea. She has had a few episodes of low-volume bright red blood associated with defecation although she does not seem terribly concerned about that. She denies abdominal pain, nausea, vomiting, pyrosis, dysphagia, odynophagia, weight loss, fever or chills. Her most recent colonoscopy was on 1/21/2011 at which time she was noted to have severe diverticulosis and internal hemorrhoids. Recent laboratory studies include white blood count 6200, hemoglobin 11.8, hematocrit 37.6, platelet count 841,078, glucose 93, BUN 17, creatinine 0.84, calcium 9.7, sodium 141, potassium 4.3, chloride 105, bicarbonate 26, iron 68, TIBC 366, B12 340, folate 14, ferritin 30. She had a CT scan after presenting to the emergency room room with a fall last August which revealed no significant GI/intra-abdominal abnormalities other than vascular disease and diverticulosis. A recent abdominal series was unremarkable.     Past Medical History:   Diagnosis Date    Abnormal resting ECG findings 1/25/12    Normal sinus rhythm with frequent PACs in a trigeminy pattern    Abnormal resting ECG findings 6/10/2013    Severe sinus bradycardia at 50 bpm.     Anxiety     Chronic back pain     Pt. c/o upper back pain,,,,\"On the sides of my lungs\"    Hiatal hernia     Holter monitor, abnormal 12    Multiple episodes of SVT between 100 and 130 beats per minute most consistent with atrial fibrillation and/or atrial flutter with variable block.  Hypothyroidism     Insomnia     MAC (mycobacterium avium-intracellulare complex)     Normal cardiac stress test 12    low risk study.  Paroxysmal atrial fibrillation ([address] 2012    Found on holter monitor  and      Severe sinus bradycardia 6/10/13    At least partially drug induced.  Status post placement of implantable loop recorder 10/19/2017    LINQ IMPLANTED. [employer]    Subdural hematoma [facility])         Past Surgical History:   Procedure Laterality Date    BRAIN SURGERY      Had a blood clot on the brain. Fell 10 feet.  CATARACT REMOVAL Bilateral      SECTION      x1    COLONOSCOPY      several over the years by Dr. Russell Vergara Left 10/19/2017    [address] Left 2019    Dr. Jewel Hernandez N/A 2019    PACEMAKER INSERTION PERMANENT performed by Jayme Yeung MD at [facility]      tumor removed    UPPER GASTROINTESTINAL ENDOSCOPY      Anjel        Family History   Problem Relation Age of Onset    Stroke Father     Stroke Mother        Social History     Socioeconomic History    Marital status:      Spouse name: Not on file    Number of children: Not on file    Years of education: Not on file    Highest education level: Not on file   Occupational History    Not on file   Social Needs    Financial resource strain: Not on file    Food insecurity:     Worry: Not on file     Inability: Not on file    Transportation needs:     Medical: Not on file     Non-medical: Not on file   Tobacco Use    Smoking status: Never Smoker    Smokeless tobacco: Never Used   Substance and Sexual Activity    Alcohol use:  No or noncontributory  ENT:  Completed and, except as mentioned above, was negative or noncontributory  Allergy and Immunology:  Completed and, except as mentioned above, was negative or noncontributory  Hematological and Lymphatic:  Completed and, except as mentioned above, was negative or noncontributory  Endocrine: Completed and, except as mentioned above, was negative or noncontributory  Breast:  Completed and, except as mentioned above, was negative or noncontributory  Respiratory:  Completed and, except as mentioned above, was negative or noncontributory  Cardiovascular:  Completed and, except as mentioned above, was negative or noncontributory  Gastrointestinal: See HPI  Genito-Urinary:  Completed and, except as mentioned above, was negative or noncontributory  Musculoskeletal:  Completed and, except as mentioned above, was negative or noncontributory  Neurological:  Completed and, except as mentioned above, was negative or noncontributory  Dermatological:  Completed and, except as mentioned above, was negative or noncontributory      Physical exam:  Constitutional - Well-developed, well-nourished, in no acute distress. Vital signs, height and weight are as documented above.     Mental Status /Psychiatric - alert, oriented to person, place, and time, normal mood, behavior, speech, dress, motor activity, and thought processes    Eyes - pupils equal and reactive, extraocular eye movements intact, sclera anicteric, conjunctiva and eyelids normal    Neck - supple, no significant adenopathy, trachea midline, thyroid not enlarged     Respiratory - clear to auscultation, no wheezes, rales or rhonchi, symmetric air entry, no tachypnea, retractions or cyanosis, no evidence of increased respiratory effort     Cardiovascular - normal rate, regular rhythm, normal S1, S2, no murmurs, rubs, clicks or gallops, normal bilateral carotid upstroke without bruits, no JVD     Gastrointestinal - soft, nontender, nondistended, no masses or organomegaly, bowel sounds normal, no hernias noted     Musculoskeletal - no joint tenderness, deformity or swelling, no muscular tenderness noted, normal range of motion     Extremities - peripheral pulses normal, no pedal edema, no clubbing or cyanosis     Skin - normal coloration and turgor, no rashes, no suspicious skin lesions noted    This lady's constipation is most likely functional with her low volume bright red rectal bleeding being from hemorrhoids. On the other hand, neoplasm is a consideration given the bleeding in view of which we discussed the risk benefit ratio of proceeding with colonoscopy. Neither the patient nor her daughter were particularly concerned about the bleeding and I agree that the likelihood of finding a lesion would be low albeit not zero. At this time, they wish to proceed with addressing her constipation symptomatically and I think that is reasonable. We spoke at length about the appropriate dose of MiraLAX (capful versus tablespoon) and I advised her to start with one capful (17 g) daily and then increase the dose up or down depending on her response. Otherwise, I do not think anything further needs to be done at this point in time. She will follow up with her PCP and see me as needed going forward. Alternatives, risks and benefits of this approach were discussed with the patient who understands and agrees. Please note that portions of this note were completed with a voice recognition program. Efforts were made to edit the dictation but occasionally words are mis-transcribed.

## 2019-04-03 NOTE — PATIENT INSTRUCTIONS
SURVEY:    You may be receiving a survey from [email or web address] regarding your visit today. Please complete the survey to enable us to provide the highest quality of care to you and your family. If you cannot score us a very good on any question, please call the office to discuss how we could have made your experience a very good one. Thank you.

## 2019-05-01 RX ORDER — LEVOTHYROXINE SODIUM 112 UG/1
112 TABLET ORAL DAILY
Qty: 90 TABLET | Refills: 3 | Status: SHIPPED | OUTPATIENT
Start: 2019-05-01 | End: 2019-07-22 | Stop reason: SDUPTHER

## 2019-05-02 ENCOUNTER — OFFICE VISIT (OUTPATIENT)
Dept: PRIMARY CARE CLINIC | Age: 84
End: 2019-05-02
Payer: MEDICARE

## 2019-05-02 VITALS
RESPIRATION RATE: 16 BRPM | HEIGHT: 61 IN | BODY MASS INDEX: 20.65 KG/M2 | SYSTOLIC BLOOD PRESSURE: 112 MMHG | TEMPERATURE: 97.9 F | WEIGHT: 109.4 LBS | HEART RATE: 66 BPM | DIASTOLIC BLOOD PRESSURE: 54 MMHG

## 2019-05-02 DIAGNOSIS — E03.1 CONGENITAL HYPOTHYROIDISM: Primary | ICD-10-CM

## 2019-05-02 DIAGNOSIS — M15.9 GENERALIZED OA: ICD-10-CM

## 2019-05-02 PROCEDURE — 1036F TOBACCO NON-USER: CPT | Performed by: NURSE PRACTITIONER

## 2019-05-02 PROCEDURE — 99214 OFFICE O/P EST MOD 30 MIN: CPT | Performed by: NURSE PRACTITIONER

## 2019-05-02 PROCEDURE — 1090F PRES/ABSN URINE INCON ASSESS: CPT | Performed by: NURSE PRACTITIONER

## 2019-05-02 PROCEDURE — G8427 DOCREV CUR MEDS BY ELIG CLIN: HCPCS | Performed by: NURSE PRACTITIONER

## 2019-05-02 PROCEDURE — G8420 CALC BMI NORM PARAMETERS: HCPCS | Performed by: NURSE PRACTITIONER

## 2019-05-02 PROCEDURE — 1123F ACP DISCUSS/DSCN MKR DOCD: CPT | Performed by: NURSE PRACTITIONER

## 2019-05-02 PROCEDURE — 4040F PNEUMOC VAC/ADMIN/RCVD: CPT | Performed by: NURSE PRACTITIONER

## 2019-05-02 RX ORDER — TRAMADOL HYDROCHLORIDE 50 MG/1
50 TABLET ORAL 2 TIMES DAILY PRN
Qty: 60 TABLET | Refills: 2 | Status: SHIPPED | OUTPATIENT
Start: 2019-05-02 | End: 2019-05-05

## 2019-05-02 ASSESSMENT — ENCOUNTER SYMPTOMS
TROUBLE SWALLOWING: 0
PHOTOPHOBIA: 0
SHORTNESS OF BREATH: 0
COUGH: 0
ABDOMINAL PAIN: 0

## 2019-05-02 NOTE — PROGRESS NOTES
spinous process tenderness and no muscular tenderness present. Decreased range of motion present. Cardiovascular: Normal rate and regular rhythm. Pulmonary/Chest: Effort normal and breath sounds normal.   Abdominal: Soft. Bowel sounds are normal. She exhibits no distension. There is no tenderness. Musculoskeletal: She exhibits tenderness. She exhibits no edema. Cervical back: She exhibits decreased range of motion and pain. Neurological: She is alert and oriented to person, place, and time. Skin: Skin is warm and dry. Nursing note and vitals reviewed. Data:     Lab Results   Component Value Date     03/06/2019    K 4.3 03/06/2019     03/06/2019    CO2 26 03/06/2019    BUN 17 03/06/2019    CREATININE 0.84 03/06/2019    GLUCOSE 93 03/06/2019    GLUCOSE 92 02/07/2012    PROT 7.0 01/08/2018    LABALBU 4.0 01/08/2018    BILITOT 0.27 01/08/2018    ALKPHOS 61 01/08/2018    AST 20 01/08/2018    ALT 16 01/08/2018     Lab Results   Component Value Date    WBC 6.2 03/06/2019    RBC 3.98 03/06/2019    RBC 4.31 02/07/2012    HGB 11.8 03/06/2019    HCT 37.6 03/06/2019    MCV 94.5 03/06/2019    MCH 29.6 03/06/2019    MCHC 31.4 03/06/2019    RDW 14.1 03/06/2019     03/06/2019     02/07/2012    MPV 9.9 03/06/2019     Lab Results   Component Value Date    TSH 5.77 03/06/2019     No results found for: CHOL, HDL, PSA, LABA1C    Assessment/Plan:      Diagnosis Orders   1. Congenital hypothyroidism  T4, Free    TSH without Reflex    Basic Metabolic Panel    CBC Auto Differential   2. Generalized OA  VOLTAREN 1 % GEL    traMADol (ULTRAM) 50 MG tablet       1. Serene received counseling on the following healthy behaviors: nutrition, exercise and medication adherence  2. Patient given educational materials - see patient instructions  3. Was a self-tracking handout given in paper form or via [employer]? No  If yes, see orders or list here.   4.  Discussed use, benefit, and side effects of prescribed medications. Barriers to medication compliance addressed. All patient questions answered. Pt voiced understanding. 5.  Reviewed prior labs and health maintenance  6. Continue current medications, diet and exercise. Completed Refills   Requested Prescriptions     Signed Prescriptions Disp Refills    VOLTAREN 1 % GEL 1 Tube 5     Sig: Apply topically 2 times daily Apply to both feet    traMADol (ULTRAM) 50 MG tablet 60 tablet 2     Sig: Take 1 tablet by mouth 2 times daily as needed for Pain for up to 3 days. Return in about 6 months (around 11/2/2019) for check up.

## 2019-05-02 NOTE — PATIENT INSTRUCTIONS
SURVEY:    You may be receiving a survey from [employer] regarding your visit today. Please complete the survey to enable us to provide the highest quality of care to you and your family. If you cannot score us a very good on any question, please call the office to discuss how we could have made your experience a very good one. Thank you. Patient Education        Neck Pain: Care Instructions  Your Care Instructions    You can have neck pain anywhere from the bottom of your head to the top of your shoulders. It can spread to the upper back or arms. Injuries, painting a ceiling, sleeping with your neck twisted, staying in one position for too long, and many other activities can cause neck pain. Most neck pain gets better with home care. Your doctor may recommend medicine to relieve pain or relax your muscles. He or she may suggest exercise and physical therapy to increase flexibility and relieve stress. You may need to wear a special (cervical) collar to support your neck for a day or two. Follow-up care is a key part of your treatment and safety. Be sure to make and go to all appointments, and call your doctor if you are having problems. It's also a good idea to know your test results and keep a list of the medicines you take. How can you care for yourself at home? · Try using a heating pad on a low or medium setting for 15 to 20 minutes every 2 or 3 hours. Try a warm shower in place of one session with the heating pad. · You can also try an ice pack for 10 to 15 minutes every 2 to 3 hours. Put a thin cloth between the ice and your skin. · Take pain medicines exactly as directed. ? If the doctor gave you a prescription medicine for pain, take it as prescribed. ? If you are not taking a prescription pain medicine, ask your doctor if you can take an over-the-counter medicine. · If your doctor recommends a cervical collar, wear it exactly as directed. When should you call for help?   Call your doctor now or seek immediate medical care if:    · You have new or worsening numbness in your arms, buttocks or legs.     · You have new or worsening weakness in your arms or legs. (This could make it hard to stand up.)     · You lose control of your bladder or bowels.    Watch closely for changes in your health, and be sure to contact your doctor if:    · Your neck pain is getting worse.     · You are not getting better after 1 week.     · You do not get better as expected. Where can you learn more? Go to [email or web address] and sign in to your [employer] account. Enter [id number] in the [email or web address] box to learn more about \"Neck Pain: Care Instructions. \"     If you do not have an account, please click on the \"Sign Up Now\" link. Current as of: September 20, 2018  Content Version: 11.9  © 8889-5264 [employer]. Care instructions adapted under license by [facility] ([facility]). If you have questions about a medical condition or this instruction, always ask your healthcare professional. [place] [address] any warranty or liability for your use of this information.

## 2019-05-07 ENCOUNTER — TELEPHONE (OUTPATIENT)
Dept: CARDIOLOGY | Age: 84
End: 2019-05-07

## 2019-06-03 ENCOUNTER — OFFICE VISIT (OUTPATIENT)
Dept: CARDIOLOGY | Age: 84
End: 2019-06-03
Payer: MEDICARE

## 2019-06-03 VITALS
BODY MASS INDEX: 19.83 KG/M2 | WEIGHT: 105 LBS | HEART RATE: 85 BPM | SYSTOLIC BLOOD PRESSURE: 139 MMHG | OXYGEN SATURATION: 99 % | DIASTOLIC BLOOD PRESSURE: 87 MMHG | RESPIRATION RATE: 20 BRPM | HEIGHT: 61 IN

## 2019-06-03 DIAGNOSIS — Z95.0 PACEMAKER: ICD-10-CM

## 2019-06-03 DIAGNOSIS — I50.32 CHRONIC DIASTOLIC CONGESTIVE HEART FAILURE (HCC): Primary | ICD-10-CM

## 2019-06-03 DIAGNOSIS — I49.5 TACHY-BRADY SYNDROME (HCC): ICD-10-CM

## 2019-06-03 DIAGNOSIS — I48.0 PAF (PAROXYSMAL ATRIAL FIBRILLATION) (HCC): ICD-10-CM

## 2019-06-03 PROCEDURE — G8420 CALC BMI NORM PARAMETERS: HCPCS | Performed by: FAMILY MEDICINE

## 2019-06-03 PROCEDURE — 1036F TOBACCO NON-USER: CPT | Performed by: FAMILY MEDICINE

## 2019-06-03 PROCEDURE — 1090F PRES/ABSN URINE INCON ASSESS: CPT | Performed by: FAMILY MEDICINE

## 2019-06-03 PROCEDURE — 1123F ACP DISCUSS/DSCN MKR DOCD: CPT | Performed by: FAMILY MEDICINE

## 2019-06-03 PROCEDURE — 4040F PNEUMOC VAC/ADMIN/RCVD: CPT | Performed by: FAMILY MEDICINE

## 2019-06-03 PROCEDURE — G8427 DOCREV CUR MEDS BY ELIG CLIN: HCPCS | Performed by: FAMILY MEDICINE

## 2019-06-03 PROCEDURE — 99214 OFFICE O/P EST MOD 30 MIN: CPT | Performed by: FAMILY MEDICINE

## 2019-06-03 RX ORDER — METOPROLOL SUCCINATE 100 MG/1
100 TABLET, EXTENDED RELEASE ORAL DAILY
Qty: 90 TABLET | Refills: 3 | Status: SHIPPED | OUTPATIENT
Start: 2019-06-03 | End: 2019-10-23

## 2019-06-03 RX ORDER — NITROGLYCERIN 0.4 MG/1
0.4 TABLET SUBLINGUAL EVERY 5 MIN PRN
COMMUNITY
End: 2021-04-12 | Stop reason: SDUPTHER

## 2019-06-03 NOTE — PATIENT INSTRUCTIONS
SURVEY:    You may be receiving a survey from [employer] regarding your visit today. Please complete the survey to enable us to provide the highest quality of care to you and your family. If you cannot score us a very good on any question, please call the office to discuss how we could have made your experience a very good one. Thank you.

## 2019-06-03 NOTE — PROGRESS NOTES
check.    Since the last time I saw Ms. Nan Jonas, she reports continued increased intermittent fatigue and weakness as well as intermittent chest pressure which at time can be moderate in severity. She has also felt some palpitations. She also mentions having some dizzy spells but no falls or near falls. She denies any abdominal pain, or concerns with any medications. She does think these spells have got a little worse since last visit. Past Medical History:   Diagnosis Date    Abnormal resting ECG findings 12    Normal sinus rhythm with frequent PACs in a trigeminy pattern    Abnormal resting ECG findings 6/10/2013    Severe sinus bradycardia at 50 bpm.     Anxiety     Chronic back pain     Pt. c/o upper back pain,,,,\"On the sides of my lungs\"    Hiatal hernia     Holter monitor, abnormal 12    Multiple episodes of SVT between 100 and 130 beats per minute most consistent with atrial fibrillation and/or atrial flutter with variable block.  Hypothyroidism     Insomnia     MAC (mycobacterium avium-intracellulare complex)     Normal cardiac stress test 12    low risk study.  Paroxysmal atrial fibrillation ([address] 2012    Found on holter monitor  and      Severe sinus bradycardia 6/10/13    At least partially drug induced.  Status post placement of implantable loop recorder 10/19/2017    LINQ IMPLANTED. [employer]    Subdural hematoma (HCC)        CURRENT ALLERGIES: Sulfur and Sulfa antibiotics REVIEW OF SYSTEMS: 14 systems were reviewed. Pertinent positives and negatives as above, all else negative. Past Surgical History:   Procedure Laterality Date    BRAIN SURGERY      Had a blood clot on the brain. Fell 10 feet.      CATARACT REMOVAL Bilateral      SECTION      x1    COLONOSCOPY      several over the years by Dr. Miguelangel Linn Left 10/19/2017    [place] Left 2019 Dr. Rasheed Cabrera N/A 1/2/2019    PACEMAKER INSERTION PERMANENT [facility]      tumor removed    UPPER GASTROINTESTINAL ENDOSCOPY  2013    Anjel    Social History:  Social History     Tobacco Use    Smoking status: Never Smoker    Smokeless tobacco: Never Used   Substance Use Topics    Alcohol use: No    Drug use: No        CURRENT MEDICATIONS:  Outpatient Medications Marked as Taking for the 6/3/19 encounter (Office Visit) with Samuel Augustin MD   Medication Sig Dispense Refill    VOLTAREN 1 % GEL Apply topically 2 times daily Apply to both feet 1 Tube 5    levothyroxine (SYNTHROID) 112 MCG tablet Take 1 tablet by mouth Daily 90 tablet 3    metoprolol succinate (TOPROL XL) 50 MG extended release tablet Take 1 tablet by mouth daily 90 tablet 3    diltiazem (CARDIZEM CD) 360 MG extended release capsule Take 1 capsule by mouth daily 90 capsule 3    apixaban (ELIQUIS) 2.5 MG TABS tablet Take 1 tablet by mouth 2 times daily 180 tablet 3    dofetilide (TIKOSYN) 250 MCG capsule Take 1 capsule by mouth 2 times daily 180 capsule 3    omeprazole (PRILOSEC) 20 MG delayed release capsule Take 1 capsule by mouth daily 90 capsule 3       FAMILY HISTORY: family history includes Stroke in her father and mother. PHYSICAL EXAM:   /87 (Site: Left Upper Arm, Position: Sitting, Cuff Size: Small Adult)   Pulse 85   Resp 20   Ht 5' 1\" (1.549 m)   Wt 105 lb (47.6 kg)   SpO2 99%   BMI 19.84 kg/m²  Body mass index is 19.84 kg/m². Constitutional: She is oriented to person, place, and time. She appears well-developed and well-nourished. In no acute distress. HEENT: Normocephalic and atraumatic. No significant JVD was present. Carotid bruit is not present. No mass and no thyromegaly present. No lymphadenopathy present. Cardiovascular: Normal rate, irregularly irregular rhythm, normal heart sounds.  Exam reveals no gallop and no friction rubs. A I/VI systolic murmur was heard maximally at the apex. Pulmonary/Chest: Effort normal and breath sounds normal. No respiratory distress. She has no wheezes, rhonchi or rales. Abdominal: Soft, non-tender. Bowel sounds and aorta are normal. She exhibits no organomegaly, mass or bruit. Extremities: Trace-1+ lower extremity pitting pedal edema. 1/2 way up to the knees bilaterally  No cyanosis, or clubbing. Pulses are 2+ radial/carotid/ and 2+ dorsalis pedis and posterior tibial bilaterally. Compression stockings in place. Neurological: She is alert and oriented to person, place, and time. No evidence of gross cranial nerve deficit. Coordination appeared normal.   Skin: Skin is warm and dry. There is no rash or diaphoresis. Psychiatric: She has a normal mood and affect. Her speech is normal and behavior is normal.      MOST RECENT LABS ON RECORD:   Lab Results   Component Value Date    WBC 6.2 03/06/2019    HGB 11.8 (L) 03/06/2019    HCT 37.6 03/06/2019     03/06/2019    ALT 16 01/08/2018    AST 20 01/08/2018     03/06/2019    K 4.3 03/06/2019     03/06/2019    CREATININE 0.84 03/06/2019    BUN 17 03/06/2019    CO2 26 03/06/2019    TSH 5.77 (H) 03/06/2019    INR 1.0 12/28/2018       ASSESSMENT:  1. Chronic diastolic congestive heart failure ([facility] [address]    2. PAF (paroxysmal atrial fibrillation) ([facility] [address]    3. Pacemaker    4. Tachy-robbin syndrome (HCC)       PLAN:   Chronic Diastolic Heart Failure: Currently Controlled   Beta Blocker Therapy: Increase Metoprolol succinate (Toprol XL)  100 mg  daily I discussed the potential side effects of this medication including lightheadedness and dizziness and told her to call the office if this occurs. Nonpharmacologic management of Heart Failure: I told her to continue wearing lower extremity compression stockings and I advised her to try and keep his legs up whenever possible and to limit salt in her diet.        Paroxysmal Atrial Fibrillation: Rhythm Control. Atrial fibrillation burden increased to 8.8% from 6.6% with HR into the 120's and 130's during atrial fibrillation since her last visit with worsening symptoms as per HPI. Beta Blocker Therapy: Increase Metoprolol succinate (Toprol XL)  100 mg  daily I discussed the potential side effects of this medication including lightheadedness and dizziness and told her to call the office if this occurs. Calcium Channel Blocker: Continue diltiazem 360 mg daily. Rhythm Control Agent: Continue Dofetilide (Tikosyn) 250 mcg twice daily   Monitoring: Dofetilide (Tikosyn Since she will be maintained on Tikosyn I will continue to monitor her ECG's every 6 months and make sure his QTc remains less than 500 ms. Stroke Risk: Her CHADS2-VASc score is 3/9 (3.2% stroke risk)  Anticoagulation: Continue Apixaban (Eliquis) 2.5 mg every 12 hours. I also reminded her to watch for signs of blood in her stool or black tarry stools and stop the medication immediately if this develops as this could be life threatening. I spent about 20 minutes discussing the multiple treatment options including simple rate control, rhythm control including the use of antiarrhythmics, as well as catheter ablation therapies. After hearing all of the options including potential but relatively rare possibility of stroke, heart attack and death, Ms. Dez Duncan said she would like to think about having the ablation done which I think is reasonable. Therefore we will wait to hear back from her to schedule anything. I did answer all of her and her daughters questions at this time. Dual Chamber Pacemaker:   Indication for Device Placement: Tachycardia-Bradycardia Syndrome  Interrogation Findings: I reviewed the results of Ms. Dez Duncan most recent home interrogation from . Afib burden 8.8 % of the time which has increased since last visit from 6.6%. Increased Toprol XL to 100 mg once daily.      Finally, I recommended that she continue her other medications and follow up with you as previously scheduled. FOLLOW UP:  I told Ms. Addie Zapata to call my office if she had any problems, but otherwise told her to Return in about 7 weeks (around 7/22/2019). However, I would be happy to see her sooner should the need arise. Sincerely,  Basilio Johnson. Kev DAIGLE, MS, F.A.C.C. [facility] Cardiology Specialist  [address] [place] [address]  Phone: [phone], Fax: [phone]    I believe that the risk of significant morbidity and mortality related to the patient's current medical conditions are: intermediate-high. The documentation recorded by the scribe, accurately and completely reflects the services I personally performed and the decisions made by me. Oh Morgan MD, MS, F.A.C.C.  Leticia 3, 2019

## 2019-06-10 ENCOUNTER — TELEPHONE (OUTPATIENT)
Dept: CARDIOLOGY | Age: 84
End: 2019-06-10

## 2019-06-12 DIAGNOSIS — I48.0 PAROXYSMAL ATRIAL FIBRILLATION (HCC): Primary | ICD-10-CM

## 2019-06-12 NOTE — TELEPHONE ENCOUNTER
Ok. Please make referral to Dr. Jw Thompson. Tell office I did speak with him about her and if he is willing I think we can skip the consult but I understand if he really wants to see her in consultation 1st. Thanks.

## 2019-06-13 ENCOUNTER — TELEPHONE (OUTPATIENT)
Dept: CARDIOLOGY CLINIC | Age: 84
End: 2019-06-13

## 2019-06-13 PROCEDURE — 93294 REM INTERROG EVL PM/LDLS PM: CPT | Performed by: FAMILY MEDICINE

## 2019-06-13 PROCEDURE — 93296 REM INTERROG EVL PM/IDS: CPT | Performed by: FAMILY MEDICINE

## 2019-06-13 NOTE — TELEPHONE ENCOUNTER
ASSESSMENT:  1. Chronic diastolic congestive heart failure ([facility] [address]    2. PAF (paroxysmal atrial fibrillation) ([facility] [address]    3. Pacemaker    4.  Tachy-robbin syndrome [facility])      Patient is scheduled July 17, 2019 at 11am.   Patient verbalized understanding

## 2019-06-14 ENCOUNTER — TELEPHONE (OUTPATIENT)
Dept: CARDIOLOGY | Age: 84
End: 2019-06-14

## 2019-06-14 ENCOUNTER — NURSE ONLY (OUTPATIENT)
Dept: CARDIOLOGY | Age: 84
End: 2019-06-14
Payer: MEDICARE

## 2019-06-14 DIAGNOSIS — R00.1 SEVERE SINUS BRADYCARDIA: Primary | ICD-10-CM

## 2019-06-14 DIAGNOSIS — Z95.0 PACEMAKER: ICD-10-CM

## 2019-07-02 ENCOUNTER — TELEPHONE (OUTPATIENT)
Dept: PRIMARY CARE CLINIC | Age: 84
End: 2019-07-02

## 2019-07-15 ENCOUNTER — OFFICE VISIT (OUTPATIENT)
Dept: CARDIOLOGY | Age: 84
End: 2019-07-15
Payer: MEDICARE

## 2019-07-15 VITALS
DIASTOLIC BLOOD PRESSURE: 85 MMHG | BODY MASS INDEX: 19.45 KG/M2 | RESPIRATION RATE: 18 BRPM | HEART RATE: 81 BPM | HEIGHT: 61 IN | SYSTOLIC BLOOD PRESSURE: 138 MMHG | WEIGHT: 103 LBS | OXYGEN SATURATION: 97 %

## 2019-07-15 DIAGNOSIS — I48.0 PAF (PAROXYSMAL ATRIAL FIBRILLATION) (HCC): ICD-10-CM

## 2019-07-15 DIAGNOSIS — I49.5 TACHYCARDIA-BRADYCARDIA SYNDROME (HCC): ICD-10-CM

## 2019-07-15 DIAGNOSIS — I50.32 CHRONIC DIASTOLIC CONGESTIVE HEART FAILURE (HCC): Primary | ICD-10-CM

## 2019-07-15 DIAGNOSIS — Z95.0 PACEMAKER: ICD-10-CM

## 2019-07-15 PROCEDURE — 1090F PRES/ABSN URINE INCON ASSESS: CPT | Performed by: FAMILY MEDICINE

## 2019-07-15 PROCEDURE — 1036F TOBACCO NON-USER: CPT | Performed by: FAMILY MEDICINE

## 2019-07-15 PROCEDURE — 1123F ACP DISCUSS/DSCN MKR DOCD: CPT | Performed by: FAMILY MEDICINE

## 2019-07-15 PROCEDURE — G8427 DOCREV CUR MEDS BY ELIG CLIN: HCPCS | Performed by: FAMILY MEDICINE

## 2019-07-15 PROCEDURE — 4040F PNEUMOC VAC/ADMIN/RCVD: CPT | Performed by: FAMILY MEDICINE

## 2019-07-15 PROCEDURE — 99213 OFFICE O/P EST LOW 20 MIN: CPT | Performed by: FAMILY MEDICINE

## 2019-07-15 PROCEDURE — G8420 CALC BMI NORM PARAMETERS: HCPCS | Performed by: FAMILY MEDICINE

## 2019-07-15 NOTE — PROGRESS NOTES
Carotid bruit is not present. No mass and no thyromegaly present. No lymphadenopathy present. Cardiovascular: Normal rate, regular rhythm, normal heart sounds. Exam reveals no gallop and no friction rubs. A I/VI systolic murmur was heard maximally at the apex. Pulmonary/Chest: Effort normal and breath sounds normal. No respiratory distress. She has no wheezes, rhonchi or rales. Abdominal: Soft, non-tender. Bowel sounds and aorta are normal. She exhibits no organomegaly, mass or bruit. Extremities: Trace-1+ lower extremity pitting pedal edema. 1/2 way up to the knees bilaterally  L>R. No cyanosis, or clubbing. Pulses are 2+ radial/carotid/ and 2+ dorsalis pedis and posterior tibial bilaterally. Compression stockings in place. Neurological: She is alert and oriented to person, place, and time. No evidence of gross cranial nerve deficit. Coordination appeared normal.   Skin: Skin is warm and dry. There is no rash or diaphoresis. Psychiatric: She has a normal mood and affect. Her speech is normal and behavior is normal.      MOST RECENT LABS ON RECORD:   Lab Results   Component Value Date    WBC 6.2 03/06/2019    HGB 11.8 (L) 03/06/2019    HCT 37.6 03/06/2019     03/06/2019    ALT 16 01/08/2018    AST 20 01/08/2018     03/06/2019    K 4.3 03/06/2019     03/06/2019    CREATININE 0.84 03/06/2019    BUN 17 03/06/2019    CO2 26 03/06/2019    TSH 5.77 (H) 03/06/2019    INR 1.0 12/28/2018       ASSESSMENT:  1. Chronic diastolic congestive heart failure ([address]    2. PAF (paroxysmal atrial fibrillation) ([address]    3. Pacemaker    4. Tachycardia-bradycardia syndrome ([address]       PLAN:   Chronic Diastolic Heart Failure: Currently Controlled   Beta Blocker Therapy: Continue Metoprolol succinate (Toprol XL)  100 mg  daily I discussed the potential side effects of this medication including lightheadedness and dizziness and told her to call the office if this occurs.   Nonpharmacologic management of Heart

## 2019-07-17 ENCOUNTER — OFFICE VISIT (OUTPATIENT)
Dept: CARDIOLOGY CLINIC | Age: 84
End: 2019-07-17
Payer: MEDICARE

## 2019-07-17 VITALS
HEIGHT: 61 IN | DIASTOLIC BLOOD PRESSURE: 78 MMHG | WEIGHT: 102 LBS | HEART RATE: 73 BPM | BODY MASS INDEX: 19.26 KG/M2 | SYSTOLIC BLOOD PRESSURE: 142 MMHG

## 2019-07-17 DIAGNOSIS — I48.0 PAROXYSMAL ATRIAL FIBRILLATION (HCC): Primary | ICD-10-CM

## 2019-07-17 PROCEDURE — 4040F PNEUMOC VAC/ADMIN/RCVD: CPT | Performed by: INTERNAL MEDICINE

## 2019-07-17 PROCEDURE — 1123F ACP DISCUSS/DSCN MKR DOCD: CPT | Performed by: INTERNAL MEDICINE

## 2019-07-17 PROCEDURE — 1090F PRES/ABSN URINE INCON ASSESS: CPT | Performed by: INTERNAL MEDICINE

## 2019-07-17 PROCEDURE — G8420 CALC BMI NORM PARAMETERS: HCPCS | Performed by: INTERNAL MEDICINE

## 2019-07-17 PROCEDURE — G8427 DOCREV CUR MEDS BY ELIG CLIN: HCPCS | Performed by: INTERNAL MEDICINE

## 2019-07-17 PROCEDURE — 93000 ELECTROCARDIOGRAM COMPLETE: CPT | Performed by: INTERNAL MEDICINE

## 2019-07-17 PROCEDURE — 99204 OFFICE O/P NEW MOD 45 MIN: CPT | Performed by: INTERNAL MEDICINE

## 2019-07-17 PROCEDURE — 1036F TOBACCO NON-USER: CPT | Performed by: INTERNAL MEDICINE

## 2019-07-17 ASSESSMENT — ENCOUNTER SYMPTOMS
BACK PAIN: 0
VOMITING: 0
SHORTNESS OF BREATH: 0
ABDOMINAL PAIN: 0
RIGHT EYE: 0
SORE THROAT: 0
COUGH: 0
CONSTIPATION: 0
BLURRED VISION: 0
LEFT EYE: 0
DIARRHEA: 0
DOUBLE VISION: 0
NAUSEA: 0
WHEEZING: 0

## 2019-07-22 ENCOUNTER — OFFICE VISIT (OUTPATIENT)
Dept: PRIMARY CARE CLINIC | Age: 84
End: 2019-07-22
Payer: MEDICARE

## 2019-07-22 ENCOUNTER — HOSPITAL ENCOUNTER (OUTPATIENT)
Age: 84
Discharge: HOME | End: 2019-07-22
Payer: MEDICARE

## 2019-07-22 VITALS
SYSTOLIC BLOOD PRESSURE: 101 MMHG | DIASTOLIC BLOOD PRESSURE: 79 MMHG | WEIGHT: 102.7 LBS | HEIGHT: 61 IN | TEMPERATURE: 97.9 F | RESPIRATION RATE: 16 BRPM | BODY MASS INDEX: 19.39 KG/M2 | HEART RATE: 96 BPM

## 2019-07-22 DIAGNOSIS — D64.9 ANEMIA, UNSPECIFIED TYPE: ICD-10-CM

## 2019-07-22 DIAGNOSIS — F34.1 DYSTHYMIA: ICD-10-CM

## 2019-07-22 DIAGNOSIS — E03.1 CONGENITAL HYPOTHYROIDISM: Primary | ICD-10-CM

## 2019-07-22 LAB
ABSOLUTE EOS #: 0.17 K/UL (ref 0–0.44)
ABSOLUTE IMMATURE GRANULOCYTE: <0.03 K/UL (ref 0–0.3)
ABSOLUTE LYMPH #: 2.06 K/UL (ref 1.1–3.7)
ABSOLUTE MONO #: 0.54 K/UL (ref 0.1–1.2)
BASOPHILS # BLD: 0 % (ref 0–2)
BASOPHILS ABSOLUTE: 0.03 K/UL (ref 0–0.2)
DIFFERENTIAL TYPE: NORMAL
EOSINOPHILS RELATIVE PERCENT: 3 % (ref 1–4)
FERRITIN: 44 UG/L (ref 13–150)
FOLATE: 12.1 NG/ML
HCT VFR BLD CALC: 40 % (ref 36.3–47.1)
HEMOGLOBIN: 12.7 G/DL (ref 11.9–15.1)
IMMATURE GRANULOCYTES: 0 %
IRON SATURATION: 27 % (ref 20–55)
IRON: 80 UG/DL (ref 37–145)
LYMPHOCYTES # BLD: 31 % (ref 24–43)
MCH RBC QN AUTO: 30.1 PG (ref 25.2–33.5)
MCHC RBC AUTO-ENTMCNC: 31.8 G/DL (ref 28.4–34.8)
MCV RBC AUTO: 94.8 FL (ref 82.6–102.9)
MONOCYTES # BLD: 8 % (ref 3–12)
NRBC AUTOMATED: 0 PER 100 WBC
PDW BLD-RTO: 13.8 % (ref 11.8–14.4)
PLATELET # BLD: 212 K/UL (ref 138–453)
PLATELET ESTIMATE: NORMAL
PMV BLD AUTO: 9.5 FL (ref 8.1–13.5)
RBC # BLD: 4.22 M/UL (ref 3.95–5.11)
RBC # BLD: NORMAL 10*6/UL
SEG NEUTROPHILS: 58 % (ref 36–65)
SEGMENTED NEUTROPHILS ABSOLUTE COUNT: 3.93 K/UL (ref 1.5–8.1)
TOTAL IRON BINDING CAPACITY: 298 UG/DL (ref 250–450)
UNSATURATED IRON BINDING CAPACITY: 218 UG/DL (ref 112–347)
VITAMIN B-12: 358 PG/ML (ref 232–1245)
WBC # BLD: 6.8 K/UL (ref 3.5–11.3)
WBC # BLD: NORMAL 10*3/UL

## 2019-07-22 PROCEDURE — 99214 OFFICE O/P EST MOD 30 MIN: CPT | Performed by: NURSE PRACTITIONER

## 2019-07-22 PROCEDURE — 1090F PRES/ABSN URINE INCON ASSESS: CPT | Performed by: NURSE PRACTITIONER

## 2019-07-22 PROCEDURE — 1036F TOBACCO NON-USER: CPT | Performed by: NURSE PRACTITIONER

## 2019-07-22 PROCEDURE — 1123F ACP DISCUSS/DSCN MKR DOCD: CPT | Performed by: NURSE PRACTITIONER

## 2019-07-22 PROCEDURE — G8420 CALC BMI NORM PARAMETERS: HCPCS | Performed by: NURSE PRACTITIONER

## 2019-07-22 PROCEDURE — 36415 COLL VENOUS BLD VENIPUNCTURE: CPT

## 2019-07-22 PROCEDURE — 83550 IRON BINDING TEST: CPT

## 2019-07-22 PROCEDURE — 4040F PNEUMOC VAC/ADMIN/RCVD: CPT | Performed by: NURSE PRACTITIONER

## 2019-07-22 PROCEDURE — 83540 ASSAY OF IRON: CPT

## 2019-07-22 PROCEDURE — 85025 COMPLETE CBC W/AUTO DIFF WBC: CPT

## 2019-07-22 PROCEDURE — 82607 VITAMIN B-12: CPT

## 2019-07-22 PROCEDURE — G8427 DOCREV CUR MEDS BY ELIG CLIN: HCPCS | Performed by: NURSE PRACTITIONER

## 2019-07-22 PROCEDURE — 82746 ASSAY OF FOLIC ACID SERUM: CPT

## 2019-07-22 PROCEDURE — 82728 ASSAY OF FERRITIN: CPT

## 2019-07-22 RX ORDER — BUPROPION HYDROCHLORIDE 150 MG/1
150 TABLET ORAL EVERY MORNING
Qty: 90 TABLET | Refills: 1 | Status: SHIPPED | OUTPATIENT
Start: 2019-07-22 | End: 2019-08-22 | Stop reason: SDUPTHER

## 2019-07-22 RX ORDER — LEVOTHYROXINE SODIUM 0.12 MG/1
125 TABLET ORAL DAILY
Qty: 90 TABLET | Refills: 1 | Status: SHIPPED | OUTPATIENT
Start: 2019-07-22 | End: 2019-08-22 | Stop reason: DRUGHIGH

## 2019-07-22 ASSESSMENT — ENCOUNTER SYMPTOMS
SHORTNESS OF BREATH: 0
COUGH: 0
NAUSEA: 0
SORE THROAT: 0
VOMITING: 0
ABDOMINAL PAIN: 0
DIARRHEA: 0
RHINORRHEA: 0

## 2019-07-22 NOTE — PATIENT INSTRUCTIONS
SURVEY:    You may be receiving a survey from [employer] regarding your visit today. Please complete the survey to enable us to provide the highest quality of care to you and your family. If you cannot score us a very good on any question, please call the office to discuss how we could have made your experience a very good one. Thank you. Patient Education        Fatigue: Care Instructions  Your Care Instructions    Fatigue is a feeling of tiredness, exhaustion, or lack of energy. You may feel fatigue because of too much or not enough activity. It can also come from stress, lack of sleep, boredom, and poor diet. Many medical problems, such as viral infections, can cause fatigue. Emotional problems, especially depression, are often the cause of fatigue. Fatigue is most often a symptom of another problem. Treatment for fatigue depends on the cause. For example, if you have fatigue because you have a certain health problem, treating this problem also treats your fatigue. If depression or anxiety is the cause, treatment may help. Follow-up care is a key part of your treatment and safety. Be sure to make and go to all appointments, and call your doctor if you are having problems. It's also a good idea to know your test results and keep a list of the medicines you take. How can you care for yourself at home? · Get regular exercise. But don't overdo it. Go back and forth between rest and exercise. · Get plenty of rest.  · Eat a healthy diet. Do not skip meals, especially breakfast.  · Reduce your use of caffeine, tobacco, and alcohol. Caffeine is most often found in coffee, tea, cola drinks, and chocolate. · Limit medicines that can cause fatigue. This includes tranquilizers and cold and allergy medicines. When should you call for help?   Watch closely for changes in your health, and be sure to contact your doctor if:    · You have new symptoms such as fever or a rash.     · Your fatigue gets worse.

## 2019-07-23 ENCOUNTER — TELEPHONE (OUTPATIENT)
Dept: PRIMARY CARE CLINIC | Age: 84
End: 2019-07-23

## 2019-07-29 ENCOUNTER — TELEPHONE (OUTPATIENT)
Dept: CARDIOLOGY | Age: 84
End: 2019-07-29

## 2019-08-01 ENCOUNTER — TELEPHONE (OUTPATIENT)
Dept: CARDIOLOGY | Age: 84
End: 2019-08-01

## 2019-08-07 ENCOUNTER — OFFICE VISIT (OUTPATIENT)
Dept: CARDIOLOGY | Age: 84
End: 2019-08-07
Payer: MEDICARE

## 2019-08-07 VITALS
HEART RATE: 64 BPM | DIASTOLIC BLOOD PRESSURE: 62 MMHG | HEIGHT: 60 IN | OXYGEN SATURATION: 96 % | BODY MASS INDEX: 20.38 KG/M2 | WEIGHT: 103.8 LBS | RESPIRATION RATE: 16 BRPM | SYSTOLIC BLOOD PRESSURE: 112 MMHG

## 2019-08-07 DIAGNOSIS — I48.0 PAF (PAROXYSMAL ATRIAL FIBRILLATION) (HCC): ICD-10-CM

## 2019-08-07 DIAGNOSIS — F32.0 CURRENT MILD EPISODE OF MAJOR DEPRESSIVE DISORDER WITHOUT PRIOR EPISODE (HCC): ICD-10-CM

## 2019-08-07 DIAGNOSIS — Z95.0 PACEMAKER: ICD-10-CM

## 2019-08-07 DIAGNOSIS — I50.32 CHRONIC DIASTOLIC CONGESTIVE HEART FAILURE (HCC): Primary | ICD-10-CM

## 2019-08-07 PROBLEM — F32.9 MAJOR DEPRESSIVE DISORDER: Status: ACTIVE | Noted: 2019-08-07

## 2019-08-07 PROCEDURE — 4040F PNEUMOC VAC/ADMIN/RCVD: CPT | Performed by: FAMILY MEDICINE

## 2019-08-07 PROCEDURE — 1123F ACP DISCUSS/DSCN MKR DOCD: CPT | Performed by: FAMILY MEDICINE

## 2019-08-07 PROCEDURE — G8427 DOCREV CUR MEDS BY ELIG CLIN: HCPCS | Performed by: FAMILY MEDICINE

## 2019-08-07 PROCEDURE — 1036F TOBACCO NON-USER: CPT | Performed by: FAMILY MEDICINE

## 2019-08-07 PROCEDURE — G8420 CALC BMI NORM PARAMETERS: HCPCS | Performed by: FAMILY MEDICINE

## 2019-08-07 PROCEDURE — 1090F PRES/ABSN URINE INCON ASSESS: CPT | Performed by: FAMILY MEDICINE

## 2019-08-07 PROCEDURE — 99213 OFFICE O/P EST LOW 20 MIN: CPT | Performed by: FAMILY MEDICINE

## 2019-08-07 NOTE — PROGRESS NOTES
on her 19 pacemaker check and is up to 9.3% on her  check. Since the last time I saw Ms. Carlos Dominguez, she reports continued increased intermittent fatigue, shortness of breath with exertion and weakness. She has also felt some palpitations but not any more than last visit. She is concerned with starting Levothyroxin and Wellbutrin and whether she can take this with her Eliquis. She denies any falls or near falls. She denies any abdominal pain, or concerns with any medications. She denies any chest pain, pressure or tightness. Past Medical History:   Diagnosis Date    Abnormal resting ECG findings 12    Normal sinus rhythm with frequent PACs in a trigeminy pattern    Abnormal resting ECG findings 6/10/2013    Severe sinus bradycardia at 50 bpm.     Anxiety     Chronic back pain     Pt. c/o upper back pain,,,,\"On the sides of my lungs\"    Hiatal hernia     Holter monitor, abnormal 12    Multiple episodes of SVT between 100 and 130 beats per minute most consistent with atrial fibrillation and/or atrial flutter with variable block.  Hypothyroidism     Insomnia     MAC (mycobacterium avium-intracellulare complex)     Normal cardiac stress test 12    low risk study.  Paroxysmal atrial fibrillation ([facility] [address] 2012    Found on holter monitor  and      Severe sinus bradycardia 6/10/13    At least partially drug induced.  Status post placement of implantable loop recorder 10/19/2017    LINQ IMPLANTED. [employer]    Subdural hematoma (HCC)        CURRENT ALLERGIES: Sulfur and Sulfa antibiotics REVIEW OF SYSTEMS: 14 systems were reviewed. Pertinent positives and negatives as above, all else negative. Past Surgical History:   Procedure Laterality Date    BRAIN SURGERY      Had a blood clot on the brain. Fell 10 feet.      CATARACT REMOVAL Bilateral      SECTION      x1    COLONOSCOPY      several over the years by Dr. Mia Goodell

## 2019-08-13 ENCOUNTER — NURSE ONLY (OUTPATIENT)
Dept: CARDIOLOGY | Age: 84
End: 2019-08-13

## 2019-08-13 DIAGNOSIS — I49.5 SSS (SICK SINUS SYNDROME) (HCC): ICD-10-CM

## 2019-08-13 DIAGNOSIS — Z95.0 PACEMAKER: ICD-10-CM

## 2019-08-14 ENCOUNTER — CARE COORDINATION (OUTPATIENT)
Dept: CARE COORDINATION | Age: 84
End: 2019-08-14

## 2019-08-14 ENCOUNTER — TELEPHONE (OUTPATIENT)
Dept: CARDIOLOGY | Age: 84
End: 2019-08-14

## 2019-08-14 NOTE — CARE COORDINATION
Symptoms:   None:  Yes      Symptom course:  stable  Weight trend:  stable      and   General Assessment    Do you have any symptoms that are causing concern?:  No         Any ED visits or Hospitalizations? · Last ED visit: 12/28/18- bradycardia   · Last hospitalization: 1/2/19- PAF- pacemaker insertion       8/12/18- multiple rib fractures due to fall    Medications Reviewed with patient today:  · Patient verbalizes that she has all medications. · Patient denies any questions. · Any cost issues with medications No    Zone Management tool reviewed today is applicable:   Yes, CHF     Reviewed social needs/Home Needs if any:   · Does patient have enough food? Yes, daughter does grocery shopping  · Does patient have transportation to appointment/store/pharmacy, etc?Yes, daughter drives   · Does patient need any help in the home?denies   · If yes, what type of help? n/a         Reviewed Upcoming follow up appointments with patient  Future Appointments   Date Time Provider Brittnee Rios   8/22/2019  8:40 AM CAMRON Stewart CNP [facility]   9/17/2019  1:15 PM SCHEDULE, [facility] TIFFIN CAR MEDKATYA BUTT [facility]   9/25/2019  3:00 PM CAMRON Beaulieu CNP [facility]   10/23/2019 11:00 AM MD FILOMENA Coon [facility]   11/6/2019  9:40 AM CAM[facility] Stewart CNP [facility]   2/12/2020 10:20 AM Kendra Gonzalez MD [facility]       Interventions completed today:  · Patient to reach out to care coordinator at [phone] or MD office with questions. · Reminded patient of walk in care clinic availability/hours; and when to utilize the facility if MD office not available. Care Coordinator Plan of Care: This nurse CC will plan not to enroll patient into Care coordination due to patient declining at this time. Prior to Admission medications    Medication Sig Start Date End Date Taking?  Authorizing Provider   levothyroxine (SYNTHROID) 125 MCG tablet Take 1 tablet by mouth Daily 7/22/19  Yes CAMRON Garcia - ABELINO   buPROPion (WELLBUTRIN XL) 150 MG extended release tablet Take 1 tablet by mouth every morning 7/22/19  Yes CAMRON Garcia - AEBLINO   metoprolol succinate (TOPROL XL) 100 MG extended release tablet Take 1 tablet by mouth daily 6/3/19  Yes Evan Martines MD   nitroGLYCERIN (NITROSTAT) 0.4 MG SL tablet Place 0.4 mg under the tongue every 5 minutes as needed for Chest pain up to max of 3 total doses. If no relief after 1 dose, call 911.    Yes Historical Provider, MD   VOLTAREN 1 % GEL Apply topically 2 times daily Apply to both feet 5/2/19  Yes CAMRON Garcia CNP   diltiazem (CARDIZEM CD) 360 MG extended release capsule Take 1 capsule by mouth daily 2/1/19  Yes Evan Martines MD   apixaban (ELIQUIS) 2.5 MG TABS tablet Take 1 tablet by mouth 2 times daily 1/8/19  Yes Orly Landry MD   [facility]) 250 MCG capsule Take 1 capsule by mouth 2 times daily 12/29/18  Yes Evan Martines MD   omeprazole (PRILOSEC) 20 MG delayed release capsule Take 1 capsule by mouth daily 12/18/18  Yes Evan Martines MD

## 2019-08-19 ENCOUNTER — TELEPHONE (OUTPATIENT)
Dept: CARDIOLOGY | Age: 84
End: 2019-08-19

## 2019-08-22 ENCOUNTER — OFFICE VISIT (OUTPATIENT)
Dept: PRIMARY CARE CLINIC | Age: 84
End: 2019-08-22
Payer: MEDICARE

## 2019-08-22 VITALS
DIASTOLIC BLOOD PRESSURE: 50 MMHG | WEIGHT: 101.2 LBS | HEIGHT: 61 IN | SYSTOLIC BLOOD PRESSURE: 101 MMHG | RESPIRATION RATE: 14 BRPM | HEART RATE: 62 BPM | TEMPERATURE: 97.6 F | BODY MASS INDEX: 19.11 KG/M2

## 2019-08-22 DIAGNOSIS — M50.30 DDD (DEGENERATIVE DISC DISEASE), CERVICAL: ICD-10-CM

## 2019-08-22 DIAGNOSIS — E03.1 CONGENITAL HYPOTHYROIDISM: Primary | ICD-10-CM

## 2019-08-22 DIAGNOSIS — F34.1 DYSTHYMIA: ICD-10-CM

## 2019-08-22 PROCEDURE — 1123F ACP DISCUSS/DSCN MKR DOCD: CPT | Performed by: NURSE PRACTITIONER

## 2019-08-22 PROCEDURE — 1090F PRES/ABSN URINE INCON ASSESS: CPT | Performed by: NURSE PRACTITIONER

## 2019-08-22 PROCEDURE — 1036F TOBACCO NON-USER: CPT | Performed by: NURSE PRACTITIONER

## 2019-08-22 PROCEDURE — G8427 DOCREV CUR MEDS BY ELIG CLIN: HCPCS | Performed by: NURSE PRACTITIONER

## 2019-08-22 PROCEDURE — G8420 CALC BMI NORM PARAMETERS: HCPCS | Performed by: NURSE PRACTITIONER

## 2019-08-22 PROCEDURE — 4040F PNEUMOC VAC/ADMIN/RCVD: CPT | Performed by: NURSE PRACTITIONER

## 2019-08-22 PROCEDURE — 99214 OFFICE O/P EST MOD 30 MIN: CPT | Performed by: NURSE PRACTITIONER

## 2019-08-22 RX ORDER — LEVOTHYROXINE SODIUM 0.12 MG/1
125 TABLET ORAL DAILY
Qty: 90 TABLET | Refills: 1
Start: 2019-07-22 | End: 2019-08-22 | Stop reason: SDUPTHER

## 2019-08-22 RX ORDER — BUPROPION HYDROCHLORIDE 300 MG/1
300 TABLET ORAL EVERY MORNING
Qty: 90 TABLET | Refills: 3 | Status: SHIPPED | OUTPATIENT
Start: 2019-08-22 | End: 2019-10-23

## 2019-08-22 RX ORDER — LEVOTHYROXINE SODIUM 112 UG/1
TABLET ORAL
COMMUNITY
Start: 2019-08-09 | End: 2019-08-22 | Stop reason: ALTCHOICE

## 2019-08-22 RX ORDER — LEVOTHYROXINE SODIUM 0.12 MG/1
125 TABLET ORAL DAILY
Qty: 90 TABLET | Refills: 3 | Status: SHIPPED | OUTPATIENT
Start: 2019-08-22 | End: 2019-09-26 | Stop reason: ALTCHOICE

## 2019-08-22 ASSESSMENT — ENCOUNTER SYMPTOMS
DIARRHEA: 0
SORE THROAT: 0
NAUSEA: 0
VISUAL CHANGE: 0
ABDOMINAL PAIN: 0
SHORTNESS OF BREATH: 0
RHINORRHEA: 0
BACK PAIN: 0
PHOTOPHOBIA: 0
TROUBLE SWALLOWING: 0
COUGH: 0
VOMITING: 0

## 2019-08-22 NOTE — PATIENT INSTRUCTIONS
· You have been feeling down, depressed, or hopeless. Or you may have lost interest in things that you usually enjoy.     · You are not getting better as expected. Where can you learn more? Go to [email or web address] and sign in to your Glints account. Enter N752 in the [employer] box to learn more about \"Fatigue: Care Instructions. \"     If you do not have an account, please click on the \"Sign Up Now\" link. Current as of: September 23, 2018  Content Version: 12.1  © 1446-2021 Healthwise, Incorporated. Care instructions adapted under license by [facility] ([facility]). If you have questions about a medical condition or this instruction, always ask your healthcare professional. [address] any warranty or liability for your use of this information.

## 2019-08-22 NOTE — PROGRESS NOTES
implantable loop recorder 10/19/2017    LINQ IMPLANTED. [employer]    Subdural hematoma [facility])       Reviewed all health maintenance requirements and ordered appropriate tests  Health Maintenance Due   Topic Date Due    Annual Wellness Visit (AWV)  1996       Past Surgical History:     Past Surgical History:   Procedure Laterality Date    BRAIN SURGERY      Had a blood clot on the brain. Fell 10 feet.  CATARACT REMOVAL Bilateral      SECTION      x1    COLONOSCOPY      several over the years by Dr. Davidson Myles Left 10/19/2017    [place]. 32 Left 2019    Dr. Elier Cooper N/A 2019    Antonio Lank performed by Mir Pitt MD at [facility]      tumor removed    UPPER GASTROINTESTINAL ENDOSCOPY      Anjel        Medications:       Prior to Admission medications    Medication Sig Start Date End Date Taking? Authorizing Provider   buPROPion (WELLBUTRIN XL) 300 MG extended release tablet Take 1 tablet by mouth every morning 19  Yes Thea Gains Might, APRN - CNP   levothyroxine (SYNTHROID) 125 MCG tablet Take 1 tablet by mouth Daily 19  Yes Thea Gains Might, APRN - CNP   metoprolol succinate (TOPROL XL) 100 MG extended release tablet Take 1 tablet by mouth daily 6/3/19  Yes Thai Medina MD   nitroGLYCERIN (NITROSTAT) 0.4 MG SL tablet Place 0.4 mg under the tongue every 5 minutes as needed for Chest pain up to max of 3 total doses. If no relief after 1 dose, call 911.    Yes Historical Provider, MD   diltiazem (CARDIZEM CD) 360 MG extended release capsule Take 1 capsule by mouth daily 19  Yes Thai Medina MD   apixaban Candida Fortis) 2.5 MG TABS tablet Take 1 tablet by mouth 2 times daily 19  Yes Mir Pitt MD   [facility]) 250 MCG capsule Take 1 capsule by mouth 2 times daily 18  Yes Thai Medina MD   omeprazole Physical Exam   Constitutional: She is oriented to person, place, and time. She appears well-developed and well-nourished. No distress. HENT:   Mouth/Throat: Oropharynx is clear and moist.   Eyes: Conjunctivae are normal.   Neck: Neck supple. Cardiovascular: Normal rate and regular rhythm. Murmur heard. Pulmonary/Chest: Effort normal and breath sounds normal.   Abdominal: Soft. Bowel sounds are normal. She exhibits no distension. There is no tenderness. Neurological: She is alert and oriented to person, place, and time. Skin: Skin is warm and dry. No rash noted. Psychiatric: Her speech is normal and behavior is normal. Judgment and thought content normal. Her mood appears not anxious. Cognition and memory are normal. She does not exhibit a depressed mood. She expresses no homicidal and no suicidal ideation. She expresses no suicidal plans and no homicidal plans. She is attentive. Nursing note and vitals reviewed. Data:     Lab Results   Component Value Date     03/06/2019    K 4.3 03/06/2019     03/06/2019    CO2 26 03/06/2019    BUN 17 03/06/2019    CREATININE 0.84 03/06/2019    GLUCOSE 93 03/06/2019    GLUCOSE 92 02/07/2012    PROT 7.0 01/08/2018    LABALBU 4.0 01/08/2018    BILITOT 0.27 01/08/2018    ALKPHOS 61 01/08/2018    AST 20 01/08/2018    ALT 16 01/08/2018     Lab Results   Component Value Date    WBC 6.8 07/22/2019    RBC 4.22 07/22/2019    RBC 4.31 02/07/2012    HGB 12.7 07/22/2019    HCT 40.0 07/22/2019    MCV 94.8 07/22/2019    MCH 30.1 07/22/2019    MCHC 31.8 07/22/2019    RDW 13.8 07/22/2019     07/22/2019     02/07/2012    MPV 9.5 07/22/2019     Lab Results   Component Value Date    TSH 5.77 03/06/2019     No results found for: CHOL, HDL, PSA, LABA1C    Assessment/Plan:      Diagnosis Orders   1. DDD (degenerative disc disease), cervical  [facility]   2.  Congenital hypothyroidism  levothyroxine (SYNTHROID) 125 MCG tablet DISCONTINUED: levothyroxine (SYNTHROID) 125 MCG tablet   3. Dysthymia  buPROPion (WELLBUTRIN XL) 300 MG extended release tablet     We have adjusted levothyroxine to 125 mcg daily and resent the medication to [facility]. We did increase bupropion to 300 mg daily as well. We will order physical therapy for her chronic neck discomfort. 1.  Serene received counseling on the following healthy behaviors: nutrition, exercise and medication adherence  2. Patient given educational materials - see patient instructions  3. Was a self-tracking handout given in paper form or via Bloomfire[employer]? No  If yes, see orders or list here. 4.  Discussed use, benefit, and side effects of prescribed medications. Barriers to medication compliance addressed. All patient questions answered. Pt voiced understanding. 5.  Reviewed prior labs and health maintenance  6. Continue current medications, diet and exercise. Completed Refills   Requested Prescriptions     Signed Prescriptions Disp Refills    buPROPion (WELLBUTRIN XL) 300 MG extended release tablet 90 tablet 3     Sig: Take 1 tablet by mouth every morning    levothyroxine (SYNTHROID) 125 MCG tablet 90 tablet 3     Sig: Take 1 tablet by mouth Daily         Return in about 3 months (around 11/22/2019) for check up.

## 2019-09-25 ENCOUNTER — HOSPITAL ENCOUNTER (OUTPATIENT)
Age: 84
Discharge: HOME | End: 2019-09-25
Payer: MEDICARE

## 2019-09-25 ENCOUNTER — TELEPHONE (OUTPATIENT)
Dept: CARDIOLOGY | Age: 84
End: 2019-09-25

## 2019-09-25 ENCOUNTER — TELEPHONE (OUTPATIENT)
Dept: PRIMARY CARE CLINIC | Age: 84
End: 2019-09-25

## 2019-09-25 ENCOUNTER — OFFICE VISIT (OUTPATIENT)
Dept: PRIMARY CARE CLINIC | Age: 84
End: 2019-09-25
Payer: MEDICARE

## 2019-09-25 VITALS
BODY MASS INDEX: 18.63 KG/M2 | DIASTOLIC BLOOD PRESSURE: 85 MMHG | TEMPERATURE: 97.2 F | SYSTOLIC BLOOD PRESSURE: 104 MMHG | WEIGHT: 98.6 LBS | OXYGEN SATURATION: 96 % | RESPIRATION RATE: 18 BRPM | HEART RATE: 124 BPM

## 2019-09-25 DIAGNOSIS — R00.0 TACHYCARDIA: Primary | ICD-10-CM

## 2019-09-25 DIAGNOSIS — I48.0 PAF (PAROXYSMAL ATRIAL FIBRILLATION) (HCC): ICD-10-CM

## 2019-09-25 DIAGNOSIS — R53.83 FATIGUE, UNSPECIFIED TYPE: ICD-10-CM

## 2019-09-25 DIAGNOSIS — R00.0 TACHYCARDIA: ICD-10-CM

## 2019-09-25 LAB
ANION GAP SERPL CALCULATED.3IONS-SCNC: 13 MMOL/L (ref 9–17)
BUN BLDV-MCNC: 18 MG/DL (ref 8–23)
BUN/CREAT BLD: 16 (ref 9–20)
CALCIUM SERPL-MCNC: 9.3 MG/DL (ref 8.6–10.4)
CHLORIDE BLD-SCNC: 106 MMOL/L (ref 98–107)
CO2: 21 MMOL/L (ref 20–31)
CREAT SERPL-MCNC: 1.13 MG/DL (ref 0.5–0.9)
EKG ATRIAL RATE: 300 BPM
EKG Q-T INTERVAL: 312 MS
EKG QRS DURATION: 72 MS
EKG QTC CALCULATION (BAZETT): 459 MS
EKG R AXIS: 51 DEGREES
EKG T AXIS: 51 DEGREES
EKG VENTRICULAR RATE: 130 BPM
GFR AFRICAN AMERICAN: 55 ML/MIN
GFR NON-AFRICAN AMERICAN: 46 ML/MIN
GFR SERPL CREATININE-BSD FRML MDRD: ABNORMAL ML/MIN/{1.73_M2}
GFR SERPL CREATININE-BSD FRML MDRD: ABNORMAL ML/MIN/{1.73_M2}
GLUCOSE BLD-MCNC: 111 MG/DL (ref 70–99)
POTASSIUM SERPL-SCNC: 4.5 MMOL/L (ref 3.7–5.3)
SODIUM BLD-SCNC: 140 MMOL/L (ref 135–144)
THYROXINE, FREE: 3.04 NG/DL (ref 0.93–1.7)
TSH SERPL DL<=0.05 MIU/L-ACNC: <0.01 MIU/L (ref 0.3–5)

## 2019-09-25 PROCEDURE — 84439 ASSAY OF FREE THYROXINE: CPT

## 2019-09-25 PROCEDURE — 80048 BASIC METABOLIC PNL TOTAL CA: CPT

## 2019-09-25 PROCEDURE — 1036F TOBACCO NON-USER: CPT | Performed by: NURSE PRACTITIONER

## 2019-09-25 PROCEDURE — 99214 OFFICE O/P EST MOD 30 MIN: CPT | Performed by: NURSE PRACTITIONER

## 2019-09-25 PROCEDURE — G8427 DOCREV CUR MEDS BY ELIG CLIN: HCPCS | Performed by: NURSE PRACTITIONER

## 2019-09-25 PROCEDURE — 84443 ASSAY THYROID STIM HORMONE: CPT

## 2019-09-25 PROCEDURE — 1123F ACP DISCUSS/DSCN MKR DOCD: CPT | Performed by: NURSE PRACTITIONER

## 2019-09-25 PROCEDURE — 4040F PNEUMOC VAC/ADMIN/RCVD: CPT | Performed by: NURSE PRACTITIONER

## 2019-09-25 PROCEDURE — 1090F PRES/ABSN URINE INCON ASSESS: CPT | Performed by: NURSE PRACTITIONER

## 2019-09-25 PROCEDURE — 93010 ELECTROCARDIOGRAM REPORT: CPT | Performed by: INTERNAL MEDICINE

## 2019-09-25 PROCEDURE — 93005 ELECTROCARDIOGRAM TRACING: CPT

## 2019-09-25 PROCEDURE — 36415 COLL VENOUS BLD VENIPUNCTURE: CPT

## 2019-09-25 PROCEDURE — G8420 CALC BMI NORM PARAMETERS: HCPCS | Performed by: NURSE PRACTITIONER

## 2019-09-25 ASSESSMENT — ENCOUNTER SYMPTOMS
DIARRHEA: 0
TROUBLE SWALLOWING: 0
SHORTNESS OF BREATH: 0
COUGH: 0
SORE THROAT: 0
CHEST TIGHTNESS: 0
CONSTIPATION: 0
WHEEZING: 0

## 2019-09-25 NOTE — PROGRESS NOTES
Exam   Constitutional: She is oriented to person, place, and time. She appears well-developed and well-nourished. Non-toxic appearance. No distress. Elderly appearing   HENT:   Head: Normocephalic and atraumatic. Mouth/Throat: She has dentures. Neck: Normal range of motion. Neck supple. Cardiovascular: Normal pulses. An irregular rhythm present. Tachycardia present. No murmur heard. Pulmonary/Chest: Effort normal and breath sounds normal. She has no wheezes. She has no rales. Musculoskeletal: She exhibits edema (trace pitting edema bilaterally). Lymphadenopathy:     She has no cervical adenopathy. Neurological: She is alert and oriented to person, place, and time. Skin: No rash noted. No erythema. Psychiatric: She has a normal mood and affect. Nursing note and vitals reviewed. Data:     Lab Results   Component Value Date     09/25/2019    K 4.5 09/25/2019     09/25/2019    CO2 21 09/25/2019    BUN 18 09/25/2019    CREATININE 1.13 09/25/2019    GLUCOSE 111 09/25/2019    GLUCOSE 92 02/07/2012    PROT 7.0 01/08/2018    LABALBU 4.0 01/08/2018    BILITOT 0.27 01/08/2018    ALKPHOS 61 01/08/2018    AST 20 01/08/2018    ALT 16 01/08/2018     Lab Results   Component Value Date    WBC 6.8 07/22/2019    RBC 4.22 07/22/2019    RBC 4.31 02/07/2012    HGB 12.7 07/22/2019    HCT 40.0 07/22/2019    MCV 94.8 07/22/2019    MCH 30.1 07/22/2019    MCHC 31.8 07/22/2019    RDW 13.8 07/22/2019     07/22/2019     02/07/2012    MPV 9.5 07/22/2019     Lab Results   Component Value Date    TSH <0.01 09/25/2019     No results found for: CHOL, HDL, PSA, LABA1C    Assessment/Plan:      Diagnosis Orders   1. Tachycardia  EKG 12 lead    TSH With Reflex Ft4    Basic Metabolic Panel   2. Fatigue, unspecified type  TSH With Reflex Ft4    Basic Metabolic Panel   3. PAF (paroxysmal atrial fibrillation) (HCC)       [facility]elaides EKG came back A. fib RVR.   Cardiology was notified and they increased her Toprol XL from 100 mg 150 mg daily. Subsequently her free T4 was 3.04. Will stop her Synthroid for 1 week and will decrease dosage from 125 mcg to 75 mcg daily. She was advised to take Synthroid on empty stomach. We will recheck thyroid studies in 1 month. Unfortunately I believe that her fatigue could be related to deconditioning and her antiarrhythmic medications. She is encouraged to continue to be active. She will follow-up with cardiology and October and we will see her back in the office in November. 1.  Serene received counseling on the following healthy behaviors: nutrition, exercise and medication adherence  2. Patient given educational materials - see patient instructions  3. Was a self-tracking handout given in paper form or via [employer]? No  If yes, see orders or list here. 4.  Discussed use, benefit, and side effects of prescribed medications. Barriers to medication compliance addressed. All patient questions answered. Pt voiced understanding. 5.  Reviewed prior labs and health maintenance  6. Continue current medications, diet and exercise. Completed Refills   Requested Prescriptions      No prescriptions requested or ordered in this encounter         Return if symptoms worsen or fail to improve.

## 2019-09-25 NOTE — TELEPHONE ENCOUNTER
Per verbal request of Shellie Gross CNP pt was contacted regarding EKG results showing a-fib and to go to the ER for evaluation. Pt states she was on her way home and she wanted to get her things before she goes to the hospital and to call her daughter. Writer called pts daughter and daughter advised that Dr. Jaja Clancy office had just called her and advised to increase the Toprol XL to 1 1/2 tabs daily and to push fluids. Writer advised Shellie Gross of this and he advised pt can follow Dr. Jaja Clancy instructions and to follow up with Dr. Tj De Souza if no better. Writer advised daughter of this and daughter verbalized understanding. Daughter will relay to pt that it is not needed to go to the ER and daughter will also give pt instructions from Dr. Jaja Clancy office.

## 2019-09-26 ENCOUNTER — TELEPHONE (OUTPATIENT)
Dept: PRIMARY CARE CLINIC | Age: 84
End: 2019-09-26

## 2019-09-26 DIAGNOSIS — E03.1 CONGENITAL HYPOTHYROIDISM: Primary | ICD-10-CM

## 2019-09-26 RX ORDER — LEVOTHYROXINE SODIUM 0.07 MG/1
75 TABLET ORAL DAILY
Qty: 90 TABLET | Refills: 0 | Status: SHIPPED | OUTPATIENT
Start: 2019-09-26 | End: 2019-12-16

## 2019-09-26 ASSESSMENT — ENCOUNTER SYMPTOMS
CHANGE IN BOWEL HABIT: 0
ABDOMINAL PAIN: 0

## 2019-09-27 ENCOUNTER — TELEPHONE (OUTPATIENT)
Dept: PRIMARY CARE CLINIC | Age: 84
End: 2019-09-27

## 2019-09-27 ASSESSMENT — ENCOUNTER SYMPTOMS
SINUS PRESSURE: 0
SINUS PAIN: 0

## 2019-09-30 ENCOUNTER — NURSE ONLY (OUTPATIENT)
Dept: PRIMARY CARE CLINIC | Age: 84
End: 2019-09-30
Payer: MEDICARE

## 2019-09-30 VITALS
BODY MASS INDEX: 18.78 KG/M2 | DIASTOLIC BLOOD PRESSURE: 65 MMHG | WEIGHT: 99.4 LBS | SYSTOLIC BLOOD PRESSURE: 120 MMHG | HEART RATE: 62 BPM | OXYGEN SATURATION: 98 %

## 2019-09-30 DIAGNOSIS — Z23 NEED FOR INFLUENZA VACCINATION: Primary | ICD-10-CM

## 2019-09-30 PROCEDURE — G0008 ADMIN INFLUENZA VIRUS VAC: HCPCS | Performed by: NURSE PRACTITIONER

## 2019-09-30 PROCEDURE — 90686 IIV4 VACC NO PRSV 0.5 ML IM: CPT | Performed by: NURSE PRACTITIONER

## 2019-10-23 ENCOUNTER — HOSPITAL ENCOUNTER (OUTPATIENT)
Age: 84
Setting detail: SPECIMEN
Discharge: HOME | End: 2019-10-23
Payer: MEDICARE

## 2019-10-23 ENCOUNTER — OFFICE VISIT (OUTPATIENT)
Dept: OBGYN | Age: 84
End: 2019-10-23
Payer: MEDICARE

## 2019-10-23 ENCOUNTER — OFFICE VISIT (OUTPATIENT)
Dept: CARDIOLOGY | Age: 84
End: 2019-10-23
Payer: MEDICARE

## 2019-10-23 VITALS
WEIGHT: 98 LBS | OXYGEN SATURATION: 98 % | BODY MASS INDEX: 18.5 KG/M2 | SYSTOLIC BLOOD PRESSURE: 127 MMHG | HEART RATE: 75 BPM | DIASTOLIC BLOOD PRESSURE: 83 MMHG | RESPIRATION RATE: 20 BRPM | HEIGHT: 61 IN

## 2019-10-23 VITALS
BODY MASS INDEX: 19.18 KG/M2 | SYSTOLIC BLOOD PRESSURE: 122 MMHG | WEIGHT: 101.6 LBS | HEIGHT: 61 IN | DIASTOLIC BLOOD PRESSURE: 60 MMHG

## 2019-10-23 DIAGNOSIS — Z95.0 PACEMAKER: ICD-10-CM

## 2019-10-23 DIAGNOSIS — F34.1 DYSTHYMIA: ICD-10-CM

## 2019-10-23 DIAGNOSIS — I48.0 PAF (PAROXYSMAL ATRIAL FIBRILLATION) (HCC): ICD-10-CM

## 2019-10-23 DIAGNOSIS — R53.83 TIREDNESS: ICD-10-CM

## 2019-10-23 DIAGNOSIS — I50.32 CHRONIC DIASTOLIC CONGESTIVE HEART FAILURE (HCC): ICD-10-CM

## 2019-10-23 DIAGNOSIS — I49.5 TACHY-BRADY SYNDROME (HCC): ICD-10-CM

## 2019-10-23 DIAGNOSIS — N94.819 VULVODYNIA: ICD-10-CM

## 2019-10-23 DIAGNOSIS — I49.5 SSS (SICK SINUS SYNDROME) (HCC): ICD-10-CM

## 2019-10-23 DIAGNOSIS — T88.7XXA MEDICATION SIDE EFFECT: Primary | ICD-10-CM

## 2019-10-23 DIAGNOSIS — R30.0 DYSURIA: Primary | ICD-10-CM

## 2019-10-23 LAB
BILIRUBIN, POC: NORMAL
BLOOD URINE, POC: NORMAL
CLARITY, POC: CLEAR
COLOR, POC: NORMAL
GLUCOSE URINE, POC: NORMAL
KETONES, POC: NORMAL
LEUKOCYTE EST, POC: NORMAL
NITRITE, POC: NORMAL
PH, POC: 6
PROTEIN, POC: NORMAL
SPECIFIC GRAVITY, POC: 1.01
UROBILINOGEN, POC: NORMAL

## 2019-10-23 PROCEDURE — 4040F PNEUMOC VAC/ADMIN/RCVD: CPT | Performed by: ADVANCED PRACTICE MIDWIFE

## 2019-10-23 PROCEDURE — G8427 DOCREV CUR MEDS BY ELIG CLIN: HCPCS | Performed by: FAMILY MEDICINE

## 2019-10-23 PROCEDURE — 4040F PNEUMOC VAC/ADMIN/RCVD: CPT | Performed by: FAMILY MEDICINE

## 2019-10-23 PROCEDURE — 1090F PRES/ABSN URINE INCON ASSESS: CPT | Performed by: ADVANCED PRACTICE MIDWIFE

## 2019-10-23 PROCEDURE — 99213 OFFICE O/P EST LOW 20 MIN: CPT | Performed by: ADVANCED PRACTICE MIDWIFE

## 2019-10-23 PROCEDURE — G8427 DOCREV CUR MEDS BY ELIG CLIN: HCPCS | Performed by: ADVANCED PRACTICE MIDWIFE

## 2019-10-23 PROCEDURE — G8482 FLU IMMUNIZE ORDER/ADMIN: HCPCS | Performed by: ADVANCED PRACTICE MIDWIFE

## 2019-10-23 PROCEDURE — 1123F ACP DISCUSS/DSCN MKR DOCD: CPT | Performed by: FAMILY MEDICINE

## 2019-10-23 PROCEDURE — 1090F PRES/ABSN URINE INCON ASSESS: CPT | Performed by: FAMILY MEDICINE

## 2019-10-23 PROCEDURE — 87070 CULTURE OTHR SPECIMN AEROBIC: CPT

## 2019-10-23 PROCEDURE — G8482 FLU IMMUNIZE ORDER/ADMIN: HCPCS | Performed by: FAMILY MEDICINE

## 2019-10-23 PROCEDURE — 1036F TOBACCO NON-USER: CPT | Performed by: ADVANCED PRACTICE MIDWIFE

## 2019-10-23 PROCEDURE — 81002 URINALYSIS NONAUTO W/O SCOPE: CPT | Performed by: ADVANCED PRACTICE MIDWIFE

## 2019-10-23 PROCEDURE — G8420 CALC BMI NORM PARAMETERS: HCPCS | Performed by: ADVANCED PRACTICE MIDWIFE

## 2019-10-23 PROCEDURE — 99214 OFFICE O/P EST MOD 30 MIN: CPT | Performed by: FAMILY MEDICINE

## 2019-10-23 PROCEDURE — 1036F TOBACCO NON-USER: CPT | Performed by: FAMILY MEDICINE

## 2019-10-23 PROCEDURE — G8420 CALC BMI NORM PARAMETERS: HCPCS | Performed by: FAMILY MEDICINE

## 2019-10-23 PROCEDURE — 1123F ACP DISCUSS/DSCN MKR DOCD: CPT | Performed by: ADVANCED PRACTICE MIDWIFE

## 2019-10-23 RX ORDER — METOPROLOL SUCCINATE 100 MG/1
150 TABLET, EXTENDED RELEASE ORAL NIGHTLY
Qty: 90 TABLET | Refills: 3
Start: 2019-10-23 | End: 2020-03-11 | Stop reason: DRUGHIGH

## 2019-10-23 RX ORDER — BUPROPION HYDROCHLORIDE 300 MG/1
300 TABLET ORAL NIGHTLY
Qty: 90 TABLET | Refills: 3
Start: 2019-10-23 | End: 2020-03-11

## 2019-10-23 RX ORDER — TRIAMCINOLONE ACETONIDE 5 MG/G
OINTMENT TOPICAL
Qty: 1 TUBE | Refills: 1 | Status: SHIPPED | OUTPATIENT
Start: 2019-10-23 | End: 2019-10-30

## 2019-10-26 LAB
CULTURE: NORMAL
Lab: NORMAL
SPECIMEN DESCRIPTION: NORMAL

## 2019-11-12 ENCOUNTER — TELEPHONE (OUTPATIENT)
Dept: CARDIOLOGY | Age: 84
End: 2019-11-12

## 2019-11-18 ENCOUNTER — OFFICE VISIT (OUTPATIENT)
Dept: PRIMARY CARE CLINIC | Age: 84
End: 2019-11-18
Payer: MEDICARE

## 2019-11-18 VITALS
OXYGEN SATURATION: 97 % | WEIGHT: 104.6 LBS | RESPIRATION RATE: 16 BRPM | TEMPERATURE: 97.6 F | HEART RATE: 67 BPM | DIASTOLIC BLOOD PRESSURE: 60 MMHG | HEIGHT: 60 IN | SYSTOLIC BLOOD PRESSURE: 135 MMHG | BODY MASS INDEX: 20.54 KG/M2

## 2019-11-18 DIAGNOSIS — R53.82 CHRONIC FATIGUE: ICD-10-CM

## 2019-11-18 DIAGNOSIS — I50.32 CHRONIC DIASTOLIC CONGESTIVE HEART FAILURE (HCC): Primary | ICD-10-CM

## 2019-11-18 PROCEDURE — 99214 OFFICE O/P EST MOD 30 MIN: CPT | Performed by: NURSE PRACTITIONER

## 2019-11-18 PROCEDURE — 1036F TOBACCO NON-USER: CPT | Performed by: NURSE PRACTITIONER

## 2019-11-18 PROCEDURE — 1123F ACP DISCUSS/DSCN MKR DOCD: CPT | Performed by: NURSE PRACTITIONER

## 2019-11-18 PROCEDURE — G8482 FLU IMMUNIZE ORDER/ADMIN: HCPCS | Performed by: NURSE PRACTITIONER

## 2019-11-18 PROCEDURE — G8420 CALC BMI NORM PARAMETERS: HCPCS | Performed by: NURSE PRACTITIONER

## 2019-11-18 PROCEDURE — G8427 DOCREV CUR MEDS BY ELIG CLIN: HCPCS | Performed by: NURSE PRACTITIONER

## 2019-11-18 PROCEDURE — 4040F PNEUMOC VAC/ADMIN/RCVD: CPT | Performed by: NURSE PRACTITIONER

## 2019-11-18 PROCEDURE — 1090F PRES/ABSN URINE INCON ASSESS: CPT | Performed by: NURSE PRACTITIONER

## 2019-11-18 ASSESSMENT — ENCOUNTER SYMPTOMS
TROUBLE SWALLOWING: 0
ABDOMINAL PAIN: 0
COUGH: 0
PHOTOPHOBIA: 0
SHORTNESS OF BREATH: 0

## 2019-11-19 ENCOUNTER — NURSE ONLY (OUTPATIENT)
Dept: CARDIOLOGY | Age: 84
End: 2019-11-19
Payer: MEDICARE

## 2019-11-19 DIAGNOSIS — I49.5 SSS (SICK SINUS SYNDROME) (HCC): ICD-10-CM

## 2019-11-19 DIAGNOSIS — Z95.0 PACEMAKER: ICD-10-CM

## 2019-11-19 PROCEDURE — 93294 REM INTERROG EVL PM/LDLS PM: CPT | Performed by: FAMILY MEDICINE

## 2019-11-24 DIAGNOSIS — Z51.81 VISIT FOR MONITORING TIKOSYN THERAPY: ICD-10-CM
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
## 2019-11-24 DIAGNOSIS — I48.0 PAF (PAROXYSMAL ATRIAL FIBRILLATION) (HCC): ICD-10-CM

## 2019-11-24 DIAGNOSIS — Z79.899 VISIT FOR MONITORING TIKOSYN THERAPY: ICD-10-CM

## 2019-11-24 DIAGNOSIS — R55 SYNCOPE AND COLLAPSE: ICD-10-CM

## 2019-11-25 RX ORDER — DOFETILIDE 0.25 MG/1
CAPSULE ORAL
Qty: 180 CAPSULE | Refills: 0 | Status: SHIPPED | OUTPATIENT
Start: 2019-11-25 | End: 2020-10-09 | Stop reason: SDUPTHER

## 2019-11-25 RX ORDER — OMEPRAZOLE 20 MG/1
CAPSULE, DELAYED RELEASE ORAL
Qty: 90 CAPSULE | Refills: 0 | Status: SHIPPED | OUTPATIENT
Start: 2019-11-25 | End: 2021-02-08 | Stop reason: SDUPTHER

## 2019-11-25 RX ORDER — APIXABAN 2.5 MG/1
TABLET, FILM COATED ORAL
Qty: 180 TABLET | Refills: 0 | Status: SHIPPED | OUTPATIENT
Start: 2019-11-25 | End: 2020-10-09 | Stop reason: SDUPTHER

## 2019-12-02 NOTE — PROGRESS NOTES
[facility]  FEMALE PELVIC MEDICINE AND RECONSTRUCTIVE SURGERY  MD Geoffrey Sepulveda MD Abraham Shashoua, MD Alissa Schiller, PA-C  [phone]  Fax [phone]    December 2, 2019       Tin Qureshi MD  [address] [address]  VIA Facsimile: [phone]      Patient: Robyn Hanna   YOB: 1975   Date of Visit: 12/2/2019       Dear Dr. Qureshi:    Thank you for referring Robyn Hanna to me for evaluation. Below are my notes for this visit with her.    If you have questions, please do not hesitate to call me. I look forward to following your patient along with you.      Sincerely,        Rony Downing MD        CC: No Recipients  Rony Downing MD  12/2/2019  2:47 PM  Sign when Signing Visit  [facility]  FEMALE PELVIC MEDICINE AND RECONSTRUCTIVE SURGERY  MD Geoffrey Sepulveda MD Abraham Shashoua, MD Alissa Schiller, PA-C      CHIEF COMPLAINT  Chief Complaint   Patient presents with   • Surgical Followup       REFERRING PROVIDER/PRIMARY CARE PROVIDER  PCP - Tin Qureshi MD     POST-OPERATIVE NOTE:    Ms. Hanna is a 44 year old year old female who presents in the office today for a post-operative follow up. She underwent Da Shabbir operative laparoscopy multiple myomectomy resection of pelvic endometriosis on 11/15/2019 .      Operative findings and pathology include:1.  A total of 5 uterine leiomyoma removed including a 3 cm anterior fundal transmural myoma of 4 cm anterior transmural myoma 2 cm submucosal fibroid on the right and a 2 cm fundal submucosal myoma.  An additional 2 cm subserosal myoma was removed. There is pelvic endometriosis involving the perirectal peritoneum bilaterally with multiple lesions on the right.  The there was deep infiltrating endometriosis of the left uterosacral  ligament..    Pathologic Diagnosis :         A: \"Endometriosis\":     - Fibromuscular and adipose tissue with endometriosis.          RESPIRATORY ASSESSMENT PROTOCOL                                                                                              Patient Name: Sandra Gerber Room#: [id number] : 1933     Admitting diagnosis: PAF (paroxysmal atrial fibrillation) ([facility] [I48.0]  PAF (paroxysmal atrial fibrillation) ([facility] [I48.0]       Medical History:   Past Medical History:   Diagnosis Date    Abnormal resting ECG findings 12    Normal sinus rhythm with frequent PACs in a trigeminy pattern    Abnormal resting ECG findings 6/10/2013    Severe sinus bradycardia at 50 bpm.     Anxiety     Chronic back pain     Pt. c/o upper back pain,,,,\"On the sides of my lungs\"    Hiatal hernia     Holter monitor, abnormal 12    Multiple episodes of SVT between 100 and 130 beats per minute most consistent with atrial fibrillation and/or atrial flutter with variable block.  Hypothyroidism     Insomnia     MAC (mycobacterium avium-intracellulare complex)     Normal cardiac stress test 12    low risk study.  Paroxysmal atrial fibrillation ([facility] 2012    Found on holter monitor  and      Severe sinus bradycardia 6/10/13    At least partially drug induced.  Status post placement of implantable loop recorder 10/19/2017    LINQ IMPLANTED.  [employer]    Subdural hematoma [facility])        PATIENT ASSESSMENT    LABORATORY DATA  Hematology:   Lab Results   Component Value Date    WBC 6.9 2018    RBC 4.06 2018    RBC 4.31 2012    HGB 12.2 2018    HCT 38.0 2018     2018     2012     Chemistry:  No results found for: PHART, DPQ7VZB, PO2ART, B5OPXKSS, IMT8AGW, PBEA    Blood Culture:  Sputum Culture:     VITALS  Pulse: 60   Resp: 20  BP: (!) 159/77  SpO2: 97 % O2 Device: None (Room air)  Temp: 97.2 °F (36.2 °C)  Comment:   SKIN COLOR  [x] Normal  [] Pale  [] Dusky  [] Cyanotic      RESPIRATORY PATTERN  [x] Normal  [] Dyspnea  [] Cheyne-Roa  [] Kussmaul  []   B: Uterine fibroids:     - Portions of leiomyomata.     She denies significant complaints including: nausea, vomiting, fever, discharge, bleeding and significant pain.     On examination,  Visit Vitals  BP (!) 131/91 (BP Location: LUE - Left upper extremity, Patient Position: Sitting, Cuff Size: Regular)   Pulse 87   Temp 97.2 °F (36.2 °C) (Oral)   Resp 15   Ht 5' 10\" (1.778 m)   Wt 69.9 kg (154 lb)   LMP 11/27/2019 (Exact Date)   BMI 22.10 kg/m²          The wounds are healing well, the abdomen is non-tender and vaginal examination is normal.     In summary, Ms. Hanna has a normal post-operative examination. We had an extensive discussion regarding her diagnosis, treatment, and post-operative instructions. I asked her to contact me if she develops fever, pain, vomiting, or any other significant complaints.     I recommend a period of 4 months to allow for uterine healing prior to transfer.  During that time, and considering extent of resected endometriosis, I recommend initiating Orilissa for 3 months for management of endometriosis.      I have personally reviewed and updated the following EMR sections: Allergies, Problem List, Past Medical History, Past Surgical History, Social History and Family History    MEDICATIONS  Current Outpatient Medications   Medication Sig Dispense Refill   • fluticasone propionate (FLOVENT HFA) 110 MCG/ACT inhaler Inhale 1 puff into the lungs.     • albuterol 108 (90 Base) MCG/ACT inhaler Inhale 1 puff into the lungs.     • EPINEPHrine 0.3 MG/0.3ML auto-injector Inject 0.3 mg into the muscle.     • ibuprofen (MOTRIN) 600 MG tablet TK 1 T PO Q 6 H PRF MODERATE PAIN  1   • Elagolix Sodium (ORILISSA) 200 MG Tab Take 200 mg by mouth 2 times daily. 56 tablet 6     No current facility-administered medications for this visit.      Medications were reviewed and updated today.    REVIEW OF SYSTEMS    All other systems are reviewed and are negative.                 Biots  AMBULATORY  [] Yes  [] No  [x] With Assistance    PEAK FLOW  Predicted:     Personal Best:        VITAL CAPACITY  Predicted value:  ml  Actual Value:  ml  30% of Predicted:  ml  Patient Acuity 0 1 2 3 4 Score   Level of Concious (LOC) [x]  Alert & Oriented or Pt normal LOC []  Confused;follows directions []  Confused & uncooper-ative []  Obtunded []  Comatose 0   Respiratory Rate  (RR) [x]  Reg. rate & pattern. 12 - 20 bpm  []  Increased RR. Greater than 20 bpm   []  SOB w/ exertion or RR greater than 24 bpm []  Access- ory muscle use at rest. Abn.  resp. []  SOB at rest.   0   Bilateral Breath Sounds (BBS) [x]  Clear []  Diminish-ed bases  []  Diminish-ed t/o, or rales   []  Sporadic, scattered wheezes or rhonchi []  Persistentwheezes and, or absent BBS 0   Cough [x]  Strong, effective, & non-prod. []  Effective & prod. Less than 25 ml (2 TBSP) over past 24 hrs []  Ineffective & non-prod to less than 25 ML over past 24 hrs []  Ineffective and, or greater than 25 ml sputum prod. past 24 hrs. []  Nonspon- taneous; Requires suctioning 0   Pulmonary History  (PULM HX) []  No smoking and no chronic pulmonaryhistory []  Former smoker. Quit over 12 mos. ago []  Current smoker or quit w/ in 12 mos [x]  Pulm. History and, or 20 pk/yr smoking hx []  Admitted w/ acute pulm. dx and, or has been admitted w/ pulm. dx 2 or more times over past 12 mos 3   Surgical History this Admit  (SURG HX) [x]  No surgery []  General surgery []  Lower abdominal []  Thoracic or upper abdominal   []  Thoracic w/ pulm. disease 0   Chest X-Ray (CXR)/CT Scan [x]  Clear or not applicable []  Not available []  Atelect- asis or pleural effusions []  Localized infiltrate or pulm. edema []  Con-solidated Infiltrates, bilateral, or in more than 1 lobe 0   Slow or Forced VC, FEV1 OR PEFR (PULM FXN)  [x]  80% or greater, or not indicated []  Pt. unable to perform []  FEV1 or PEFR or VC 51-79%.   []  FEV1 or PEFR or VC  30-49%   []  FEV1 or PEFR or VC [physician-ordered bronchodilator(s)] if CXR Acuity = 4; otherwise:  PD&P, PEP, or Vest QID & PRN  NT Sxn PRN for ineffective cough  METANEB QID with [physician-ordered bronchodilator(s)] if CXR Acuity = 4; otherwise:  PD&P, PEP, or Vest TID & PRN  NT Sxn PRN for ineffective cough  Instruct patient to self-perform IS q1hr WA   Directed Cough self-performed q1hr WA     If Acuity Level is 2 or above in the following:    [] PULMONARY HISTORY (PULM HX)    Goal: Assist patient in quitting smoking to slow or stop the progression of lung disease.     [] Smoking Cessation Protocol    SMOKING CESSATION EDUCATION provided according to policy OX_079: (akira with an X)  ____Yes    ____ No     ____ NA    Smoking Cessation Booklet given:  ____Yes  ____No ____Patient Srinath Matta

## 2019-12-16 ENCOUNTER — TELEPHONE (OUTPATIENT)
Dept: PRIMARY CARE CLINIC | Age: 84
End: 2019-12-16

## 2019-12-16 DIAGNOSIS — S16.1XXS STRAIN OF NECK MUSCLE, SEQUELA: Primary | ICD-10-CM

## 2019-12-16 RX ORDER — LEVOTHYROXINE SODIUM 0.12 MG/1
75 TABLET ORAL DAILY
Status: ON HOLD | COMMUNITY
Start: 2019-11-24 | End: 2019-12-17

## 2019-12-17 ENCOUNTER — APPOINTMENT (OUTPATIENT)
Dept: GENERAL RADIOLOGY | Age: 84
DRG: 444 | End: 2019-12-17
Payer: MEDICARE

## 2019-12-17 ENCOUNTER — APPOINTMENT (OUTPATIENT)
Dept: CT IMAGING | Age: 84
DRG: 444 | End: 2019-12-17
Payer: MEDICARE

## 2019-12-17 ENCOUNTER — APPOINTMENT (OUTPATIENT)
Dept: ULTRASOUND IMAGING | Age: 84
DRG: 444 | End: 2019-12-17
Payer: MEDICARE

## 2019-12-17 ENCOUNTER — HOSPITAL ENCOUNTER (INPATIENT)
Age: 84
LOS: 4 days | Discharge: HOME | DRG: 444 | End: 2019-12-21
Attending: EMERGENCY MEDICINE | Admitting: INTERNAL MEDICINE
Payer: MEDICARE

## 2019-12-17 DIAGNOSIS — M79.10 MYALGIA: Primary | ICD-10-CM

## 2019-12-17 DIAGNOSIS — J18.9 PNEUMONIA DUE TO ORGANISM: ICD-10-CM

## 2019-12-17 DIAGNOSIS — R79.82 ELEVATED C-REACTIVE PROTEIN (CRP): ICD-10-CM

## 2019-12-17 DIAGNOSIS — R10.84 GENERALIZED ABDOMINAL PAIN: ICD-10-CM

## 2019-12-17 PROBLEM — I50.32 CHRONIC DIASTOLIC CONGESTIVE HEART FAILURE (HCC): Chronic | Status: ACTIVE | Noted: 2019-06-03

## 2019-12-17 PROBLEM — I48.0 PAF (PAROXYSMAL ATRIAL FIBRILLATION) (HCC): Chronic | Status: ACTIVE | Noted: 2018-07-24

## 2019-12-17 PROBLEM — S27.0XXA PNEUMOTHORAX, TRAUMATIC: Status: RESOLVED | Noted: 2018-08-12 | Resolved: 2019-12-17

## 2019-12-17 PROBLEM — S27.321A RIGHT PULMONARY CONTUSION: Status: RESOLVED | Noted: 2018-08-12 | Resolved: 2019-12-17

## 2019-12-17 PROBLEM — I49.5 SSS (SICK SINUS SYNDROME) (HCC): Chronic | Status: ACTIVE | Noted: 2019-01-02

## 2019-12-17 PROBLEM — J93.9 PNEUMOTHORAX ON RIGHT: Status: RESOLVED | Noted: 2018-08-12 | Resolved: 2019-12-17

## 2019-12-17 PROBLEM — S22.41XA MULTIPLE CLOSED FRACTURES OF RIBS OF RIGHT SIDE: Status: RESOLVED | Noted: 2018-08-12 | Resolved: 2019-12-17

## 2019-12-17 PROBLEM — Z51.81 ANTICOAGULATION MANAGEMENT ENCOUNTER: Status: RESOLVED | Noted: 2017-11-20 | Resolved: 2019-12-17

## 2019-12-17 PROBLEM — R53.83 OTHER FATIGUE: Status: RESOLVED | Noted: 2018-01-08 | Resolved: 2019-12-17

## 2019-12-17 PROBLEM — T88.7XXA MEDICATION SIDE EFFECT: Status: RESOLVED | Noted: 2019-10-23 | Resolved: 2019-12-17

## 2019-12-17 PROBLEM — K81.0 ACUTE CHOLECYSTITIS: Status: ACTIVE | Noted: 2019-12-17

## 2019-12-17 PROBLEM — Z79.01 ANTICOAGULATION MANAGEMENT ENCOUNTER: Status: RESOLVED | Noted: 2017-11-20 | Resolved: 2019-12-17

## 2019-12-17 LAB
-: ABNORMAL
ABSOLUTE EOS #: <0.03 K/UL (ref 0–0.44)
ABSOLUTE IMMATURE GRANULOCYTE: 0.05 K/UL (ref 0–0.3)
ABSOLUTE LYMPH #: 1.18 K/UL (ref 1.1–3.7)
ABSOLUTE MONO #: 0.85 K/UL (ref 0.1–1.2)
ALBUMIN SERPL-MCNC: 3.1 G/DL (ref 3.5–5.2)
ALBUMIN/GLOBULIN RATIO: 0.9 (ref 1–2.5)
ALP BLD-CCNC: 62 U/L (ref 35–104)
ALT SERPL-CCNC: 8 U/L (ref 5–33)
AMORPHOUS: ABNORMAL
ANION GAP SERPL CALCULATED.3IONS-SCNC: 11 MMOL/L (ref 9–17)
APPEARANCE CSF: CLEAR
AST SERPL-CCNC: 11 U/L
BACTERIA: ABNORMAL
BASOPHILS # BLD: 0 % (ref 0–2)
BASOPHILS ABSOLUTE: <0.03 K/UL (ref 0–0.2)
BILIRUB SERPL-MCNC: 0.33 MG/DL (ref 0.3–1.2)
BILIRUBIN DIRECT: <0.08 MG/DL
BILIRUBIN URINE: NEGATIVE
BILIRUBIN, INDIRECT: ABNORMAL MG/DL (ref 0–1)
BUN BLDV-MCNC: 22 MG/DL (ref 8–23)
BUN/CREAT BLD: 24 (ref 9–20)
C-REACTIVE PROTEIN: 139.7 MG/L (ref 0–5)
CALCIUM SERPL-MCNC: 8.7 MG/DL (ref 8.6–10.4)
CASTS UA: ABNORMAL /LPF
CHLORIDE BLD-SCNC: 104 MMOL/L (ref 98–107)
CO2: 22 MMOL/L (ref 20–31)
COLOR: YELLOW
COMMENT UA: ABNORMAL
CREAT SERPL-MCNC: 0.91 MG/DL (ref 0.5–0.9)
CRYSTALS, UA: ABNORMAL /HPF
DIFFERENTIAL TYPE: ABNORMAL
DIRECT EXAM: NORMAL
EOSINOPHILS RELATIVE PERCENT: 0 % (ref 1–4)
EPITHELIAL CELLS UA: ABNORMAL /HPF (ref 0–25)
GFR AFRICAN AMERICAN: >60 ML/MIN
GFR NON-AFRICAN AMERICAN: 59 ML/MIN
GFR SERPL CREATININE-BSD FRML MDRD: ABNORMAL ML/MIN/{1.73_M2}
GFR SERPL CREATININE-BSD FRML MDRD: ABNORMAL ML/MIN/{1.73_M2}
GLOBULIN: ABNORMAL G/DL (ref 1.5–3.8)
GLUCOSE BLD-MCNC: 110 MG/DL (ref 70–99)
GLUCOSE URINE: NEGATIVE
GLUCOSE, CSF: 62 MG/DL (ref 40–70)
HCT VFR BLD CALC: 29.7 % (ref 36.3–47.1)
HEMOGLOBIN: 9.3 G/DL (ref 11.9–15.1)
IMMATURE GRANULOCYTES: 1 %
KETONES, URINE: ABNORMAL
LACTIC ACID: 1.6 MMOL/L (ref 0.5–2.2)
LEUKOCYTE ESTERASE, URINE: NEGATIVE
LIPASE: 16 U/L (ref 13–60)
LYMPHOCYTES # BLD: 12 % (ref 24–43)
Lab: NORMAL
MCH RBC QN AUTO: 29.9 PG (ref 25.2–33.5)
MCHC RBC AUTO-ENTMCNC: 31.3 G/DL (ref 28.4–34.8)
MCV RBC AUTO: 95.5 FL (ref 82.6–102.9)
MONOCYTES # BLD: 8 % (ref 3–12)
MUCUS: ABNORMAL
NITRITE, URINE: NEGATIVE
NRBC AUTOMATED: 0 PER 100 WBC
OTHER OBSERVATIONS UA: ABNORMAL
PDW BLD-RTO: 14 % (ref 11.8–14.4)
PH UA: 5.5 (ref 5–9)
PLATELET # BLD: 225 K/UL (ref 138–453)
PLATELET ESTIMATE: ABNORMAL
PMV BLD AUTO: 10 FL (ref 8.1–13.5)
POTASSIUM SERPL-SCNC: 3.7 MMOL/L (ref 3.7–5.3)
PROTEIN CSF: 19 MG/DL (ref 15–45)
PROTEIN UA: NEGATIVE
RBC # BLD: 3.11 M/UL (ref 3.95–5.11)
RBC # BLD: ABNORMAL 10*6/UL
RBC CSF: 3 /MM3
RBC UA: ABNORMAL /HPF (ref 0–2)
RENAL EPITHELIAL, UA: ABNORMAL /HPF
SEG NEUTROPHILS: 79 % (ref 36–65)
SEGMENTED NEUTROPHILS ABSOLUTE COUNT: 8.14 K/UL (ref 1.5–8.1)
SODIUM BLD-SCNC: 137 MMOL/L (ref 135–144)
SPECIFIC GRAVITY UA: 1.01 (ref 1.01–1.02)
SPECIMEN DESCRIPTION: NORMAL
SUPERNAT COLOR CSF: CLEAR
TOTAL CK: 19 U/L (ref 26–192)
TOTAL PROTEIN: 6.4 G/DL (ref 6.4–8.3)
TRICHOMONAS: ABNORMAL
TUBE NUMBER CSF: 4
TURBIDITY: CLEAR
URINE HGB: ABNORMAL
UROBILINOGEN, URINE: NORMAL
VOLUME CSF: ABNORMAL
WBC # BLD: 10.2 K/UL (ref 3.5–11.3)
WBC # BLD: ABNORMAL 10*3/UL
WBC CSF: 3 /MM3 (ref 0–5)
WBC UA: ABNORMAL /HPF (ref 0–5)
XANTHOCHROMIA: ABNORMAL
YEAST: ABNORMAL

## 2019-12-17 PROCEDURE — 87070 CULTURE OTHR SPECIMN AEROBIC: CPT

## 2019-12-17 PROCEDURE — 85025 COMPLETE CBC W/AUTO DIFF WBC: CPT

## 2019-12-17 PROCEDURE — 86140 C-REACTIVE PROTEIN: CPT

## 2019-12-17 PROCEDURE — 84157 ASSAY OF PROTEIN OTHER: CPT

## 2019-12-17 PROCEDURE — 89050 BODY FLUID CELL COUNT: CPT

## 2019-12-17 PROCEDURE — 6370000000 HC RX 637 (ALT 250 FOR IP): Performed by: INTERNAL MEDICINE

## 2019-12-17 PROCEDURE — 2500000003 HC RX 250 WO HCPCS: Performed by: EMERGENCY MEDICINE

## 2019-12-17 PROCEDURE — 009U3ZX DRAINAGE OF SPINAL CANAL, PERCUTANEOUS APPROACH, DIAGNOSTIC: ICD-10-PCS | Performed by: INTERNAL MEDICINE

## 2019-12-17 PROCEDURE — 72125 CT NECK SPINE W/O DYE: CPT

## 2019-12-17 PROCEDURE — 6360000004 HC RX CONTRAST MEDICATION: Performed by: EMERGENCY MEDICINE

## 2019-12-17 PROCEDURE — 71046 X-RAY EXAM CHEST 2 VIEWS: CPT

## 2019-12-17 PROCEDURE — 99285 EMERGENCY DEPT VISIT HI MDM: CPT

## 2019-12-17 PROCEDURE — 96374 THER/PROPH/DIAG INJ IV PUSH: CPT

## 2019-12-17 PROCEDURE — 80048 BASIC METABOLIC PNL TOTAL CA: CPT

## 2019-12-17 PROCEDURE — 83605 ASSAY OF LACTIC ACID: CPT

## 2019-12-17 PROCEDURE — 87040 BLOOD CULTURE FOR BACTERIA: CPT

## 2019-12-17 PROCEDURE — 70450 CT HEAD/BRAIN W/O DYE: CPT

## 2019-12-17 PROCEDURE — 1200000000 HC SEMI PRIVATE

## 2019-12-17 PROCEDURE — 81001 URINALYSIS AUTO W/SCOPE: CPT

## 2019-12-17 PROCEDURE — 80076 HEPATIC FUNCTION PANEL: CPT

## 2019-12-17 PROCEDURE — 2580000003 HC RX 258: Performed by: INTERNAL MEDICINE

## 2019-12-17 PROCEDURE — 6360000002 HC RX W HCPCS: Performed by: EMERGENCY MEDICINE

## 2019-12-17 PROCEDURE — 87205 SMEAR GRAM STAIN: CPT

## 2019-12-17 PROCEDURE — 74177 CT ABD & PELVIS W/CONTRAST: CPT

## 2019-12-17 PROCEDURE — 82945 GLUCOSE OTHER FLUID: CPT

## 2019-12-17 PROCEDURE — 82550 ASSAY OF CK (CPK): CPT

## 2019-12-17 PROCEDURE — 76705 ECHO EXAM OF ABDOMEN: CPT

## 2019-12-17 PROCEDURE — 2580000003 HC RX 258: Performed by: EMERGENCY MEDICINE

## 2019-12-17 PROCEDURE — 83690 ASSAY OF LIPASE: CPT

## 2019-12-17 PROCEDURE — 36415 COLL VENOUS BLD VENIPUNCTURE: CPT

## 2019-12-17 PROCEDURE — 87804 INFLUENZA ASSAY W/OPTIC: CPT

## 2019-12-17 RX ORDER — SODIUM CHLORIDE 0.9 % (FLUSH) 0.9 %
10 SYRINGE (ML) INJECTION EVERY 12 HOURS SCHEDULED
Status: DISCONTINUED | OUTPATIENT
Start: 2019-12-17 | End: 2019-12-21 | Stop reason: HOSPADM

## 2019-12-17 RX ORDER — LEVOTHYROXINE SODIUM 0.07 MG/1
75 TABLET ORAL DAILY
COMMUNITY
End: 2019-12-31 | Stop reason: SDUPTHER

## 2019-12-17 RX ORDER — LEVOTHYROXINE SODIUM 0.07 MG/1
75 TABLET ORAL DAILY
Status: DISCONTINUED | OUTPATIENT
Start: 2019-12-18 | End: 2019-12-21 | Stop reason: HOSPADM

## 2019-12-17 RX ORDER — ACETAMINOPHEN 325 MG/1
650 TABLET ORAL EVERY 4 HOURS PRN
Status: DISCONTINUED | OUTPATIENT
Start: 2019-12-17 | End: 2019-12-21 | Stop reason: HOSPADM

## 2019-12-17 RX ORDER — FAMOTIDINE 20 MG/1
20 TABLET, FILM COATED ORAL 2 TIMES DAILY
Status: DISCONTINUED | OUTPATIENT
Start: 2019-12-17 | End: 2019-12-18

## 2019-12-17 RX ORDER — DILTIAZEM HYDROCHLORIDE 180 MG/1
360 CAPSULE, COATED, EXTENDED RELEASE ORAL DAILY
Status: DISCONTINUED | OUTPATIENT
Start: 2019-12-18 | End: 2019-12-21 | Stop reason: HOSPADM

## 2019-12-17 RX ORDER — ONDANSETRON 2 MG/ML
4 INJECTION INTRAMUSCULAR; INTRAVENOUS EVERY 6 HOURS PRN
Status: DISCONTINUED | OUTPATIENT
Start: 2019-12-17 | End: 2019-12-21 | Stop reason: HOSPADM

## 2019-12-17 RX ORDER — LIDOCAINE HYDROCHLORIDE 10 MG/ML
20 INJECTION, SOLUTION EPIDURAL; INFILTRATION; INTRACAUDAL; PERINEURAL ONCE
Status: COMPLETED | OUTPATIENT
Start: 2019-12-17 | End: 2019-12-17

## 2019-12-17 RX ORDER — KETOROLAC TROMETHAMINE 15 MG/ML
15 INJECTION, SOLUTION INTRAMUSCULAR; INTRAVENOUS ONCE
Status: COMPLETED | OUTPATIENT
Start: 2019-12-17 | End: 2019-12-17

## 2019-12-17 RX ORDER — DOFETILIDE 0.25 MG/1
250 CAPSULE ORAL EVERY 12 HOURS SCHEDULED
Status: DISCONTINUED | OUTPATIENT
Start: 2019-12-17 | End: 2019-12-21 | Stop reason: HOSPADM

## 2019-12-17 RX ORDER — 0.9 % SODIUM CHLORIDE 0.9 %
500 INTRAVENOUS SOLUTION INTRAVENOUS ONCE
Status: COMPLETED | OUTPATIENT
Start: 2019-12-17 | End: 2019-12-17

## 2019-12-17 RX ORDER — SODIUM CHLORIDE 0.9 % (FLUSH) 0.9 %
10 SYRINGE (ML) INJECTION PRN
Status: DISCONTINUED | OUTPATIENT
Start: 2019-12-17 | End: 2019-12-21 | Stop reason: HOSPADM

## 2019-12-17 RX ORDER — BUPROPION HYDROCHLORIDE 150 MG/1
300 TABLET ORAL NIGHTLY
Status: DISCONTINUED | OUTPATIENT
Start: 2019-12-17 | End: 2019-12-21 | Stop reason: HOSPADM

## 2019-12-17 RX ORDER — SODIUM CHLORIDE 9 MG/ML
INJECTION, SOLUTION INTRAVENOUS CONTINUOUS
Status: DISCONTINUED | OUTPATIENT
Start: 2019-12-17 | End: 2019-12-20

## 2019-12-17 RX ADMIN — SODIUM CHLORIDE: 9 INJECTION, SOLUTION INTRAVENOUS at 22:09

## 2019-12-17 RX ADMIN — APIXABAN 2.5 MG: 2.5 TABLET, FILM COATED ORAL at 22:00

## 2019-12-17 RX ADMIN — SODIUM CHLORIDE 500 ML: 9 INJECTION, SOLUTION INTRAVENOUS at 09:48

## 2019-12-17 RX ADMIN — PIPERACILLIN AND TAZOBACTAM 3.38 G: 3; .375 INJECTION, POWDER, FOR SOLUTION INTRAVENOUS at 19:10

## 2019-12-17 RX ADMIN — FAMOTIDINE 20 MG: 20 TABLET ORAL at 21:59

## 2019-12-17 RX ADMIN — KETOROLAC TROMETHAMINE 15 MG: 15 INJECTION, SOLUTION INTRAMUSCULAR; INTRAVENOUS at 09:51

## 2019-12-17 RX ADMIN — ACETAMINOPHEN 650 MG: 325 TABLET, FILM COATED ORAL at 22:00

## 2019-12-17 RX ADMIN — BUPROPION HYDROCHLORIDE 300 MG: 150 TABLET, FILM COATED, EXTENDED RELEASE ORAL at 22:04

## 2019-12-17 RX ADMIN — DOFETILIDE 250 MCG: 0.25 CAPSULE ORAL at 22:00

## 2019-12-17 RX ADMIN — IOPAMIDOL 75 ML: 755 INJECTION, SOLUTION INTRAVENOUS at 11:41

## 2019-12-17 RX ADMIN — LIDOCAINE HYDROCHLORIDE 20 ML: 10 INJECTION, SOLUTION EPIDURAL; INFILTRATION; INTRACAUDAL at 16:30

## 2019-12-17 ASSESSMENT — PAIN DESCRIPTION - PAIN TYPE: TYPE: ACUTE PAIN

## 2019-12-17 ASSESSMENT — PAIN SCALES - GENERAL
PAINLEVEL_OUTOF10: 7
PAINLEVEL_OUTOF10: 10
PAINLEVEL_OUTOF10: 0
PAINLEVEL_OUTOF10: 10

## 2019-12-17 ASSESSMENT — PAIN DESCRIPTION - LOCATION: LOCATION: GENERALIZED

## 2019-12-18 PROBLEM — E44.1 MILD MALNUTRITION (HCC): Status: ACTIVE | Noted: 2019-12-18

## 2019-12-18 LAB
ABSOLUTE EOS #: 0.03 K/UL (ref 0–0.44)
ABSOLUTE IMMATURE GRANULOCYTE: 0.03 K/UL (ref 0–0.3)
ABSOLUTE LYMPH #: 1.7 K/UL (ref 1.1–3.7)
ABSOLUTE MONO #: 0.82 K/UL (ref 0.1–1.2)
ALBUMIN SERPL-MCNC: 2.6 G/DL (ref 3.5–5.2)
ALBUMIN/GLOBULIN RATIO: 0.8 (ref 1–2.5)
ALP BLD-CCNC: 56 U/L (ref 35–104)
ALT SERPL-CCNC: 7 U/L (ref 5–33)
ANION GAP SERPL CALCULATED.3IONS-SCNC: 12 MMOL/L (ref 9–17)
AST SERPL-CCNC: 9 U/L
BASOPHILS # BLD: 0 % (ref 0–2)
BASOPHILS ABSOLUTE: 0.03 K/UL (ref 0–0.2)
BILIRUB SERPL-MCNC: 0.25 MG/DL (ref 0.3–1.2)
BUN BLDV-MCNC: 29 MG/DL (ref 8–23)
BUN/CREAT BLD: 26 (ref 9–20)
CALCIUM SERPL-MCNC: 8.4 MG/DL (ref 8.6–10.4)
CHLORIDE BLD-SCNC: 105 MMOL/L (ref 98–107)
CO2: 19 MMOL/L (ref 20–31)
CREAT SERPL-MCNC: 1.11 MG/DL (ref 0.5–0.9)
DIFFERENTIAL TYPE: ABNORMAL
EKG ATRIAL RATE: 59 BPM
EKG P AXIS: -4 DEGREES
EKG P-R INTERVAL: 224 MS
EKG Q-T INTERVAL: 474 MS
EKG QRS DURATION: 76 MS
EKG QTC CALCULATION (BAZETT): 474 MS
EKG R AXIS: 33 DEGREES
EKG T AXIS: 28 DEGREES
EKG VENTRICULAR RATE: 60 BPM
EOSINOPHILS RELATIVE PERCENT: 0 % (ref 1–4)
GFR AFRICAN AMERICAN: 56 ML/MIN
GFR NON-AFRICAN AMERICAN: 47 ML/MIN
GFR SERPL CREATININE-BSD FRML MDRD: ABNORMAL ML/MIN/{1.73_M2}
GFR SERPL CREATININE-BSD FRML MDRD: ABNORMAL ML/MIN/{1.73_M2}
GLUCOSE BLD-MCNC: 98 MG/DL (ref 70–99)
HCT VFR BLD CALC: 27.8 % (ref 36.3–47.1)
HEMOGLOBIN: 8.6 G/DL (ref 11.9–15.1)
IMMATURE GRANULOCYTES: 0 %
LYMPHOCYTES # BLD: 24 % (ref 24–43)
MAGNESIUM: 2 MG/DL (ref 1.6–2.6)
MCH RBC QN AUTO: 29 PG (ref 25.2–33.5)
MCHC RBC AUTO-ENTMCNC: 30.9 G/DL (ref 28.4–34.8)
MCV RBC AUTO: 93.6 FL (ref 82.6–102.9)
MONOCYTES # BLD: 12 % (ref 3–12)
NRBC AUTOMATED: 0 PER 100 WBC
PDW BLD-RTO: 14.1 % (ref 11.8–14.4)
PLATELET # BLD: 230 K/UL (ref 138–453)
PLATELET ESTIMATE: ABNORMAL
PMV BLD AUTO: 10.2 FL (ref 8.1–13.5)
POTASSIUM SERPL-SCNC: 3.4 MMOL/L (ref 3.7–5.3)
RBC # BLD: 2.97 M/UL (ref 3.95–5.11)
RBC # BLD: ABNORMAL 10*6/UL
SEG NEUTROPHILS: 64 % (ref 36–65)
SEGMENTED NEUTROPHILS ABSOLUTE COUNT: 4.49 K/UL (ref 1.5–8.1)
SODIUM BLD-SCNC: 136 MMOL/L (ref 135–144)
TOTAL PROTEIN: 5.7 G/DL (ref 6.4–8.3)
WBC # BLD: 7.1 K/UL (ref 3.5–11.3)
WBC # BLD: ABNORMAL 10*3/UL

## 2019-12-18 PROCEDURE — 97116 GAIT TRAINING THERAPY: CPT

## 2019-12-18 PROCEDURE — 83735 ASSAY OF MAGNESIUM: CPT

## 2019-12-18 PROCEDURE — 97166 OT EVAL MOD COMPLEX 45 MIN: CPT

## 2019-12-18 PROCEDURE — 97530 THERAPEUTIC ACTIVITIES: CPT

## 2019-12-18 PROCEDURE — 97110 THERAPEUTIC EXERCISES: CPT

## 2019-12-18 PROCEDURE — 97162 PT EVAL MOD COMPLEX 30 MIN: CPT

## 2019-12-18 PROCEDURE — 2580000003 HC RX 258: Performed by: INTERNAL MEDICINE

## 2019-12-18 PROCEDURE — 85025 COMPLETE CBC W/AUTO DIFF WBC: CPT

## 2019-12-18 PROCEDURE — 93010 ELECTROCARDIOGRAM REPORT: CPT | Performed by: INTERNAL MEDICINE

## 2019-12-18 PROCEDURE — 93005 ELECTROCARDIOGRAM TRACING: CPT | Performed by: INTERNAL MEDICINE

## 2019-12-18 PROCEDURE — 6370000000 HC RX 637 (ALT 250 FOR IP): Performed by: INTERNAL MEDICINE

## 2019-12-18 PROCEDURE — 97161 PT EVAL LOW COMPLEX 20 MIN: CPT

## 2019-12-18 PROCEDURE — 6360000002 HC RX W HCPCS: Performed by: INTERNAL MEDICINE

## 2019-12-18 PROCEDURE — 1200000000 HC SEMI PRIVATE

## 2019-12-18 PROCEDURE — 80053 COMPREHEN METABOLIC PANEL: CPT

## 2019-12-18 PROCEDURE — 36415 COLL VENOUS BLD VENIPUNCTURE: CPT

## 2019-12-18 RX ORDER — FAMOTIDINE 20 MG/1
20 TABLET, FILM COATED ORAL DAILY
Status: DISCONTINUED | OUTPATIENT
Start: 2019-12-19 | End: 2019-12-21 | Stop reason: HOSPADM

## 2019-12-18 RX ADMIN — LEVOTHYROXINE SODIUM 75 MCG: 75 TABLET ORAL at 07:21

## 2019-12-18 RX ADMIN — PIPERACILLIN AND TAZOBACTAM 3.38 G: 3; .375 INJECTION, POWDER, FOR SOLUTION INTRAVENOUS at 02:18

## 2019-12-18 RX ADMIN — DILTIAZEM HYDROCHLORIDE 360 MG: 180 CAPSULE, COATED, EXTENDED RELEASE ORAL at 09:05

## 2019-12-18 RX ADMIN — APIXABAN 2.5 MG: 2.5 TABLET, FILM COATED ORAL at 09:06

## 2019-12-18 RX ADMIN — APIXABAN 2.5 MG: 2.5 TABLET, FILM COATED ORAL at 20:10

## 2019-12-18 RX ADMIN — DOFETILIDE 250 MCG: 0.25 CAPSULE ORAL at 09:05

## 2019-12-18 RX ADMIN — ACETAMINOPHEN 650 MG: 325 TABLET, FILM COATED ORAL at 18:38

## 2019-12-18 RX ADMIN — ACETAMINOPHEN 650 MG: 325 TABLET, FILM COATED ORAL at 12:31

## 2019-12-18 RX ADMIN — FAMOTIDINE 20 MG: 20 TABLET ORAL at 09:05

## 2019-12-18 RX ADMIN — BUPROPION HYDROCHLORIDE 300 MG: 150 TABLET, FILM COATED, EXTENDED RELEASE ORAL at 20:10

## 2019-12-18 RX ADMIN — PIPERACILLIN AND TAZOBACTAM 3.38 G: 3; .375 INJECTION, POWDER, FOR SOLUTION INTRAVENOUS at 12:31

## 2019-12-18 RX ADMIN — ACETAMINOPHEN 650 MG: 325 TABLET, FILM COATED ORAL at 03:15

## 2019-12-18 RX ADMIN — DOFETILIDE 250 MCG: 0.25 CAPSULE ORAL at 20:10

## 2019-12-18 RX ADMIN — METOPROLOL SUCCINATE 150 MG: 50 TABLET, EXTENDED RELEASE ORAL at 20:09

## 2019-12-18 RX ADMIN — SODIUM CHLORIDE: 9 INJECTION, SOLUTION INTRAVENOUS at 16:43

## 2019-12-18 RX ADMIN — PIPERACILLIN AND TAZOBACTAM 3.38 G: 3; .375 INJECTION, POWDER, FOR SOLUTION INTRAVENOUS at 20:09

## 2019-12-18 RX ADMIN — SODIUM CHLORIDE: 9 INJECTION, SOLUTION INTRAVENOUS at 02:12

## 2019-12-18 ASSESSMENT — PAIN DESCRIPTION - LOCATION
LOCATION: NECK
LOCATION: NECK

## 2019-12-18 ASSESSMENT — ENCOUNTER SYMPTOMS
COUGH: 1
COLOR CHANGE: 0
DIARRHEA: 0
ABDOMINAL PAIN: 1
PHOTOPHOBIA: 0
SORE THROAT: 0
SHORTNESS OF BREATH: 0
NAUSEA: 1
VOMITING: 0

## 2019-12-18 ASSESSMENT — PAIN SCALES - GENERAL
PAINLEVEL_OUTOF10: 3
PAINLEVEL_OUTOF10: 8
PAINLEVEL_OUTOF10: 10
PAINLEVEL_OUTOF10: 5
PAINLEVEL_OUTOF10: 10

## 2019-12-18 ASSESSMENT — PAIN DESCRIPTION - PAIN TYPE: TYPE: ACUTE PAIN

## 2019-12-18 ASSESSMENT — PAIN DESCRIPTION - ORIENTATION: ORIENTATION: POSTERIOR

## 2019-12-19 LAB
ABSOLUTE EOS #: 0.03 K/UL (ref 0–0.44)
ABSOLUTE IMMATURE GRANULOCYTE: 0.06 K/UL (ref 0–0.3)
ABSOLUTE LYMPH #: 1.05 K/UL (ref 1.1–3.7)
ABSOLUTE MONO #: 0.65 K/UL (ref 0.1–1.2)
ALBUMIN SERPL-MCNC: 3.2 G/DL (ref 3.5–5.2)
ALBUMIN/GLOBULIN RATIO: 0.9 (ref 1–2.5)
ALP BLD-CCNC: 68 U/L (ref 35–104)
ALT SERPL-CCNC: 10 U/L (ref 5–33)
ANION GAP SERPL CALCULATED.3IONS-SCNC: 14 MMOL/L (ref 9–17)
AST SERPL-CCNC: 13 U/L
BASOPHILS # BLD: 0 % (ref 0–2)
BASOPHILS ABSOLUTE: 0.04 K/UL (ref 0–0.2)
BILIRUB SERPL-MCNC: 0.32 MG/DL (ref 0.3–1.2)
BUN BLDV-MCNC: 23 MG/DL (ref 8–23)
BUN/CREAT BLD: 25 (ref 9–20)
CALCIUM SERPL-MCNC: 8.8 MG/DL (ref 8.6–10.4)
CHLORIDE BLD-SCNC: 110 MMOL/L (ref 98–107)
CO2: 19 MMOL/L (ref 20–31)
CREAT SERPL-MCNC: 0.92 MG/DL (ref 0.5–0.9)
DIFFERENTIAL TYPE: ABNORMAL
EOSINOPHILS RELATIVE PERCENT: 0 % (ref 1–4)
GFR AFRICAN AMERICAN: >60 ML/MIN
GFR NON-AFRICAN AMERICAN: 58 ML/MIN
GFR SERPL CREATININE-BSD FRML MDRD: ABNORMAL ML/MIN/{1.73_M2}
GFR SERPL CREATININE-BSD FRML MDRD: ABNORMAL ML/MIN/{1.73_M2}
GLUCOSE BLD-MCNC: 140 MG/DL (ref 70–99)
HCT VFR BLD CALC: 30.8 % (ref 36.3–47.1)
HEMOGLOBIN: 9.5 G/DL (ref 11.9–15.1)
IMMATURE GRANULOCYTES: 1 %
LYMPHOCYTES # BLD: 10 % (ref 24–43)
MCH RBC QN AUTO: 29.4 PG (ref 25.2–33.5)
MCHC RBC AUTO-ENTMCNC: 30.8 G/DL (ref 28.4–34.8)
MCV RBC AUTO: 95.4 FL (ref 82.6–102.9)
MONOCYTES # BLD: 6 % (ref 3–12)
NRBC AUTOMATED: 0 PER 100 WBC
PDW BLD-RTO: 14.1 % (ref 11.8–14.4)
PLATELET # BLD: 296 K/UL (ref 138–453)
PLATELET ESTIMATE: ABNORMAL
PMV BLD AUTO: 10 FL (ref 8.1–13.5)
POTASSIUM SERPL-SCNC: 3.7 MMOL/L (ref 3.7–5.3)
RBC # BLD: 3.23 M/UL (ref 3.95–5.11)
RBC # BLD: ABNORMAL 10*6/UL
SEG NEUTROPHILS: 83 % (ref 36–65)
SEGMENTED NEUTROPHILS ABSOLUTE COUNT: 8.73 K/UL (ref 1.5–8.1)
SODIUM BLD-SCNC: 143 MMOL/L (ref 135–144)
TOTAL PROTEIN: 6.7 G/DL (ref 6.4–8.3)
WBC # BLD: 10.6 K/UL (ref 3.5–11.3)
WBC # BLD: ABNORMAL 10*3/UL

## 2019-12-19 PROCEDURE — 2580000003 HC RX 258: Performed by: INTERNAL MEDICINE

## 2019-12-19 PROCEDURE — 6370000000 HC RX 637 (ALT 250 FOR IP): Performed by: INTERNAL MEDICINE

## 2019-12-19 PROCEDURE — 97110 THERAPEUTIC EXERCISES: CPT

## 2019-12-19 PROCEDURE — 85025 COMPLETE CBC W/AUTO DIFF WBC: CPT

## 2019-12-19 PROCEDURE — 97116 GAIT TRAINING THERAPY: CPT

## 2019-12-19 PROCEDURE — 80053 COMPREHEN METABOLIC PANEL: CPT

## 2019-12-19 PROCEDURE — 36415 COLL VENOUS BLD VENIPUNCTURE: CPT

## 2019-12-19 PROCEDURE — 97535 SELF CARE MNGMENT TRAINING: CPT

## 2019-12-19 PROCEDURE — 6360000002 HC RX W HCPCS: Performed by: INTERNAL MEDICINE

## 2019-12-19 PROCEDURE — 94664 DEMO&/EVAL PT USE INHALER: CPT

## 2019-12-19 PROCEDURE — 1200000000 HC SEMI PRIVATE

## 2019-12-19 RX ORDER — IPRATROPIUM BROMIDE AND ALBUTEROL SULFATE 2.5; .5 MG/3ML; MG/3ML
1 SOLUTION RESPIRATORY (INHALATION) EVERY 4 HOURS PRN
Status: DISCONTINUED | OUTPATIENT
Start: 2019-12-19 | End: 2019-12-21 | Stop reason: HOSPADM

## 2019-12-19 RX ADMIN — PIPERACILLIN AND TAZOBACTAM 3.38 G: 3; .375 INJECTION, POWDER, FOR SOLUTION INTRAVENOUS at 11:15

## 2019-12-19 RX ADMIN — SODIUM CHLORIDE: 9 INJECTION, SOLUTION INTRAVENOUS at 21:40

## 2019-12-19 RX ADMIN — SODIUM CHLORIDE: 9 INJECTION, SOLUTION INTRAVENOUS at 06:35

## 2019-12-19 RX ADMIN — METOPROLOL SUCCINATE 150 MG: 50 TABLET, EXTENDED RELEASE ORAL at 20:16

## 2019-12-19 RX ADMIN — DILTIAZEM HYDROCHLORIDE 360 MG: 180 CAPSULE, COATED, EXTENDED RELEASE ORAL at 08:42

## 2019-12-19 RX ADMIN — ACETAMINOPHEN 650 MG: 325 TABLET, FILM COATED ORAL at 11:51

## 2019-12-19 RX ADMIN — APIXABAN 2.5 MG: 2.5 TABLET, FILM COATED ORAL at 20:16

## 2019-12-19 RX ADMIN — APIXABAN 2.5 MG: 2.5 TABLET, FILM COATED ORAL at 08:43

## 2019-12-19 RX ADMIN — ACETAMINOPHEN 650 MG: 325 TABLET, FILM COATED ORAL at 21:40

## 2019-12-19 RX ADMIN — PIPERACILLIN AND TAZOBACTAM 3.38 G: 3; .375 INJECTION, POWDER, FOR SOLUTION INTRAVENOUS at 03:32

## 2019-12-19 RX ADMIN — DOFETILIDE 250 MCG: 0.25 CAPSULE ORAL at 20:16

## 2019-12-19 RX ADMIN — BUPROPION HYDROCHLORIDE 300 MG: 150 TABLET, FILM COATED, EXTENDED RELEASE ORAL at 20:16

## 2019-12-19 RX ADMIN — DOFETILIDE 250 MCG: 0.25 CAPSULE ORAL at 08:43

## 2019-12-19 RX ADMIN — Medication 10 ML: at 08:45

## 2019-12-19 RX ADMIN — PIPERACILLIN AND TAZOBACTAM 3.38 G: 3; .375 INJECTION, POWDER, FOR SOLUTION INTRAVENOUS at 19:17

## 2019-12-19 RX ADMIN — FAMOTIDINE 20 MG: 20 TABLET ORAL at 08:43

## 2019-12-19 RX ADMIN — LEVOTHYROXINE SODIUM 75 MCG: 75 TABLET ORAL at 08:45

## 2019-12-19 ASSESSMENT — PAIN SCALES - GENERAL
PAINLEVEL_OUTOF10: 10
PAINLEVEL_OUTOF10: 10
PAINLEVEL_OUTOF10: 0
PAINLEVEL_OUTOF10: 5
PAINLEVEL_OUTOF10: 4

## 2019-12-20 ENCOUNTER — APPOINTMENT (OUTPATIENT)
Dept: GENERAL RADIOLOGY | Age: 84
DRG: 444 | End: 2019-12-20
Payer: MEDICARE

## 2019-12-20 LAB
ABSOLUTE EOS #: 0.14 K/UL (ref 0–0.44)
ABSOLUTE IMMATURE GRANULOCYTE: 0.05 K/UL (ref 0–0.3)
ABSOLUTE LYMPH #: 1.18 K/UL (ref 1.1–3.7)
ABSOLUTE MONO #: 0.61 K/UL (ref 0.1–1.2)
ALBUMIN SERPL-MCNC: 2.9 G/DL (ref 3.5–5.2)
ALBUMIN/GLOBULIN RATIO: 0.9 (ref 1–2.5)
ALP BLD-CCNC: 66 U/L (ref 35–104)
ALT SERPL-CCNC: 6 U/L (ref 5–33)
ANION GAP SERPL CALCULATED.3IONS-SCNC: 13 MMOL/L (ref 9–17)
AST SERPL-CCNC: 15 U/L
BASOPHILS # BLD: 0 % (ref 0–2)
BASOPHILS ABSOLUTE: 0.03 K/UL (ref 0–0.2)
BILIRUB SERPL-MCNC: 0.27 MG/DL (ref 0.3–1.2)
BUN BLDV-MCNC: 19 MG/DL (ref 8–23)
BUN/CREAT BLD: 21 (ref 9–20)
CALCIUM SERPL-MCNC: 8.2 MG/DL (ref 8.6–10.4)
CHLORIDE BLD-SCNC: 110 MMOL/L (ref 98–107)
CO2: 20 MMOL/L (ref 20–31)
CREAT SERPL-MCNC: 0.89 MG/DL (ref 0.5–0.9)
CULTURE: ABNORMAL
CULTURE: NORMAL
DIFFERENTIAL TYPE: ABNORMAL
DIRECT EXAM: NORMAL
EOSINOPHILS RELATIVE PERCENT: 2 % (ref 1–4)
GFR AFRICAN AMERICAN: >60 ML/MIN
GFR NON-AFRICAN AMERICAN: >60 ML/MIN
GFR SERPL CREATININE-BSD FRML MDRD: ABNORMAL ML/MIN/{1.73_M2}
GFR SERPL CREATININE-BSD FRML MDRD: ABNORMAL ML/MIN/{1.73_M2}
GLUCOSE BLD-MCNC: 134 MG/DL (ref 70–99)
HCT VFR BLD CALC: 31.1 % (ref 36.3–47.1)
HEMOGLOBIN: 9.8 G/DL (ref 11.9–15.1)
IMMATURE GRANULOCYTES: 1 %
LYMPHOCYTES # BLD: 15 % (ref 24–43)
Lab: ABNORMAL
Lab: NORMAL
MAGNESIUM: 2 MG/DL (ref 1.6–2.6)
MCH RBC QN AUTO: 29.9 PG (ref 25.2–33.5)
MCHC RBC AUTO-ENTMCNC: 31.5 G/DL (ref 28.4–34.8)
MCV RBC AUTO: 94.8 FL (ref 82.6–102.9)
MONOCYTES # BLD: 8 % (ref 3–12)
NRBC AUTOMATED: 0 PER 100 WBC
PDW BLD-RTO: 14.2 % (ref 11.8–14.4)
PLATELET # BLD: 273 K/UL (ref 138–453)
PLATELET ESTIMATE: ABNORMAL
PMV BLD AUTO: 9.6 FL (ref 8.1–13.5)
POTASSIUM SERPL-SCNC: 3.3 MMOL/L (ref 3.7–5.3)
RBC # BLD: 3.28 M/UL (ref 3.95–5.11)
RBC # BLD: ABNORMAL 10*6/UL
SEG NEUTROPHILS: 74 % (ref 36–65)
SEGMENTED NEUTROPHILS ABSOLUTE COUNT: 6.05 K/UL (ref 1.5–8.1)
SODIUM BLD-SCNC: 143 MMOL/L (ref 135–144)
SPECIMEN DESCRIPTION: ABNORMAL
SPECIMEN DESCRIPTION: NORMAL
TOTAL PROTEIN: 6.2 G/DL (ref 6.4–8.3)
WBC # BLD: 8.1 K/UL (ref 3.5–11.3)
WBC # BLD: ABNORMAL 10*3/UL

## 2019-12-20 PROCEDURE — 97116 GAIT TRAINING THERAPY: CPT

## 2019-12-20 PROCEDURE — 85025 COMPLETE CBC W/AUTO DIFF WBC: CPT

## 2019-12-20 PROCEDURE — 2700000000 HC OXYGEN THERAPY PER DAY

## 2019-12-20 PROCEDURE — 97110 THERAPEUTIC EXERCISES: CPT

## 2019-12-20 PROCEDURE — 94664 DEMO&/EVAL PT USE INHALER: CPT

## 2019-12-20 PROCEDURE — 97535 SELF CARE MNGMENT TRAINING: CPT

## 2019-12-20 PROCEDURE — 6370000000 HC RX 637 (ALT 250 FOR IP): Performed by: INTERNAL MEDICINE

## 2019-12-20 PROCEDURE — 94760 N-INVAS EAR/PLS OXIMETRY 1: CPT

## 2019-12-20 PROCEDURE — 2580000003 HC RX 258: Performed by: INTERNAL MEDICINE

## 2019-12-20 PROCEDURE — 80053 COMPREHEN METABOLIC PANEL: CPT

## 2019-12-20 PROCEDURE — 71045 X-RAY EXAM CHEST 1 VIEW: CPT

## 2019-12-20 PROCEDURE — 83735 ASSAY OF MAGNESIUM: CPT

## 2019-12-20 PROCEDURE — 6360000002 HC RX W HCPCS: Performed by: INTERNAL MEDICINE

## 2019-12-20 PROCEDURE — 1200000000 HC SEMI PRIVATE

## 2019-12-20 PROCEDURE — 94640 AIRWAY INHALATION TREATMENT: CPT

## 2019-12-20 PROCEDURE — 36415 COLL VENOUS BLD VENIPUNCTURE: CPT

## 2019-12-20 RX ORDER — FUROSEMIDE 10 MG/ML
40 INJECTION INTRAMUSCULAR; INTRAVENOUS ONCE
Status: COMPLETED | OUTPATIENT
Start: 2019-12-20 | End: 2019-12-20

## 2019-12-20 RX ORDER — POTASSIUM CHLORIDE 7.45 MG/ML
10 INJECTION INTRAVENOUS PRN
Status: DISCONTINUED | OUTPATIENT
Start: 2019-12-20 | End: 2019-12-21 | Stop reason: HOSPADM

## 2019-12-20 RX ORDER — POTASSIUM CHLORIDE 20 MEQ/1
40 TABLET, EXTENDED RELEASE ORAL PRN
Status: DISCONTINUED | OUTPATIENT
Start: 2019-12-20 | End: 2019-12-21 | Stop reason: HOSPADM

## 2019-12-20 RX ADMIN — LEVOTHYROXINE SODIUM 75 MCG: 75 TABLET ORAL at 08:54

## 2019-12-20 RX ADMIN — PIPERACILLIN AND TAZOBACTAM 3.38 G: 3; .375 INJECTION, POWDER, FOR SOLUTION INTRAVENOUS at 19:44

## 2019-12-20 RX ADMIN — METOPROLOL SUCCINATE 150 MG: 50 TABLET, EXTENDED RELEASE ORAL at 21:28

## 2019-12-20 RX ADMIN — DOFETILIDE 250 MCG: 0.25 CAPSULE ORAL at 08:54

## 2019-12-20 RX ADMIN — POTASSIUM CHLORIDE 40 MEQ: 20 TABLET, EXTENDED RELEASE ORAL at 08:54

## 2019-12-20 RX ADMIN — ACETAMINOPHEN 650 MG: 325 TABLET, FILM COATED ORAL at 04:15

## 2019-12-20 RX ADMIN — ACETAMINOPHEN 650 MG: 325 TABLET, FILM COATED ORAL at 11:30

## 2019-12-20 RX ADMIN — APIXABAN 2.5 MG: 2.5 TABLET, FILM COATED ORAL at 21:28

## 2019-12-20 RX ADMIN — PIPERACILLIN AND TAZOBACTAM 3.38 G: 3; .375 INJECTION, POWDER, FOR SOLUTION INTRAVENOUS at 02:16

## 2019-12-20 RX ADMIN — DILTIAZEM HYDROCHLORIDE 360 MG: 180 CAPSULE, COATED, EXTENDED RELEASE ORAL at 08:54

## 2019-12-20 RX ADMIN — FAMOTIDINE 20 MG: 20 TABLET ORAL at 21:27

## 2019-12-20 RX ADMIN — FUROSEMIDE 40 MG: 10 INJECTION, SOLUTION INTRAMUSCULAR; INTRAVENOUS at 03:46

## 2019-12-20 RX ADMIN — APIXABAN 2.5 MG: 2.5 TABLET, FILM COATED ORAL at 08:54

## 2019-12-20 RX ADMIN — BUPROPION HYDROCHLORIDE 300 MG: 150 TABLET, FILM COATED, EXTENDED RELEASE ORAL at 21:28

## 2019-12-20 RX ADMIN — DOFETILIDE 250 MCG: 0.25 CAPSULE ORAL at 21:28

## 2019-12-20 RX ADMIN — IPRATROPIUM BROMIDE AND ALBUTEROL SULFATE 1 AMPULE: .5; 3 SOLUTION RESPIRATORY (INHALATION) at 03:24

## 2019-12-20 RX ADMIN — PIPERACILLIN AND TAZOBACTAM 3.38 G: 3; .375 INJECTION, POWDER, FOR SOLUTION INTRAVENOUS at 11:29

## 2019-12-20 RX ADMIN — ACETAMINOPHEN 650 MG: 325 TABLET, FILM COATED ORAL at 21:28

## 2019-12-20 ASSESSMENT — PAIN DESCRIPTION - LOCATION
LOCATION: NECK
LOCATION: HEAD

## 2019-12-20 ASSESSMENT — PAIN SCALES - GENERAL
PAINLEVEL_OUTOF10: 7
PAINLEVEL_OUTOF10: 3
PAINLEVEL_OUTOF10: 10
PAINLEVEL_OUTOF10: 3
PAINLEVEL_OUTOF10: 10
PAINLEVEL_OUTOF10: 0
PAINLEVEL_OUTOF10: 5
PAINLEVEL_OUTOF10: 3

## 2019-12-20 ASSESSMENT — PAIN DESCRIPTION - ORIENTATION: ORIENTATION: POSTERIOR

## 2019-12-20 ASSESSMENT — PAIN DESCRIPTION - PAIN TYPE
TYPE: ACUTE PAIN
TYPE: ACUTE PAIN

## 2019-12-21 VITALS
HEIGHT: 60 IN | OXYGEN SATURATION: 95 % | HEART RATE: 64 BPM | TEMPERATURE: 97.1 F | WEIGHT: 107.5 LBS | BODY MASS INDEX: 21.1 KG/M2 | RESPIRATION RATE: 20 BRPM | DIASTOLIC BLOOD PRESSURE: 54 MMHG | SYSTOLIC BLOOD PRESSURE: 138 MMHG

## 2019-12-21 LAB
ABSOLUTE EOS #: 0.3 K/UL (ref 0–0.44)
ABSOLUTE IMMATURE GRANULOCYTE: 0.07 K/UL (ref 0–0.3)
ABSOLUTE LYMPH #: 2.39 K/UL (ref 1.1–3.7)
ABSOLUTE MONO #: 0.94 K/UL (ref 0.1–1.2)
BASOPHILS # BLD: 0 % (ref 0–2)
BASOPHILS ABSOLUTE: 0.03 K/UL (ref 0–0.2)
DIFFERENTIAL TYPE: ABNORMAL
EOSINOPHILS RELATIVE PERCENT: 3 % (ref 1–4)
HCT VFR BLD CALC: 29.8 % (ref 36.3–47.1)
HEMOGLOBIN: 9.2 G/DL (ref 11.9–15.1)
IMMATURE GRANULOCYTES: 1 %
LYMPHOCYTES # BLD: 26 % (ref 24–43)
MCH RBC QN AUTO: 29.1 PG (ref 25.2–33.5)
MCHC RBC AUTO-ENTMCNC: 30.9 G/DL (ref 28.4–34.8)
MCV RBC AUTO: 94.3 FL (ref 82.6–102.9)
MONOCYTES # BLD: 10 % (ref 3–12)
NRBC AUTOMATED: 0 PER 100 WBC
PDW BLD-RTO: 14.2 % (ref 11.8–14.4)
PLATELET # BLD: 314 K/UL (ref 138–453)
PLATELET ESTIMATE: ABNORMAL
PMV BLD AUTO: 9.9 FL (ref 8.1–13.5)
RBC # BLD: 3.16 M/UL (ref 3.95–5.11)
RBC # BLD: ABNORMAL 10*6/UL
SEG NEUTROPHILS: 59 % (ref 36–65)
SEGMENTED NEUTROPHILS ABSOLUTE COUNT: 5.38 K/UL (ref 1.5–8.1)
WBC # BLD: 9.1 K/UL (ref 3.5–11.3)
WBC # BLD: ABNORMAL 10*3/UL

## 2019-12-21 PROCEDURE — 6370000000 HC RX 637 (ALT 250 FOR IP): Performed by: INTERNAL MEDICINE

## 2019-12-21 PROCEDURE — 97116 GAIT TRAINING THERAPY: CPT

## 2019-12-21 PROCEDURE — 94640 AIRWAY INHALATION TREATMENT: CPT

## 2019-12-21 PROCEDURE — 85025 COMPLETE CBC W/AUTO DIFF WBC: CPT

## 2019-12-21 PROCEDURE — 2580000003 HC RX 258: Performed by: INTERNAL MEDICINE

## 2019-12-21 PROCEDURE — 6360000002 HC RX W HCPCS: Performed by: INTERNAL MEDICINE

## 2019-12-21 PROCEDURE — 36415 COLL VENOUS BLD VENIPUNCTURE: CPT

## 2019-12-21 PROCEDURE — 97110 THERAPEUTIC EXERCISES: CPT

## 2019-12-21 RX ORDER — ACETAMINOPHEN 500 MG
1000 TABLET ORAL 4 TIMES DAILY PRN
COMMUNITY
Start: 2019-12-21

## 2019-12-21 RX ORDER — AMOXICILLIN AND CLAVULANATE POTASSIUM 875; 125 MG/1; MG/1
1 TABLET, FILM COATED ORAL 2 TIMES DAILY
Qty: 12 TABLET | Refills: 0 | Status: SHIPPED | OUTPATIENT
Start: 2019-12-21 | End: 2019-12-27

## 2019-12-21 RX ADMIN — ACETAMINOPHEN 650 MG: 325 TABLET, FILM COATED ORAL at 05:34

## 2019-12-21 RX ADMIN — LEVOTHYROXINE SODIUM 75 MCG: 75 TABLET ORAL at 07:47

## 2019-12-21 RX ADMIN — APIXABAN 2.5 MG: 2.5 TABLET, FILM COATED ORAL at 08:48

## 2019-12-21 RX ADMIN — DILTIAZEM HYDROCHLORIDE 360 MG: 180 CAPSULE, COATED, EXTENDED RELEASE ORAL at 08:48

## 2019-12-21 RX ADMIN — IPRATROPIUM BROMIDE AND ALBUTEROL SULFATE 1 AMPULE: .5; 3 SOLUTION RESPIRATORY (INHALATION) at 06:03

## 2019-12-21 RX ADMIN — FAMOTIDINE 20 MG: 20 TABLET ORAL at 08:48

## 2019-12-21 RX ADMIN — PIPERACILLIN AND TAZOBACTAM 3.38 G: 3; .375 INJECTION, POWDER, FOR SOLUTION INTRAVENOUS at 04:25

## 2019-12-21 RX ADMIN — DOFETILIDE 250 MCG: 0.25 CAPSULE ORAL at 08:48

## 2019-12-21 RX ADMIN — Medication 10 ML: at 08:48

## 2019-12-21 ASSESSMENT — PAIN SCALES - GENERAL
PAINLEVEL_OUTOF10: 0
PAINLEVEL_OUTOF10: 8
PAINLEVEL_OUTOF10: 0
PAINLEVEL_OUTOF10: 2

## 2019-12-21 ASSESSMENT — PAIN DESCRIPTION - FREQUENCY: FREQUENCY: CONTINUOUS

## 2019-12-21 ASSESSMENT — PAIN DESCRIPTION - ORIENTATION: ORIENTATION: POSTERIOR

## 2019-12-21 ASSESSMENT — PAIN DESCRIPTION - PAIN TYPE: TYPE: ACUTE PAIN

## 2019-12-21 ASSESSMENT — PAIN DESCRIPTION - LOCATION: LOCATION: HEAD

## 2019-12-21 ASSESSMENT — PAIN DESCRIPTION - DESCRIPTORS: DESCRIPTORS: HEADACHE

## 2019-12-22 ENCOUNTER — CARE COORDINATION (OUTPATIENT)
Dept: CASE MANAGEMENT | Age: 84
End: 2019-12-22

## 2019-12-22 LAB
CULTURE: NORMAL
Lab: NORMAL
SPECIMEN DESCRIPTION: NORMAL

## 2019-12-23 ENCOUNTER — CARE COORDINATION (OUTPATIENT)
Dept: CASE MANAGEMENT | Age: 84
End: 2019-12-23

## 2019-12-23 DIAGNOSIS — K81.0 ACUTE CHOLECYSTITIS: Primary | ICD-10-CM

## 2019-12-23 PROCEDURE — 1111F DSCHRG MED/CURRENT MED MERGE: CPT | Performed by: NURSE PRACTITIONER

## 2019-12-30 ENCOUNTER — OFFICE VISIT (OUTPATIENT)
Dept: PRIMARY CARE CLINIC | Age: 84
End: 2019-12-30
Payer: MEDICARE

## 2019-12-30 ENCOUNTER — HOSPITAL ENCOUNTER (OUTPATIENT)
Age: 84
Discharge: HOME | End: 2019-12-30
Payer: MEDICARE

## 2019-12-30 VITALS
HEART RATE: 100 BPM | WEIGHT: 100.1 LBS | SYSTOLIC BLOOD PRESSURE: 130 MMHG | TEMPERATURE: 98.6 F | BODY MASS INDEX: 19.65 KG/M2 | HEIGHT: 60 IN | DIASTOLIC BLOOD PRESSURE: 73 MMHG | RESPIRATION RATE: 18 BRPM

## 2019-12-30 DIAGNOSIS — D64.9 ANEMIA, UNSPECIFIED TYPE: ICD-10-CM

## 2019-12-30 DIAGNOSIS — K81.0 ACUTE CHOLECYSTITIS: Primary | ICD-10-CM

## 2019-12-30 DIAGNOSIS — E03.1 CONGENITAL HYPOTHYROIDISM: ICD-10-CM

## 2019-12-30 DIAGNOSIS — G89.29 CHRONIC NECK PAIN: ICD-10-CM

## 2019-12-30 DIAGNOSIS — E44.1 MILD MALNUTRITION (HCC): ICD-10-CM

## 2019-12-30 DIAGNOSIS — M54.2 CHRONIC NECK PAIN: ICD-10-CM

## 2019-12-30 LAB
ABSOLUTE EOS #: 0.16 K/UL (ref 0–0.44)
ABSOLUTE IMMATURE GRANULOCYTE: 0 K/UL (ref 0–0.3)
ABSOLUTE LYMPH #: 2.08 K/UL (ref 1.1–3.7)
ABSOLUTE MONO #: 0.56 K/UL (ref 0.1–1.2)
BASOPHILS # BLD: 0 % (ref 0–2)
BASOPHILS ABSOLUTE: 0 K/UL (ref 0–0.2)
DIFFERENTIAL TYPE: ABNORMAL
EOSINOPHILS RELATIVE PERCENT: 2 % (ref 1–4)
FERRITIN: 96 UG/L (ref 13–150)
FOLATE: 9 NG/ML
HCT VFR BLD CALC: 35.8 % (ref 36.3–47.1)
HEMOGLOBIN: 10.9 G/DL (ref 11.9–15.1)
IMMATURE GRANULOCYTES: 0 %
IRON SATURATION: 11 % (ref 20–55)
IRON: 26 UG/DL (ref 37–145)
LYMPHOCYTES # BLD: 26 % (ref 24–43)
MCH RBC QN AUTO: 28.9 PG (ref 25.2–33.5)
MCHC RBC AUTO-ENTMCNC: 30.4 G/DL (ref 28.4–34.8)
MCV RBC AUTO: 95 FL (ref 82.6–102.9)
MONOCYTES # BLD: 7 % (ref 3–12)
MORPHOLOGY: NORMAL
NRBC AUTOMATED: 0 PER 100 WBC
PDW BLD-RTO: 14.5 % (ref 11.8–14.4)
PLATELET # BLD: 539 K/UL (ref 138–453)
PLATELET ESTIMATE: ABNORMAL
PMV BLD AUTO: 9.1 FL (ref 8.1–13.5)
RBC # BLD: 3.77 M/UL (ref 3.95–5.11)
RBC # BLD: ABNORMAL 10*6/UL
SEG NEUTROPHILS: 65 % (ref 36–65)
SEGMENTED NEUTROPHILS ABSOLUTE COUNT: 5.2 K/UL (ref 1.5–8.1)
THYROXINE, FREE: 1.12 NG/DL (ref 0.93–1.7)
TOTAL IRON BINDING CAPACITY: 227 UG/DL (ref 250–450)
TSH SERPL DL<=0.05 MIU/L-ACNC: 3.51 MIU/L (ref 0.3–5)
UNSATURATED IRON BINDING CAPACITY: 201 UG/DL (ref 112–347)
VITAMIN B-12: 736 PG/ML (ref 232–1245)
WBC # BLD: 8 K/UL (ref 3.5–11.3)
WBC # BLD: ABNORMAL 10*3/UL

## 2019-12-30 PROCEDURE — 99495 TRANSJ CARE MGMT MOD F2F 14D: CPT | Performed by: NURSE PRACTITIONER

## 2019-12-30 PROCEDURE — 82607 VITAMIN B-12: CPT

## 2019-12-30 PROCEDURE — 85025 COMPLETE CBC W/AUTO DIFF WBC: CPT

## 2019-12-30 PROCEDURE — 83540 ASSAY OF IRON: CPT

## 2019-12-30 PROCEDURE — 84439 ASSAY OF FREE THYROXINE: CPT

## 2019-12-30 PROCEDURE — 84443 ASSAY THYROID STIM HORMONE: CPT

## 2019-12-30 PROCEDURE — 82746 ASSAY OF FOLIC ACID SERUM: CPT

## 2019-12-30 PROCEDURE — 36415 COLL VENOUS BLD VENIPUNCTURE: CPT

## 2019-12-30 PROCEDURE — 1111F DSCHRG MED/CURRENT MED MERGE: CPT | Performed by: NURSE PRACTITIONER

## 2019-12-30 PROCEDURE — 82728 ASSAY OF FERRITIN: CPT

## 2019-12-30 PROCEDURE — 83550 IRON BINDING TEST: CPT

## 2019-12-30 RX ORDER — TRAMADOL HYDROCHLORIDE 50 MG/1
50 TABLET ORAL 2 TIMES DAILY PRN
Qty: 60 TABLET | Refills: 0 | Status: SHIPPED | OUTPATIENT
Start: 2019-12-30 | End: 2020-01-29

## 2019-12-31 ENCOUNTER — TELEPHONE (OUTPATIENT)
Dept: PRIMARY CARE CLINIC | Age: 84
End: 2019-12-31

## 2019-12-31 DIAGNOSIS — D50.9 IRON DEFICIENCY ANEMIA, UNSPECIFIED IRON DEFICIENCY ANEMIA TYPE: Primary | ICD-10-CM

## 2019-12-31 DIAGNOSIS — E03.1 CONGENITAL HYPOTHYROIDISM: Primary | ICD-10-CM

## 2019-12-31 RX ORDER — LEVOTHYROXINE SODIUM 0.1 MG/1
100 TABLET ORAL DAILY
Qty: 90 TABLET | Refills: 3 | Status: SHIPPED | OUTPATIENT
Start: 2019-12-31 | End: 2021-08-23

## 2020-01-02 ENCOUNTER — CARE COORDINATION (OUTPATIENT)
Dept: CASE MANAGEMENT | Age: 85
End: 2020-01-02

## 2020-01-17 ENCOUNTER — APPOINTMENT (OUTPATIENT)
Dept: CT IMAGING | Age: 85
End: 2020-01-17
Payer: MEDICARE

## 2020-01-17 ENCOUNTER — TELEPHONE (OUTPATIENT)
Dept: PRIMARY CARE CLINIC | Age: 85
End: 2020-01-17

## 2020-01-17 ENCOUNTER — HOSPITAL ENCOUNTER (EMERGENCY)
Age: 85
Discharge: ANOTHER HOSPITAL | End: 2020-01-17
Payer: MEDICARE

## 2020-01-17 ENCOUNTER — HOSPITAL ENCOUNTER (INPATIENT)
Age: 85
LOS: 2 days | Discharge: HOME | DRG: 552 | End: 2020-01-19
Attending: EMERGENCY MEDICINE | Admitting: INTERNAL MEDICINE
Payer: MEDICARE

## 2020-01-17 VITALS
HEART RATE: 90 BPM | TEMPERATURE: 98.7 F | OXYGEN SATURATION: 95 % | SYSTOLIC BLOOD PRESSURE: 128 MMHG | RESPIRATION RATE: 16 BRPM | DIASTOLIC BLOOD PRESSURE: 88 MMHG

## 2020-01-17 PROBLEM — M54.9 BACK PAIN: Status: ACTIVE | Noted: 2020-01-17

## 2020-01-17 LAB
-: NORMAL
ABSOLUTE EOS #: <0.03 K/UL (ref 0–0.44)
ABSOLUTE IMMATURE GRANULOCYTE: 0.03 K/UL (ref 0–0.3)
ABSOLUTE LYMPH #: 2.19 K/UL (ref 1.1–3.7)
ABSOLUTE MONO #: 0.86 K/UL (ref 0.1–1.2)
ALBUMIN SERPL-MCNC: 4 G/DL (ref 3.5–5.2)
ALBUMIN/GLOBULIN RATIO: 1.1 (ref 1–2.5)
ALP BLD-CCNC: 82 U/L (ref 35–104)
ALT SERPL-CCNC: 11 U/L (ref 5–33)
AMORPHOUS: NORMAL
ANION GAP SERPL CALCULATED.3IONS-SCNC: 13 MMOL/L (ref 9–17)
AST SERPL-CCNC: 21 U/L
BACTERIA: NORMAL
BASOPHILS # BLD: 0 % (ref 0–2)
BASOPHILS ABSOLUTE: <0.03 K/UL (ref 0–0.2)
BILIRUB SERPL-MCNC: 0.51 MG/DL (ref 0.3–1.2)
BILIRUBIN URINE: NEGATIVE
BUN BLDV-MCNC: 24 MG/DL (ref 8–23)
BUN/CREAT BLD: 28 (ref 9–20)
C-REACTIVE PROTEIN: 84.6 MG/L (ref 0–5)
CALCIUM IONIZED: 0.95 MMOL/L (ref 1.13–1.33)
CALCIUM SERPL-MCNC: 9.5 MG/DL (ref 8.6–10.4)
CASTS UA: NORMAL /LPF
CHLORIDE BLD-SCNC: 102 MMOL/L (ref 98–107)
CO2: 24 MMOL/L (ref 20–31)
COLOR: YELLOW
COMMENT UA: ABNORMAL
CREAT SERPL-MCNC: 0.87 MG/DL (ref 0.5–0.9)
CRYSTALS, UA: NORMAL /HPF
DIFFERENTIAL TYPE: ABNORMAL
EOSINOPHILS RELATIVE PERCENT: 0 % (ref 1–4)
EPITHELIAL CELLS UA: NORMAL /HPF (ref 0–25)
GFR AFRICAN AMERICAN: >60 ML/MIN
GFR NON-AFRICAN AMERICAN: >60 ML/MIN
GFR SERPL CREATININE-BSD FRML MDRD: ABNORMAL ML/MIN/{1.73_M2}
GFR SERPL CREATININE-BSD FRML MDRD: ABNORMAL ML/MIN/{1.73_M2}
GLUCOSE BLD-MCNC: 106 MG/DL (ref 70–99)
GLUCOSE URINE: NEGATIVE
HCT VFR BLD CALC: 35.1 % (ref 36.3–47.1)
HEMOGLOBIN: 10.8 G/DL (ref 11.9–15.1)
IMMATURE GRANULOCYTES: 0 %
KETONES, URINE: NEGATIVE
LEUKOCYTE ESTERASE, URINE: NEGATIVE
LIPASE: 23 U/L (ref 13–60)
LYMPHOCYTES # BLD: 23 % (ref 24–43)
MAGNESIUM: 2 MG/DL (ref 1.6–2.6)
MCH RBC QN AUTO: 28.2 PG (ref 25.2–33.5)
MCHC RBC AUTO-ENTMCNC: 30.8 G/DL (ref 28.4–34.8)
MCV RBC AUTO: 91.6 FL (ref 82.6–102.9)
MONOCYTES # BLD: 9 % (ref 3–12)
MUCUS: NORMAL
NITRITE, URINE: NEGATIVE
NRBC AUTOMATED: 0 PER 100 WBC
OTHER OBSERVATIONS UA: NORMAL
PDW BLD-RTO: 14.8 % (ref 11.8–14.4)
PH UA: 6.5 (ref 5–9)
PLATELET # BLD: 283 K/UL (ref 138–453)
PLATELET ESTIMATE: ABNORMAL
PMV BLD AUTO: 9.5 FL (ref 8.1–13.5)
POTASSIUM SERPL-SCNC: 3.8 MMOL/L (ref 3.7–5.3)
PROTEIN UA: NEGATIVE
RBC # BLD: 3.83 M/UL (ref 3.95–5.11)
RBC # BLD: ABNORMAL 10*6/UL
RBC UA: NORMAL /HPF (ref 0–2)
RENAL EPITHELIAL, UA: NORMAL /HPF
SEDIMENTATION RATE, ERYTHROCYTE: 77 MM (ref 0–20)
SEG NEUTROPHILS: 68 % (ref 36–65)
SEGMENTED NEUTROPHILS ABSOLUTE COUNT: 6.35 K/UL (ref 1.5–8.1)
SODIUM BLD-SCNC: 139 MMOL/L (ref 135–144)
SPECIFIC GRAVITY UA: 1.01 (ref 1.01–1.02)
TOTAL PROTEIN: 7.7 G/DL (ref 6.4–8.3)
TRICHOMONAS: NORMAL
TROPONIN INTERP: ABNORMAL
TROPONIN T: ABNORMAL NG/ML
TROPONIN, HIGH SENSITIVITY: 22 NG/L (ref 0–14)
TROPONIN, HIGH SENSITIVITY: 22 NG/L (ref 0–14)
TROPONIN, HIGH SENSITIVITY: 23 NG/L (ref 0–14)
TSH SERPL DL<=0.05 MIU/L-ACNC: 0.32 MIU/L (ref 0.3–5)
TURBIDITY: CLEAR
URINE HGB: ABNORMAL
UROBILINOGEN, URINE: NORMAL
WBC # BLD: 9.5 K/UL (ref 3.5–11.3)
WBC # BLD: ABNORMAL 10*3/UL
WBC UA: NORMAL /HPF (ref 0–5)
YEAST: NORMAL

## 2020-01-17 PROCEDURE — 85651 RBC SED RATE NONAUTOMATED: CPT

## 2020-01-17 PROCEDURE — 6370000000 HC RX 637 (ALT 250 FOR IP): Performed by: PHYSICIAN ASSISTANT

## 2020-01-17 PROCEDURE — 99284 EMERGENCY DEPT VISIT MOD MDM: CPT

## 2020-01-17 PROCEDURE — 76376 3D RENDER W/INTRP POSTPROCES: CPT

## 2020-01-17 PROCEDURE — 2580000003 HC RX 258: Performed by: STUDENT IN AN ORGANIZED HEALTH CARE EDUCATION/TRAINING PROGRAM

## 2020-01-17 PROCEDURE — 93005 ELECTROCARDIOGRAM TRACING: CPT | Performed by: STUDENT IN AN ORGANIZED HEALTH CARE EDUCATION/TRAINING PROGRAM

## 2020-01-17 PROCEDURE — 6360000004 HC RX CONTRAST MEDICATION: Performed by: PHYSICIAN ASSISTANT

## 2020-01-17 PROCEDURE — 96374 THER/PROPH/DIAG INJ IV PUSH: CPT

## 2020-01-17 PROCEDURE — 99285 EMERGENCY DEPT VISIT HI MDM: CPT

## 2020-01-17 PROCEDURE — 83690 ASSAY OF LIPASE: CPT

## 2020-01-17 PROCEDURE — 82330 ASSAY OF CALCIUM: CPT

## 2020-01-17 PROCEDURE — 83735 ASSAY OF MAGNESIUM: CPT

## 2020-01-17 PROCEDURE — 74177 CT ABD & PELVIS W/CONTRAST: CPT

## 2020-01-17 PROCEDURE — 2060000000 HC ICU INTERMEDIATE R&B

## 2020-01-17 PROCEDURE — 6360000002 HC RX W HCPCS: Performed by: STUDENT IN AN ORGANIZED HEALTH CARE EDUCATION/TRAINING PROGRAM

## 2020-01-17 PROCEDURE — 81001 URINALYSIS AUTO W/SCOPE: CPT

## 2020-01-17 PROCEDURE — 80053 COMPREHEN METABOLIC PANEL: CPT

## 2020-01-17 PROCEDURE — 84484 ASSAY OF TROPONIN QUANT: CPT

## 2020-01-17 PROCEDURE — 36415 COLL VENOUS BLD VENIPUNCTURE: CPT

## 2020-01-17 PROCEDURE — 85025 COMPLETE CBC W/AUTO DIFF WBC: CPT

## 2020-01-17 PROCEDURE — 84443 ASSAY THYROID STIM HORMONE: CPT

## 2020-01-17 PROCEDURE — 86140 C-REACTIVE PROTEIN: CPT

## 2020-01-17 PROCEDURE — 2500000003 HC RX 250 WO HCPCS: Performed by: STUDENT IN AN ORGANIZED HEALTH CARE EDUCATION/TRAINING PROGRAM

## 2020-01-17 RX ORDER — METOPROLOL TARTRATE 5 MG/5ML
2.5 INJECTION INTRAVENOUS ONCE
Status: COMPLETED | OUTPATIENT
Start: 2020-01-17 | End: 2020-01-17

## 2020-01-17 RX ORDER — HYDROCODONE BITARTRATE AND ACETAMINOPHEN 5; 325 MG/1; MG/1
1 TABLET ORAL ONCE
Status: COMPLETED | OUTPATIENT
Start: 2020-01-17 | End: 2020-01-17

## 2020-01-17 RX ORDER — ASPIRIN 81 MG/1
81 TABLET, CHEWABLE ORAL DAILY
Status: DISCONTINUED | OUTPATIENT
Start: 2020-01-18 | End: 2020-01-19 | Stop reason: HOSPADM

## 2020-01-17 RX ORDER — LEVOTHYROXINE SODIUM 0.1 MG/1
100 TABLET ORAL DAILY
Status: DISCONTINUED | OUTPATIENT
Start: 2020-01-18 | End: 2020-01-19 | Stop reason: HOSPADM

## 2020-01-17 RX ORDER — TRAMADOL HYDROCHLORIDE 50 MG/1
50 TABLET ORAL EVERY 6 HOURS PRN
Status: DISCONTINUED | OUTPATIENT
Start: 2020-01-17 | End: 2020-01-19 | Stop reason: HOSPADM

## 2020-01-17 RX ORDER — ACETAMINOPHEN 325 MG/1
650 TABLET ORAL EVERY 4 HOURS PRN
Status: DISCONTINUED | OUTPATIENT
Start: 2020-01-17 | End: 2020-01-19 | Stop reason: HOSPADM

## 2020-01-17 RX ORDER — DOFETILIDE 0.25 MG/1
250 CAPSULE ORAL EVERY 12 HOURS SCHEDULED
Status: DISCONTINUED | OUTPATIENT
Start: 2020-01-17 | End: 2020-01-19 | Stop reason: HOSPADM

## 2020-01-17 RX ORDER — DILTIAZEM HYDROCHLORIDE 180 MG/1
360 CAPSULE, COATED, EXTENDED RELEASE ORAL DAILY
Status: DISCONTINUED | OUTPATIENT
Start: 2020-01-18 | End: 2020-01-19 | Stop reason: HOSPADM

## 2020-01-17 RX ORDER — SODIUM CHLORIDE 0.9 % (FLUSH) 0.9 %
10 SYRINGE (ML) INJECTION EVERY 12 HOURS SCHEDULED
Status: DISCONTINUED | OUTPATIENT
Start: 2020-01-17 | End: 2020-01-19 | Stop reason: HOSPADM

## 2020-01-17 RX ORDER — PANTOPRAZOLE SODIUM 40 MG/1
40 TABLET, DELAYED RELEASE ORAL
Status: DISCONTINUED | OUTPATIENT
Start: 2020-01-18 | End: 2020-01-19 | Stop reason: HOSPADM

## 2020-01-17 RX ORDER — SODIUM CHLORIDE 0.9 % (FLUSH) 0.9 %
10 SYRINGE (ML) INJECTION PRN
Status: DISCONTINUED | OUTPATIENT
Start: 2020-01-17 | End: 2020-01-19 | Stop reason: HOSPADM

## 2020-01-17 RX ORDER — ONDANSETRON 2 MG/ML
4 INJECTION INTRAMUSCULAR; INTRAVENOUS EVERY 6 HOURS PRN
Status: DISCONTINUED | OUTPATIENT
Start: 2020-01-17 | End: 2020-01-19 | Stop reason: HOSPADM

## 2020-01-17 RX ORDER — BUPROPION HYDROCHLORIDE 150 MG/1
300 TABLET ORAL NIGHTLY
Status: DISCONTINUED | OUTPATIENT
Start: 2020-01-17 | End: 2020-01-19 | Stop reason: HOSPADM

## 2020-01-17 RX ORDER — NICOTINE 21 MG/24HR
1 PATCH, TRANSDERMAL 24 HOURS TRANSDERMAL DAILY PRN
Status: DISCONTINUED | OUTPATIENT
Start: 2020-01-17 | End: 2020-01-19 | Stop reason: HOSPADM

## 2020-01-17 RX ADMIN — HYDROCODONE BITARTRATE AND ACETAMINOPHEN 1 TABLET: 5; 325 TABLET ORAL at 13:05

## 2020-01-17 RX ADMIN — IOPAMIDOL 75 ML: 755 INJECTION, SOLUTION INTRAVENOUS at 14:03

## 2020-01-17 RX ADMIN — METOPROLOL TARTRATE 2.5 MG: 5 INJECTION, SOLUTION INTRAVENOUS at 20:18

## 2020-01-17 RX ADMIN — CALCIUM GLUCONATE 1 G: 98 INJECTION, SOLUTION INTRAVENOUS at 21:16

## 2020-01-17 ASSESSMENT — PAIN DESCRIPTION - LOCATION: LOCATION: ABDOMEN;BACK

## 2020-01-17 ASSESSMENT — PAIN SCALES - GENERAL
PAINLEVEL_OUTOF10: 8
PAINLEVEL_OUTOF10: 10
PAINLEVEL_OUTOF10: 8

## 2020-01-17 ASSESSMENT — PAIN DESCRIPTION - FREQUENCY: FREQUENCY: CONTINUOUS

## 2020-01-17 ASSESSMENT — PAIN - FUNCTIONAL ASSESSMENT: PAIN_FUNCTIONAL_ASSESSMENT: PREVENTS OR INTERFERES SOME ACTIVE ACTIVITIES AND ADLS

## 2020-01-17 ASSESSMENT — PAIN DESCRIPTION - ORIENTATION: ORIENTATION: MID

## 2020-01-17 ASSESSMENT — PAIN DESCRIPTION - DESCRIPTORS: DESCRIPTORS: CONSTANT;ACHING;SHARP;SHOOTING

## 2020-01-17 ASSESSMENT — PAIN DESCRIPTION - ONSET: ONSET: ON-GOING

## 2020-01-17 ASSESSMENT — PAIN DESCRIPTION - PROGRESSION: CLINICAL_PROGRESSION: GRADUALLY WORSENING

## 2020-01-17 ASSESSMENT — PAIN DESCRIPTION - PAIN TYPE: TYPE: ACUTE PAIN

## 2020-01-17 NOTE — ED NOTES
Keenjar, spoke with Marcello Andersen to initiate transfer to [facility].  She will connect with the ER     Yo Garcia  01/17/20 [id number]

## 2020-01-17 NOTE — ED PROVIDER NOTES
[facility] ED  EMERGENCY DEPARTMENT ENCOUNTER      Pt Name: Joanna Awad  MRN: [id number]  [place] 2/13/1933  Date of evaluation: 1/17/2020  Provider: Yared Rosa PA-C    CHIEF COMPLAINT       Chief Complaint   Patient presents with    Back Pain     Mid-lumbar without radiation, onset 3 days ago. Pt states she had a spinal tap 1 month ago       [address] is a 80 y.o. female who presents to the emergency department from home complains of a right-sided low back pain without radiation extremities progressively worsening for 3 days unable to sit up or walk today. She states it wraps around her side to the right upper quadrant of her abdomen. Patient was admitted 1 month ago to this hospital she had received a lumbar puncture at that time and had been confused with fever was admitted to the hospital they did a CT scan of her abdomen and question contracted gallbladder with inflammation at that time on the discharge summary they felt that her fever and high white blood count or signs of infection from cholecystitis she was discharged home she states she was doing well at home until 3 days ago when the back pain and abdominal pain started similar to what she had a month ago when she was seen here. The patient is on Eliquis. Triage notes and Nursing notes were reviewed by myself. Any discrepancies are addressed above.     PAST MEDICAL HISTORY     Past Medical History:   Diagnosis Date    Abnormal resting ECG findings 1/25/12    Normal sinus rhythm with frequent PACs in a trigeminy pattern    Abnormal resting ECG findings 6/10/2013    Severe sinus bradycardia at 50 bpm.     Anxiety     Atrial fibrillation (HCC)     Cholecystitis     Chronic back pain     Pt. c/o upper back pain,,,,\"On the sides of my lungs\"    Hiatal hernia     Holter monitor, abnormal 1/27/12    Multiple episodes of SVT between 100 and 130 beats per minute most consistent with Review of Systems    Except as noted above the remainder of the review of systems was reviewed and is negative. SCREENINGS    [place] Coma Scale  Eye Opening: Spontaneous  Best Verbal Response: Oriented  Best Motor Response: Obeys commands  Sherice Coma Scale Score: 15      PHYSICAL EXAM    (up to 7 for level 4, 8 or more for level 5)     ED Triage Vitals [01/17/20 1245]   BP Temp Temp Source Pulse Resp SpO2 Height Weight   (!) 152/78 98.7 °F (37.1 °C) Oral 90 16 100 % -- --       Physical Exam  Active and oriented ×3. Nontoxic. No acute distress. Well-hydrated. Head is atraumatic, facies symmetrical.  Pupils equal round and reactive to light, extraocular movements intact, anterior chambers clear bilaterally  Neck is supple. No adenopathy. Respirations nonlabored. Lungs clear to auscultation. No wheezes rales or rhonchi noted. Heart regular rate and rhythm. No murmur noted  Abdomen positive bowel sounds, no bruit. soft tenderness in the right upper quadrant and epigastric area mild in nature. No pulsatile masses noted no guarding or rigidity  Is over the right CVA and right paralumbar musculature extending down to the level of the right iliac crest.  No direct spinal tenderness step-off or deformity no erythema no dermatomal rash noted. Skin free of any obvious rashes or lesions. Extremities without edema. No calf tenderness noted. Distal pulses and sensation intact. Good capillary refill noted. No acute neurologic deficit noted. Patient unable to ambulate due to pain. clear speech. Good affect. Pleasant patient. DIAGNOSTIC RESULTS       RADIOLOGY: (none if blank)   Interpretation per the Radiologistbelow, if available at the time of this note:    CT 3D RECONSTRUCTION   Final Result   Multilevel degenerative disc disease with associated facet and ligamentous   hypertrophy resulting in mild bilateral foraminal narrowing at L5-S1.          CT ABDOMEN PELVIS W IV CONTRAST Additional

## 2020-01-18 ENCOUNTER — APPOINTMENT (OUTPATIENT)
Dept: CT IMAGING | Age: 85
DRG: 552 | End: 2020-01-18
Payer: MEDICARE

## 2020-01-18 LAB
ALBUMIN SERPL-MCNC: 3.2 G/DL (ref 3.5–5.2)
ALBUMIN/GLOBULIN RATIO: 0.9 (ref 1–2.5)
ALP BLD-CCNC: 69 U/L (ref 35–104)
ALT SERPL-CCNC: 8 U/L (ref 5–33)
ANION GAP SERPL CALCULATED.3IONS-SCNC: 11 MMOL/L (ref 9–17)
AST SERPL-CCNC: 14 U/L
BILIRUB SERPL-MCNC: 0.33 MG/DL (ref 0.3–1.2)
BNP INTERPRETATION: ABNORMAL
BUN BLDV-MCNC: 16 MG/DL (ref 8–23)
BUN/CREAT BLD: ABNORMAL (ref 9–20)
CALCIUM SERPL-MCNC: 9.1 MG/DL (ref 8.6–10.4)
CHLORIDE BLD-SCNC: 104 MMOL/L (ref 98–107)
CO2: 23 MMOL/L (ref 20–31)
CREAT SERPL-MCNC: 0.72 MG/DL (ref 0.5–0.9)
GFR AFRICAN AMERICAN: >60 ML/MIN
GFR NON-AFRICAN AMERICAN: >60 ML/MIN
GFR SERPL CREATININE-BSD FRML MDRD: ABNORMAL ML/MIN/{1.73_M2}
GFR SERPL CREATININE-BSD FRML MDRD: ABNORMAL ML/MIN/{1.73_M2}
GLUCOSE BLD-MCNC: 100 MG/DL (ref 70–99)
INR BLD: 1.1
MAGNESIUM: 1.9 MG/DL (ref 1.6–2.6)
POTASSIUM SERPL-SCNC: 4 MMOL/L (ref 3.7–5.3)
PRO-BNP: 1845 PG/ML
PROTHROMBIN TIME: 12 SEC (ref 9–12)
SODIUM BLD-SCNC: 138 MMOL/L (ref 135–144)
TOTAL PROTEIN: 6.6 G/DL (ref 6.4–8.3)
TROPONIN INTERP: ABNORMAL
TROPONIN T: ABNORMAL NG/ML
TROPONIN, HIGH SENSITIVITY: 25 NG/L (ref 0–14)
TSH SERPL DL<=0.05 MIU/L-ACNC: 0.48 MIU/L (ref 0.3–5)

## 2020-01-18 PROCEDURE — 6370000000 HC RX 637 (ALT 250 FOR IP): Performed by: NURSE PRACTITIONER

## 2020-01-18 PROCEDURE — 72128 CT CHEST SPINE W/O DYE: CPT

## 2020-01-18 PROCEDURE — 2580000003 HC RX 258: Performed by: NURSE PRACTITIONER

## 2020-01-18 PROCEDURE — 97162 PT EVAL MOD COMPLEX 30 MIN: CPT

## 2020-01-18 PROCEDURE — 84484 ASSAY OF TROPONIN QUANT: CPT

## 2020-01-18 PROCEDURE — 97530 THERAPEUTIC ACTIVITIES: CPT

## 2020-01-18 PROCEDURE — 99223 1ST HOSP IP/OBS HIGH 75: CPT | Performed by: INTERNAL MEDICINE

## 2020-01-18 PROCEDURE — 80053 COMPREHEN METABOLIC PANEL: CPT

## 2020-01-18 PROCEDURE — 2060000000 HC ICU INTERMEDIATE R&B

## 2020-01-18 PROCEDURE — 97166 OT EVAL MOD COMPLEX 45 MIN: CPT

## 2020-01-18 PROCEDURE — 99222 1ST HOSP IP/OBS MODERATE 55: CPT | Performed by: NEUROLOGICAL SURGERY

## 2020-01-18 PROCEDURE — 83735 ASSAY OF MAGNESIUM: CPT

## 2020-01-18 PROCEDURE — 6360000002 HC RX W HCPCS: Performed by: NURSE PRACTITIONER

## 2020-01-18 PROCEDURE — 84443 ASSAY THYROID STIM HORMONE: CPT

## 2020-01-18 PROCEDURE — 97535 SELF CARE MNGMENT TRAINING: CPT

## 2020-01-18 PROCEDURE — 83880 ASSAY OF NATRIURETIC PEPTIDE: CPT

## 2020-01-18 PROCEDURE — 85610 PROTHROMBIN TIME: CPT

## 2020-01-18 PROCEDURE — 36415 COLL VENOUS BLD VENIPUNCTURE: CPT

## 2020-01-18 RX ADMIN — APIXABAN 2.5 MG: 2.5 TABLET, FILM COATED ORAL at 07:56

## 2020-01-18 RX ADMIN — METOPROLOL SUCCINATE 150 MG: 100 TABLET, FILM COATED, EXTENDED RELEASE ORAL at 00:36

## 2020-01-18 RX ADMIN — SODIUM CHLORIDE, PRESERVATIVE FREE 10 ML: 5 INJECTION INTRAVENOUS at 07:56

## 2020-01-18 RX ADMIN — SODIUM CHLORIDE, PRESERVATIVE FREE 10 ML: 5 INJECTION INTRAVENOUS at 00:39

## 2020-01-18 RX ADMIN — DOFETILIDE 250 MCG: 0.25 CAPSULE ORAL at 07:56

## 2020-01-18 RX ADMIN — PANTOPRAZOLE SODIUM 40 MG: 40 TABLET, DELAYED RELEASE ORAL at 06:25

## 2020-01-18 RX ADMIN — TRAMADOL HYDROCHLORIDE 50 MG: 50 TABLET, FILM COATED ORAL at 02:27

## 2020-01-18 RX ADMIN — APIXABAN 2.5 MG: 2.5 TABLET, FILM COATED ORAL at 00:36

## 2020-01-18 RX ADMIN — ONDANSETRON 4 MG: 2 INJECTION INTRAMUSCULAR; INTRAVENOUS at 07:49

## 2020-01-18 RX ADMIN — SODIUM CHLORIDE, PRESERVATIVE FREE 10 ML: 5 INJECTION INTRAVENOUS at 20:48

## 2020-01-18 RX ADMIN — BUPROPION HYDROCHLORIDE 300 MG: 150 TABLET, EXTENDED RELEASE ORAL at 20:40

## 2020-01-18 RX ADMIN — LEVOTHYROXINE SODIUM 100 MCG: 100 TABLET ORAL at 06:25

## 2020-01-18 RX ADMIN — ASPIRIN 81 MG: 81 TABLET, CHEWABLE ORAL at 07:56

## 2020-01-18 RX ADMIN — ACETAMINOPHEN 650 MG: 325 TABLET ORAL at 20:42

## 2020-01-18 RX ADMIN — DOFETILIDE 250 MCG: 0.25 CAPSULE ORAL at 20:40

## 2020-01-18 RX ADMIN — DILTIAZEM HYDROCHLORIDE 360 MG: 180 CAPSULE, COATED, EXTENDED RELEASE ORAL at 07:56

## 2020-01-18 RX ADMIN — TRAMADOL HYDROCHLORIDE 50 MG: 50 TABLET, FILM COATED ORAL at 16:00

## 2020-01-18 RX ADMIN — BUPROPION HYDROCHLORIDE 300 MG: 150 TABLET, EXTENDED RELEASE ORAL at 00:37

## 2020-01-18 RX ADMIN — APIXABAN 2.5 MG: 2.5 TABLET, FILM COATED ORAL at 20:40

## 2020-01-18 RX ADMIN — DOFETILIDE 250 MCG: 0.25 CAPSULE ORAL at 00:37

## 2020-01-18 ASSESSMENT — ENCOUNTER SYMPTOMS
BACK PAIN: 1
SHORTNESS OF BREATH: 0
EYE DISCHARGE: 0
PHOTOPHOBIA: 0
STRIDOR: 0
VOMITING: 0
BLOOD IN STOOL: 0
SORE THROAT: 0
RHINORRHEA: 0
NAUSEA: 0
COLOR CHANGE: 0
WHEEZING: 0
ABDOMINAL PAIN: 0

## 2020-01-18 ASSESSMENT — PAIN SCALES - GENERAL
PAINLEVEL_OUTOF10: 7
PAINLEVEL_OUTOF10: 2
PAINLEVEL_OUTOF10: 0
PAINLEVEL_OUTOF10: 6
PAINLEVEL_OUTOF10: 0
PAINLEVEL_OUTOF10: 8

## 2020-01-18 NOTE — ED NOTES
Pt placed on bedpan. Urinated. Tolerated well. No additional needs at this time.      Gabo Zamorano  01/17/20 2050

## 2020-01-18 NOTE — PLAN OF CARE
Problem: Pain:  Goal: Control of acute pain  Description  Control of acute pain  Outcome: Ongoing   Pt reports pain in her lower, mid back. Pt states that when she moves around and coughs, the pain radiates to her front left side and upper abdomen. Patient reports pain at an 8 for this shift. Nurse offered patient PRN pain medication. Problem: Risk for Impaired Skin Integrity  Goal: Tissue integrity - skin and mucous membranes  Description  Structural intactness and normal physiological function of skin and  mucous membranes. Outcome: Ongoing   Pt calls out appropriately to use the bedpan or go to the bathroom. Pt is not incontinent this shift. Skin staying warm, dry, no redness.      Electronically signed by Natasha Ivory RN on 1/18/2020 at 3:20 AM

## 2020-01-18 NOTE — H&P
[facility]    HISTORY AND PHYSICAL EXAMINATION            Date:   1/18/2020  Patient name:  Catia Da Silva  Date of admission:  1/17/2020  7:16 PM  MRN:   [id number]  Account:  [de-identified]  YOB: 1933  PCP:    CAMRON Gonsalves CNP  Room:   8654/2674-06  Code Status:    Full Code    Chief Complaint:     Chief Complaint   Patient presents with    Back Pain     Pt transferred from [place], c/o lower back pain, had issue a month ago had spinal tap at that time, pain started back 4 days ago same spot       History Obtained From:     patient    History of Present Illness:     Catia Da Silva is a 80 y.o. Non-/non  female who was transferred from [place] after she presented to their ED with a complain of worsening mid to lower back pain over the past 4 days and now having difficulty ambulating due to pain. She denies any direct trauma to the back but report that she may have tripped. In the ED she was evaluated with CT of the back which shows Multilevel degenerative disc disease with associated facet and ligamentous hypertrophy resulting in mild bilateral foraminal narrowing at L5-S1. While in the ED, she was noted to have AFIB with RVR in the 110. She denies any chest pain or SOB or palpitation. Ed requested admission for further evaluation of acute back pain with NS and AFIB with RVR. When I evaluated her this morning, her HR is back to normal with restarting her home medications. She continue to complain of back pain and has difficulty ambulating.      Past Medical History:     Past Medical History:   Diagnosis Date    Abnormal resting ECG findings 1/25/12    Normal sinus rhythm with frequent PACs in a trigeminy pattern    Abnormal resting ECG findings 6/10/2013    Severe sinus bradycardia at 50 bpm.     Anxiety     Atrial fibrillation (HCC)     Cholecystitis     Chronic back pain     Pt. c/o upper back pain,,,,\"On the sides of my lungs\"    Hiatal hernia     Holter monitor, abnormal 12    Multiple episodes of SVT between 100 and 130 beats per minute most consistent with atrial fibrillation and/or atrial flutter with variable block.  Hypothyroidism     Insomnia     MAC (mycobacterium avium-intracellulare complex)     Normal cardiac stress test 12    low risk study.  Paroxysmal atrial fibrillation ([address] 2012    Found on holter monitor  and      Severe sinus bradycardia 6/10/13    At least partially drug induced.  Status post placement of implantable loop recorder 10/19/2017    LINQ IMPLANTED. [employer]    Subdural hematoma [facility])         Past Surgical History:     Past Surgical History:   Procedure Laterality Date    BRAIN SURGERY      Had a blood clot on the brain. Fell 10 feet.  CATARACT REMOVAL Bilateral      SECTION      x1    COLONOSCOPY      several over the years by Dr. Alexandr Emerson Left 10/19/2017    [address] Left 2019    Dr. Mustapha Nicolas N/A 2019    Lana Jimenez performed by Mandy Ramírez MD at [facility]      tumor removed    UPPER GASTROINTESTINAL ENDOSCOPY      Anjel        Medications Prior to Admission:     Prior to Admission medications    Medication Sig Start Date End Date Taking? Authorizing Provider   levothyroxine (SYNTHROID) 100 MCG tablet Take 1 tablet by mouth Daily 19  Yes CAMRON Hernandez CNP   traMADol (ULTRAM) 50 MG tablet Take 1 tablet by mouth 2 times daily as needed for Pain for up to 30 days.  19 Yes CARMON Carpenter CNP   ELIQUIS 2.5 MG TABS tablet TAKE 1 TABLET TWICE DAILY 19  Yes Krystin Padilla MD   omeprazole (PRILOSEC) 20 MG delayed release capsule TAKE 1 CAPSULE EVERY DAY 19  Yes Krystin Padilla MD   metoprolol succinate (TOPROL XL) Time: 01/17/20 12:55 PM   Result Value Ref Range    WBC 9.5 3.5 - 11.3 k/uL    RBC 3.83 (L) 3.95 - 5.11 m/uL    Hemoglobin 10.8 (L) 11.9 - 15.1 g/dL    Hematocrit 35.1 (L) 36.3 - 47.1 %    MCV 91.6 82.6 - 102.9 fL    MCH 28.2 25.2 - 33.5 pg    MCHC 30.8 28.4 - 34.8 g/dL    RDW 14.8 (H) 11.8 - 14.4 %    Platelets 336 878 - 374 k/uL    MPV 9.5 8.1 - 13.5 fL    NRBC Automated 0.0 0.0 per 100 WBC    Differential Type NOT REPORTED     Seg Neutrophils 68 (H) 36 - 65 %    Lymphocytes 23 (L) 24 - 43 %    Monocytes 9 3 - 12 %    Eosinophils % 0 (L) 1 - 4 %    Basophils 0 0 - 2 %    Immature Granulocytes 0 0 %    Segs Absolute 6.35 1.50 - 8.10 k/uL    Absolute Lymph # 2.19 1.10 - 3.70 k/uL    Absolute Mono # 0.86 0.10 - 1.20 k/uL    Absolute Eos # <0.03 0.00 - 0.44 k/uL    Basophils Absolute <0.03 0.00 - 0.20 k/uL    Absolute Immature Granulocyte 0.03 0.00 - 0.30 k/uL    WBC Morphology NOT REPORTED     RBC Morphology NOT REPORTED     Platelet Estimate NOT REPORTED    Comprehensive Metabolic Panel w/ Reflex to MG    Collection Time: 01/17/20 12:55 PM   Result Value Ref Range    Glucose 106 (H) 70 - 99 mg/dL    BUN 24 (H) 8 - 23 mg/dL    CREATININE 0.87 0.50 - 0.90 mg/dL    Bun/Cre Ratio 28 (H) 9 - 20    Calcium 9.5 8.6 - 10.4 mg/dL    Sodium 139 135 - 144 mmol/L    Potassium 3.8 3.7 - 5.3 mmol/L    Chloride 102 98 - 107 mmol/L    CO2 24 20 - 31 mmol/L    Anion Gap 13 9 - 17 mmol/L    Alkaline Phosphatase 82 35 - 104 U/L    ALT 11 5 - 33 U/L    AST 21 <32 U/L    Total Bilirubin 0.51 0.3 - 1.2 mg/dL    Total Protein 7.7 6.4 - 8.3 g/dL    Alb 4.0 3.5 - 5.2 g/dL    Albumin/Globulin Ratio 1.1 1.0 - 2.5    GFR Non-African American >60 >60 mL/min    GFR African American >60 >60 mL/min    GFR Comment          GFR Staging         Lipase    Collection Time: 01/17/20 12:55 PM   Result Value Ref Range    Lipase 23 13 - 60 U/L   Sedimentation Rate    Collection Time: 01/17/20 12:55 PM   Result Value Ref Range    Sed Rate 77 (H) 0 - 20 mm C-reactive protein    Collection Time: 01/17/20 12:55 PM   Result Value Ref Range    CRP 84.6 (H) 0.0 - 5.0 mg/L   Urinalysis, reflex to microscopic    Collection Time: 01/17/20  2:22 PM   Result Value Ref Range    Color, UA YELLOW YELLOW    Turbidity UA CLEAR CLEAR    Glucose, Ur NEGATIVE NEGATIVE    Bilirubin Urine NEGATIVE NEGATIVE    Ketones, Urine NEGATIVE NEGATIVE    Specific [place], UA 1.010 1.010 - 1.020    Urine Hgb TRACE (A) NEGATIVE    pH, UA 6.5 5.0 - 9.0    Protein, UA NEGATIVE NEGATIVE    Urobilinogen, Urine Normal Normal    Nitrite, Urine NEGATIVE NEGATIVE    Leukocyte Esterase, Urine NEGATIVE NEGATIVE    Urinalysis Comments NOT REPORTED    Microscopic Urinalysis    Collection Time: 01/17/20  2:22 PM   Result Value Ref Range    -          WBC, UA None 0 - 5 /HPF    RBC, UA None 0 - 2 /HPF    Casts UA NOT REPORTED /LPF    Crystals UA NOT REPORTED None /HPF    Epithelial Cells UA 0 TO 2 0 - 25 /HPF    Renal Epithelial, Urine NOT REPORTED 0 /HPF    Bacteria, UA NOT REPORTED None    Mucus, UA NOT REPORTED None    Trichomonas, UA NOT REPORTED None    Amorphous, UA NOT REPORTED None    Other Observations UA NOT REPORTED NOT REQ.     Yeast, UA NOT REPORTED None   Troponin    Collection Time: 01/17/20  8:02 PM   Result Value Ref Range    Troponin, High Sensitivity 23 (H) 0 - 14 ng/L    Troponin T NOT REPORTED <0.03 ng/mL    Troponin Interp NOT REPORTED    MAGNESIUM    Collection Time: 01/17/20  8:02 PM   Result Value Ref Range    Magnesium 2.0 1.6 - 2.6 mg/dL   CALCIUM, IONIZED    Collection Time: 01/17/20  8:02 PM   Result Value Ref Range    Calcium, Ion 0.95 (L) 1.13 - 1.33 mmol/L   TSH with Reflex    Collection Time: 01/17/20  8:02 PM   Result Value Ref Range    TSH 0.32 0.30 - 5.00 mIU/L   Troponin    Collection Time: 01/17/20  9:54 PM   Result Value Ref Range    Troponin, High Sensitivity 22 (H) 0 - 14 ng/L    Troponin T NOT REPORTED <0.03 ng/mL    Troponin Interp NOT REPORTED    Troponin    Collection Time: 01/17/20 10:54 PM   Result Value Ref Range    Troponin, High Sensitivity 22 (H) 0 - 14 ng/L    Troponin T NOT REPORTED <0.03 ng/mL    Troponin Interp NOT REPORTED    Troponin    Collection Time: 01/18/20  2:19 AM   Result Value Ref Range    Troponin, High Sensitivity 25 (H) 0 - 14 ng/L    Troponin T NOT REPORTED <0.03 ng/mL    Troponin Interp NOT REPORTED        Imaging/Diagnostics:  Ct Abdomen Pelvis W Iv Contrast Additional Contrast? None    Result Date: 1/17/2020  Trace bibasilar effusions. Minimal intrahepatic biliary dilatation and mild dilatation the pancreatic duct that is similar compared to prior and of uncertain etiology. Uncomplicated colonic diverticulosis. Ct 3d Reconstruction    Result Date: 1/17/2020  Multilevel degenerative disc disease with associated facet and ligamentous hypertrophy resulting in mild bilateral foraminal narrowing at L5-S1. Assessment :      Hospital Problems           Last Modified POA    * (Principal) Atrial fibrillation with RVR ([address] 1/18/2020 Yes    Chronic diastolic congestive heart failure ([address] (Chronic) 1/18/2020 Yes    Back pain 1/17/2020 Yes          Plan:     Patient status observation in the Med/Surge    1. AFib with RVR. Resolved. Continue PO Toprol 150 mg daily and Cardizem 360 mg daily and Tikosyn 250 mg daily. Continue Eliquis  2. Acute back pain. CT back did not show any fracture. NS consulted for further evaluation   3. Chronic diastolic CHF. No acute exacerbation.  Continue Toprol     Consultations:   IP CONSULT TO HOSPITALIST  IP CONSULT TO NEUROSURGERY      Steven Owusu MD  1/18/2020  8:29 AM    Copy sent to Dr. Calvin Truong, CAMRON - CNP

## 2020-01-18 NOTE — ED PROVIDER NOTES
(Bilateral); Insertable Cardiac Monitor (Left, 10/19/2017); Pacemaker insertion (Left, 01/02/2019); Pacemaker insertion (N/A, 1/2/2019); Colonoscopy; and Upper gastrointestinal endoscopy (2013). Social History     Socioeconomic History    Marital status:      Spouse name: Not on file    Number of children: Not on file    Years of education: Not on file    Highest education level: Not on file   Occupational History    Not on file   Social Needs    Financial resource strain: Not on file    Food insecurity:     Worry: Not on file     Inability: Not on file    Transportation needs:     Medical: Not on file     Non-medical: Not on file   Tobacco Use    Smoking status: Never Smoker    Smokeless tobacco: Never Used   Substance and Sexual Activity    Alcohol use: No    Drug use: No    Sexual activity: Not Currently   Lifestyle    Physical activity:     Days per week: Not on file     Minutes per session: Not on file    Stress: Not on file   Relationships    Social connections:     Talks on phone: Not on file     Gets together: Not on file     Attends [employer] service: Not on file     Active member of club or organization: Not on file     Attends meetings of clubs or organizations: Not on file     Relationship status: Not on file    Intimate partner violence:     Fear of current or ex partner: Not on file     Emotionally abused: Not on file     Physically abused: Not on file     Forced sexual activity: Not on file   Other Topics Concern    Not on file   Social History Narrative    ** Merged History Encounter **            Family History   Problem Relation Age of Onset    Stroke Father     Stroke Mother        Allergies:  Sulfur and Sulfa antibiotics    Home Medications:  Prior to Admission medications    Medication Sig Start Date End Date Taking?  Authorizing Provider   levothyroxine (SYNTHROID) 100 MCG tablet Take 1 tablet by mouth Daily 12/31/19  Yes Mark Gross, APRN - ABELINO   traMADol (ULTRAM) 50 MG tablet Take 1 tablet by mouth 2 times daily as needed for Pain for up to 30 days. 12/30/19 1/29/20 Yes CAMRON Carpenter CNP   ELIQUIS 2.5 MG TABS tablet TAKE 1 TABLET TWICE DAILY 11/25/19  Yes Anna Del Rosario MD   omeprazole (PRILOSEC) 20 MG delayed release capsule TAKE 1 CAPSULE EVERY DAY 11/25/19  Yes Anna Del Rosario MD   metoprolol succinate (TOPROL XL) 100 MG extended release tablet Take 1.5 tablets by mouth nightly 10/23/19  Yes Anna Del Rosario MD   buPROPion (WELLBUTRIN XL) 300 MG extended release tablet Take 1 tablet by mouth nightly 10/23/19  Yes Anna Del Rosario MD   diltiazem (CARDIZEM CD) 360 MG extended release capsule Take 1 capsule by mouth daily 2/1/19  Yes Anna Del Rosario MD   acetaminophen (TYLENOL) 500 MG tablet Take 2 tablets by mouth 4 times daily as needed for Pain 12/21/19   Deana Hassan MD   dofetilide (TIKOSYN) 250 MCG capsule TAKE 1 CAPSULE TWICE DAILY 11/25/19   Anna Del Rosario MD   nitroGLYCERIN (NITROSTAT) 0.4 MG SL tablet Place 0.4 mg under the tongue every 5 minutes as needed for Chest pain up to max of 3 total doses. If no relief after 1 dose, call 911. Historical Provider, MD       REVIEW OF SYSTEMS    (2-9 systems for level 4, 10 or more for level 5)      Review of Systems   Constitutional: Negative for chills and fever. HENT: Negative for congestion, rhinorrhea and sore throat. Eyes: Negative for photophobia and visual disturbance. Respiratory: Negative for shortness of breath and stridor. Cardiovascular: Negative for chest pain, palpitations and leg swelling. Gastrointestinal: Negative for abdominal pain, nausea and vomiting. Genitourinary: Negative for decreased urine volume. Saddle anesthesia   Musculoskeletal: Positive for back pain. Negative for neck pain and neck stiffness. Skin: Negative for rash. Allergic/Immunologic: Positive for environmental allergies. Neurological: Negative for numbness.    Hematological: Negative for adenopathy. Psychiatric/Behavioral: Negative for confusion. PHYSICAL EXAM   (up to 7 for level 4, 8 or more for level 5)      INITIAL VITALS:   BP (!) 133/99   Pulse 124   Temp 99.1 °F (37.3 °C) (Oral)   Resp 19   Ht 5' 1\" (1.549 m)   Wt 97 lb (44 kg)   SpO2 98%   BMI 18.33 kg/m²     Physical Exam  Constitutional:       General: She is not in acute distress. Appearance: She is not diaphoretic. HENT:      Head: Normocephalic and atraumatic. Eyes:      Pupils: Pupils are equal, round, and reactive to light. Neck:      Musculoskeletal: Normal range of motion and neck supple. Trachea: No tracheal deviation. Cardiovascular:      Rate and Rhythm: Tachycardia present. Rhythm irregular. Pulmonary:      Effort: Pulmonary effort is normal. No respiratory distress. Abdominal:      General: There is no distension. Palpations: Abdomen is soft. Musculoskeletal:        Back:    Skin:     General: Skin is warm. Neurological:      General: No focal deficit present. Mental Status: She is oriented to person, place, and time. Comments: Check reflexes lower extremities, normal strength sensation. Nonfocal neurologic exam.         DIFFERENTIAL  DIAGNOSIS     PLAN (LABS / IMAGING / EKG):  Orders Placed This Encounter   Procedures    Troponin    MAGNESIUM    CALCIUM, IONIZED    TSH with Reflex    Troponin    Comprehensive Metabolic Panel w/ Reflex to MG    Magnesium    TSH with Reflex    Troponin    Brain natriuretic peptide    Protime-INR    DIET CARDIAC;    Vital signs per unit routine    Telemetry monitoring    Notify physician    Up with assistance    Daily weights    Intake and output    Neurovascular checks    Tobacco cessation education    Elevate heels off of bed at all times if patient is not able to move lower extremities    Turn or assist with turn every 2 hours if patient is unable to turn self. Remind patient to turn if necessary.     Inspect skin Troponin, High Sensitivity 22 (H) 0 - 14 ng/L    Troponin T NOT REPORTED <0.03 ng/mL    Troponin Interp NOT REPORTED          RADIOLOGY:  None    EKG  None    All EKG's are interpreted by the Emergency Department Physician who either signs or Co-signs this chart in the absence of a cardiologist.    EMERGENCY DEPARTMENT COURSE:  Patient is a 63-year-old female presenting as a transfer from [facility] due to back pain. Sent here for further evaluation. On arrival patient in A. fib, history of atrial fibrillation on Eliquis, rapid ventricular response up to 130s, will treat with Lopressor will obtain troponin, magnesium TSH. Able to review full lab work from [address] and this was recently performed so no indication to repeat all labs at this time. Neurologic exam unremarkable, normal strength sensation upper lower extremities normal proprioception, normal and symmetric reflexes. Does have tenderness midline lumbar spine. No reported fall or known injury, no overlying skin change. Will get patient to [facility], plan for evaluation for A. fib RVR as well as back pain. Patient likely would benefit from neurosurgery consult in a.m. to determine whether or not MRI imaging is warranted. PROCEDURES:  None    CONSULTS:  IP CONSULT TO HOSPITALIST  IP CONSULT TO NEUROSURGERY    CRITICAL CARE:  None    FINAL IMPRESSION      1. Atrial fibrillation with RVR ([address]    2.  Acute midline low back pain without sciatica          DISPOSITION / PLAN     DISPOSITION Admitted 01/17/2020 09:05:33 PM      PATIENT REFERRED TO:  [address], [address]  [phone]            DISCHARGE MEDICATIONS:  Current Discharge Medication List          Marcos Jimenez DO  Emergency Medicine Resident    (Please note that portions of thisnote were completed with a voice recognition program.  Efforts were made to edit the dictations but occasionally words are mis-transcribed.)        Marcos Jimenez

## 2020-01-18 NOTE — CONSULTS
Department of Neurosurgery                                       Resident Consult Note      Reason for Consult:  Back pain  Requesting Physician:  Kapil Schafer  Neurosurgeon: Dr. Parish Loja    History Obtained From:  patient    CHIEF COMPLAINT:         Mid back pain    HISTORY OF PRESENT ILLNESS:       The patient is a 80 y.o. female who presents with a 1 week history of mid to low back pain. Patient states it has been present for over a week now. It started while she was sitting in her car. Denies any traumas, falls or accidents. Has not had pain like this in the past. No history of back surgery, no cancer history. Denies numbness tingling or radiating pain into legs. Endorses history of brain surgery 17 years ago that has been uncomplicated since. PAST MEDICAL HISTORY :       Past Medical History:        Diagnosis Date    Abnormal resting ECG findings 1/25/12    Normal sinus rhythm with frequent PACs in a trigeminy pattern    Abnormal resting ECG findings 6/10/2013    Severe sinus bradycardia at 50 bpm.     Anxiety     Atrial fibrillation (HCC)     Cholecystitis     Chronic back pain     Pt. c/o upper back pain,,,,\"On the sides of my lungs\"    Hiatal hernia     Holter monitor, abnormal 1/27/12    Multiple episodes of SVT between 100 and 130 beats per minute most consistent with atrial fibrillation and/or atrial flutter with variable block.  Hypothyroidism     Insomnia     MAC (mycobacterium avium-intracellulare complex)     Normal cardiac stress test 1/26/12    low risk study.  Paroxysmal atrial fibrillation ([address] 2/7/2012    Found on holter monitor 2/12 and 12/12     Severe sinus bradycardia 6/10/13    At least partially drug induced.  Status post placement of implantable loop recorder 10/19/2017    LINQ IMPLANTED. [employer]    Subdural hematoma [facility])        Past Surgical History:        Procedure Laterality Date    BRAIN SURGERY      Had a blood clot on the brain.   Fell 10 feet.     CATARACT REMOVAL Bilateral      SECTION      x1    COLONOSCOPY      several over the years by Dr. Joseline Covarrubias Left 10/19/2017    [address] Left 2019    Dr. Stiven Hua N/A 2019    PACEMAKER INSERTION PERMANENT performed by Angela Begum MD at [facility]      tumor removed    UPPER GASTROINTESTINAL ENDOSCOPY      Anjel       Social History:   Social History     Socioeconomic History    Marital status:       Spouse name: Not on file    Number of children: Not on file    Years of education: Not on file    Highest education level: Not on file   Occupational History    Not on file   Social Needs    Financial resource strain: Not on file    Food insecurity:     Worry: Not on file     Inability: Not on file    Transportation needs:     Medical: Not on file     Non-medical: Not on file   Tobacco Use    Smoking status: Never Smoker    Smokeless tobacco: Never Used   Substance and Sexual Activity    Alcohol use: No    Drug use: No    Sexual activity: Not Currently   Lifestyle    Physical activity:     Days per week: Not on file     Minutes per session: Not on file    Stress: Not on file   Relationships    Social connections:     Talks on phone: Not on file     Gets together: Not on file     Attends Cheondo[employer] service: Not on file     Active member of club or organization: Not on file     Attends meetings of clubs or organizations: Not on file     Relationship status: Not on file    Intimate partner violence:     Fear of current or ex partner: Not on file     Emotionally abused: Not on file     Physically abused: Not on file     Forced sexual activity: Not on file   Other Topics Concern    Not on file   Social History Narrative    ** Merged History Encounter **            Family History:       Problem Relation Age of Onset    Stroke Father  Stroke Mother        Allergies:  Sulfur and Sulfa antibiotics    Home Medications:  Prior to Admission medications    Medication Sig Start Date End Date Taking? Authorizing Provider   levothyroxine (SYNTHROID) 100 MCG tablet Take 1 tablet by mouth Daily 12/31/19  Yes CAMRON George CNP   traMADol (ULTRAM) 50 MG tablet Take 1 tablet by mouth 2 times daily as needed for Pain for up to 30 days. 12/30/19 1/29/20 Yes CAMRON Carpenter CNP   ELIQUIS 2.5 MG TABS tablet TAKE 1 TABLET TWICE DAILY 11/25/19  Yes Lisa Robertson MD   omeprazole (PRILOSEC) 20 MG delayed release capsule TAKE 1 CAPSULE EVERY DAY 11/25/19  Yes Lisa Robertson MD   metoprolol succinate (TOPROL XL) 100 MG extended release tablet Take 1.5 tablets by mouth nightly 10/23/19  Yes Lisa Robertson MD   buPROPion (WELLBUTRIN XL) 300 MG extended release tablet Take 1 tablet by mouth nightly 10/23/19  Yes Lisa Robertson MD   diltiazem (CARDIZEM CD) 360 MG extended release capsule Take 1 capsule by mouth daily 2/1/19  Yes Lisa Robertson MD   acetaminophen (TYLENOL) 500 MG tablet Take 2 tablets by mouth 4 times daily as needed for Pain 12/21/19   Shahram Becker MD   dofetilide (TIKOSYN) 250 MCG capsule TAKE 1 CAPSULE TWICE DAILY 11/25/19   Lisa Robertson MD   nitroGLYCERIN (NITROSTAT) 0.4 MG SL tablet Place 0.4 mg under the tongue every 5 minutes as needed for Chest pain up to max of 3 total doses. If no relief after 1 dose, call 911.     Historical Provider, MD       Current Medications:   Current Facility-Administered Medications: buPROPion (WELLBUTRIN XL) extended release tablet 300 mg, 300 mg, Oral, Nightly  diltiazem (CARDIZEM CD) extended release capsule 360 mg, 360 mg, Oral, Daily  dofetilide (TIKOSYN) capsule 250 mcg, 250 mcg, Oral, 2 times per day  apixaban (ELIQUIS) tablet 2.5 mg, 2.5 mg, Oral, BID  levothyroxine (SYNTHROID) tablet 100 mcg, 100 mcg, Oral, Daily  metoprolol succinate (TOPROL XL) extended release tablet 150 mg, 150 mg, Oral, Nightly  pantoprazole (PROTONIX) tablet 40 mg, 40 mg, Oral, QAM AC  traMADol (ULTRAM) tablet 50 mg, 50 mg, Oral, Q6H PRN  sodium chloride flush 0.9 % injection 10 mL, 10 mL, Intravenous, 2 times per day  sodium chloride flush 0.9 % injection 10 mL, 10 mL, Intravenous, PRN  magnesium hydroxide (MILK OF MAGNESIA) 400 MG/5ML suspension 30 mL, 30 mL, Oral, Daily PRN  ondansetron (ZOFRAN) injection 4 mg, 4 mg, Intravenous, Q6H PRN  nicotine (NICODERM CQ) 21 MG/24HR 1 patch, 1 patch, Transdermal, Daily PRN  acetaminophen (TYLENOL) tablet 650 mg, 650 mg, Oral, Q4H PRN  aspirin chewable tablet 81 mg, 81 mg, Oral, Daily    REVIEW OF SYSTEMS:       CONSTITUTIONAL: negative for fatigue and malaise   EYES: negative for double vision and photophobia    HEENT: negative for tinnitus and sore throat   RESPIRATORY: negative for cough, shortness of breath   CARDIOVASCULAR: negative for chest pain, palpitations   GASTROINTESTINAL: negative for nausea, vomiting   GENITOURINARY: negative for incontinence   MUSCULOSKELETAL: negative for neck or back pain   NEUROLOGICAL: negative for seizures   PSYCHIATRIC: negative for agitated     Review of systems otherwise negative. PHYSICAL EXAM:       /72   Pulse 74   Temp 97.6 °F (36.4 °C) (Oral)   Resp 19   Ht 5' 1\" (1.549 m)   Wt 97 lb (44 kg)   SpO2 96%   BMI 18.33 kg/m²       CONSTITUTIONAL:  Well developed, well nourished, alert and oriented x 3, in no acute distress. GCS 15, nontoxic. No dysarthria, no aphasia. EOMI.     HEAD:  normocephalic, atraumatic    EYES:  PERRLA, EOMI.   ENT:  moist mucous membranes   NECK:  supple, symmetric, no midline tenderness to palpation    BACK:  Focal midline TTP about low thoracic spine around T10-L1 area   LUNGS:  Equal air entry bilaterally   CARDIOVASCULAR:  normal s1 / s2   ABDOMEN:  Soft, no rigidity   NEUROLOGIC:  EYE OPENING     Spontaneous - 4 [x]       To voice - 3 []       To pain - 2 []       None - 1 []    VERBAL RESPONSE Appropriate, oriented - 5 [x]       Dazed or confused - 4 []       Syllables, expletives - 3 []       Grunts - 2 []       None - 1 []    MOTOR RESPONSE     Spontaneous, command - 6 [x]       Localizes pain - 5 []       Withdraws pain - 4 []       Abnormal flexion - 3 []       Abnormal extension - 2 []       None - 1 []            Total GCS: 15    Mental Status:  A & O x3, awake             Cranial Nerves:    cranial nerves II-XII are grossly intact    Motor Exam:    Drift:  absent  Tone:  normal    Motor exam is symmetrical 5 out of 5 all extremities bilaterally    Sensory:    Touch:    Right Upper Extremity:  normal  Left Upper Extremity:  normal  Right Lower Extremity:  normal  Left Lower Extremity:  normal     SKIN:  no rash      LABS AND IMAGING:     CBC with Differential:    Lab Results   Component Value Date    WBC 9.5 01/17/2020    RBC 3.83 01/17/2020    RBC 4.31 02/07/2012    HGB 10.8 01/17/2020    HCT 35.1 01/17/2020     01/17/2020     02/07/2012    MCV 91.6 01/17/2020    MCH 28.2 01/17/2020    MCHC 30.8 01/17/2020    RDW 14.8 01/17/2020    LYMPHOPCT 23 01/17/2020    MONOPCT 9 01/17/2020    BASOPCT 0 01/17/2020    MONOSABS 0.86 01/17/2020    LYMPHSABS 2.19 01/17/2020    EOSABS <0.03 01/17/2020    BASOSABS <0.03 01/17/2020    DIFFTYPE NOT REPORTED 01/17/2020     BMP:    Lab Results   Component Value Date     01/17/2020    K 3.8 01/17/2020     01/17/2020    CO2 24 01/17/2020    BUN 24 01/17/2020    LABALBU 4.0 01/17/2020    CREATININE 0.87 01/17/2020    CALCIUM 9.5 01/17/2020    GFRAA >60 01/17/2020    LABGLOM >60 01/17/2020    GLUCOSE 106 01/17/2020    GLUCOSE 92 02/07/2012       Radiology Review:    CT Abd/pelvis:   Trace bibasilar effusions.       Minimal intrahepatic biliary dilatation and mild dilatation the pancreatic   duct that is similar compared to prior and of uncertain etiology.       Uncomplicated colonic diverticulosis.      ASSESSMENT AND PLAN:       Patient Active

## 2020-01-19 VITALS
DIASTOLIC BLOOD PRESSURE: 68 MMHG | OXYGEN SATURATION: 94 % | TEMPERATURE: 97.5 F | BODY MASS INDEX: 18.31 KG/M2 | HEIGHT: 61 IN | RESPIRATION RATE: 20 BRPM | HEART RATE: 89 BPM | WEIGHT: 97 LBS | SYSTOLIC BLOOD PRESSURE: 106 MMHG

## 2020-01-19 PROCEDURE — 6360000002 HC RX W HCPCS: Performed by: NURSE PRACTITIONER

## 2020-01-19 PROCEDURE — 2580000003 HC RX 258: Performed by: NURSE PRACTITIONER

## 2020-01-19 PROCEDURE — 6370000000 HC RX 637 (ALT 250 FOR IP): Performed by: NURSE PRACTITIONER

## 2020-01-19 PROCEDURE — 99239 HOSP IP/OBS DSCHRG MGMT >30: CPT | Performed by: INTERNAL MEDICINE

## 2020-01-19 RX ORDER — ONDANSETRON 4 MG/1
4 TABLET, FILM COATED ORAL EVERY 8 HOURS PRN
Qty: 30 TABLET | Refills: 0 | Status: SHIPPED | OUTPATIENT
Start: 2020-01-19 | End: 2020-12-07

## 2020-01-19 RX ADMIN — TRAMADOL HYDROCHLORIDE 50 MG: 50 TABLET, FILM COATED ORAL at 04:45

## 2020-01-19 RX ADMIN — APIXABAN 2.5 MG: 2.5 TABLET, FILM COATED ORAL at 07:55

## 2020-01-19 RX ADMIN — DILTIAZEM HYDROCHLORIDE 360 MG: 180 CAPSULE, COATED, EXTENDED RELEASE ORAL at 07:55

## 2020-01-19 RX ADMIN — SODIUM CHLORIDE, PRESERVATIVE FREE 10 ML: 5 INJECTION INTRAVENOUS at 07:56

## 2020-01-19 RX ADMIN — PANTOPRAZOLE SODIUM 40 MG: 40 TABLET, DELAYED RELEASE ORAL at 06:30

## 2020-01-19 RX ADMIN — ASPIRIN 81 MG: 81 TABLET, CHEWABLE ORAL at 07:55

## 2020-01-19 RX ADMIN — DOFETILIDE 250 MCG: 0.25 CAPSULE ORAL at 07:55

## 2020-01-19 RX ADMIN — LEVOTHYROXINE SODIUM 100 MCG: 100 TABLET ORAL at 06:29

## 2020-01-19 RX ADMIN — ONDANSETRON 4 MG: 2 INJECTION INTRAMUSCULAR; INTRAVENOUS at 07:56

## 2020-01-19 ASSESSMENT — ENCOUNTER SYMPTOMS
ABDOMINAL PAIN: 0
EYE DISCHARGE: 0
VOMITING: 0
WHEEZING: 0
COLOR CHANGE: 0
BLOOD IN STOOL: 0
NAUSEA: 1
SHORTNESS OF BREATH: 0
BACK PAIN: 1

## 2020-01-19 ASSESSMENT — PAIN SCALES - GENERAL: PAINLEVEL_OUTOF10: 5

## 2020-01-19 NOTE — DISCHARGE SUMMARY
from NS. Pain improved with PT and pain medications. Agreeing to outpatient PT   3. Chronic diastolic CHF. No acute exacerbation. Continue Toprol   4. Discharge planning - home today with PT outpatient    Significant Diagnostic Studies:   Labs / Micro:  CBC:   Lab Results   Component Value Date    WBC 9.5 01/17/2020    RBC 3.83 01/17/2020    RBC 4.31 02/07/2012    HGB 10.8 01/17/2020    HCT 35.1 01/17/2020    MCV 91.6 01/17/2020    MCH 28.2 01/17/2020    MCHC 30.8 01/17/2020    RDW 14.8 01/17/2020     01/17/2020     02/07/2012     BMP:    Lab Results   Component Value Date    GLUCOSE 100 01/18/2020    GLUCOSE 92 02/07/2012     01/18/2020    K 4.0 01/18/2020     01/18/2020    CO2 23 01/18/2020    ANIONGAP 11 01/18/2020    BUN 16 01/18/2020    CREATININE 0.72 01/18/2020    BUNCRER NOT REPORTED 01/18/2020    CALCIUM 9.1 01/18/2020    LABGLOM >60 01/18/2020    GFRAA >60 01/18/2020    GFR      01/18/2020    GFR NOT REPORTED 01/18/2020        Radiology:  Ct Thoracic Spine Wo Contrast    Result Date: 1/18/2020  Mild thoracic spine degenerative changes with diffuse osteopenia. No acute findings. Ct Abdomen Pelvis W Iv Contrast Additional Contrast? None    Result Date: 1/17/2020  Trace bibasilar effusions. Minimal intrahepatic biliary dilatation and mild dilatation the pancreatic duct that is similar compared to prior and of uncertain etiology. Uncomplicated colonic diverticulosis. Ct 3d Reconstruction    Result Date: 1/17/2020  Multilevel degenerative disc disease with associated facet and ligamentous hypertrophy resulting in mild bilateral foraminal narrowing at L5-S1.        Consultations:    Consults:     Final Specialist Recommendations/Findings:   IP CONSULT TO HOSPITALIST  IP CONSULT TO NEUROSURGERY  IP CONSULT TO PAIN MANAGEMENT      The patient was seen and examined on day of discharge and this discharge summary is in conjunction with any daily progress note from day of discharge. Discharge plan:     Disposition: Home    Physician Follow Up:     Abhi Lamas, APRN - CNP  [address] [place] [address]          Stephanie Granado, [address] [address]    Call  Call [phone] to make new patient appointment for pain management       Requiring Further Evaluation/Follow Up POST HOSPITALIZATION/Incidental Findings: none    Diet: regular diet    Activity: As tolerated    Instructions to Patient: attend PT outpatient 3 times weekly    Discharge Medications:      Medication List      START taking these medications    ondansetron 4 MG tablet  Commonly known as:  ZOFRAN  Take 1 tablet by mouth every 8 hours as needed for Nausea or Vomiting        CONTINUE taking these medications    acetaminophen 500 MG tablet  Commonly known as:  TYLENOL  Take 2 tablets by mouth 4 times daily as needed for Pain     buPROPion 300 MG extended release tablet  Commonly known as:  WELLBUTRIN XL  Take 1 tablet by mouth nightly     diltiazem 360 MG extended release capsule  Commonly known as:  CARDIZEM CD  Take 1 capsule by mouth daily     dofetilide 250 MCG capsule  Commonly known as:  TIKOSYN  TAKE 1 CAPSULE TWICE DAILY     ELIQUIS 2.5 MG Tabs tablet  Generic drug:  apixaban  TAKE 1 TABLET TWICE DAILY     levothyroxine 100 MCG tablet  Commonly known as:  SYNTHROID  Take 1 tablet by mouth Daily     metoprolol succinate 100 MG extended release tablet  Commonly known as:  TOPROL XL  Take 1.5 tablets by mouth nightly     nitroGLYCERIN 0.4 MG SL tablet  Commonly known as:  NITROSTAT     omeprazole 20 MG delayed release capsule  Commonly known as:  PRILOSEC  TAKE 1 CAPSULE EVERY DAY     traMADol 50 MG tablet  Commonly known as:  ULTRAM  Take 1 tablet by mouth 2 times daily as needed for Pain for up to 30 days.            Where to Get Your Medications      These medications were sent to [facility] [address] [place] [address] [phone]

## 2020-01-19 NOTE — PROGRESS NOTES
Pt discharge education reviewed with pt. All questions answered. Outpatient PT script given to patient. Meds to beds delivered. Pt states ride can be here around 1530/1600. Pt belongings at bedside.   Electronically signed by Amirah Michel RN on 1/19/2020 at 1:16 PM

## 2020-01-19 NOTE — PROGRESS NOTES
[facility] [address]    Progress Note    1/19/2020    7:45 AM    Name:   Jillian Elkins  MRN:     [id number]     Acct:      [de-identified]   Room:   [address] [place] [address] Day:  2  Admit Date:  1/17/2020  7:16 PM    PCP:   CAMRON Michaud CNP  Code Status:  Full Code    Subjective:     C/C:   Chief Complaint   Patient presents with    Back Pain     Pt transferred from [place], c/o lower back pain, had issue a month ago had spinal tap at that time, pain started back 4 days ago same spot     Interval History Status: improved. Doing well today  Back pain improved  Complain of \"sick to her stomach\" with pain medication    Brief History:   Jillian Elkins is a 80 y.o. Non-/non  female who was transferred from [place] after she presented to their ED with a complain of worsening mid to lower back pain over the past 4 days and now having difficulty ambulating due to pain. She denies any direct trauma to the back but report that she may have tripped. In the ED she was evaluated with CT of the back which shows Multilevel degenerative disc disease with associated facet and ligamentous hypertrophy resulting in mild bilateral foraminal narrowing at L5-S1. While in the ED, she was noted to have AFIB with RVR in the 110. She denies any chest pain or SOB or palpitation. Ed requested admission for further evaluation of acute back pain with NS and AFIB with RVR.      When I evaluated her this morning, her HR is back to normal with restarting her home medications. She continue to complain of back pain and has difficulty ambulating. Review of Systems:     Review of Systems   Constitutional: Negative for appetite change and fever. HENT: Negative for congestion and ear discharge. Eyes: Negative for discharge and visual disturbance. Respiratory: Negative for shortness of breath and wheezing.     Cardiovascular: Negative for chest pain, palpitations and leg swelling. Gastrointestinal: Positive for nausea. Negative for abdominal pain, blood in stool and vomiting. Endocrine: Negative for cold intolerance and heat intolerance. Genitourinary: Negative for dysuria and frequency. Musculoskeletal: Positive for back pain. Negative for arthralgias and joint swelling. Skin: Negative for color change and rash. Neurological: Negative for dizziness, tremors, seizures and light-headedness. Psychiatric/Behavioral: Negative for agitation and confusion. Medications: Allergies: Allergies   Allergen Reactions    Sulfur Hives    Sulfa Antibiotics Rash       Current Meds:   Scheduled Meds:    buPROPion  300 mg Oral Nightly    diltiazem  360 mg Oral Daily    dofetilide  250 mcg Oral 2 times per day    apixaban  2.5 mg Oral BID    levothyroxine  100 mcg Oral Daily    metoprolol succinate  150 mg Oral Nightly    pantoprazole  40 mg Oral QAM AC    sodium chloride flush  10 mL Intravenous 2 times per day    aspirin  81 mg Oral Daily     Continuous Infusions:   PRN Meds: traMADol, sodium chloride flush, magnesium hydroxide, ondansetron, nicotine, acetaminophen    Data:     Past Medical History:   has a past medical history of Abnormal resting ECG findings, Abnormal resting ECG findings, Anxiety, Atrial fibrillation (HCC), Cholecystitis, Chronic back pain, Hiatal hernia, Holter monitor, abnormal, Hypothyroidism, Insomnia, MAC (mycobacterium avium-intracellulare complex), Normal cardiac stress test, Paroxysmal atrial fibrillation (HCC), Severe sinus bradycardia, Status post placement of implantable loop recorder, and Subdural hematoma ([facility] [address] Social History:   reports that she has never smoked. She has never used smokeless tobacco. She reports that she does not drink alcohol or use drugs.      Family History:   Family History   Problem Relation Age of Onset    Stroke Father     Stroke Mother        Vitals:  BP 93/68   Pulse 85   Temp 97.8 °F (36.6 °C) Neck:      Musculoskeletal: Normal range of motion and neck supple. Cardiovascular:      Rate and Rhythm: Normal rate and regular rhythm. Pulses: Normal pulses. Heart sounds: Normal heart sounds. No murmur. Pulmonary:      Effort: Pulmonary effort is normal.      Breath sounds: Normal breath sounds. No wheezing or rales. Abdominal:      General: Bowel sounds are normal. There is no distension. Palpations: Abdomen is soft. Tenderness: There is no tenderness. Musculoskeletal: Normal range of motion. General: No swelling or tenderness. Skin:     General: Skin is warm and dry. Findings: No rash. Neurological:      General: No focal deficit present. Mental Status: She is alert and oriented to person, place, and time. Psychiatric:         Mood and Affect: Mood normal.         Thought Content: Thought content normal.         Judgment: Judgment normal.         Assessment:        Hospital Problems           Last Modified POA    * (Principal) Atrial fibrillation with RVR (HCC) 1/18/2020 Yes    Chronic diastolic congestive heart failure (HCC) (Chronic) 1/18/2020 Yes    Back pain 1/17/2020 Yes          Plan:        1. AFib with RVR. Resolved. Continue PO Toprol 150 mg daily and Cardizem 360 mg daily and Tikosyn 250 mg daily. Continue Eliquis  2. Acute back pain. CT back did not show any fracture. No further input from NS. Pain improved with PT and pain medications. Agreeing to outpatient PT   3. Chronic diastolic CHF. No acute exacerbation. Continue Toprol   4.  Discharge planning - home today with PT outpatient    Steven Hassan MD  1/19/2020  7:45 AM

## 2020-01-20 ENCOUNTER — CARE COORDINATION (OUTPATIENT)
Dept: CASE MANAGEMENT | Age: 85
End: 2020-01-20

## 2020-01-20 LAB
EKG ATRIAL RATE: 102 BPM
EKG Q-T INTERVAL: 294 MS
EKG QRS DURATION: 76 MS
EKG QTC CALCULATION (BAZETT): 408 MS
EKG R AXIS: 35 DEGREES
EKG T AXIS: 29 DEGREES
EKG VENTRICULAR RATE: 116 BPM

## 2020-01-20 PROCEDURE — 93010 ELECTROCARDIOGRAM REPORT: CPT | Performed by: INTERNAL MEDICINE

## 2020-01-21 ENCOUNTER — CARE COORDINATION (OUTPATIENT)
Dept: CASE MANAGEMENT | Age: 85
End: 2020-01-21

## 2020-01-21 PROCEDURE — 1111F DSCHRG MED/CURRENT MED MERGE: CPT | Performed by: NURSE PRACTITIONER

## 2020-01-21 NOTE — CARE COORDINATION
patient-reported symptoms:  Other (Comment: daughter, POA scheduling pain management appt)  Were you discharged with any Home Care or Post Acute Services:  No  Care Transitions Interventions                                 Follow Up  Future Appointments   Date Time Provider Brittnee Rios   1/22/2020  3:00 PM Chai uA MD tiff onc spe [facility]   2/11/2020 11:45 AM SCHEDULE, [facility] [employer] [facility]   3/11/2020  8:20 AM Darion Pfeiffer MD TIFF CARD [facility]   3/17/2020  8:30 AM SCHEDULE, [facility] [employer] TIFF CARD [facility]   5/19/2020 11:00 AM CAMRON Costa - CNP T[facility]       Ashley Murray RN

## 2020-01-22 ENCOUNTER — OFFICE VISIT (OUTPATIENT)
Dept: ONCOLOGY | Age: 85
End: 2020-01-22
Payer: MEDICARE

## 2020-01-22 VITALS
RESPIRATION RATE: 18 BRPM | DIASTOLIC BLOOD PRESSURE: 58 MMHG | WEIGHT: 96.8 LBS | HEART RATE: 81 BPM | TEMPERATURE: 96.9 F | BODY MASS INDEX: 18.29 KG/M2 | SYSTOLIC BLOOD PRESSURE: 118 MMHG

## 2020-01-22 PROBLEM — D50.8 IRON DEFICIENCY ANEMIA SECONDARY TO INADEQUATE DIETARY IRON INTAKE: Status: ACTIVE | Noted: 2020-01-22

## 2020-01-22 PROBLEM — K90.9 IRON MALABSORPTION: Status: ACTIVE | Noted: 2020-01-22

## 2020-01-22 PROCEDURE — G8427 DOCREV CUR MEDS BY ELIG CLIN: HCPCS | Performed by: INTERNAL MEDICINE

## 2020-01-22 PROCEDURE — 99204 OFFICE O/P NEW MOD 45 MIN: CPT | Performed by: INTERNAL MEDICINE

## 2020-01-22 PROCEDURE — G8482 FLU IMMUNIZE ORDER/ADMIN: HCPCS | Performed by: INTERNAL MEDICINE

## 2020-01-22 PROCEDURE — 1090F PRES/ABSN URINE INCON ASSESS: CPT | Performed by: INTERNAL MEDICINE

## 2020-01-22 PROCEDURE — 99212 OFFICE O/P EST SF 10 MIN: CPT | Performed by: INTERNAL MEDICINE

## 2020-01-22 PROCEDURE — G8419 CALC BMI OUT NRM PARAM NOF/U: HCPCS | Performed by: INTERNAL MEDICINE

## 2020-01-22 RX ORDER — SODIUM CHLORIDE 9 MG/ML
INJECTION, SOLUTION INTRAVENOUS CONTINUOUS
Status: CANCELLED | OUTPATIENT
Start: 2020-01-27

## 2020-01-22 RX ORDER — METHYLPREDNISOLONE SODIUM SUCCINATE 125 MG/2ML
125 INJECTION, POWDER, LYOPHILIZED, FOR SOLUTION INTRAMUSCULAR; INTRAVENOUS ONCE
Status: CANCELLED | OUTPATIENT
Start: 2020-01-27

## 2020-01-22 RX ORDER — SODIUM CHLORIDE 0.9 % (FLUSH) 0.9 %
10 SYRINGE (ML) INJECTION PRN
Status: CANCELLED | OUTPATIENT
Start: 2020-01-27

## 2020-01-22 RX ORDER — SODIUM CHLORIDE 0.9 % (FLUSH) 0.9 %
5 SYRINGE (ML) INJECTION PRN
Status: CANCELLED | OUTPATIENT
Start: 2020-01-27

## 2020-01-22 RX ORDER — DIPHENHYDRAMINE HYDROCHLORIDE 50 MG/ML
50 INJECTION INTRAMUSCULAR; INTRAVENOUS ONCE
Status: CANCELLED | OUTPATIENT
Start: 2020-01-27

## 2020-01-22 RX ORDER — EPINEPHRINE 1 MG/ML
0.3 INJECTION, SOLUTION, CONCENTRATE INTRAVENOUS PRN
Status: CANCELLED | OUTPATIENT
Start: 2020-01-27

## 2020-01-22 RX ORDER — HEPARIN SODIUM (PORCINE) LOCK FLUSH IV SOLN 100 UNIT/ML 100 UNIT/ML
500 SOLUTION INTRAVENOUS PRN
Status: CANCELLED | OUTPATIENT
Start: 2020-01-27

## 2020-01-22 NOTE — PROGRESS NOTES
eye movements intact  Ears - bilateral TM's and external ear canals normal  Nose - normal and patent, no erythema, discharge or polyps  Mouth - mucous membranes moist, pharynx normal without lesions  Neck - supple, no significant adenopathy  Lymphatics - no palpable lymphadenopathy, no hepatosplenomegaly  Chest - clear to auscultation, no wheezes, rales or rhonchi, symmetric air entry  Heart - normal rate, regular rhythm, normal S1, S2, no murmurs, rubs, clicks or gallops  Abdomen - soft, nontender, nondistended, no masses or organomegaly  Neurological - alert, oriented, normal speech, no focal findings or movement disorder noted  Musculoskeletal - no joint tenderness, deformity or swelling  Extremities - peripheral pulses normal, no pedal edema, no clubbing or cyanosis  Skin - normal coloration and turgor, no rashes, no suspicious skin lesions noted     Review of Diagnostic data:   Lab Results   Component Value Date    WBC 9.5 01/17/2020    HGB 10.8 (L) 01/17/2020    HCT 35.1 (L) 01/17/2020    MCV 91.6 01/17/2020     01/17/2020       Chemistry        Component Value Date/Time     01/18/2020 0801    K 4.0 01/18/2020 0801     01/18/2020 0801    CO2 23 01/18/2020 0801    BUN 16 01/18/2020 0801    CREATININE 0.72 01/18/2020 0801        Component Value Date/Time    CALCIUM 9.1 01/18/2020 0801    ALKPHOS 69 01/18/2020 0801    AST 14 01/18/2020 0801    ALT 8 01/18/2020 0801    BILITOT 0.33 01/18/2020 0801        Lab Results   Component Value Date    IRON 26 (L) 12/30/2019    TIBC 227 (L) 12/30/2019    FERRITIN 96 12/30/2019         IMPRESSION:   Normocytic anemia  Iron deficiency anemia  intolarable side effects to oral Iron. Multiple co morbidities as listed. PLAN: I reviewed the labs available to me and discussed with the patient. For more than 60 minutes of face to face discussion, I explained to the patient the nature of this hematologic problem.  I explained the significance of these abnormalities in layman language. Patient is symptomatic from severe iron deficiency. She had significant side effects to oral iron. So we will give IV Iron infusion. Explained  Benefits and side effects. Patient had GI work up 2 years ago which was negative. Will repeat labs in 2 months for evaluation of response to treatment. Patient's questions were answered to the best of her satisfaction and she verbalized full understanding and agreement.                                 [facility]                          Cell: [phone]

## 2020-01-24 ENCOUNTER — CARE COORDINATION (OUTPATIENT)
Dept: CASE MANAGEMENT | Age: 85
End: 2020-01-24

## 2020-01-27 ENCOUNTER — HOSPITAL ENCOUNTER (OUTPATIENT)
Dept: INFUSION THERAPY | Age: 85
Discharge: HOME | End: 2020-01-27
Payer: MEDICARE

## 2020-01-27 VITALS
TEMPERATURE: 97.2 F | HEART RATE: 91 BPM | RESPIRATION RATE: 18 BRPM | SYSTOLIC BLOOD PRESSURE: 146 MMHG | DIASTOLIC BLOOD PRESSURE: 63 MMHG

## 2020-01-27 DIAGNOSIS — D50.8 IRON DEFICIENCY ANEMIA SECONDARY TO INADEQUATE DIETARY IRON INTAKE: ICD-10-CM

## 2020-01-27 DIAGNOSIS — K90.9 IRON MALABSORPTION: Primary | ICD-10-CM

## 2020-01-27 PROCEDURE — 6360000002 HC RX W HCPCS: Performed by: INTERNAL MEDICINE

## 2020-01-27 PROCEDURE — 96365 THER/PROPH/DIAG IV INF INIT: CPT

## 2020-01-27 PROCEDURE — 2580000003 HC RX 258: Performed by: INTERNAL MEDICINE

## 2020-01-27 RX ORDER — SODIUM CHLORIDE 0.9 % (FLUSH) 0.9 %
10 SYRINGE (ML) INJECTION PRN
Status: DISCONTINUED | OUTPATIENT
Start: 2020-01-27 | End: 2020-01-28 | Stop reason: HOSPADM

## 2020-01-27 RX ORDER — SODIUM CHLORIDE 9 MG/ML
INJECTION, SOLUTION INTRAVENOUS CONTINUOUS
Status: ACTIVE | OUTPATIENT
Start: 2020-01-27 | End: 2020-01-27

## 2020-01-27 RX ORDER — METHYLPREDNISOLONE SODIUM SUCCINATE 125 MG/2ML
125 INJECTION, POWDER, LYOPHILIZED, FOR SOLUTION INTRAMUSCULAR; INTRAVENOUS ONCE
Status: CANCELLED | OUTPATIENT
Start: 2020-02-03

## 2020-01-27 RX ORDER — SODIUM CHLORIDE 0.9 % (FLUSH) 0.9 %
10 SYRINGE (ML) INJECTION PRN
Status: CANCELLED | OUTPATIENT
Start: 2020-02-03

## 2020-01-27 RX ORDER — SODIUM CHLORIDE 9 MG/ML
INJECTION, SOLUTION INTRAVENOUS CONTINUOUS
Status: CANCELLED | OUTPATIENT
Start: 2020-02-03

## 2020-01-27 RX ORDER — EPINEPHRINE 1 MG/ML
0.3 INJECTION, SOLUTION, CONCENTRATE INTRAVENOUS PRN
Status: CANCELLED | OUTPATIENT
Start: 2020-02-03

## 2020-01-27 RX ORDER — HEPARIN SODIUM (PORCINE) LOCK FLUSH IV SOLN 100 UNIT/ML 100 UNIT/ML
500 SOLUTION INTRAVENOUS PRN
Status: CANCELLED | OUTPATIENT
Start: 2020-02-03

## 2020-01-27 RX ORDER — DIPHENHYDRAMINE HYDROCHLORIDE 50 MG/ML
50 INJECTION INTRAMUSCULAR; INTRAVENOUS ONCE
Status: CANCELLED | OUTPATIENT
Start: 2020-02-03

## 2020-01-27 RX ORDER — SODIUM CHLORIDE 0.9 % (FLUSH) 0.9 %
5 SYRINGE (ML) INJECTION PRN
Status: CANCELLED | OUTPATIENT
Start: 2020-02-03

## 2020-01-27 RX ADMIN — FERRIC CARBOXYMALTOSE INJECTION 750 MG: 50 INJECTION, SOLUTION INTRAVENOUS at 09:22

## 2020-01-27 RX ADMIN — Medication 10 ML: at 09:21

## 2020-01-27 RX ADMIN — SODIUM CHLORIDE: 9 INJECTION, SOLUTION INTRAVENOUS at 09:22

## 2020-01-29 ENCOUNTER — CARE COORDINATION (OUTPATIENT)
Dept: CASE MANAGEMENT | Age: 85
End: 2020-01-29

## 2020-01-29 NOTE — CARE COORDINATION
[place] [address] Transitions Follow Up Call    2020    Patient: Jaylin Roque  Patient : 1933   MRN: [id number]  Reason for Admission:  Acute on chronic back pain, AF RVR  Discharge Date: 20 RARS: Readmission Risk Score: 14         Spoke with: Josefina Nieves, patient    Follow Up:  Contacted patient for transition follow up. She reports being very tired and sleepy. Denies having any chest pain/discomfort, palpitations, shortness of breath, lightheadedness/dizziness, nausea/vomiting. She states she has swelling in her feet and keeps them elevated as much as possible. She is also wearing compression stockings. continues to weigh herself everyday. Reports that she has lost weight. Discussed signs/symptoms of when to contact MD with a weight gain of 3 lbs within 24 hours or 5 lbs within a week. She verbalized understanding. Reports that her back pain has gotten better and neck/head pain comes and goes. She is taking Tylenol for the pain which has been \"somewhat\" effective. No urinary or bowel issues. She is eating and drinking with no difficulties. She is aware of when to contact provider. She does not have any questions or concerns at this time.       Future Appointments   Date Time Provider Brittnee Rios   2/3/2020 11:00 AM SCHEDULE, [facility] MED ONC ROOM 6 [facility] MED ONC [place]   2020 11:45 AM SCHEDULE, [facility] TIFFIN CAR [facility] TIFF CARD [facility]   3/4/2020  3:15 PM Griselda Suh MD tiff onc spe [facility]   3/11/2020  8:20 AM Venus Saaverda MD TIFF CARD [facility]   3/17/2020  8:30 AM SCHEDULE, P TIFFIN CAR [facility] TIFF CARD [facility]   2020 11:00 AM CAMRON Ness - CNP Tiff Prim Ca TPP       Peng Sommer RN

## 2020-02-03 ENCOUNTER — HOSPITAL ENCOUNTER (OUTPATIENT)
Dept: INFUSION THERAPY | Age: 85
Discharge: HOME | End: 2020-02-03
Payer: MEDICARE

## 2020-02-03 VITALS
TEMPERATURE: 97.7 F | DIASTOLIC BLOOD PRESSURE: 89 MMHG | RESPIRATION RATE: 18 BRPM | SYSTOLIC BLOOD PRESSURE: 138 MMHG | HEART RATE: 78 BPM

## 2020-02-03 DIAGNOSIS — D50.8 IRON DEFICIENCY ANEMIA SECONDARY TO INADEQUATE DIETARY IRON INTAKE: ICD-10-CM

## 2020-02-03 DIAGNOSIS — K90.9 IRON MALABSORPTION: Primary | ICD-10-CM

## 2020-02-03 PROCEDURE — 96365 THER/PROPH/DIAG IV INF INIT: CPT

## 2020-02-03 PROCEDURE — 2580000003 HC RX 258: Performed by: INTERNAL MEDICINE

## 2020-02-03 RX ORDER — DIPHENHYDRAMINE HYDROCHLORIDE 50 MG/ML
50 INJECTION INTRAMUSCULAR; INTRAVENOUS ONCE
Status: CANCELLED | OUTPATIENT
Start: 2020-02-03

## 2020-02-03 RX ORDER — SODIUM CHLORIDE 9 MG/ML
INJECTION, SOLUTION INTRAVENOUS CONTINUOUS
Status: CANCELLED | OUTPATIENT
Start: 2020-02-03

## 2020-02-03 RX ORDER — EPINEPHRINE 1 MG/ML
0.3 INJECTION, SOLUTION, CONCENTRATE INTRAVENOUS PRN
Status: CANCELLED | OUTPATIENT
Start: 2020-02-03

## 2020-02-03 RX ORDER — SODIUM CHLORIDE 9 MG/ML
INJECTION, SOLUTION INTRAVENOUS CONTINUOUS
Status: ACTIVE | OUTPATIENT
Start: 2020-02-03 | End: 2020-02-03

## 2020-02-03 RX ORDER — METHYLPREDNISOLONE SODIUM SUCCINATE 125 MG/2ML
125 INJECTION, POWDER, LYOPHILIZED, FOR SOLUTION INTRAMUSCULAR; INTRAVENOUS ONCE
Status: CANCELLED | OUTPATIENT
Start: 2020-02-03

## 2020-02-03 RX ORDER — SODIUM CHLORIDE 0.9 % (FLUSH) 0.9 %
10 SYRINGE (ML) INJECTION PRN
Status: CANCELLED | OUTPATIENT
Start: 2020-02-03

## 2020-02-03 RX ORDER — SODIUM CHLORIDE 0.9 % (FLUSH) 0.9 %
10 SYRINGE (ML) INJECTION PRN
Status: DISCONTINUED | OUTPATIENT
Start: 2020-02-03 | End: 2020-02-04 | Stop reason: HOSPADM

## 2020-02-03 RX ORDER — HEPARIN SODIUM (PORCINE) LOCK FLUSH IV SOLN 100 UNIT/ML 100 UNIT/ML
500 SOLUTION INTRAVENOUS PRN
Status: CANCELLED | OUTPATIENT
Start: 2020-02-03

## 2020-02-03 RX ORDER — SODIUM CHLORIDE 0.9 % (FLUSH) 0.9 %
5 SYRINGE (ML) INJECTION PRN
Status: CANCELLED | OUTPATIENT
Start: 2020-02-03

## 2020-02-03 RX ADMIN — Medication 10 ML: at 11:30

## 2020-02-03 RX ADMIN — SODIUM CHLORIDE: 9 INJECTION, SOLUTION INTRAVENOUS at 11:30

## 2020-02-03 NOTE — PLAN OF CARE
Problem: KNOWLEDGE DEFICIT  Goal: Patient/S.O. demonstrates understanding of disease process, treatment plan, medications, and discharge instructions.   Outcome: Completed

## 2020-02-05 ENCOUNTER — CARE COORDINATION (OUTPATIENT)
Dept: CASE MANAGEMENT | Age: 85
End: 2020-02-05

## 2020-02-06 ENCOUNTER — CARE COORDINATION (OUTPATIENT)
Dept: CASE MANAGEMENT | Age: 85
End: 2020-02-06

## 2020-02-06 NOTE — CARE COORDINATION
[facility] Transitions Follow Up Call  2020    Patient: Lamin Goldberg  Patient : 1933   MRN: [id number]  Reason for Admission: Atrial Fib, recent cholecystitis  Discharge Date: 20 RARS: Readmission Risk Score: 15       Spoke with: daughter, Venancio Marcelo for [facility] referral.    Daughter reports that patient is still quite fatigued - denies noticeable improvement since iron infusions. Notes that patient is getting frustrated, is used to being more active. Ankle swelling improved. Daily weights stable. Some chronic foot & back pain - daughter is planning on pain management. Informed of final transitional call - daughter is interested in Ambulatory referral for patient. Discussed with Mary Miller who follows Patience for ambulatory care management. Care Transitions Subsequent and Final Call    Subsequent and Final Calls  Do you have any ongoing symptoms?:  Yes  Onset of Patient-reported symptoms: In the past 7 days  Patient-reported symptoms:  Fatigue  Interventions for patient-reported symptoms:  Other  Have your medications changed?:  No  Do you have any questions related to your medications?:  No  Do you currently have any active services?:  No  Do you have any needs or concerns that I can assist you with?:  Yes (Comment: daughter is interested in having patient be followed by ambulatory care for ongoing coordination of care & resources)  Care Transitions Interventions     Other Services:  Completed (Comment: ambulatory referral)                           Other Interventions:                  Care Transition Summary:  -  STV - had December admission to [facility] for acute cholecystitis & encephalopathy    Patient: Lamin Goldberg Patient : 1933        Is patient active with support services? no     Continued Care Coordination Recommended:  Yes - on several cardiac meds for AF - including eliquis.       Problem List:   Patient Active Problem List

## 2020-02-07 ENCOUNTER — CARE COORDINATION (OUTPATIENT)
Dept: CARE COORDINATION | Age: 85
End: 2020-02-07

## 2020-02-07 NOTE — LETTER
2/7/2020    [facility] 2        Dear Andres Rainey,        My name is Shannon Deal and I am a registered nurse who partners with [facility] [place] [address], APRN - CNP to improve patients' health. [address] [place] [address] [address], APRN - CNP believes you would benefit from working with me. As a member of your health care team, I would work with other providers involved in your care, offer education for your specific health conditions, and connect you with additional resources as needed. I will collaborate with [address] [place] [address] [address], APRN - CNP to support you in following your treatment plan. The additional support I provide is no additional cost to you. My primary focus is to help you achieve specific goals and improve your health. We are committed to walk with you on this journey and look forward to working with you. Please call me to further discuss your healthcare needs. I am available by phone or for appointments at the office.   You can reach me at [phone] Monday-Friday 8 am- 4 pm.       In good health,         Shannon Deal RN Ambulatory Care Manager

## 2020-02-07 NOTE — CARE COORDINATION
Protocol  Program Enrollment:  Complex Care  Referral from Primary Care Provider:  No  Week 1 - Initial Assessment     Do you have all of your prescriptions and are they filled?:  Yes  Barriers to medication adherence:  Forgets to take  Are you able to afford your medications?:  Yes  How often do you have trouble taking your medications the way you have been told to take them?:  Sometimes I take them as prescribed. Do you have Home O2 Therapy?:  No      Ability to seek help/take action for Emergent Urgent situations i.e. fire, crime, inclement weather or health crisis. :  Independent  Ability to ambulate to restroom:  Independent  Ability handle personal hygeine needs (bathing/dressing/grooming): Independent  Ability to manage Medications:  Dependent  Ability to prepare Food Preparation:  Dependent  Ability to maintain home (clean home, laundry):   Independent  Ability to drive and/or has transportation:  Independent  Ability to do shopping:  Independent  Ability to manage finances:  Needs Assistance  Is patient able to live independently?:  Yes     Current Housing:  Private Residence        Per the Fall Risk Screening, did the patient have 2 or more falls or 1 fall with injury in the past year?:  No     Frequent urination at night?:  No  Do you use rails/bars?:  Yes  Do you have a non-slip tub mat?:  Yes     Are you experiencing loss of meaning?:  No  Are you experiencing loss of hope and peace?:  No     Thinking about your patient's physical health needs, are there any symptoms or problems (risk indicators) you are unsure about that require further investigation?:  No identified areas of uncertainly or problems already being investigated   Are the patients physical health problems impacting on their mental well-being?:  Mild impact on mental well-being e.g. \"\"feeling fed-up\"\", \"\"reduced enjoyment\"\"   Are there any problems with your patients lifestyle behaviors (alcohol, drugs, diet, exercise) that are Zone Management Tools:  Completed         Schedule an appointment with the patient's PCP, Set up/Review an Education Plan, Set up/Review Goals              Prior to Admission medications    Medication Sig Start Date End Date Taking? Authorizing Provider   ondansetron (ZOFRAN) 4 MG tablet Take 1 tablet by mouth every 8 hours as needed for Nausea or Vomiting 1/19/20   Steven Harris MD   levothyroxine (SYNTHROID) 100 MCG tablet Take 1 tablet by mouth Daily 12/31/19   CAMRON Bui - CNP   acetaminophen (TYLENOL) 500 MG tablet Take 2 tablets by mouth 4 times daily as needed for Pain 12/21/19   Carla Mcclure MD   ELIQUIS 2.5 MG TABS tablet TAKE 1 TABLET TWICE DAILY 11/25/19   Selin Gudino MD   dofetilide (TIKOSYN) 250 MCG capsule TAKE 1 CAPSULE TWICE DAILY 11/25/19   Selin Gudino MD   omeprazole (PRILOSEC) 20 MG delayed release capsule TAKE 1 CAPSULE EVERY DAY 11/25/19   Selin Gudino MD   metoprolol succinate (TOPROL XL) 100 MG extended release tablet Take 1.5 tablets by mouth nightly 10/23/19   Selin Gudino MD   buPROPion (WELLBUTRIN XL) 300 MG extended release tablet Take 1 tablet by mouth nightly 10/23/19   Selin Gudino MD   nitroGLYCERIN (NITROSTAT) 0.4 MG SL tablet Place 0.4 mg under the tongue every 5 minutes as needed for Chest pain up to max of 3 total doses. If no relief after 1 dose, call 911.     Historical Provider, MD   diltiazem (CARDIZEM CD) 360 MG extended release capsule Take 1 capsule by mouth daily 2/1/19   Selin Gudino MD

## 2020-02-17 ENCOUNTER — CARE COORDINATION (OUTPATIENT)
Dept: CARE COORDINATION | Age: 85
End: 2020-02-17

## 2020-02-17 NOTE — CARE COORDINATION
Reason For Call Today:  -Attempted to reach Serene Pedroza's daughter today. Wanting to follow up on receiving nailing ACM sent past outreach- any questions? Discuss medications- bleeding risk with anticoagulants. Remind of blood work in 2 weeks,   -Unable to reach Butch Evans   -Left a voicemail message requesting a return phone call back to this ACM when patient is able to [phone], office hours given. Future Appointments   Date Time Provider Brittnee Rios   3/4/2020  3:15 PM Tosha Goff MD tiff onc spe [facility]   3/11/2020  9:20 AM Naa Uriostegui MD TIFF CARD [facility]   3/17/2020  8:30 AM SCHEDULE, [facility] TIFF CARD [facility]   5/19/2020 11:00 AM CAMRON Gomes Si - CNP Tiff Prim Ca [facility]       Care Coordination Plan of Care: This nurse Care Coordinator will await call back from patient/daughter, and if no return call; will attempt to reach patient/daughter back again next week.

## 2020-02-20 ENCOUNTER — CARE COORDINATION (OUTPATIENT)
Dept: CARE COORDINATION | Age: 85
End: 2020-02-20

## 2020-02-20 ENCOUNTER — TELEPHONE (OUTPATIENT)
Dept: PHARMACY | Age: 85
End: 2020-02-20

## 2020-02-20 NOTE — CARE COORDINATION
Reason For Call Today:  -Incoming call from Nixon Pedroza daughter   -Butch Evans returning this ACM voicemail from earlier this week   -Butch Evans states that [place] is \"up and down\"   -Had iron transfusion 2 weeks ago (2/3) and has had good days and bad- past 2 days have been bad (very tired and weak)   -Has follow up with Dr. Justus Silverman (hematology) on 3/4   -Per [place] (Dr. Justus Silverman) wanted to do a bone marrow test this next appointment to test for MDS but I will not allow it. I do not want mom going through that pain at her age\"   -Butch Evans wanting to sit down with this ACM and review medications and best times to take them   -Butch Evans states she fills [address] with alarm but feels like she is giving so many medications at night- should she be taking all these pills at night?   -ACM informs Butch Evans case would be referred to Pharmacist on care coordination team   -Butch Evans agreeable to meeting ACM next week to review medications and pharmacist recommendations. Call To:  -Call placed to PharmacistEsther with [address] that referral was going to be placed- wanting review of medications and best time to take these medications for this individual patient         Future Appointments   Date Time Provider Brittnee Jaegeri   3/4/2020  3:15 PM Gregorio Pacheco MD tiff onc spe [facility]   3/11/2020  9:20 AM Abdirahman Barnhart MD TIFF CARD [facility]   3/17/2020  8:30 AM SCHEDULE, [facility] TIFFIN CAR [facility] TIFF CARD [facility]   5/19/2020 11:00 AM Chi Gross APRN - CNP Tiff Prim Ca [facility]         Care Coordination Plan of Care: This nurse Care Coordinator will plan to send referral to Pharmacist on care coordination team. Will then plan to meet Brittani face to face at [facility] office on Tuesday 2/25 at 9 am to review medications and pharmacist recommendations. May also discuss nutritional supplement shakes as well.

## 2020-02-24 ENCOUNTER — CARE COORDINATION (OUTPATIENT)
Dept: CARE COORDINATION | Age: 85
End: 2020-02-24

## 2020-02-24 NOTE — CARE COORDINATION
Reason For Call Today:  -Incoming call from Atiya Mendoza and student Laura Duncan, MALIKA Pharmacist's   -ACM had referred patient case last week for medication review and when the best time to take medications would be per family request    -Case reviewed   -Continue to monitor and trend TSH levels- patient varies   -Encourage consistency with medications       Future Appointments   Date Time Provider Brittnee Rios   3/4/2020  3:15 Aspen Franco MD tiff onc spe [facility]   3/11/2020  9:20 AM Lindsey Adams MD TIFF CARD [facility]   3/17/2020  8:30 AM SCHEDULE, P TIFFIN CAR [facility] TIFF CARD [facility]   5/19/2020 11:00 AM Bela Gross, APRN - CNP Tiff Prim Ca [facility]         Care Coordination Plan of Care: This nurse Care Coordinator will plan to meet with patients daughterCristal, tomorrow 2/25 and review pharmacy recommendations.

## 2020-02-25 ENCOUNTER — CARE COORDINATION (OUTPATIENT)
Dept: CARE COORDINATION | Age: 85
End: 2020-02-25

## 2020-03-02 ENCOUNTER — HOSPITAL ENCOUNTER (OUTPATIENT)
Age: 85
Discharge: HOME | End: 2020-03-02
Payer: MEDICARE

## 2020-03-02 LAB
ABSOLUTE EOS #: 0.1 K/UL (ref 0–0.44)
ABSOLUTE IMMATURE GRANULOCYTE: <0.03 K/UL (ref 0–0.3)
ABSOLUTE LYMPH #: 1.88 K/UL (ref 1.1–3.7)
ABSOLUTE MONO #: 0.33 K/UL (ref 0.1–1.2)
BASOPHILS # BLD: 0 % (ref 0–2)
BASOPHILS ABSOLUTE: <0.03 K/UL (ref 0–0.2)
DIFFERENTIAL TYPE: ABNORMAL
EOSINOPHILS RELATIVE PERCENT: 2 % (ref 1–4)
FERRITIN: 1053 UG/L (ref 13–150)
HCT VFR BLD CALC: 41.5 % (ref 36.3–47.1)
HEMOGLOBIN: 12.4 G/DL (ref 11.9–15.1)
IMMATURE GRANULOCYTES: 0 %
IRON SATURATION: 60 % (ref 20–55)
IRON: 141 UG/DL (ref 37–145)
LYMPHOCYTES # BLD: 38 % (ref 24–43)
MCH RBC QN AUTO: 29.7 PG (ref 25.2–33.5)
MCHC RBC AUTO-ENTMCNC: 29.9 G/DL (ref 28.4–34.8)
MCV RBC AUTO: 99.5 FL (ref 82.6–102.9)
MONOCYTES # BLD: 7 % (ref 3–12)
NRBC AUTOMATED: 0 PER 100 WBC
PDW BLD-RTO: 18.6 % (ref 11.8–14.4)
PLATELET # BLD: 185 K/UL (ref 138–453)
PLATELET ESTIMATE: ABNORMAL
PMV BLD AUTO: 10.2 FL (ref 8.1–13.5)
RBC # BLD: 4.17 M/UL (ref 3.95–5.11)
RBC # BLD: ABNORMAL 10*6/UL
SEG NEUTROPHILS: 53 % (ref 36–65)
SEGMENTED NEUTROPHILS ABSOLUTE COUNT: 2.68 K/UL (ref 1.5–8.1)
TOTAL IRON BINDING CAPACITY: 235 UG/DL (ref 250–450)
UNSATURATED IRON BINDING CAPACITY: 94 UG/DL (ref 112–347)
WBC # BLD: 5 K/UL (ref 3.5–11.3)
WBC # BLD: ABNORMAL 10*3/UL

## 2020-03-02 PROCEDURE — 85025 COMPLETE CBC W/AUTO DIFF WBC: CPT

## 2020-03-02 PROCEDURE — 83550 IRON BINDING TEST: CPT

## 2020-03-02 PROCEDURE — 82728 ASSAY OF FERRITIN: CPT

## 2020-03-02 PROCEDURE — 36415 COLL VENOUS BLD VENIPUNCTURE: CPT

## 2020-03-02 PROCEDURE — 83540 ASSAY OF IRON: CPT

## 2020-03-04 ENCOUNTER — OFFICE VISIT (OUTPATIENT)
Dept: ONCOLOGY | Age: 85
End: 2020-03-04
Payer: MEDICARE

## 2020-03-04 VITALS
TEMPERATURE: 97.4 F | HEART RATE: 72 BPM | RESPIRATION RATE: 18 BRPM | SYSTOLIC BLOOD PRESSURE: 142 MMHG | HEIGHT: 61 IN | DIASTOLIC BLOOD PRESSURE: 72 MMHG | WEIGHT: 98 LBS | BODY MASS INDEX: 18.5 KG/M2

## 2020-03-04 PROCEDURE — G8482 FLU IMMUNIZE ORDER/ADMIN: HCPCS | Performed by: INTERNAL MEDICINE

## 2020-03-04 PROCEDURE — 4040F PNEUMOC VAC/ADMIN/RCVD: CPT | Performed by: INTERNAL MEDICINE

## 2020-03-04 PROCEDURE — 1123F ACP DISCUSS/DSCN MKR DOCD: CPT | Performed by: INTERNAL MEDICINE

## 2020-03-04 PROCEDURE — G8428 CUR MEDS NOT DOCUMENT: HCPCS | Performed by: INTERNAL MEDICINE

## 2020-03-04 PROCEDURE — 99214 OFFICE O/P EST MOD 30 MIN: CPT | Performed by: INTERNAL MEDICINE

## 2020-03-04 PROCEDURE — G8420 CALC BMI NORM PARAMETERS: HCPCS | Performed by: INTERNAL MEDICINE

## 2020-03-04 PROCEDURE — 1090F PRES/ABSN URINE INCON ASSESS: CPT | Performed by: INTERNAL MEDICINE

## 2020-03-04 PROCEDURE — 1036F TOBACCO NON-USER: CPT | Performed by: INTERNAL MEDICINE

## 2020-03-04 PROCEDURE — 99211 OFF/OP EST MAY X REQ PHY/QHP: CPT

## 2020-03-04 NOTE — PROGRESS NOTES
Chief Complaint   Patient presents with    Anemia     DIAGNOSIS:       Normocytic anemia  Iron deficiency anemia  intolarable side effects to oral Iron. Multiple co morbidities as listed. CURRENT THERAPY:         IV Iron infusion 2020    BRIEF CASE HISTORY:       Ms. Tonia Taylor is a very pleasant 80 y.o. female with history of multiple co morbidities as listed. Patient is referred for anemia. She has increasing weakness and fatigue for long duration. She has SOB on exertion. No dizziness. No active bleeding. No melena. No hematemesis. No Vaginal bleeding. Patient had multiple hospitalizations. She was treated with Iron replacement. She is currently on Prenatal vitamins. She developed severe constipation. Patient had colonoscopy. 2 years ago. It was negative. INTERIM HISTORY:   The patient is seen for follow up anemia. Did well after IV Iron infusion. No weakness now. No dizziness. No active bleeding. No new events. PAST MEDICAL HISTORY: has a past medical history of Abnormal resting ECG findings, Abnormal resting ECG findings, Anxiety, Atrial fibrillation ([address] Cholecystitis, Chronic back pain, Hiatal hernia, Holter monitor, abnormal, Hypothyroidism, Insomnia, MAC (mycobacterium avium-intracellulare complex), Normal cardiac stress test, Paroxysmal atrial fibrillation (HCC), Severe sinus bradycardia, Status post placement of implantable loop recorder, and Subdural hematoma ([address] PAST SURGICAL HISTORY: has a past surgical history that includes Foot surgery;  section; Thyroid surgery; brain surgery; Cataract removal (Bilateral); Insertable Cardiac Monitor (Left, 10/19/2017); Pacemaker insertion (Left, 2019); Pacemaker insertion (N/A, 2019); Colonoscopy; and Upper gastrointestinal endoscopy ().      CURRENT MEDICATIONS:  has a current medication list which includes the congestion or hives. No repeated infections. PHYSICAL EXAM:  The patient is not in acute distress. Vital signs: Blood pressure (!) 142/72, pulse 72, temperature 97.4 °F (36.3 °C), temperature source Temporal, resp. rate 18, height 5' 1\" (1.549 m), weight 98 lb (44.5 kg), not currently breastfeeding.      General appearance - well appearing, not in pain or distress  Mental status - good mood, alert and oriented  Eyes - pupils equal and reactive, extraocular eye movements intact  Ears - bilateral TM's and external ear canals normal  Nose - normal and patent, no erythema, discharge or polyps  Mouth - mucous membranes moist, pharynx normal without lesions  Neck - supple, no significant adenopathy  Lymphatics - no palpable lymphadenopathy, no hepatosplenomegaly  Chest - clear to auscultation, no wheezes, rales or rhonchi, symmetric air entry  Heart - normal rate, regular rhythm, normal S1, S2, no murmurs, rubs, clicks or gallops  Abdomen - soft, nontender, nondistended, no masses or organomegaly  Neurological - alert, oriented, normal speech, no focal findings or movement disorder noted  Musculoskeletal - no joint tenderness, deformity or swelling  Extremities - peripheral pulses normal, no pedal edema, no clubbing or cyanosis  Skin - normal coloration and turgor, no rashes, no suspicious skin lesions noted     Review of Diagnostic data:   Lab Results   Component Value Date    WBC 5.0 03/02/2020    HGB 12.4 03/02/2020    HCT 41.5 03/02/2020    MCV 99.5 03/02/2020     03/02/2020       Chemistry        Component Value Date/Time     01/18/2020 0801    K 4.0 01/18/2020 0801     01/18/2020 0801    CO2 23 01/18/2020 0801    BUN 16 01/18/2020 0801    CREATININE 0.72 01/18/2020 0801        Component Value Date/Time    CALCIUM 9.1 01/18/2020 0801    ALKPHOS 69 01/18/2020 0801    AST 14 01/18/2020 0801    ALT 8 01/18/2020 0801    BILITOT 0.33 01/18/2020 0801        Lab Results   Component Value Date

## 2020-03-11 ENCOUNTER — OFFICE VISIT (OUTPATIENT)
Dept: CARDIOLOGY | Age: 85
End: 2020-03-11
Payer: MEDICARE

## 2020-03-11 VITALS
BODY MASS INDEX: 18.54 KG/M2 | DIASTOLIC BLOOD PRESSURE: 54 MMHG | OXYGEN SATURATION: 89 % | WEIGHT: 98.2 LBS | HEIGHT: 61 IN | RESPIRATION RATE: 15 BRPM | HEART RATE: 69 BPM | SYSTOLIC BLOOD PRESSURE: 89 MMHG

## 2020-03-11 PROCEDURE — 93288 INTERROG EVL PM/LDLS PM IP: CPT | Performed by: FAMILY MEDICINE

## 2020-03-11 PROCEDURE — 1036F TOBACCO NON-USER: CPT | Performed by: FAMILY MEDICINE

## 2020-03-11 PROCEDURE — 4040F PNEUMOC VAC/ADMIN/RCVD: CPT | Performed by: FAMILY MEDICINE

## 2020-03-11 PROCEDURE — 99214 OFFICE O/P EST MOD 30 MIN: CPT | Performed by: FAMILY MEDICINE

## 2020-03-11 PROCEDURE — G8427 DOCREV CUR MEDS BY ELIG CLIN: HCPCS | Performed by: FAMILY MEDICINE

## 2020-03-11 PROCEDURE — G8420 CALC BMI NORM PARAMETERS: HCPCS | Performed by: FAMILY MEDICINE

## 2020-03-11 PROCEDURE — 1123F ACP DISCUSS/DSCN MKR DOCD: CPT | Performed by: FAMILY MEDICINE

## 2020-03-11 PROCEDURE — 1090F PRES/ABSN URINE INCON ASSESS: CPT | Performed by: FAMILY MEDICINE

## 2020-03-11 PROCEDURE — G8482 FLU IMMUNIZE ORDER/ADMIN: HCPCS | Performed by: FAMILY MEDICINE

## 2020-03-11 RX ORDER — METOPROLOL SUCCINATE 200 MG/1
200 TABLET, EXTENDED RELEASE ORAL DAILY
Qty: 90 TABLET | Refills: 3 | Status: SHIPPED | OUTPATIENT
Start: 2020-03-11 | End: 2022-05-23

## 2020-03-11 RX ORDER — DILTIAZEM HYDROCHLORIDE 240 MG/1
240 CAPSULE, COATED, EXTENDED RELEASE ORAL DAILY
Qty: 90 CAPSULE | Refills: 3 | Status: SHIPPED | OUTPATIENT
Start: 2020-03-11 | End: 2021-04-27

## 2020-03-11 NOTE — PROGRESS NOTES
Adilia Covarrubias am scribing for and in the presence of Solange Angulo MD, MS, F.A.C.C. Patient: Jack Celaya  : 1933  Date of Visit: 2020    REASON FOR VISIT / CONSULTATION: Follow-up (w/ Pacer Medtronic check. ER on  & . Hx:CHF,PAF,PAcer,SSS, Tachy-Alcides, Dysthymis,Tiredness. Very tired all the time. Once in awhile SOB with stairs. Rarely feels palps. Denies: CP, lightheaded/dizziness. )      Dear Lizbeth Stafford, APRN - CNP,    I had the pleasure of seeing your patient Jack Celaya in my office today. As you know, Ms. Dulce Alvarenga is a 80 y.o. female with a history of paroxysmal atrial fibrillation as well as intermittent episodes of lightheadedness and dizziness as well as several episodes of syncope and near syncope of unknown etiology. A Holter monitor showed several breakthrough brief episodes of  atrial fibrillation with RVR. Therefore I started her on Amiodarone 200 mg daily. Her last LINQ remote interrogation in  had shown about 1.1% percent of atrial fibrillation. Unfortunately, she developed some significant thyroid issues leading her amiodarone to be stop and instead started on Tikosyn therapy 2018 for PAF. Unfortunately, she was admitted on 18 to [facility] for a punctured lung and broken ribs due to her shoes and slipping and falling while trying to walk out of her house. Apparently this led to then need for a chest tube for what sounds to be a hemothorax which ultimately was removed. LINQ alert on 2018 showed atrial fibrillation. LINQ alert on 10/16/2018 showed atrial fibrillation increased from 2% to 8.7% with frequent RVR in the 160's that has been associated with lightheadedness and dizziness. LINQ alert on 18 showed A-Fib. Echocardiogram done 10/25/18 EF 65% evidence of moderate diastolic dysfunction is seen. 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  She had 6.3% atrial fibrillation burden on 2/6/19 which unfortunlatey went up to 8.8% on her 5/2/19 pacemaker check and is up to 9.3% on her 7/19 check. Pacemaker interrogated today in office with Rep showed her heart rate is fairly well controlled. Since the last time I saw Ms. Sherry Jackson, she reports continued increased intermittent fatigue and weakness. She says that she frequently falls asleep during the day, with watching TV or even eating sometimes and says she rarely sleeps well at night. She has some shortness of breath also while she is up moving around. She states she is going to have to quit her job because of her symptoms. She denies any chest pain, pressure or tightness. No falls or near falls. She denies any lightheaded/dizziness, palpitations, abdominal pain, or concerns with any medications. No bleeding issues. Past Medical History:   Diagnosis Date    Abnormal resting ECG findings 1/25/12    Normal sinus rhythm with frequent PACs in a trigeminy pattern    Abnormal resting ECG findings 6/10/2013    Severe sinus bradycardia at 50 bpm.     Anxiety     Atrial fibrillation (HCC)     Cholecystitis     Chronic back pain     Pt. c/o upper back pain,,,,\"On the sides of my lungs\"    Hiatal hernia     Holter monitor, abnormal 1/27/12    Multiple episodes of SVT between 100 and 130 beats per minute most consistent with atrial fibrillation and/or atrial flutter with variable block.  Hypothyroidism     Insomnia     MAC (mycobacterium avium-intracellulare complex)     Normal cardiac stress test 1/26/12    low risk study.  Paroxysmal atrial fibrillation ([facility] [address] 2/7/2012    Found on holter monitor 2/12 and 12/12     Severe sinus bradycardia 6/10/13    At least partially drug induced.  Status post placement of implantable loop recorder 10/19/2017    LINQ IMPLANTED. [employer]TRONIC    Subdural hematoma (HCC)        CURRENT ALLERGIES: Sulfur and Sulfa antibiotics REVIEW OF SYSTEMS: 14 systems were reviewed.  Pertinent positives and negatives as above, all else negative. Past Surgical History:   Procedure Laterality Date    BRAIN SURGERY      Had a blood clot on the brain. Fell 10 feet.  CATARACT REMOVAL Bilateral      SECTION      x1    COLONOSCOPY      several over the years by Dr. Katelynn Becerra Left 10/19/2017    [address] Left 2019    Dr. Anh Escalante N/A 2019    Bev Kal dual IS1 small DDD NOT MRI COMPATIBLE performed by Alma William MD at [facility] 106      tumor removed    UPPER GASTROINTESTINAL ENDOSCOPY      Anjel    Social History:  Social History     Tobacco Use    Smoking status: Never Smoker    Smokeless tobacco: Never Used   Substance Use Topics    Alcohol use: No    Drug use: No        CURRENT MEDICATIONS:  Outpatient Medications Marked as Taking for the 3/11/20 encounter (Office Visit) with Milena Sim MD   Medication Sig Dispense Refill    levothyroxine (SYNTHROID) 100 MCG tablet Take 1 tablet by mouth Daily 90 tablet 3    acetaminophen (TYLENOL) 500 MG tablet Take 2 tablets by mouth 4 times daily as needed for Pain      ELIQUIS 2.5 MG TABS tablet TAKE 1 TABLET TWICE DAILY 180 tablet 0    dofetilide (TIKOSYN) 250 MCG capsule TAKE 1 CAPSULE TWICE DAILY 180 capsule 0    omeprazole (PRILOSEC) 20 MG delayed release capsule TAKE 1 CAPSULE EVERY DAY 90 capsule 0    metoprolol succinate (TOPROL XL) 100 MG extended release tablet Take 1.5 tablets by mouth nightly 90 tablet 3    buPROPion (WELLBUTRIN XL) 300 MG extended release tablet Take 1 tablet by mouth nightly 90 tablet 3    nitroGLYCERIN (NITROSTAT) 0.4 MG SL tablet Place 0.4 mg under the tongue every 5 minutes as needed for Chest pain up to max of 3 total doses. If no relief after 1 dose, call 911.       diltiazem (CARDIZEM CD) 360 MG extended release capsule Take 1 capsule by mouth daily 90 capsule 3       FAMILY HISTORY: family history includes Stroke in her father and mother. PHYSICAL EXAM:   BP (!) 89/54 (Site: Left Upper Arm, Position: Sitting, Cuff Size: Medium Adult)   Pulse 69   Resp 15   Ht 5' 1\" (1.549 m)   Wt 98 lb 3.2 oz (44.5 kg)   SpO2 (!) 89%   BMI 18.55 kg/m²  Body mass index is 18.55 kg/m². Constitutional: She is oriented to person, place, and time. She appears well-developed and well-nourished. In no acute distress. HEENT: Normocephalic and atraumatic. No significant JVD was present. Carotid bruit is not present. No mass and no thyromegaly present. No lymphadenopathy present. Cardiovascular: Normal rate, regular rhythm, normal heart sounds. Exam reveals no gallop and no friction rubs. 2/6 systolic murmur, 5th intercostal space on the LEFT in the mid-clavicular line (cardiac apex). Pulmonary/Chest: Effort normal and breath sounds normal. No respiratory distress. She has no wheezes, rhonchi or rales. Abdominal: Soft, non-tender. Bowel sounds and aorta are normal. She exhibits no organomegaly, mass or bruit. Extremities: Trace. No cyanosis or clubbing. 2+ radial and carotid pulses. Distal extremity pulses: 2+ bilaterally. Compression stockings in place. Neurological: She is alert and oriented to person, place, and time. No evidence of gross cranial nerve deficit. Coordination appeared normal.   Skin: Skin is warm and dry. There is no rash or diaphoresis. Psychiatric: She has a normal mood and affect. Her speech is normal and behavior is normal.      MOST RECENT LABS ON RECORD:   Lab Results   Component Value Date    WBC 5.0 03/02/2020    HGB 12.4 03/02/2020    HCT 41.5 03/02/2020     03/02/2020    ALT 8 01/18/2020    AST 14 01/18/2020     01/18/2020    K 4.0 01/18/2020     01/18/2020    CREATININE 0.72 01/18/2020    BUN 16 01/18/2020    CO2 23 01/18/2020    TSH 0.48 01/18/2020    INR 1.1 01/18/2020       ASSESSMENT:  1. Tiredness    2. Dysthymia    3. Chronic diastolic congestive heart failure ([address]    4. PAF (paroxysmal atrial fibrillation) ([facility])    5. Cardiac pacemaker in situ       PLAN:   · Fatigue and Tiredness: I think that this multifactoral including her poor sleep habits, her hypothyroidism and/or depression and possibly medications. I also think that this could be related to some kind of underlying lung disease. PO2 today was 89%. · Beta Blocker: INCREASE to Metoprolol succinate (Toprol XL) 200 mg daily. I also discussed the potential side effects of this medication including lightheadedness and dizziness and instructed them to stop the medication of this occurs and call our office if this occurs. · Decrease Cardizem CD to 240 mg daily. · STOP Wellbutrin XL  · She denies any lung issues or lung testing, I am going to order a PFT test for her to have done. She could be tired from her oxygen being low. Chronic Diastolic Heart Failure: Currently Controlled   Beta Blocker Therapy: Increase Metoprolol succinate (Toprol XL)  200 mg nightly     Nonpharmacologic management of Heart Failure: I told her to continue wearing lower extremity compression stockings and I advised her to try and keep his legs up whenever possible and to limit salt in her diet. Paroxysmal Atrial Fibrillation: Rhythm Control 11% atrial fibrillation burden but rates largely well controlled <110   Beta Blocker Therapy: Increase Metoprolol succinate (Toprol XL)  200 mg  nightly     Calcium Channel Blocker: Decrease diltiazem 240 mg daily. Rhythm Control Agent: Continue Dofetilide (Tikosyn) 250 mcg twice daily   Monitoring: Dofetilide (Tikosyn Since she will be maintained on Tikosyn I will continue to monitor her ECG's every 6 months and make sure his QTc remains less than 500 ms. Stroke Risk: Her CHADS2-VASc score is 3/9 (3.2% stroke risk)  Anticoagulation: Continue Apixaban (Eliquis) 2.5 mg every 12 hours.  I also reminded her to watch for signs of blood

## 2020-03-11 NOTE — PATIENT INSTRUCTIONS
SURVEY:    You may be receiving a survey from [employer] regarding your visit today. Please complete the survey to enable us to provide the highest quality of care to you and your family. If you cannot score us a very good on any question, please call the office to discuss how we could have made your experience a very good one. Thank you.

## 2020-03-16 ENCOUNTER — CARE COORDINATION (OUTPATIENT)
Dept: CARE COORDINATION | Age: 85
End: 2020-03-16

## 2020-03-18 ENCOUNTER — NURSE ONLY (OUTPATIENT)
Dept: CARDIOLOGY | Age: 85
End: 2020-03-18
Payer: MEDICARE

## 2020-03-18 PROCEDURE — 93294 REM INTERROG EVL PM/LDLS PM: CPT | Performed by: FAMILY MEDICINE

## 2020-03-23 ENCOUNTER — CARE COORDINATION (OUTPATIENT)
Dept: CARE COORDINATION | Age: 85
End: 2020-03-23

## 2020-03-23 NOTE — CARE COORDINATION
Reason For Call Today:  -Attempted to reach [address] today to follow up on Serene's medications, follow up on Oncology and Cardiology appointments. Review medication changes per Dr. Santa Wagn. Follow up on drinking supplemental shakes. -Unable to reach [address] today   -Left a voicemail message requesting a return phone call back to this AC when patient is able to [phone], office hours given. Future Appointments   Date Time Provider Brittnee Rios   4/1/2020  9:00 AM [facility] PULMONARY FUNCTION ROOM [facility] PFT [place]   4/20/2020 10:00 AM Clarice Montano MD TIFF CARD [facility]   5/19/2020 11:00 AM CAMRON Ayoub - CNP Tiff Prim Ca [facility]   9/2/2020 10:00 AM Jenna Capone MD tiff onc spe [facility]   9/15/2020 10:00 AM SCHEDULE, P TIFFIN CAR [employer] TIFF CARD [facility]   3/8/2021 10:20 AM Clarice Montano MD TIFF CARD [facility]       Care Coordination Plan of Care: This nurse Care Coordinator will await call back from patient, and if no return call; will attempt to reach patient back again next week.

## 2020-03-28 NOTE — FLOWSHEET NOTE
DATE: 2018    NAME: Leonard Joya  MRN: [id number]   : 1933    Patient not seen this date for Physical Therapy due to:  [] Blood transfusion in progress  [] Hemodialysis  []  Patient Declined  [] Spine Precautions   [] Strict Bedrest  [] Surgery/ Procedure  [] Testing      [x] Other: RN Cx, dislodged CT, awaiting trauma decision        [] PT being discontinued at this time. Patient independent. No further needs. [] PT being discontinued at this time as the patient has been transferred to palliative care. No further needs.     Surya Mejia, SAMANTHA Yes

## 2020-03-30 ENCOUNTER — TELEPHONE (OUTPATIENT)
Dept: CARDIOLOGY | Age: 85
End: 2020-03-30

## 2020-03-31 ENCOUNTER — CARE COORDINATION (OUTPATIENT)
Dept: CARE COORDINATION | Age: 85
End: 2020-03-31

## 2020-03-31 NOTE — TELEPHONE ENCOUNTER
Please let patient know that I cannot tell her why she is short of breath and that is why I ordered the PFT's. I understand why she does not want to do the test but I cannot give her answers until she does do it. Otherwise follow up with PCP or even lung doctor if she would prefer.

## 2020-04-07 ENCOUNTER — CARE COORDINATION (OUTPATIENT)
Dept: CARE COORDINATION | Age: 85
End: 2020-04-07

## 2020-04-07 ENCOUNTER — HOSPITAL ENCOUNTER (OUTPATIENT)
Dept: PULMONOLOGY | Age: 85
Discharge: HOME | End: 2020-04-07
Payer: MEDICARE

## 2020-04-07 PROCEDURE — 94664 DEMO&/EVAL PT USE INHALER: CPT

## 2020-04-07 PROCEDURE — 94729 DIFFUSING CAPACITY: CPT

## 2020-04-07 PROCEDURE — 94726 PLETHYSMOGRAPHY LUNG VOLUMES: CPT

## 2020-04-07 PROCEDURE — 94060 EVALUATION OF WHEEZING: CPT

## 2020-04-07 PROCEDURE — 6360000002 HC RX W HCPCS: Performed by: INTERNAL MEDICINE

## 2020-04-07 RX ORDER — ALBUTEROL SULFATE 2.5 MG/3ML
2.5 SOLUTION RESPIRATORY (INHALATION) ONCE
Status: COMPLETED | OUTPATIENT
Start: 2020-04-07 | End: 2020-04-07

## 2020-04-07 RX ADMIN — ALBUTEROL SULFATE 2.5 MG: 2.5 SOLUTION RESPIRATORY (INHALATION) at 13:35

## 2020-04-14 ENCOUNTER — CARE COORDINATION (OUTPATIENT)
Dept: CARE COORDINATION | Age: 85
End: 2020-04-14

## 2020-04-14 NOTE — CARE COORDINATION
Reason For Call Today:  -Attempted to reach [address] today to follow up on Serene's medication. Review medication list. Follow up on drinking supplemental shakes. -Unable to reach [address] today   -Left a voicemail message requesting a return phone call back to this AC when patient is able to [phone], office hours given. -5th unsuccessful reach out attempt via phone       Future Appointments   Date Time Provider Brittnee Rios   4/28/2020  8:00 AM SCHEDULE, [facility] TIFFIN CAR [employer] TIFF CARD [facility]   5/19/2020 11:00 AM CAMRON Canales - CNP Tiff Prim Ca [facility]   6/2/2020  8:00 AM SCHEDULE, [facility] TIFFIN CAR [employer] TIFF CARD [facility]   7/7/2020  9:30 AM SCHEDULE, [facility] TIFFIN CAR [employer] TIFF CARD [facility]   9/2/2020 10:00 AM Ankush Law MD tiff onc spe [facility]   9/15/2020 10:00 AM SCHEDULE, [facility] TIFFIN CAR [employer] TIFF CARD [facility]   3/8/2021 10:20 AM Samy Cna MD TIFF CARD [facility]       Care Coordination Plan of Care: This nurse Care Coordinator will await call back from patient/Brittani, and if no return call; will plan to end episode due to unable to reach.

## 2020-06-01 ENCOUNTER — OFFICE VISIT (OUTPATIENT)
Dept: PRIMARY CARE CLINIC | Age: 85
End: 2020-06-01
Payer: MEDICARE

## 2020-06-01 VITALS
HEIGHT: 61 IN | WEIGHT: 97.7 LBS | DIASTOLIC BLOOD PRESSURE: 73 MMHG | HEART RATE: 64 BPM | RESPIRATION RATE: 14 BRPM | SYSTOLIC BLOOD PRESSURE: 136 MMHG | TEMPERATURE: 97.6 F | BODY MASS INDEX: 18.45 KG/M2

## 2020-06-01 PROBLEM — E44.1 MILD MALNUTRITION (HCC): Status: RESOLVED | Noted: 2019-12-18 | Resolved: 2020-06-01

## 2020-06-01 PROBLEM — J47.9 BRONCHIECTASIS WITHOUT COMPLICATION (HCC): Status: ACTIVE | Noted: 2020-06-01

## 2020-06-01 PROBLEM — F32.0 CURRENT MILD EPISODE OF MAJOR DEPRESSIVE DISORDER WITHOUT PRIOR EPISODE (HCC): Status: ACTIVE | Noted: 2020-06-01

## 2020-06-01 PROCEDURE — 1123F ACP DISCUSS/DSCN MKR DOCD: CPT | Performed by: NURSE PRACTITIONER

## 2020-06-01 PROCEDURE — 1090F PRES/ABSN URINE INCON ASSESS: CPT | Performed by: NURSE PRACTITIONER

## 2020-06-01 PROCEDURE — G8419 CALC BMI OUT NRM PARAM NOF/U: HCPCS | Performed by: NURSE PRACTITIONER

## 2020-06-01 PROCEDURE — G8427 DOCREV CUR MEDS BY ELIG CLIN: HCPCS | Performed by: NURSE PRACTITIONER

## 2020-06-01 PROCEDURE — 1036F TOBACCO NON-USER: CPT | Performed by: NURSE PRACTITIONER

## 2020-06-01 PROCEDURE — 4040F PNEUMOC VAC/ADMIN/RCVD: CPT | Performed by: NURSE PRACTITIONER

## 2020-06-01 PROCEDURE — 99214 OFFICE O/P EST MOD 30 MIN: CPT | Performed by: NURSE PRACTITIONER

## 2020-06-01 ASSESSMENT — ENCOUNTER SYMPTOMS
COUGH: 0
CONSTIPATION: 0
BACK PAIN: 0
DIARRHEA: 0
ABDOMINAL PAIN: 0
NAUSEA: 0
VOMITING: 0
WHEEZING: 0
RHINORRHEA: 0
SORE THROAT: 0
SHORTNESS OF BREATH: 1

## 2020-06-01 ASSESSMENT — COPD QUESTIONNAIRES: COPD: 1

## 2020-06-01 ASSESSMENT — PATIENT HEALTH QUESTIONNAIRE - PHQ9
SUM OF ALL RESPONSES TO PHQ9 QUESTIONS 1 & 2: 0
SUM OF ALL RESPONSES TO PHQ QUESTIONS 1-9: 0
1. LITTLE INTEREST OR PLEASURE IN DOING THINGS: 0
2. FEELING DOWN, DEPRESSED OR HOPELESS: 0
SUM OF ALL RESPONSES TO PHQ QUESTIONS 1-9: 0

## 2020-06-01 NOTE — PATIENT INSTRUCTIONS
have been feeling down, depressed, or hopeless. Or you may have lost interest in things that you usually enjoy. · You are not getting better as expected. Where can you learn more? Go to https://[email or web address]. org and sign in to your [employer] account. Enter S972 in the [employer] box to learn more about \"Fatigue: Care Instructions. \"     If you do not have an account, please click on the \"Sign Up Now\" link. Current as of: June 26, 2019               Content Version: 12.5  © 7987-0211 Healthwise, Incorporated. Care instructions adapted under license by [facility] ([facility]). If you have questions about a medical condition or this instruction, always ask your healthcare professional. [address] any warranty or liability for your use of this information.

## 2020-06-01 NOTE — PROGRESS NOTES
dyspnea on exertion. Pertinent negatives include no appetite change, chest pain, fever, headaches, myalgias, rhinorrhea or sore throat. Her symptoms are aggravated by emotional stress, strenuous activity, URI, exposure to smoke, exposure to fumes, change in weather and climbing stairs. Her symptoms are alleviated by nothing. Her symptoms are not alleviated by rest. There are no known risk factors for lung disease. Her past medical history is significant for bronchiectasis, bronchitis, COPD, emphysema and pneumonia. There is no history of asthma. Past Medical History:     Past Medical History:   Diagnosis Date    Abnormal resting ECG findings 1/25/12    Normal sinus rhythm with frequent PACs in a trigeminy pattern    Abnormal resting ECG findings 6/10/2013    Severe sinus bradycardia at 50 bpm.     Anxiety     Atrial fibrillation (HCC)     Cholecystitis     Chronic back pain     Pt. c/o upper back pain,,,,\"On the sides of my lungs\"    Hiatal hernia     Holter monitor, abnormal 1/27/12    Multiple episodes of SVT between 100 and 130 beats per minute most consistent with atrial fibrillation and/or atrial flutter with variable block.  Hypothyroidism     Insomnia     MAC (mycobacterium avium-intracellulare complex)     Normal cardiac stress test 1/26/12    low risk study.  Paroxysmal atrial fibrillation ([address] 2/7/2012    Found on holter monitor 2/12 and 12/12     Severe sinus bradycardia 6/10/13    At least partially drug induced.  Status post placement of implantable loop recorder 10/19/2017    LINQ IMPLANTED. [employer]    Subdural hematoma [facility])       Reviewed all health maintenance requirements and ordered appropriate tests  Health Maintenance Due   Topic Date Due    Annual Wellness Visit (AWV)  05/29/2019       Past Surgical History:     Past Surgical History:   Procedure Laterality Date    BRAIN SURGERY      Had a blood clot on the brain. Fell 10 feet.      CATARACT REMOVAL Bilateral   SECTION      x1    COLONOSCOPY      several over the years by Dr. Byrnes  Left 10/19/2017    [address] Left 2019    Dr. Laura Ladd 2019    Cristela Fan dual IS1 small DDD NOT MRI COMPATIBLE performed by Yulisa Wolfe MD at [facility]      tumor removed    UPPER GASTROINTESTINAL ENDOSCOPY      Anjel        Medications:       Prior to Admission medications    Medication Sig Start Date End Date Taking? Authorizing Provider   dilTIAZem (CARDIZEM CD) 240 MG extended release capsule Take 1 capsule by mouth daily 3/11/20  Yes Debbie Foster MD   metoprolol succinate (TOPROL XL) 200 MG extended release tablet Take 1 tablet by mouth daily 3/11/20  Yes Debbie Foster MD   ondansetron (ZOFRAN) 4 MG tablet Take 1 tablet by mouth every 8 hours as needed for Nausea or Vomiting 20  Yes Steven Velasquez MD   levothyroxine (SYNTHROID) 100 MCG tablet Take 1 tablet by mouth Daily 19  Yes CAMRON Rea CNP   acetaminophen (TYLENOL) 500 MG tablet Take 2 tablets by mouth 4 times daily as needed for Pain 19  Yes Chapin Foley MD   ELIQUIS 2.5 MG TABS tablet TAKE 1 TABLET TWICE DAILY 19  Yes Debbie Foster MD   dofetilide (TIKOSYN) 250 MCG capsule TAKE 1 CAPSULE TWICE DAILY 19  Yes Debbie Foster MD   omeprazole (PRILOSEC) 20 MG delayed release capsule TAKE 1 CAPSULE EVERY DAY 19  Yes Debbie Foster MD   nitroGLYCERIN (NITROSTAT) 0.4 MG SL tablet Place 0.4 mg under the tongue every 5 minutes as needed for Chest pain up to max of 3 total doses. If no relief after 1 dose, call 911. Yes Historical Provider, MD        Allergies:       Sulfur and Sulfa antibiotics    Social History:     Tobacco:    reports that she has never smoked.  She has never used smokeless tobacco.  Alcohol:      reports no Effort: Pulmonary effort is normal.      Breath sounds: Normal breath sounds. Abdominal:      General: Bowel sounds are normal. There is no distension. Palpations: Abdomen is soft. Tenderness: There is no abdominal tenderness. Musculoskeletal:      Right lower leg: No edema. Left lower leg: No edema. Lymphadenopathy:      Cervical: No cervical adenopathy. Skin:     General: Skin is warm and dry. Findings: No rash. Neurological:      Mental Status: She is alert and oriented to person, place, and time. Psychiatric:         Attention and Perception: She is attentive. Mood and Affect: Mood is not anxious or depressed. Speech: Speech normal.         Behavior: Behavior normal.         Thought Content: Thought content normal. Thought content does not include homicidal or suicidal ideation. Thought content does not include homicidal or suicidal plan. Judgment: Judgment normal.         Data:     Lab Results   Component Value Date     01/18/2020    K 4.0 01/18/2020     01/18/2020    CO2 23 01/18/2020    BUN 16 01/18/2020    CREATININE 0.72 01/18/2020    GLUCOSE 100 01/18/2020    GLUCOSE 92 02/07/2012    PROT 6.6 01/18/2020    LABALBU 3.2 01/18/2020    BILITOT 0.33 01/18/2020    ALKPHOS 69 01/18/2020    AST 14 01/18/2020    ALT 8 01/18/2020     Lab Results   Component Value Date    WBC 5.0 03/02/2020    RBC 4.17 03/02/2020    RBC 4.31 02/07/2012    HGB 12.4 03/02/2020    HCT 41.5 03/02/2020    MCV 99.5 03/02/2020    MCH 29.7 03/02/2020    MCHC 29.9 03/02/2020    RDW 18.6 03/02/2020     03/02/2020     02/07/2012    MPV 10.2 03/02/2020     Lab Results   Component Value Date    TSH 0.48 01/18/2020     No results found for: CHOL, HDL, PSA, LABA1C    Assessment/Plan:      Diagnosis Orders   1. Congenital hypothyroidism  T4, Free    TSH without Reflex   2. Current mild episode of major depressive disorder without prior episode ([facility] [address]     3.

## 2020-06-04 ENCOUNTER — OFFICE VISIT (OUTPATIENT)
Dept: PULMONOLOGY | Age: 85
End: 2020-06-04
Payer: MEDICARE

## 2020-06-04 VITALS
WEIGHT: 100 LBS | RESPIRATION RATE: 16 BRPM | BODY MASS INDEX: 18.88 KG/M2 | DIASTOLIC BLOOD PRESSURE: 62 MMHG | SYSTOLIC BLOOD PRESSURE: 132 MMHG | TEMPERATURE: 99 F | OXYGEN SATURATION: 97 % | HEART RATE: 68 BPM | HEIGHT: 61 IN

## 2020-06-04 PROCEDURE — 1090F PRES/ABSN URINE INCON ASSESS: CPT | Performed by: INTERNAL MEDICINE

## 2020-06-04 PROCEDURE — 99214 OFFICE O/P EST MOD 30 MIN: CPT | Performed by: INTERNAL MEDICINE

## 2020-06-04 PROCEDURE — 99213 OFFICE O/P EST LOW 20 MIN: CPT | Performed by: INTERNAL MEDICINE

## 2020-06-04 PROCEDURE — 1123F ACP DISCUSS/DSCN MKR DOCD: CPT | Performed by: INTERNAL MEDICINE

## 2020-06-04 PROCEDURE — 4040F PNEUMOC VAC/ADMIN/RCVD: CPT | Performed by: INTERNAL MEDICINE

## 2020-06-04 PROCEDURE — G8420 CALC BMI NORM PARAMETERS: HCPCS | Performed by: INTERNAL MEDICINE

## 2020-06-04 PROCEDURE — 1036F TOBACCO NON-USER: CPT | Performed by: INTERNAL MEDICINE

## 2020-06-04 PROCEDURE — G8427 DOCREV CUR MEDS BY ELIG CLIN: HCPCS | Performed by: INTERNAL MEDICINE

## 2020-06-04 RX ORDER — ALBUTEROL SULFATE 90 UG/1
2 AEROSOL, METERED RESPIRATORY (INHALATION) EVERY 6 HOURS PRN
Qty: 1 INHALER | Refills: 11 | Status: SHIPPED | OUTPATIENT
Start: 2020-06-04 | End: 2020-12-07

## 2020-06-04 NOTE — PATIENT INSTRUCTIONS
SURVEY:    You may be receiving a survey from [employer] regarding your visit today. Please complete the survey to enable us to provide the highest quality of care to you and your family. If you cannot score us a very good on any question, please call the office to discuss how we could have made your experience a very good one. Thank you. Patient Education        Fatigue: Care Instructions  Your Care Instructions     Fatigue is a feeling of tiredness, exhaustion, or lack of energy. You may feel fatigue because of too much or not enough activity. It can also come from stress, lack of sleep, boredom, and poor diet. Many medical problems, such as viral infections, can cause fatigue. Emotional problems, especially depression, are often the cause of fatigue. Fatigue is most often a symptom of another problem. Treatment for fatigue depends on the cause. For example, if you have fatigue because you have a certain health problem, treating this problem also treats your fatigue. If depression or anxiety is the cause, treatment may help. Follow-up care is a key part of your treatment and safety. Be sure to make and go to all appointments, and call your doctor if you are having problems. It's also a good idea to know your test results and keep a list of the medicines you take. How can you care for yourself at home? · Get regular exercise. But don't overdo it. Go back and forth between rest and exercise. · Get plenty of rest.  · Eat a healthy diet. Do not skip meals, especially breakfast.  · Reduce your use of caffeine, tobacco, and alcohol. Caffeine is most often found in coffee, tea, cola drinks, and chocolate. · Limit medicines that can cause fatigue. This includes tranquilizers and cold and allergy medicines. When should you call for help? Watch closely for changes in your health, and be sure to contact your doctor if:  · You have new symptoms such as fever or a rash. · Your fatigue gets worse.   · You have been feeling down, depressed, or hopeless. Or you may have lost interest in things that you usually enjoy. · You are not getting better as expected. Where can you learn more? Go to https://[employer]. org and sign in to your [employer] account. Enter Z391 in the [employer] box to learn more about \"Fatigue: Care Instructions. \"     If you do not have an account, please click on the \"Sign Up Now\" link. Current as of: June 26, 2019               Content Version: 12.5  © 4879-3777 [employer]. Care instructions adapted under license by [employer]. If you have questions about a medical condition or this instruction, always ask your healthcare professional. [address] any warranty or liability for your use of this information.

## 2020-06-04 NOTE — PROGRESS NOTES
have mild pedal edema and she is supposed to wear the stockings but denies any change in her edema. She denies waking up at night with cough chest tightness wheezing orthopnea or PND. Last echo 2018 showed moderate MR MR and no pulm htn reported    Past Medical History:        Diagnosis Date    Abnormal resting ECG findings 12    Normal sinus rhythm with frequent PACs in a trigeminy pattern    Abnormal resting ECG findings 6/10/2013    Severe sinus bradycardia at 50 bpm.     Anxiety     Atrial fibrillation (HCC)     Cholecystitis     Chronic back pain     Pt. c/o upper back pain,,,,\"On the sides of my lungs\"    Hiatal hernia     Holter monitor, abnormal 12    Multiple episodes of SVT between 100 and 130 beats per minute most consistent with atrial fibrillation and/or atrial flutter with variable block.  Hypothyroidism     Insomnia     MAC (mycobacterium avium-intracellulare complex)     Normal cardiac stress test 12    low risk study.  Paroxysmal atrial fibrillation ([address] 2012    Found on holter monitor  and      Severe sinus bradycardia 6/10/13    At least partially drug induced.  Status post placement of implantable loop recorder 10/19/2017    LINQ IMPLANTED. [employer]    Subdural hematoma [facility])        Past Surgical History:        Procedure Laterality Date    BRAIN SURGERY      Had a blood clot on the brain. Fell 10 feet.      CATARACT REMOVAL Bilateral      SECTION      x1    COLONOSCOPY      several over the years by Dr. Mohsen Sotelo Left 10/19/2017    [address] Left 2019    Dr. Deepa Berman 2019    Jeffrey Both dual IS1 small DDD NOT MRI COMPATIBLE performed by Valery Dukes MD at [facility]      tumor removed   [facility] ENDOSCOPY      Anjel Allergies: Allergies   Allergen Reactions    Sulfur Hives    Sulfa Antibiotics Rash         Home Meds:   Outpatient Encounter Medications as of 6/4/2020   Medication Sig Dispense Refill    albuterol sulfate  (90 Base) MCG/ACT inhaler Inhale 2 puffs into the lungs every 6 hours as needed for Wheezing or Shortness of Breath 1 Inhaler 11    dilTIAZem (CARDIZEM CD) 240 MG extended release capsule Take 1 capsule by mouth daily 90 capsule 3    metoprolol succinate (TOPROL XL) 200 MG extended release tablet Take 1 tablet by mouth daily 90 tablet 3    ondansetron (ZOFRAN) 4 MG tablet Take 1 tablet by mouth every 8 hours as needed for Nausea or Vomiting 30 tablet 0    levothyroxine (SYNTHROID) 100 MCG tablet Take 1 tablet by mouth Daily 90 tablet 3    acetaminophen (TYLENOL) 500 MG tablet Take 2 tablets by mouth 4 times daily as needed for Pain      ELIQUIS 2.5 MG TABS tablet TAKE 1 TABLET TWICE DAILY 180 tablet 0    dofetilide (TIKOSYN) 250 MCG capsule TAKE 1 CAPSULE TWICE DAILY 180 capsule 0    omeprazole (PRILOSEC) 20 MG delayed release capsule TAKE 1 CAPSULE EVERY DAY 90 capsule 0    nitroGLYCERIN (NITROSTAT) 0.4 MG SL tablet Place 0.4 mg under the tongue every 5 minutes as needed for Chest pain up to max of 3 total doses. If no relief after 1 dose, call 911. No facility-administered encounter medications on file as of 6/4/2020. Social History:   TOBACCO:   reports that she has never smoked. She has never used smokeless tobacco.  ETOH:   reports no history of alcohol use.   OCCUPATION:  House cleaning    Family History:       Problem Relation Age of Onset    Stroke Father     Stroke Mother        Immunizations:    Immunization History   Administered Date(s) Administered    Influenza Vaccine, unspecified formulation 09/01/2016    Influenza Virus Vaccine 09/01/2014    Influenza Whole 11/12/2009    Influenza, Quadv, IM, PF (6 mo and older Fluzone, Flulaval, Fluarix, and 3 yrs kg)   03/11/20 98 lb 3.2 oz (44.5 kg)     Body mass index is 18.89 kg/m². Physical Examination:   General appearance - alert, well appearing, and in no distress, oriented to person, place, and time, normal appearing weight and acyanotic, in no respiratory distress  Mental status - alert, oriented to person, place, and time  Eyes - pupils equal and reactive, extraocular eye movements intact, sclera anicteric, left eye normal, right eye normal  Ears - right ear normal, left ear normal  Nose - normal and patent, no erythema, discharge or polyps  Mouth - mucous membranes moist, pharynx normal without lesions  Neck - supple, no significant adenopathy  Chest - no tachypnea, retractions or cyanosis, bilateral symmetrical chest movement, normal resonance on percussion, air entry is bilateral present and good and fair air entry.  She has mild crackles present at right base partially cleared with cough  Heart - S1 and S2 normal, no gallops noted  Abdomen - soft, nontender, nondistended, no masses or organomegaly  Neurological - alert, oriented, normal speech, no focal findings or movement disorder noted}  Extremities - peripheral pulses normal, mild pedal edema, no clubbing or cyanosis    Skin - normal coloration and turgor, no rashes, no suspicious skin lesions noted       LABS:    CBC:   WBC   Date Value Ref Range Status   03/02/2020 5.0 3.5 - 11.3 k/uL Final   01/17/2020 9.5 3.5 - 11.3 k/uL Final   12/30/2019 8.0 3.5 - 11.3 k/uL Final     Hemoglobin   Date Value Ref Range Status   03/02/2020 12.4 11.9 - 15.1 g/dL Final   01/17/2020 10.8 (L) 11.9 - 15.1 g/dL Final   12/30/2019 10.9 (L) 11.9 - 15.1 g/dL Final     Platelet Count   Date Value Ref Range Status   02/07/2012 257 140 - 450 k/uL Final     Comment:     Performed at [facility] [address] [place] [address]   [phone]     Platelets   Date Value Ref Range Status   03/02/2020 185 138 - 453 k/uL Final   01/17/2020 283 138 - 453 k/uL Final CARDIAC ENZYMES:   Total CK   Date Value Ref Range Status   12/17/2019 19 (L) 26 - 192 U/L Final   04/12/2015 73 26 - 192 U/L Final     CK-MB   Date Value Ref Range Status   04/12/2015 1.0 <5.4 ng/mL Final     BNP: No results found for: BNP  Lipids: No results found for: CHOL, HDL    INR:     Thyroid:   TSH   Date Value Ref Range Status   01/18/2020 0.48 0.30 - 5.00 mIU/L Final     Urinalysis:     Cultures:-  -----------------------------------------------------------------    ABGs: No results found for: PHART, PO2ART, PFU7OZC    Pulmonary Functions Testing Results:    04/15/2020: FEV1 1.35 95%, FVC 1.85 96%, KQB3VJX 99%, TLC 4.39 95% DLCO 5.99 29%    CXR  12/20/2019  Mild interstitial edema and trace pleural effusions. .      CT Scans    ECHO: 10/25/2018  Global left ventricular systolic function appears preserved with an  estimated ejection fraction of 60%. Mildly increased left ventricular wall thickness with a normal left  ventricular cavity size. Normal mitral valve structure with moderate mitral regurgitation. Evidence of moderate diastolic dysfunction is seen.     Compared to the previous study of 1/18/13, the patients now has moderate  mitral regurgitation. Assessment and Plan         1. Dyspnea on exertion   2. Abnormal PFTs (pulmonary function tests)   3. Chronic fatigue  4. Chronic diastolic CHF  5. Moderate MR on echo in 2018  6. Paroxysmal atrial fibrillation  7. Sick sinus syndrome status post permanent pacemaker. Assessment:    Her tiredness and fatigue is not related to her pulmonary issue and likely related to beta-blocker and possibly contributed by calcium channel blocker, she is on high-dose beta-blocker but she does not take the medications daily as she feels more tired and fatigued and does miss her medication very frequently, and I have advised the patient and the daughter to discuss with her cardiologist about medications.   Her pulmonary function test shows actually normal spirometry with mild airway reversibility but severely reduced diffusion capacity, this could be secondary to CHF/pulmonary hypertension/moderate MR or possible pulmonary fibrosis but less likely to be pulmonary fibrosis as she has normal lung volumes, she has no history of emphysema and non-smoker, underlying airway respiratory is possible but would avoid any long-acting beta agonist especially in view of history of atrial fibrillation with her age and to use albuterol as needed only. We will get 6-minute walk test to see if she has exercise-induced hypoxemia and for evaluation of home oxygen and will also get high-resolution CT of the chest although she is not a candidate for any treatment if she were to diagnosed with pulmonary fibrosis. Plan and recommendation:    High-resolution CT of the chest.  6-minute walk test to determine exercise-induced hypoxemia and home O2 evaluation. Recommend to repeat echocardiogram as her echocardiogram in 2018 showed moderate MR. Follow-up with Dr. Tom Magdaleno for adjustment of her beta-blocker calcium channel blocker. Albuterol to be used as needed. Prescription for albuterol given. Vaccinations recommended annually in fall for flu  Up to date with vaccinations from [facility] an active lifestyle   PFT's reviewed  CXR reviewed from January 2020  Questions answered to pt's/family's satisfaction  Questions answered pertaining to diagnosis and management explained importance of compliance with therapy       RTC 2 months    It was my pleasure to evaluate Russ Soto today. Please call with questions. John Rosenbaum MD             6/4/2020, 6:00 PM    Please note that this chart was generated using voice recognition Dragon dictation software. Although every effort was made to ensure the accuracy of this automated transcription, some errors in transcription may have occurred.

## 2020-07-07 ENCOUNTER — NURSE ONLY (OUTPATIENT)
Dept: CARDIOLOGY | Age: 85
End: 2020-07-07
Payer: MEDICARE

## 2020-07-07 PROCEDURE — 93294 REM INTERROG EVL PM/LDLS PM: CPT | Performed by: FAMILY MEDICINE

## 2020-07-08 ENCOUNTER — TELEPHONE (OUTPATIENT)
Dept: CARDIOLOGY | Age: 85
End: 2020-07-08

## 2020-07-29 ENCOUNTER — HOSPITAL ENCOUNTER (OUTPATIENT)
Dept: CT IMAGING | Age: 85
Discharge: HOME | End: 2020-07-31
Payer: MEDICARE

## 2020-07-29 ENCOUNTER — HOSPITAL ENCOUNTER (OUTPATIENT)
Dept: PULMONOLOGY | Age: 85
Discharge: HOME | End: 2020-07-29
Payer: MEDICARE

## 2020-07-29 LAB
DISTANCE WALKED: NORMAL FT
SPO2: 99 %

## 2020-07-29 PROCEDURE — 94618 PULMONARY STRESS TESTING: CPT

## 2020-07-29 PROCEDURE — 71250 CT THORAX DX C-: CPT

## 2020-08-13 ENCOUNTER — OFFICE VISIT (OUTPATIENT)
Dept: PULMONOLOGY | Age: 85
End: 2020-08-13
Payer: MEDICARE

## 2020-08-13 ENCOUNTER — TELEPHONE (OUTPATIENT)
Dept: CARDIOLOGY | Age: 85
End: 2020-08-13

## 2020-08-13 VITALS
HEIGHT: 61 IN | DIASTOLIC BLOOD PRESSURE: 65 MMHG | WEIGHT: 100 LBS | HEART RATE: 87 BPM | BODY MASS INDEX: 18.88 KG/M2 | SYSTOLIC BLOOD PRESSURE: 92 MMHG | OXYGEN SATURATION: 98 % | RESPIRATION RATE: 16 BRPM | TEMPERATURE: 96.7 F

## 2020-08-13 PROCEDURE — 4040F PNEUMOC VAC/ADMIN/RCVD: CPT | Performed by: INTERNAL MEDICINE

## 2020-08-13 PROCEDURE — 1036F TOBACCO NON-USER: CPT | Performed by: INTERNAL MEDICINE

## 2020-08-13 PROCEDURE — G8427 DOCREV CUR MEDS BY ELIG CLIN: HCPCS | Performed by: INTERNAL MEDICINE

## 2020-08-13 PROCEDURE — 1123F ACP DISCUSS/DSCN MKR DOCD: CPT | Performed by: INTERNAL MEDICINE

## 2020-08-13 PROCEDURE — 1090F PRES/ABSN URINE INCON ASSESS: CPT | Performed by: INTERNAL MEDICINE

## 2020-08-13 PROCEDURE — G8420 CALC BMI NORM PARAMETERS: HCPCS | Performed by: INTERNAL MEDICINE

## 2020-08-13 PROCEDURE — 99213 OFFICE O/P EST LOW 20 MIN: CPT | Performed by: INTERNAL MEDICINE

## 2020-08-13 PROCEDURE — 99214 OFFICE O/P EST MOD 30 MIN: CPT | Performed by: INTERNAL MEDICINE

## 2020-08-13 RX ORDER — ALBUTEROL SULFATE 90 UG/1
2 AEROSOL, METERED RESPIRATORY (INHALATION) EVERY 6 HOURS PRN
Qty: 1 INHALER | Refills: 11 | Status: SHIPPED | OUTPATIENT
Start: 2020-08-13 | End: 2022-07-06

## 2020-08-13 RX ORDER — ALBUTEROL SULFATE 90 UG/1
2 AEROSOL, METERED RESPIRATORY (INHALATION) EVERY 6 HOURS PRN
COMMUNITY
End: 2020-12-07

## 2020-08-13 NOTE — PATIENT INSTRUCTIONS
mouth, nose, and eyes. What can you do to avoid spreading the virus to others? To help avoid spreading the virus to others:  · Cover your mouth with a tissue when you cough or sneeze. Then throw the tissue in the trash. · Use a disinfectant to clean things that you touch often. · Wear a cloth face cover if you have to go to public areas. · Stay home if you are sick or have been exposed to the virus. Don't go to school, work, or public areas. And don't use public transportation, ride-shares, or taxis unless you have no choice. · If you are sick:  ? Leave your home only if you need to get medical care. But call the doctor's office first so they know you're coming. And wear a face cover. ? Wear the face cover whenever you're around other people. It can help stop the spread of the virus when you cough or sneeze. ? Clean and disinfect your home every day. Use household  and disinfectant wipes or sprays. Take special care to clean things that you grab with your hands. These include doorknobs, remote controls, phones, and handles on your refrigerator and microwave. And don't forget countertops, tabletops, bathrooms, and computer keyboards. When to call for help  [id number] anytime you think you may need emergency care. For example, call if:  · You have severe trouble breathing. (You can't talk at all.)  · You have constant chest pain or pressure. · You are severely dizzy or lightheaded. · You are confused or can't think clearly. · Your face and lips have a blue color. · You pass out (lose consciousness) or are very hard to wake up. Call your doctor now if you develop symptoms such as:  · Shortness of breath. · Fever. · Cough. If you need to get care, call ahead to the doctor's office for instructions before you go. Make sure you wear a face cover to prevent exposing other people to the virus. Where can you get the latest information?   The following health organizations are tracking and studying this virus. Their websites contain the most up-to-date information. Chana Zelaya also learn what to do if you think you may have been exposed to the virus. · U.[place]. Centers for Disease Control and Prevention (CDC): The CDC provides updated news about the disease and travel advice. The website also tells you how to prevent the spread of infection. www.cdc.gov  · World Health Organization [facility]): WHO offers information about the virus outbreaks. WHO also has travel advice. www.who.int  Current as of: May 8, 2020               Content Version: 12.5  © 2006-2020 [employer]. Care instructions adapted under license by [facility] ([facility]). If you have questions about a medical condition or this instruction, always ask your healthcare professional. [address] any warranty or liability for your use of this information.

## 2020-08-13 NOTE — PROGRESS NOTES
denies waking up at night with cough chest tightness wheezing. Initial history and evaluation on 06/04/2020  She has no history of COPD, she is a non-smoker, no history of asthma. She has been having extreme fatigue and tiredness although she denies daytime naps or sleepiness except some time, though symptoms are present for more than a year, she has been followed up by cardiology , she does have history of diastolic CHF, atrial fibrillation on Eliquis and history of moderate MR on previous echo in 2018. She does have dyspnea on exertion and activities for almost a year or more, dyspnea of exertion is gradually worsened, she gets short of breath when she walks 25 to 50 feet, she is still working do [employer] but had to work slow she gets short of breath when she has to do stuff fast or if she has to go step up or down. Her main complaint is extreme tiredness and fatigue during the daytime, she claimed that she is on multiple medication and if she does not take her medication she feels better her tiredness and fatigue is better and she does not take her medication all the time sometimes she has to miss her cardiac medication for few days she feels better she take the medication and she feels tired fatigue again, she is on Toprol- mg once daily, she is on Cardizem to 40 mg once daily and she is on Tikosyn managed by cardiology.         Last echo 2018 showed moderate MR MR and no pulm htn reported    Past Medical History:        Diagnosis Date    Abnormal resting ECG findings 1/25/12    Normal sinus rhythm with frequent PACs in a trigeminy pattern    Abnormal resting ECG findings 6/10/2013    Severe sinus bradycardia at 50 bpm.     Anxiety     Atrial fibrillation (HCC)     Cholecystitis     Chronic back pain     Pt. c/o upper back pain,,,,\"On the sides of my lungs\"    Hiatal hernia     Holter monitor, abnormal 1/27/12    Multiple episodes of SVT between 100 and 130 beats per minute most consistent with atrial fibrillation and/or atrial flutter with variable block.  Hypothyroidism     Insomnia     MAC (mycobacterium avium-intracellulare complex)     Normal cardiac stress test 12    low risk study.  Paroxysmal atrial fibrillation ([address] 2012    Found on holter monitor  and      Severe sinus bradycardia 6/10/13    At least partially drug induced.  Status post placement of implantable loop recorder 10/19/2017    LINQ IMPLANTED. [employer]    Subdural hematoma [facility])        Past Surgical History:        Procedure Laterality Date    BRAIN SURGERY      Had a blood clot on the brain. Fell 10 feet.  CATARACT REMOVAL Bilateral      SECTION      x1    COLONOSCOPY      several over the years by Dr. Massey Certain Left 10/19/2017    [address] Left 2019    Dr. Mil Walsh 2019    Michael Fish dual IS1 small DDD NOT MRI COMPATIBLE performed by Corbin Rivero MD at [facility]      tumor removed    UPPER GASTROINTESTINAL ENDOSCOPY  2013    Anjel       Allergies:     Allergies   Allergen Reactions    Sulfur Hives    Sulfa Antibiotics Rash         Home Meds:   Outpatient Encounter Medications as of 2020   Medication Sig Dispense Refill    albuterol sulfate  (90 Base) MCG/ACT inhaler Inhale 2 puffs into the lungs every 6 hours as needed for Wheezing      dilTIAZem (CARDIZEM CD) 240 MG extended release capsule Take 1 capsule by mouth daily 90 capsule 3    metoprolol succinate (TOPROL XL) 200 MG extended release tablet Take 1 tablet by mouth daily 90 tablet 3    ondansetron (ZOFRAN) 4 MG tablet Take 1 tablet by mouth every 8 hours as needed for Nausea or Vomiting 30 tablet 0    levothyroxine (SYNTHROID) 100 MCG tablet Take 1 tablet by mouth Daily 90 tablet 3    acetaminophen (TYLENOL) 500 MG tablet Take 2 tablets by mouth 4 times daily as needed for Pain      ELIQUIS 2.5 MG TABS tablet TAKE 1 TABLET TWICE DAILY 180 tablet 0    dofetilide (TIKOSYN) 250 MCG capsule TAKE 1 CAPSULE TWICE DAILY 180 capsule 0    omeprazole (PRILOSEC) 20 MG delayed release capsule TAKE 1 CAPSULE EVERY DAY 90 capsule 0    nitroGLYCERIN (NITROSTAT) 0.4 MG SL tablet Place 0.4 mg under the tongue every 5 minutes as needed for Chest pain up to max of 3 total doses. If no relief after 1 dose, call 911.  albuterol sulfate  (90 Base) MCG/ACT inhaler Inhale 2 puffs into the lungs every 6 hours as needed for Wheezing or Shortness of Breath 1 Inhaler 11     No facility-administered encounter medications on file as of 8/13/2020. Social History:   TOBACCO:   reports that she has never smoked. She has never used smokeless tobacco.  ETOH:   reports no history of alcohol use.   OCCUPATION:  House cleaning    Family History:       Problem Relation Age of Onset    Stroke Father     Stroke Mother        Immunizations:    Immunization History   Administered Date(s) Administered    Influenza Vaccine, unspecified formulation 09/01/2016    Influenza Virus Vaccine 09/01/2014    Influenza Whole 11/12/2009    Influenza, Quadv, IM, PF (6 mo and older Fluzone, Flulaval, Fluarix, and 3 yrs and older Afluria) 11/02/2018, 09/30/2019    Pneumococcal Conjugate 13-valent ([id number]) 07/27/2018    Pneumococcal Conjugate 7-valent (Prevnar7) 04/12/2002    Pneumococcal Polysaccharide ([id number]) 04/30/2013         REVIEW OF SYSTEMS:  CONSTITUTIONAL:  positive for fatigue, negative for  fevers, chills, sweats, anorexia and weight loss  EYES:  negative for  double vision, blurred vision, dry eyes, eye discharge, visual disturbance, irritation, redness and icterus  HEENT: Positive for decreased hearing, negative for  nasal congestion, epistaxis, sore throat, hoarseness and voice change  RESPIRATORY:  positive for  dry cough and dyspnea, negative for  cough with sputum, wheezing, hemoptysis, chest pain, pleuritic pain and cyanosis  CARDIOVASCULAR:  positive for  dyspnea, fatigue, edema, negative for  chest pain, palpitations, orthopnea, PND, exertional chest pressure/discomfort, syncope  GASTROINTESTINAL:  negative for nausea, vomiting, diarrhea, constipation, abdominal pain, jaundice, dysphagia, reflux, hematemesis and hemtochezia  GENITOURINARY:  negative for frequency, dysuria and hematuria  HEMATOLOGIC/LYMPHATIC:  negative for easy bruising, bleeding and petechiae  ALLERGIC/IMMUNOLOGIC:  negative for urticaria, hay fever, angioedema and anaphylaxis  ENDOCRINE:  negative for heat intolerance, cold intolerance, tremor and weight changes  MUSCULOSKELETAL:  negative for  joint swelling and stiff joints  NEUROLOGICAL:  negative for headaches, dizziness, seizures, memory problems, speech problems, visual disturbance, coordination problems, gait problems, tremor, dysphagia, weakness, numbness and syncope  BEHAVIOR/PSYCH:  negative          Physical Exam:    Vitals: BP 92/65   Pulse 87   Temp 96.7 °F (35.9 °C)   Resp 16   Ht 5' 1\" (1.549 m)   Wt 100 lb (45.4 kg)   SpO2 98%   BMI 18.89 kg/m²   Last 3 weights: Wt Readings from Last 3 Encounters:   08/13/20 100 lb (45.4 kg)   06/04/20 100 lb (45.4 kg)   06/01/20 97 lb 11.2 oz (44.3 kg)     Body mass index is 18.89 kg/m².     Physical Examination:   General appearance - alert, well appearing, and in no distress, oriented to person, place, and time, normal appearing weight and acyanotic, in no respiratory distress  Mental status - alert, oriented to person, place, and time  Eyes - pupils equal and reactive, extraocular eye movements intact, sclera anicteric, left eye normal, right eye normal  Ears - right ear normal, left ear normal  Nose - normal and patent, no erythema, discharge or polyps  Mouth - mucous membranes moist, pharynx normal without lesions  Neck - supple, no significant adenopathy  Chest - no tachypnea, retractions or cyanosis, bilateral symmetrical chest movement, normal resonance on percussion, air entry is bilateral present and good and fair air entry.,  No crackles wheezing or rhonchi  Heart - S1 and S2 normal, no gallops noted  Abdomen - soft, nontender, nondistended, no masses or organomegaly  Neurological - alert, oriented, normal speech, no focal findings or movement disorder noted  Extremities - peripheral pulses normal, mild pedal edema, no clubbing or cyanosis    Skin - normal coloration and turgor, no rashes, no suspicious skin lesions noted       LABS:    CBC:   WBC   Date Value Ref Range Status   03/02/2020 5.0 3.5 - 11.3 k/uL Final   01/17/2020 9.5 3.5 - 11.3 k/uL Final   12/30/2019 8.0 3.5 - 11.3 k/uL Final     Hemoglobin   Date Value Ref Range Status   03/02/2020 12.4 11.9 - 15.1 g/dL Final   01/17/2020 10.8 (L) 11.9 - 15.1 g/dL Final   12/30/2019 10.9 (L) 11.9 - 15.1 g/dL Final     Platelet Count   Date Value Ref Range Status   02/07/2012 257 140 - 450 k/uL Final     Comment:     Performed at [facility] [address] [place], [place] [address]   [phone]     Platelets   Date Value Ref Range Status   03/02/2020 185 138 - 453 k/uL Final   01/17/2020 283 138 - 453 k/uL Final   12/30/2019 539 (H) 138 - 453 k/uL Final     BMP:   Sodium   Date Value Ref Range Status   01/18/2020 138 135 - 144 mmol/L Final   01/17/2020 139 135 - 144 mmol/L Final   12/20/2019 143 135 - 144 mmol/L Final     Potassium   Date Value Ref Range Status   01/18/2020 4.0 3.7 - 5.3 mmol/L Final   01/17/2020 3.8 3.7 - 5.3 mmol/L Final   12/20/2019 3.3 (L) 3.7 - 5.3 mmol/L Final     Chloride   Date Value Ref Range Status   01/18/2020 104 98 - 107 mmol/L Final   01/17/2020 102 98 - 107 mmol/L Final   12/20/2019 110 (H) 98 - 107 mmol/L Final     CO2   Date Value Ref Range Status   01/18/2020 23 20 - 31 mmol/L Final   01/17/2020 24 20 - 31 mmol/L Final   12/20/2019 20 20 - 31 mmol/L Final     BUN   Date Value Ref Range Status   01/18/2020 16 8 - 23 mg/dL Final   01/17/2020 24 (H) 8 - 23 mg/dL Final   12/20/2019 19 8 - 23 mg/dL Final     CREATININE   Date Value Ref Range Status   01/18/2020 0.72 0.50 - 0.90 mg/dL Final   01/17/2020 0.87 0.50 - 0.90 mg/dL Final   12/20/2019 0.89 0.50 - 0.90 mg/dL Final     Glucose   Date Value Ref Range Status   01/18/2020 100 (H) 70 - 99 mg/dL Final   01/17/2020 106 (H) 70 - 99 mg/dL Final   12/20/2019 134 (H) 70 - 99 mg/dL Final   02/07/2012 92 74 - 100 mg/dL Final     Hepatic:   AST   Date Value Ref Range Status   01/18/2020 14 <32 U/L Final   01/17/2020 21 <32 U/L Final   12/20/2019 15 <32 U/L Final     ALT   Date Value Ref Range Status   01/18/2020 8 5 - 33 U/L Final   01/17/2020 11 5 - 33 U/L Final   12/20/2019 6 5 - 33 U/L Final     Total Bilirubin   Date Value Ref Range Status   01/18/2020 0.33 0.3 - 1.2 mg/dL Final   01/17/2020 0.51 0.3 - 1.2 mg/dL Final   12/20/2019 0.27 (L) 0.3 - 1.2 mg/dL Final     Alkaline Phosphatase   Date Value Ref Range Status   01/18/2020 69 35 - 104 U/L Final   01/17/2020 82 35 - 104 U/L Final   12/20/2019 66 35 - 104 U/L Final     Amylase: No results found for: AMYLASE  Lipase:   Lipase   Date Value Ref Range Status   01/17/2020 23 13 - 60 U/L Final     CARDIAC ENZYMES:   Total CK   Date Value Ref Range Status   12/17/2019 19 (L) 26 - 192 U/L Final   04/12/2015 73 26 - 192 U/L Final     CK-MB   Date Value Ref Range Status   04/12/2015 1.0 <5.4 ng/mL Final     BNP: No results found for: BNP  Lipids: No results found for: CHOL, HDL    INR:     Thyroid:   TSH   Date Value Ref Range Status   01/18/2020 0.48 0.30 - 5.00 mIU/L Final     Urinalysis:     Cultures:-  -----------------------------------------------------------------    ABGs: No results found for: PHART, PO2ART, VVR4HFH    Pulmonary Functions Testing Results:    04/15/2020: FEV1 1.35 95%, FVC 1.85 96%, SVV0RPF 99%, TLC 4.39 95% DLCO 5.99 29%    CXR  12/20/2019  Mild interstitial edema and trace pleural effusions. .      CT Scans    ECHO: 10/25/2018  Global left ventricular systolic function appears preserved with an  estimated ejection fraction of 60%. Mildly increased left ventricular wall thickness with a normal left  ventricular cavity size. Normal mitral valve structure with moderate mitral regurgitation. Evidence of moderate diastolic dysfunction is seen.     Compared to the previous study of 1/18/13, the patients now has moderate  mitral regurgitation. Assessment and Plan         1. Dyspnea on exertion   2. Abnormal PFTs (pulmonary function tests)   3. Chronic fatigue  4. Chronic diastolic CHF  5. Moderate MR on echo in 2018  6. Paroxysmal atrial fibrillation  7. Sick sinus syndrome status post permanent pacemaker. Assessment:    Her tiredness and fatigue is not related to her pulmonary issue and likely related to beta-blocker and possibly contributed by calcium channel blocker, she is on high-dose beta-blocker but she does not take the medications daily as she feels more tired and fatigued and does miss her medication very frequently, and I have advised the patient and the daughter to discuss with her cardiologist about medications. Her pulmonary function test shows actually normal spirometry with mild airway reversibility but severely reduced diffusion capacity, this could be secondary to CHF/pulmonary hypertension/moderate MR less likely to be pulmonary fibrosis as she has normal lung volumes, she has no history of emphysema and non-smoker, underlying airway respiratory is possible but would avoid any long-acting beta agonist especially in view of history of atrial fibrillation with her age and to use albuterol as needed only.   She has high-resolution CT of the chest done which did not show any evidence of interstitial lung disease/fibrosis/septal thickening or honeycomb changes and does not account for her decrease in diffusion

## 2020-08-27 ENCOUNTER — HOSPITAL ENCOUNTER (OUTPATIENT)
Dept: NON INVASIVE DIAGNOSTICS | Age: 85
Discharge: HOME | End: 2020-08-27
Payer: MEDICARE

## 2020-08-27 ENCOUNTER — TELEPHONE (OUTPATIENT)
Dept: CARDIOLOGY | Age: 85
End: 2020-08-27

## 2020-08-27 LAB
LV EF: 60 %
LVEF MODALITY: NORMAL

## 2020-08-27 PROCEDURE — 93306 TTE W/DOPPLER COMPLETE: CPT

## 2020-09-23 ENCOUNTER — HOSPITAL ENCOUNTER (OUTPATIENT)
Age: 85
Discharge: HOME | End: 2020-09-23
Payer: MEDICARE

## 2020-09-23 ENCOUNTER — OFFICE VISIT (OUTPATIENT)
Dept: ONCOLOGY | Age: 85
End: 2020-09-23
Payer: MEDICARE

## 2020-09-23 VITALS
HEART RATE: 81 BPM | TEMPERATURE: 97.3 F | WEIGHT: 95.8 LBS | HEIGHT: 61 IN | RESPIRATION RATE: 18 BRPM | SYSTOLIC BLOOD PRESSURE: 109 MMHG | BODY MASS INDEX: 18.09 KG/M2 | DIASTOLIC BLOOD PRESSURE: 67 MMHG

## 2020-09-23 LAB
ABSOLUTE EOS #: 0.12 K/UL (ref 0–0.44)
ABSOLUTE IMMATURE GRANULOCYTE: <0.03 K/UL (ref 0–0.3)
ABSOLUTE LYMPH #: 1.77 K/UL (ref 1.1–3.7)
ABSOLUTE MONO #: 0.38 K/UL (ref 0.1–1.2)
BASOPHILS # BLD: 1 % (ref 0–2)
BASOPHILS ABSOLUTE: 0.03 K/UL (ref 0–0.2)
DIFFERENTIAL TYPE: ABNORMAL
EOSINOPHILS RELATIVE PERCENT: 2 % (ref 1–4)
FERRITIN: 681 UG/L (ref 13–150)
HCT VFR BLD CALC: 38.4 % (ref 36.3–47.1)
HEMOGLOBIN: 11.9 G/DL (ref 11.9–15.1)
IMMATURE GRANULOCYTES: 0 %
IRON SATURATION: 41 % (ref 20–55)
IRON: 88 UG/DL (ref 37–145)
LYMPHOCYTES # BLD: 32 % (ref 24–43)
MCH RBC QN AUTO: 31.8 PG (ref 25.2–33.5)
MCHC RBC AUTO-ENTMCNC: 31 G/DL (ref 28.4–34.8)
MCV RBC AUTO: 102.7 FL (ref 82.6–102.9)
MONOCYTES # BLD: 7 % (ref 3–12)
NRBC AUTOMATED: 0 PER 100 WBC
PDW BLD-RTO: 14 % (ref 11.8–14.4)
PLATELET # BLD: 193 K/UL (ref 138–453)
PLATELET ESTIMATE: ABNORMAL
PMV BLD AUTO: 9.7 FL (ref 8.1–13.5)
RBC # BLD: 3.74 M/UL (ref 3.95–5.11)
RBC # BLD: ABNORMAL 10*6/UL
SEG NEUTROPHILS: 58 % (ref 36–65)
SEGMENTED NEUTROPHILS ABSOLUTE COUNT: 3.17 K/UL (ref 1.5–8.1)
TOTAL IRON BINDING CAPACITY: 216 UG/DL (ref 250–450)
UNSATURATED IRON BINDING CAPACITY: 128 UG/DL (ref 112–347)
WBC # BLD: 5.5 K/UL (ref 3.5–11.3)
WBC # BLD: ABNORMAL 10*3/UL

## 2020-09-23 PROCEDURE — G8419 CALC BMI OUT NRM PARAM NOF/U: HCPCS | Performed by: INTERNAL MEDICINE

## 2020-09-23 PROCEDURE — 82728 ASSAY OF FERRITIN: CPT

## 2020-09-23 PROCEDURE — 99214 OFFICE O/P EST MOD 30 MIN: CPT | Performed by: INTERNAL MEDICINE

## 2020-09-23 PROCEDURE — 1036F TOBACCO NON-USER: CPT | Performed by: INTERNAL MEDICINE

## 2020-09-23 PROCEDURE — 1123F ACP DISCUSS/DSCN MKR DOCD: CPT | Performed by: INTERNAL MEDICINE

## 2020-09-23 PROCEDURE — 83540 ASSAY OF IRON: CPT

## 2020-09-23 PROCEDURE — 85025 COMPLETE CBC W/AUTO DIFF WBC: CPT

## 2020-09-23 PROCEDURE — 1090F PRES/ABSN URINE INCON ASSESS: CPT | Performed by: INTERNAL MEDICINE

## 2020-09-23 PROCEDURE — 99211 OFF/OP EST MAY X REQ PHY/QHP: CPT | Performed by: INTERNAL MEDICINE

## 2020-09-23 PROCEDURE — G8427 DOCREV CUR MEDS BY ELIG CLIN: HCPCS | Performed by: INTERNAL MEDICINE

## 2020-09-23 PROCEDURE — 83550 IRON BINDING TEST: CPT

## 2020-09-23 PROCEDURE — 4040F PNEUMOC VAC/ADMIN/RCVD: CPT | Performed by: INTERNAL MEDICINE

## 2020-09-23 PROCEDURE — 36415 COLL VENOUS BLD VENIPUNCTURE: CPT

## 2020-09-23 NOTE — PROGRESS NOTES
history of bleeding tendency. No bruises or ecchymosis. No history of clotting problems. · Infectious disease: No fever, chills or frequent infections. · Endocrine: No problems with obesity. No polydipsia or polyuria. No temperature intolerance. · Neurologic: No headaches or dizziness. No weakness or numbness of the extremities. No changes in balance, coordination,  memory, mentation, behavior. · Allergic/Immunologic: No nasal congestion or hives. No repeated infections. PHYSICAL EXAM:  The patient is not in acute distress. Vital signs: Blood pressure 109/67, pulse 81, temperature 97.3 °F (36.3 °C), temperature source Temporal, resp. rate 18, height 5' 1\" (1.549 m), weight 95 lb 12.8 oz (43.5 kg), not currently breastfeeding.      General appearance - well appearing, not in pain or distress  Mental status - good mood, alert and oriented  Eyes - pupils equal and reactive, extraocular eye movements intact  Ears - bilateral TM's and external ear canals normal  Nose - normal and patent, no erythema, discharge or polyps  Mouth - mucous membranes moist, pharynx normal without lesions  Neck - supple, no significant adenopathy  Lymphatics - no palpable lymphadenopathy, no hepatosplenomegaly  Chest - clear to auscultation, no wheezes, rales or rhonchi, symmetric air entry  Heart - normal rate, regular rhythm, normal S1, S2, no murmurs, rubs, clicks or gallops  Abdomen - soft, nontender, nondistended, no masses or organomegaly  Neurological - alert, oriented, normal speech, no focal findings or movement disorder noted  Musculoskeletal - no joint tenderness, deformity or swelling  Extremities - peripheral pulses normal, no pedal edema, no clubbing or cyanosis  Skin - normal coloration and turgor, no rashes, no suspicious skin lesions noted     Review of Diagnostic data:   Lab Results   Component Value Date    WBC 5.0 03/02/2020    HGB 12.4 03/02/2020    HCT 41.5 03/02/2020    MCV 99.5 03/02/2020     03/02/2020       Chemistry        Component Value Date/Time     01/18/2020 0801    K 4.0 01/18/2020 0801     01/18/2020 0801    CO2 23 01/18/2020 0801    BUN 16 01/18/2020 0801    CREATININE 0.72 01/18/2020 0801        Component Value Date/Time    CALCIUM 9.1 01/18/2020 0801    ALKPHOS 69 01/18/2020 0801    AST 14 01/18/2020 0801    ALT 8 01/18/2020 0801    BILITOT 0.33 01/18/2020 0801        Lab Results   Component Value Date    IRON 141 03/02/2020    TIBC 235 (L) 03/02/2020    FERRITIN 1,053 (H) 03/02/2020         IMPRESSION:   Normocytic anemia  Iron deficiency anemia  intolarable side effects to oral Iron. Multiple co morbidities as listed. PLAN:   The patient symptoms are returning.   We will check her hemoglobin and iron stores  She had significant benefit to IV iron before so if she is having recurrent iron deficiency anemia  The patient verbalized understanding and agreement

## 2020-10-09 RX ORDER — DOFETILIDE 0.25 MG/1
250 CAPSULE ORAL 2 TIMES DAILY
Qty: 180 CAPSULE | Refills: 3 | Status: SHIPPED | OUTPATIENT
Start: 2020-10-09 | End: 2021-03-08

## 2020-10-21 ENCOUNTER — NURSE ONLY (OUTPATIENT)
Dept: CARDIOLOGY | Age: 85
End: 2020-10-21
Payer: MEDICARE

## 2020-10-21 PROCEDURE — 93294 REM INTERROG EVL PM/LDLS PM: CPT | Performed by: FAMILY MEDICINE

## 2020-12-07 ENCOUNTER — TELEPHONE (OUTPATIENT)
Dept: PRIMARY CARE CLINIC | Age: 85
End: 2020-12-07

## 2020-12-07 ENCOUNTER — HOSPITAL ENCOUNTER (OUTPATIENT)
Age: 85
Discharge: HOME | End: 2020-12-07
Payer: MEDICARE

## 2020-12-07 ENCOUNTER — OFFICE VISIT (OUTPATIENT)
Dept: PRIMARY CARE CLINIC | Age: 85
End: 2020-12-07
Payer: MEDICARE

## 2020-12-07 VITALS
DIASTOLIC BLOOD PRESSURE: 66 MMHG | WEIGHT: 103.1 LBS | SYSTOLIC BLOOD PRESSURE: 112 MMHG | OXYGEN SATURATION: 98 % | BODY MASS INDEX: 19.48 KG/M2 | RESPIRATION RATE: 18 BRPM | HEART RATE: 50 BPM | TEMPERATURE: 97.5 F

## 2020-12-07 LAB
ABSOLUTE EOS #: 0.15 K/UL (ref 0–0.44)
ABSOLUTE IMMATURE GRANULOCYTE: <0.03 K/UL (ref 0–0.3)
ABSOLUTE LYMPH #: 2.07 K/UL (ref 1.1–3.7)
ABSOLUTE MONO #: 0.41 K/UL (ref 0.1–1.2)
ALT SERPL-CCNC: 19 U/L (ref 5–33)
ANION GAP SERPL CALCULATED.3IONS-SCNC: 10 MMOL/L (ref 9–17)
AST SERPL-CCNC: 18 U/L
BASOPHILS # BLD: 1 % (ref 0–2)
BASOPHILS ABSOLUTE: 0.04 K/UL (ref 0–0.2)
BUN BLDV-MCNC: 23 MG/DL (ref 8–23)
BUN/CREAT BLD: 21 (ref 9–20)
CALCIUM SERPL-MCNC: 9.4 MG/DL (ref 8.6–10.4)
CHLORIDE BLD-SCNC: 105 MMOL/L (ref 98–107)
CHOLESTEROL/HDL RATIO: 3
CHOLESTEROL: 213 MG/DL
CO2: 25 MMOL/L (ref 20–31)
CREAT SERPL-MCNC: 1.11 MG/DL (ref 0.5–0.9)
DIFFERENTIAL TYPE: NORMAL
EOSINOPHILS RELATIVE PERCENT: 2 % (ref 1–4)
GFR AFRICAN AMERICAN: 56 ML/MIN
GFR NON-AFRICAN AMERICAN: 46 ML/MIN
GFR SERPL CREATININE-BSD FRML MDRD: ABNORMAL ML/MIN/{1.73_M2}
GFR SERPL CREATININE-BSD FRML MDRD: ABNORMAL ML/MIN/{1.73_M2}
GLUCOSE BLD-MCNC: 99 MG/DL (ref 70–99)
HCT VFR BLD CALC: 41.8 % (ref 36.3–47.1)
HDLC SERPL-MCNC: 72 MG/DL
HEMOGLOBIN: 13.2 G/DL (ref 11.9–15.1)
IMMATURE GRANULOCYTES: 0 %
LDL CHOLESTEROL: 112 MG/DL (ref 0–130)
LYMPHOCYTES # BLD: 30 % (ref 24–43)
MCH RBC QN AUTO: 31.7 PG (ref 25.2–33.5)
MCHC RBC AUTO-ENTMCNC: 31.6 G/DL (ref 28.4–34.8)
MCV RBC AUTO: 100.5 FL (ref 82.6–102.9)
MONOCYTES # BLD: 6 % (ref 3–12)
NRBC AUTOMATED: 0 PER 100 WBC
PDW BLD-RTO: 13.4 % (ref 11.8–14.4)
PLATELET # BLD: 185 K/UL (ref 138–453)
PLATELET ESTIMATE: NORMAL
PMV BLD AUTO: 8.9 FL (ref 8.1–13.5)
POTASSIUM SERPL-SCNC: 4.4 MMOL/L (ref 3.7–5.3)
RBC # BLD: 4.16 M/UL (ref 3.95–5.11)
RBC # BLD: NORMAL 10*6/UL
SEG NEUTROPHILS: 61 % (ref 36–65)
SEGMENTED NEUTROPHILS ABSOLUTE COUNT: 4.23 K/UL (ref 1.5–8.1)
SODIUM BLD-SCNC: 140 MMOL/L (ref 135–144)
THYROXINE, FREE: 1.01 NG/DL (ref 0.93–1.7)
TRIGL SERPL-MCNC: 147 MG/DL
TSH SERPL DL<=0.05 MIU/L-ACNC: 4.59 MIU/L (ref 0.3–5)
VLDLC SERPL CALC-MCNC: ABNORMAL MG/DL (ref 1–30)
WBC # BLD: 6.9 K/UL (ref 3.5–11.3)
WBC # BLD: NORMAL 10*3/UL

## 2020-12-07 PROCEDURE — 1090F PRES/ABSN URINE INCON ASSESS: CPT | Performed by: NURSE PRACTITIONER

## 2020-12-07 PROCEDURE — 84439 ASSAY OF FREE THYROXINE: CPT

## 2020-12-07 PROCEDURE — 84443 ASSAY THYROID STIM HORMONE: CPT

## 2020-12-07 PROCEDURE — 84460 ALANINE AMINO (ALT) (SGPT): CPT

## 2020-12-07 PROCEDURE — 84450 TRANSFERASE (AST) (SGOT): CPT

## 2020-12-07 PROCEDURE — 1123F ACP DISCUSS/DSCN MKR DOCD: CPT | Performed by: NURSE PRACTITIONER

## 2020-12-07 PROCEDURE — 1036F TOBACCO NON-USER: CPT | Performed by: NURSE PRACTITIONER

## 2020-12-07 PROCEDURE — G8427 DOCREV CUR MEDS BY ELIG CLIN: HCPCS | Performed by: NURSE PRACTITIONER

## 2020-12-07 PROCEDURE — G8420 CALC BMI NORM PARAMETERS: HCPCS | Performed by: NURSE PRACTITIONER

## 2020-12-07 PROCEDURE — G8482 FLU IMMUNIZE ORDER/ADMIN: HCPCS | Performed by: NURSE PRACTITIONER

## 2020-12-07 PROCEDURE — 80048 BASIC METABOLIC PNL TOTAL CA: CPT

## 2020-12-07 PROCEDURE — 99214 OFFICE O/P EST MOD 30 MIN: CPT | Performed by: NURSE PRACTITIONER

## 2020-12-07 PROCEDURE — 80061 LIPID PANEL: CPT

## 2020-12-07 PROCEDURE — 85025 COMPLETE CBC W/AUTO DIFF WBC: CPT

## 2020-12-07 PROCEDURE — 4040F PNEUMOC VAC/ADMIN/RCVD: CPT | Performed by: NURSE PRACTITIONER

## 2020-12-07 PROCEDURE — 36415 COLL VENOUS BLD VENIPUNCTURE: CPT

## 2020-12-07 ASSESSMENT — ENCOUNTER SYMPTOMS
DIARRHEA: 0
ABDOMINAL PAIN: 0
SHORTNESS OF BREATH: 0
SORE THROAT: 0
VOMITING: 0
RHINORRHEA: 0
NAUSEA: 0
CONSTIPATION: 0
COUGH: 0
WHEEZING: 0

## 2020-12-07 NOTE — PATIENT INSTRUCTIONS
SURVEY:    You may be receiving a survey from [employer] regarding your visit today. Please complete the survey to enable us to provide the highest quality of care to you and your family. If you cannot score us a very good on any question, please call the office to discuss how we could have made your experience a very good one. Thank you.

## 2020-12-07 NOTE — PROGRESS NOTES
Name: Jeanine Decker  : 1933         Chief Complaint:     Chief Complaint   Patient presents with    Fatigue     x 3 months       History of Present Illness:      Jeanine Decker is a 80 y.o.  female who presents with Fatigue (x 3 months)      Timoteo Felty is here today for a routine office visit. Hypothyroidism- stable, tolerating medication. Does complain of daily fatigue. I think this is related to her cardiac medication and not her thyroid. I did encourage her to discuss this with her cardiologist.  She is due for labs. Congestive Heart Failure   Presents for follow-up visit. Associated symptoms include fatigue. Pertinent negatives include no abdominal pain, chest pain, palpitations or shortness of breath. The symptoms have been stable. Compliance with total regimen is %. Compliance with diet is %. Compliance with exercise is %. Compliance with medications is %. Treatment side effects: Fatigue. Fatigue   This is a chronic problem. The current episode started more than 1 year ago. The problem occurs daily. The problem has been unchanged. Associated symptoms include fatigue. Pertinent negatives include no abdominal pain, chest pain, chills, congestion, coughing, fever, headaches, nausea, neck pain, rash, sore throat or vomiting. Exacerbated by: Beta-blockers. She has tried nothing for the symptoms. The treatment provided no relief.          Past Medical History:     Past Medical History:   Diagnosis Date    Abnormal resting ECG findings 12    Normal sinus rhythm with frequent PACs in a trigeminy pattern    Abnormal resting ECG findings 6/10/2013    Severe sinus bradycardia at 50 bpm.     Anxiety     Atrial fibrillation (HCC)     Cholecystitis     Chronic back pain     Pt. c/o upper back pain,,,,\"On the sides of my lungs\"    Hiatal hernia     Holter monitor, abnormal 12    Multiple episodes of SVT between 100 and 130 beats per minute most consistent with atrial fibrillation and/or atrial flutter with variable block.  Hypothyroidism     Insomnia     MAC (mycobacterium avium-intracellulare complex)     Normal cardiac stress test 12    low risk study.  Paroxysmal atrial fibrillation ([address] 2012    Found on holter monitor  and      Severe sinus bradycardia 6/10/13    At least partially drug induced.  Status post placement of implantable loop recorder 10/19/2017    LINQ IMPLANTED. [employer]    Subdural hematoma [facility])       Reviewed all health maintenance requirements and ordered appropriate tests  Health Maintenance Due   Topic Date Due    DTaP/Tdap/Td vaccine (1 - Tdap) 1952    Shingles Vaccine (1 of 2) 1983    Annual Wellness Visit (AWV)  2019       Past Surgical History:     Past Surgical History:   Procedure Laterality Date    BRAIN SURGERY      Had a blood clot on the brain. Fell 10 feet.  CATARACT REMOVAL Bilateral      SECTION      x1    COLONOSCOPY      several over the years by Dr. Bar Bearden Left 10/19/2017    [address] Left 2019    Dr. Sara Ward 2019    [facility] dual IS1 small DDD NOT MRI COMPATIBLE performed by Aleah Post MD at [facility] 106      tumor removed    UPPER GASTROINTESTINAL ENDOSCOPY  2013    Anjel        Medications:       Prior to Admission medications    Medication Sig Start Date End Date Taking?  Authorizing Provider   apixaban (ELIQUIS) 2.5 MG TABS tablet Take 1 tablet by mouth 2 times daily 10/9/20  Yes Eveline Nieto MD   [facility]) 250 MCG capsule Take 1 capsule by mouth 2 times daily 10/9/20  Yes Eveline Nieto MD   albuterol sulfate  (90 Base) MCG/ACT inhaler Inhale 2 puffs into the lungs every 6 hours as needed for Wheezing or Shortness of Breath 20 Yes Haider Monica Saba MD   dilTIAZem (CARDIZEM CD) 240 MG extended release capsule Take 1 capsule by mouth daily 3/11/20  Yes Dylan Lee MD   metoprolol succinate (TOPROL XL) 200 MG extended release tablet Take 1 tablet by mouth daily 3/11/20  Yes Dylan Lee MD   levothyroxine (SYNTHROID) 100 MCG tablet Take 1 tablet by mouth Daily 12/31/19  Yes CAMRON Beckham CNP   acetaminophen (TYLENOL) 500 MG tablet Take 2 tablets by mouth 4 times daily as needed for Pain 12/21/19  Yes Ayden Escobar MD   omeprazole (PRILOSEC) 20 MG delayed release capsule TAKE 1 CAPSULE EVERY DAY 11/25/19  Yes Dylan Lee MD   nitroGLYCERIN (NITROSTAT) 0.4 MG SL tablet Place 0.4 mg under the tongue every 5 minutes as needed for Chest pain up to max of 3 total doses. If no relief after 1 dose, call 911. Yes Historical Provider, MD        Allergies:       Sulfur and Sulfa antibiotics    Social History:     Tobacco:    reports that she has never smoked. She has never used smokeless tobacco.  Alcohol:      reports no history of alcohol use. Drug Use:  reports no history of drug use. Family History:     Family History   Problem Relation Age of Onset    Stroke Father     Stroke Mother        Review of Systems:     Positive and Negative as described in HPI    Review of Systems   Constitutional: Positive for fatigue. Negative for chills and fever. HENT: Negative for congestion, rhinorrhea and sore throat. Eyes: Negative for visual disturbance. Respiratory: Negative for cough, shortness of breath and wheezing. Cardiovascular: Negative for chest pain and palpitations. Gastrointestinal: Negative for abdominal pain, constipation, diarrhea, nausea and vomiting. Genitourinary: Negative for difficulty urinating and dysuria. Musculoskeletal: Negative for gait problem, neck pain and neck stiffness. Skin: Negative for rash. Neurological: Negative for dizziness, syncope, light-headedness and headaches.        Physical Exam: Vitals:  /66 (Site: Left Upper Arm, Position: Sitting, Cuff Size: Medium Adult)   Pulse 50   Temp 97.5 °F (36.4 °C) (Temporal)   Resp 18   Wt 103 lb 1.6 oz (46.8 kg)   SpO2 98%   BMI 19.48 kg/m²     Physical Exam  Vitals signs and nursing note reviewed. Constitutional:       General: She is not in acute distress. Appearance: She is well-developed. HENT:      Mouth/Throat:      Mouth: Mucous membranes are moist.   Eyes:      General: No scleral icterus. Conjunctiva/sclera: Conjunctivae normal.   Neck:      Musculoskeletal: Normal range of motion and neck supple. Cardiovascular:      Rate and Rhythm: Regular rhythm. Bradycardia present. Heart sounds: Murmur present. Pulmonary:      Effort: Pulmonary effort is normal.      Breath sounds: Normal breath sounds. Abdominal:      General: Bowel sounds are normal. There is no distension. Palpations: Abdomen is soft. Tenderness: There is no abdominal tenderness. Musculoskeletal:      Right lower leg: No edema. Left lower leg: No edema. Lymphadenopathy:      Cervical: No cervical adenopathy. Skin:     General: Skin is warm and dry. Findings: No rash. Neurological:      Mental Status: She is alert and oriented to person, place, and time.    Psychiatric:         Mood and Affect: Mood normal.         Behavior: Behavior normal.         Data:     Lab Results   Component Value Date     12/07/2020    K 4.4 12/07/2020     12/07/2020    CO2 25 12/07/2020    BUN 23 12/07/2020    CREATININE 1.11 12/07/2020    GLUCOSE 99 12/07/2020    GLUCOSE 92 02/07/2012    PROT 6.6 01/18/2020    LABALBU 3.2 01/18/2020    BILITOT 0.33 01/18/2020    ALKPHOS 69 01/18/2020    AST 18 12/07/2020    ALT 19 12/07/2020     Lab Results   Component Value Date    WBC 6.9 12/07/2020    RBC 4.16 12/07/2020    RBC 4.31 02/07/2012    HGB 13.2 12/07/2020    HCT 41.8 12/07/2020    .5 12/07/2020    MCH 31.7 12/07/2020    MCHC 31.6 12/07/2020    RDW 13.4 12/07/2020     12/07/2020     02/07/2012    MPV 8.9 12/07/2020     Lab Results   Component Value Date    TSH 4.59 12/07/2020     No results found for: CHOL, HDL, PSA, LABA1C    Assessment/Plan:      Diagnosis Orders   1. Congenital hypothyroidism     2. Chronic diastolic congestive heart failure ([facility]     3. Chronic fatigue         1. Serene received counseling on the following healthy behaviors: nutrition, exercise and medication adherence  2. Patient given educational materials - see patient instructions  3. Was a self-tracking handout given in paper form or via [employer]? No  If yes, see orders or list here. 4.  Discussed use, benefit, and side effects of prescribed medications. Barriers to medication compliance addressed. All patient questions answered. Pt voiced understanding. 5.  Reviewed prior labs and health maintenance  6. Continue current medications, diet and exercise. Completed Refills   Requested Prescriptions      No prescriptions requested or ordered in this encounter         Return in about 6 months (around 6/7/2021) for Check up.

## 2020-12-08 ENCOUNTER — TELEPHONE (OUTPATIENT)
Dept: PRIMARY CARE CLINIC | Age: 85
End: 2020-12-08

## 2020-12-08 NOTE — TELEPHONE ENCOUNTER
Daughter states that pt fell in the office yesterday and is complaining that her right wrist is hurting. Daughter would like to know if she should take mother to the ER or if an xray could be ordered. Please advise. Called and spoke with daughter re: lab results. Informed daughter that labs were stable and that Ladonna Hooker would like her to continue her current medications.

## 2020-12-08 NOTE — TELEPHONE ENCOUNTER
Right wrist xray ordered. Please inform patient and daughter there will be no charge for the xray. Thank you.

## 2020-12-08 NOTE — RESULT ENCOUNTER NOTE
Phone call received that due to the fall not being fault of the office this can not be written off. Message left on daughters voicemail advising this and advised to call if any questions.

## 2020-12-08 NOTE — TELEPHONE ENCOUNTER
Phone call made to advise daughter that the xray will have to be billed to the patients insurance due to the cause of the fall was not the fault of the office. Advised for daughter to call office back to confirm message was received.

## 2020-12-09 ENCOUNTER — HOSPITAL ENCOUNTER (OUTPATIENT)
Dept: GENERAL RADIOLOGY | Age: 85
Discharge: HOME | End: 2020-12-11
Payer: MEDICARE

## 2020-12-09 ENCOUNTER — TELEPHONE (OUTPATIENT)
Dept: PRIMARY CARE CLINIC | Age: 85
End: 2020-12-09

## 2020-12-09 ENCOUNTER — HOSPITAL ENCOUNTER (OUTPATIENT)
Age: 85
Discharge: HOME | End: 2020-12-11
Payer: MEDICARE

## 2020-12-09 PROCEDURE — 73110 X-RAY EXAM OF WRIST: CPT

## 2020-12-09 NOTE — TELEPHONE ENCOUNTER
----- Message from CAMRON Markham CNP sent at 12/9/2020 11:07 AM EST -----  No fracture noted. I would recommend ice elevation and an Ace wrap for comfort.

## 2020-12-09 NOTE — TELEPHONE ENCOUNTER
I spoke with dtr. Melissa Lobato, notified. She says pt. Has been applying heat, suggested avoiding and ice to help reduce swelling & inflamation.

## 2021-02-08 RX ORDER — OMEPRAZOLE 20 MG/1
20 CAPSULE, DELAYED RELEASE ORAL DAILY
Qty: 90 CAPSULE | Refills: 3 | Status: SHIPPED | OUTPATIENT
Start: 2021-02-08 | End: 2021-08-23 | Stop reason: SDUPTHER

## 2021-02-22 PROCEDURE — 87086 URINE CULTURE/COLONY COUNT: CPT

## 2021-02-22 PROCEDURE — 87186 SC STD MICRODIL/AGAR DIL: CPT

## 2021-02-22 PROCEDURE — 87077 CULTURE AEROBIC IDENTIFY: CPT

## 2021-02-22 PROCEDURE — 81001 URINALYSIS AUTO W/SCOPE: CPT

## 2021-03-08 ENCOUNTER — OFFICE VISIT (OUTPATIENT)
Dept: CARDIOLOGY | Age: 86
End: 2021-03-08
Payer: MEDICARE

## 2021-03-08 VITALS
BODY MASS INDEX: 20.81 KG/M2 | DIASTOLIC BLOOD PRESSURE: 76 MMHG | OXYGEN SATURATION: 95 % | WEIGHT: 106 LBS | HEART RATE: 82 BPM | RESPIRATION RATE: 18 BRPM | SYSTOLIC BLOOD PRESSURE: 113 MMHG | HEIGHT: 60 IN

## 2021-03-08 DIAGNOSIS — I49.5 SSS (SICK SINUS SYNDROME) (HCC): Chronic | ICD-10-CM

## 2021-03-08 DIAGNOSIS — Z51.81 VISIT FOR MONITORING TIKOSYN THERAPY: ICD-10-CM

## 2021-03-08 DIAGNOSIS — R53.82 CHRONIC FATIGUE: ICD-10-CM

## 2021-03-08 DIAGNOSIS — Z79.899 VISIT FOR MONITORING TIKOSYN THERAPY: ICD-10-CM

## 2021-03-08 DIAGNOSIS — I48.0 PAF (PAROXYSMAL ATRIAL FIBRILLATION) (HCC): Primary | Chronic | ICD-10-CM

## 2021-03-08 DIAGNOSIS — T50.905A MEDICATION SIDE EFFECT, INITIAL ENCOUNTER: ICD-10-CM

## 2021-03-08 PROCEDURE — 1090F PRES/ABSN URINE INCON ASSESS: CPT | Performed by: FAMILY MEDICINE

## 2021-03-08 PROCEDURE — 4040F PNEUMOC VAC/ADMIN/RCVD: CPT | Performed by: FAMILY MEDICINE

## 2021-03-08 PROCEDURE — G8427 DOCREV CUR MEDS BY ELIG CLIN: HCPCS | Performed by: FAMILY MEDICINE

## 2021-03-08 PROCEDURE — G8482 FLU IMMUNIZE ORDER/ADMIN: HCPCS | Performed by: FAMILY MEDICINE

## 2021-03-08 PROCEDURE — 1036F TOBACCO NON-USER: CPT | Performed by: FAMILY MEDICINE

## 2021-03-08 PROCEDURE — G8420 CALC BMI NORM PARAMETERS: HCPCS | Performed by: FAMILY MEDICINE

## 2021-03-08 PROCEDURE — 99214 OFFICE O/P EST MOD 30 MIN: CPT | Performed by: FAMILY MEDICINE

## 2021-03-08 PROCEDURE — 1123F ACP DISCUSS/DSCN MKR DOCD: CPT | Performed by: FAMILY MEDICINE

## 2021-03-08 PROCEDURE — 93288 INTERROG EVL PM/LDLS PM IP: CPT | Performed by: FAMILY MEDICINE

## 2021-03-08 RX ORDER — ONDANSETRON 4 MG/1
4 TABLET, FILM COATED ORAL EVERY 8 HOURS PRN
COMMUNITY

## 2021-03-08 NOTE — PROGRESS NOTES
Victoriano Power am scribing for and in the presence of Enrike Palomino. Kev DAIGLE, MS, F.A.C.C. Patient: Golden Coronel  : 1933  Date of Visit: 2021    REASON FOR VISIT / CONSULTATION: Follow-up (HX: CHF, PAF, Pacer, SSS, tachy-brayd. Pt states she is doing ok. C/o: Tirdness and fatigue, weakness. Palpitations with certian activity. SOB on/off. Denies: CP, )      Dear Crow Aguilera, CAMRON - CNP,    I had the pleasure of seeing your patient Golden Coronel in my office today. As you know, Ms. Gwen Krishna is a 80 y.o. female with a history of paroxysmal atrial fibrillation as well as intermittent episodes of lightheadedness and dizziness as well as several episodes of syncope and near syncope of unknown etiology. A Holter monitor showed several breakthrough brief episodes of  atrial fibrillation with RVR. Therefore I started her on Amiodarone 200 mg daily. Her last LINQ remote interrogation in  had shown about 1.1% percent of atrial fibrillation. Unfortunately, she developed some significant thyroid issues leading her amiodarone to be stop and instead started on Tikosyn therapy 2018 for PAF. Unfortunately, she was admitted on 18 to [facility] for a punctured lung and broken ribs due to her shoes and slipping and falling while trying to walk out of her house. Apparently this led to then need for a chest tube for what sounds to be a hemothorax which ultimately was removed. LINQ alert on 2018 showed atrial fibrillation. LINQ alert on 10/16/2018 showed atrial fibrillation increased from 2% to 8.7% with frequent RVR in the 160's that has been associated with lightheadedness and dizziness. LINQ alert on 18 showed A-Fib. Echocardiogram done 10/25/18 EF 65% evidence of moderate diastolic dysfunction is seen. 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  She had 6.3% atrial fibrillation burden on 19 which unfortunlatey went up to 8.8% on her negative. Past Surgical History:   Procedure Laterality Date    BRAIN SURGERY      Had a blood clot on the brain. Fell 10 feet.  CATARACT REMOVAL Bilateral      SECTION      x1    COLONOSCOPY      several over the years by Dr. Ann Goode Left 10/19/2017    [address] Left 2019    Dr. Buck Xiao N/A 2019    [facility]lap dual IS1 small DDD NOT MRI COMPATIBLE performed by Myra Dalal MD at [facility] 106      tumor removed    UPPER GASTROINTESTINAL ENDOSCOPY      Anjel    Social History:  Social History     Tobacco Use    Smoking status: Never Smoker    Smokeless tobacco: Never Used   Substance Use Topics    Alcohol use: No    Drug use: No        CURRENT MEDICATIONS:  Outpatient Medications Marked as Taking for the 3/8/21 encounter (Office Visit) with Sadi José MD   Medication Sig Dispense Refill    ondansetron (ZOFRAN) 4 MG tablet Take 4 mg by mouth every 8 hours as needed for Nausea or Vomiting      omeprazole (PRILOSEC) 20 MG delayed release capsule Take 1 capsule by mouth Daily 90 capsule 3    apixaban (ELIQUIS) 2.5 MG TABS tablet Take 1 tablet by mouth 2 times daily 180 tablet 3    dilTIAZem (CARDIZEM CD) 240 MG extended release capsule Take 1 capsule by mouth daily 90 capsule 3    metoprolol succinate (TOPROL XL) 200 MG extended release tablet Take 1 tablet by mouth daily 90 tablet 3    levothyroxine (SYNTHROID) 100 MCG tablet Take 1 tablet by mouth Daily 90 tablet 3    acetaminophen (TYLENOL) 500 MG tablet Take 2 tablets by mouth 4 times daily as needed for Pain      nitroGLYCERIN (NITROSTAT) 0.4 MG SL tablet Place 0.4 mg under the tongue every 5 minutes as needed for Chest pain up to max of 3 total doses. If no relief after 1 dose, call 911.          FAMILY HISTORY: family history includes Stroke in her father and mother. PHYSICAL EXAM:   /76 (Site: Left Upper Arm, Position: Sitting, Cuff Size: Small Adult)   Pulse 82   Resp 18   Ht 5' (1.524 m)   Wt 106 lb (48.1 kg)   SpO2 95%   BMI 20.70 kg/m²  Body mass index is 20.7 kg/m². Constitutional: She is oriented to person, place, and time. She appears well-developed and well-nourished. In no acute distress. HEENT: Normocephalic and atraumatic. No significant JVD was present. Carotid bruit is not present. No mass and no thyromegaly present. No lymphadenopathy present. Cardiovascular: Normal rate, regular rhythm, normal heart sounds. Exam reveals no gallop and no friction rubs. 2/6 systolic murmur, 5th intercostal space on the LEFT in the mid-clavicular line (cardiac apex). Pulmonary/Chest: Effort normal and breath sounds normal. No respiratory distress. She has no wheezes, rhonchi or rales. Abdominal: Soft, non-tender. Bowel sounds and aorta are normal. She exhibits no organomegaly, mass or bruit. Extremities: Trace. No cyanosis or clubbing. 2+ radial and carotid pulses. Distal extremity pulses: 2+ bilaterally. Compression stockings in place. Neurological: She is alert and oriented to person, place, and time. No evidence of gross cranial nerve deficit. Coordination appeared normal.   Skin: Skin is warm and dry. There is no rash or diaphoresis. Psychiatric: She has a normal mood and affect. Her speech is normal and behavior is normal.      MOST RECENT LABS ON RECORD:   Lab Results   Component Value Date    WBC 6.9 12/07/2020    HGB 13.2 12/07/2020    HCT 41.8 12/07/2020     12/07/2020    CHOL 213 (H) 12/07/2020    TRIG 147 12/07/2020    HDL 72 12/07/2020    ALT 19 12/07/2020    AST 18 12/07/2020     12/07/2020    K 4.4 12/07/2020     12/07/2020    CREATININE 1.11 (H) 12/07/2020    BUN 23 12/07/2020    CO2 25 12/07/2020    TSH 4.59 12/07/2020    INR 1.1 01/18/2020       ASSESSMENT:  1.  PAF ([facility]oxysmal [facility] fibrillation) ([facility]    2. Medication side effect, initial encounter    3. SSS (sick sinus syndrome) ([facility] [address]    4. Visit for monitoring Tikosyn therapy    5. Chronic fatigue       PLAN:   · Fatigue and Tiredness of unclear etiology: Suspect at least partially due to poor sleep habits so discussed proper sleep techniques including not taking naps if possible. Also think it could possibly related to depression or even her Tikosyn. Unfortunately her atrial fibrillation burden has gone up to >26% of the times so I really do not think that her Tikosyn is really helping that much anyway, especially since she now says she cannot tell when she is in atrial fibrillation. · STOP Tikosyn    Chronic Diastolic Heart Failure: Currently Controlled   Beta Blocker Therapy: Increase Metoprolol succinate (Toprol XL)  200 mg nightly     Nonpharmacologic management of Heart Failure: I told her to continue wearing lower extremity compression stockings and I advised her to try and keep his legs up whenever possible and to limit salt in her diet. Paroxysmal Atrial Fibrillation: Rhythm Control 11% atrial fibrillation burden but rates largely well controlled <110   Beta Blocker Therapy: Increase Metoprolol succinate (Toprol XL)  200 mg  nightly     Calcium Channel Blocker: Decrease diltiazem 240 mg daily. Rhythm Control Agent: Stop Dofetilide (Tikosyn) 250 mcg twice daily  Stroke Risk: Her CHADS2-VASc score is 3/9 (3.2% stroke risk)  Anticoagulation: Continue Apixaban (Eliquis) 2.5 mg every 12 hours. I also reminded her to watch for signs of blood in her stool or black tarry stools and stop the medication immediately if this develops as this could be life threatening. · Dual Chamber Pacemaker:   · Indication for Device Placement: Sick Sinus Syndrome   · Interrogation Findings: Within normal limits and therefore no changes were made. 26% atrial fibrillation burden but rates very well controlled when in atrial fibrillation.     Finally, I

## 2021-03-08 NOTE — PATIENT INSTRUCTIONS
SURVEY:    You may be receiving a survey from [employer] regarding your visit today. Please complete the survey to enable us to provide the highest quality of care to you and your family. If you cannot score us a very good on any question, please call the office to discuss how we could have made your experience a very good one. Thank you.

## 2021-03-09 ENCOUNTER — TELEPHONE (OUTPATIENT)
Dept: PRIMARY CARE CLINIC | Age: 86
End: 2021-03-09

## 2021-03-31 ENCOUNTER — OFFICE VISIT (OUTPATIENT)
Dept: PRIMARY CARE CLINIC | Age: 86
End: 2021-03-31
Payer: MEDICARE

## 2021-03-31 ENCOUNTER — HOSPITAL ENCOUNTER (OUTPATIENT)
Age: 86
Discharge: HOME | End: 2021-04-02
Payer: MEDICARE

## 2021-03-31 ENCOUNTER — HOSPITAL ENCOUNTER (OUTPATIENT)
Dept: GENERAL RADIOLOGY | Age: 86
Discharge: HOME | End: 2021-04-02
Payer: MEDICARE

## 2021-03-31 VITALS
HEART RATE: 65 BPM | OXYGEN SATURATION: 90 % | SYSTOLIC BLOOD PRESSURE: 116 MMHG | HEIGHT: 60 IN | WEIGHT: 112 LBS | BODY MASS INDEX: 21.99 KG/M2 | DIASTOLIC BLOOD PRESSURE: 72 MMHG

## 2021-03-31 DIAGNOSIS — J30.1 SEASONAL ALLERGIC RHINITIS DUE TO POLLEN: Primary | ICD-10-CM

## 2021-03-31 DIAGNOSIS — J20.9 ACUTE BRONCHITIS, UNSPECIFIED ORGANISM: ICD-10-CM

## 2021-03-31 DIAGNOSIS — R05.9 COUGH: ICD-10-CM

## 2021-03-31 PROCEDURE — 71046 X-RAY EXAM CHEST 2 VIEWS: CPT

## 2021-03-31 PROCEDURE — 1123F ACP DISCUSS/DSCN MKR DOCD: CPT | Performed by: NURSE PRACTITIONER

## 2021-03-31 PROCEDURE — G8420 CALC BMI NORM PARAMETERS: HCPCS | Performed by: NURSE PRACTITIONER

## 2021-03-31 PROCEDURE — 99213 OFFICE O/P EST LOW 20 MIN: CPT | Performed by: NURSE PRACTITIONER

## 2021-03-31 PROCEDURE — 1090F PRES/ABSN URINE INCON ASSESS: CPT | Performed by: NURSE PRACTITIONER

## 2021-03-31 PROCEDURE — G8427 DOCREV CUR MEDS BY ELIG CLIN: HCPCS | Performed by: NURSE PRACTITIONER

## 2021-03-31 PROCEDURE — G8482 FLU IMMUNIZE ORDER/ADMIN: HCPCS | Performed by: NURSE PRACTITIONER

## 2021-03-31 PROCEDURE — 4040F PNEUMOC VAC/ADMIN/RCVD: CPT | Performed by: NURSE PRACTITIONER

## 2021-03-31 PROCEDURE — 1036F TOBACCO NON-USER: CPT | Performed by: NURSE PRACTITIONER

## 2021-03-31 RX ORDER — GUAIFENESIN 600 MG/1
600 TABLET, EXTENDED RELEASE ORAL 2 TIMES DAILY
Qty: 30 TABLET | Refills: 0 | Status: SHIPPED | OUTPATIENT
Start: 2021-03-31 | End: 2021-04-15

## 2021-03-31 RX ORDER — LORATADINE 10 MG/1
10 TABLET ORAL DAILY
Qty: 30 TABLET | Refills: 0 | Status: SHIPPED | OUTPATIENT
Start: 2021-03-31 | End: 2022-06-09

## 2021-03-31 ASSESSMENT — ENCOUNTER SYMPTOMS
VOMITING: 0
RHINORRHEA: 1
SINUS PAIN: 0
SINUS PRESSURE: 0
NAUSEA: 0
WHEEZING: 0
SHORTNESS OF BREATH: 1
COUGH: 1
SORE THROAT: 0
TROUBLE SWALLOWING: 0
HEMOPTYSIS: 0

## 2021-03-31 NOTE — PROGRESS NOTES
[address] [facility] WALK-IN CARE  [address] [facility] [address] [facility]  Dept: [phone]  Dept Fax: [phone] [facility]  is a 80 y.o. female who [facility] the [facility] Care today for her medical conditions/complaints as noted below. Yahir Nolasco is c/o of Other (chest congestion, coughing , SOB, x2-3 weeks admits fatigued and weak. Denies fever, chills, lightheadedness, nausea)      HPI:     Maxwell Jackson is here today for a walk-in visit with her daughter. She reports over the last 2 to 3 weeks she has had a intermittent nonproductive cough. Her fatigue weakness and shortness of breath is at baseline. She denies any weight gain or worsening swelling in her lower extremities. Denies any fever or chills. Cough worse in the morning when she is waking up and improving throughout the day. Feels like she does need to clear his throat. See below for further detail. Cough  This is a new problem. The current episode started 1 to 4 weeks ago (x 3 weeks). The problem has been waxing and waning. The cough is non-productive. Associated symptoms include postnasal drip, rhinorrhea and shortness of breath (  Baseline). Pertinent negatives include no chest pain, chills, ear congestion, ear pain, fever, hemoptysis, sore throat or wheezing. The symptoms are aggravated by lying down. She has tried nothing for the symptoms. Her past medical history is significant for environmental allergies. There is no history of asthma or COPD.  CHF       Past Medical History:   Diagnosis Date    Abnormal resting ECG findings 1/25/12    Normal sinus rhythm with frequent PACs in a trigeminy pattern    Abnormal resting ECG findings 6/10/2013    Severe sinus bradycardia at 50 bpm.     Anxiety     Atrial fibrillation (HCC)     Cholecystitis     Chronic back pain     Pt. c/o upper back pain,,,,\"On the sides of my lungs\"    Hiatal hernia     Holter monitor, abnormal 1/27/12    Multiple episodes of SVT between 100 and 130 beats per minute most consistent with atrial fibrillation and/or atrial flutter with variable block.  Hypothyroidism     Insomnia     MAC (mycobacterium avium-intracellulare complex)     Normal cardiac stress test 1/26/12    low risk study.  Paroxysmal atrial fibrillation ([facility] [address] 2/7/2012    Found on holter monitor 2/12 and 12/12     Severe sinus bradycardia 6/10/13    At least partially drug induced.  Status post placement of implantable loop recorder 10/19/2017    LINQ IMPLANTED. [employer]    Subdural hematoma (HCC)         Current Outpatient Medications   Medication Sig Dispense Refill    guaiFENesin (MUCINEX) 600 MG extended release tablet Take 1 tablet by mouth 2 times daily for 15 days 30 tablet 0    loratadine (CLARITIN) 10 MG tablet Take 1 tablet by mouth daily 30 tablet 0    ondansetron (ZOFRAN) 4 MG tablet Take 4 mg by mouth every 8 hours as needed for Nausea or Vomiting      omeprazole (PRILOSEC) 20 MG delayed release capsule Take 1 capsule by mouth Daily 90 capsule 3    apixaban (ELIQUIS) 2.5 MG TABS tablet Take 1 tablet by mouth 2 times daily 180 tablet 3    dilTIAZem (CARDIZEM CD) 240 MG extended release capsule Take 1 capsule by mouth daily 90 capsule 3    metoprolol succinate (TOPROL XL) 200 MG extended release tablet Take 1 tablet by mouth daily 90 tablet 3    levothyroxine (SYNTHROID) 100 MCG tablet Take 1 tablet by mouth Daily 90 tablet 3    nitroGLYCERIN (NITROSTAT) 0.4 MG SL tablet Place 0.4 mg under the tongue every 5 minutes as needed for Chest pain up to max of 3 total doses. If no relief after 1 dose, call 911.       albuterol sulfate  (90 Base) MCG/ACT inhaler Inhale 2 puffs into the lungs every 6 hours as needed for Wheezing or Shortness of Breath 1 Inhaler 11    acetaminophen (TYLENOL) 500 MG tablet Take 2 tablets by mouth 4 times daily as needed for Pain (Patient not taking: Reported on 3/31/2021)       No current facility-administered medications for this visit. Allergies   Allergen Reactions    Sulfur Hives    Sulfa Antibiotics Rash       Subjective:      Review of Systems   Constitutional: Positive for fatigue (Baseline). Negative for appetite change, chills, fever and unexpected weight change. HENT: Positive for postnasal drip and rhinorrhea. Negative for congestion, ear pain, sinus pressure, sinus pain, sneezing, sore throat and trouble swallowing. Respiratory: Positive for cough (Nonproductive) and shortness of breath (  Baseline). Negative for hemoptysis and wheezing. Cardiovascular: Positive for leg swelling (Baseline. ). Negative for chest pain. Gastrointestinal: Negative for nausea and vomiting. Allergic/Immunologic: Positive for environmental allergies. Neurological: Negative for dizziness and light-headedness. Objective:     Physical Exam  Vitals signs and nursing note reviewed. Constitutional:       General: She is not in acute distress. Appearance: Normal appearance. She is not ill-appearing, toxic-appearing or diaphoretic. HENT:      Head: Normocephalic and atraumatic. Right Ear: Tympanic membrane is not erythematous. Left Ear: Tympanic membrane is not erythematous. Nose: Mucosal edema, congestion and rhinorrhea (Clear) present. Right Turbinates: Pale. Left Turbinates: Pale. Right Sinus: No maxillary sinus tenderness. Left Sinus: No maxillary sinus tenderness. Mouth/Throat:      Pharynx: No oropharyngeal exudate. Eyes:      General:         Right eye: No discharge. Left eye: No discharge. Conjunctiva/sclera: Conjunctivae normal.   Neck:      Musculoskeletal: Normal range of motion and neck supple. Cardiovascular:      Rate and Rhythm: Normal rate and regular rhythm. Pulmonary:      Effort: Pulmonary effort is normal. No tachypnea or respiratory distress.       Breath sounds: Examination of the right-lower field reveals decreased breath sounds. Examination of the left-lower field reveals decreased breath sounds. Decreased breath sounds present. No wheezing or rhonchi. Musculoskeletal:      Right lower leg: Edema (Trace pitting) present. Left lower leg: Edema (Trace pitting) present. Skin:     General: Skin is warm. Findings: No erythema. Neurological:      General: No focal deficit present. Mental Status: She is alert and oriented to person, place, and time. Psychiatric:         Mood and Affect: Mood normal.         Behavior: Behavior normal.       /72   Pulse 65   Ht 5' (1.524 m)   Wt 112 lb (50.8 kg)   SpO2 90% Comment: raised to 95% with resting  BMI 21.87 kg/m²     Assessment:      Diagnosis Orders   1. Seasonal allergic rhinitis due to pollen  loratadine (CLARITIN) 10 MG tablet   2. Acute bronchitis, unspecified organism  guaiFENesin (MUCINEX) 600 MG extended release tablet   3. Cough  XR CHEST STANDARD (2 VW)     Chest x-ray negative for pneumonia or pulmonary edema, minimal atelectasis. Will treat her with Mucinex for bronchitis and Claritin for seasonal allergic rhinitis. Plan discussed with both patient and daughter. Discussed importance of following up if no improvement or worsening. Plan:     Discussed exam, POCT findings, plan of care (including prescriptive and supportive as listed below) and follow-up atlength with patient and or Patient and parent/guardian. Reviewed all prescribed and recommended medications, administration and side effects. Encouraged to return to [address] [place] [address] for noimprovement and or worsening of symptoms. To ER or call 911 if any difficulty breathing, shortness of breath, inability to swallow, hives or temp greater than 103 degrees. Questions answered. They verbalized understandingand agreement. Return if symptoms worsen or fail to improve.     Orders Placed This Encounter   Medications    guaiFENesin (MUCINEX) 600 MG extended release tablet     Sig: Take 1 tablet by mouth 2 times daily for 15 days     Dispense:  30 tablet     Refill:  0    loratadine (CLARITIN) 10 MG tablet     Sig: Take 1 tablet by mouth daily     Dispense:  30 tablet     Refill:  0          All patient questions answered. Pt voiced understanding.       Electronically signed by Christiana Sacks, APRN - CNP on 3/31/2021 at 5:49 PM

## 2021-04-12 ENCOUNTER — TELEPHONE (OUTPATIENT)
Dept: CARDIOLOGY | Age: 86
End: 2021-04-12

## 2021-04-12 DIAGNOSIS — I20.9 ANGINA PECTORIS, UNSPECIFIED (HCC): Primary | ICD-10-CM

## 2021-04-12 RX ORDER — NITROGLYCERIN 0.4 MG/1
0.4 TABLET SUBLINGUAL EVERY 5 MIN PRN
Qty: 25 TABLET | Refills: 3 | Status: SHIPPED | OUTPATIENT
Start: 2021-04-12 | End: 2021-04-29 | Stop reason: SDUPTHER

## 2021-04-14 ENCOUNTER — TELEPHONE (OUTPATIENT)
Dept: CARDIOLOGY | Age: 86
End: 2021-04-14

## 2021-04-14 NOTE — TELEPHONE ENCOUNTER
Per Dr Mikaela Magdaleno, patient was in afib at that time. If patient would like, we can refer her for ablation.  Daughter aware, will talk to patient and let us know if she would like referral.

## 2021-04-14 NOTE — TELEPHONE ENCOUNTER
Daughter called and said that patient thought she was having an MI last night around 3:30. Daughter did do download on her machine. Can you check to see if the download shows anything and call daughter?      Sandy Hull

## 2021-04-19 ENCOUNTER — TELEPHONE (OUTPATIENT)
Dept: PRIMARY CARE CLINIC | Age: 86
End: 2021-04-19

## 2021-04-19 NOTE — TELEPHONE ENCOUNTER
Phone call from daughter stating that patient is having trouble with constipation and bloating. Patient is not taking any medication at present for bowels. Has taken Miralax  In the past. Encouraged to try Miralax again and to make sure she is drinking plenty of water and fluids. If continues to have constipation issues to call office. Daughter verbalizes understanding. Please advise.     Health Maintenance   Topic Date Due    Annual Wellness Visit (AWV)  Never done    DTaP/Tdap/Td vaccine (1 - Tdap) 03/31/2022 (Originally 2/13/1952)    Shingles Vaccine (1 of 2) 03/31/2022 (Originally 2/13/1983)    Flu vaccine  Completed    Pneumococcal 65+ years Vaccine  Completed    COVID-19 Vaccine  Completed    Hepatitis A vaccine  Aged Out    Hepatitis B vaccine  Aged Out    Hib vaccine  Aged Out    Meningococcal (ACWY) vaccine  Aged Out             (applicable per patient's age: Cancer Screenings, Depression Screening, Fall Risk Screening, Immunizations)    LDL Cholesterol (mg/dL)   Date Value   12/07/2020 112     AST (U/L)   Date Value   12/07/2020 18     ALT (U/L)   Date Value   12/07/2020 19     BUN (mg/dL)   Date Value   12/07/2020 23      (goal A1C is < 7)   (goal LDL is <100) need 30-50% reduction from baseline     BP Readings from Last 3 Encounters:   03/31/21 116/72   03/08/21 113/76   12/07/20 112/66    (goal /80)      All Future Testing planned in [facility]:  Lab Frequency Next Occurrence   CBC Auto Differential q 2 months    Iron and TIBC q 2 months    Ferritin q 2 months    CBC Auto Differential         Next Visit Date:  Future Appointments   Date Time Provider Brittnee Rios   4/27/2021  3:00 PM Cory Rouse MD TIFF CARD [facility]   6/8/2021  9:00 AM SCHEDULE, P TIFFIN CAR [employer] TIFF CARD [facility]   6/8/2021 11:00 AM CAMRON Fletcher - CNP Tiff Prim Ca [facility]   9/8/2021 11:20 AM Cory Rouse MD TIFF CARD [facility]   9/14/2021  8:45 AM SCHEDULE, [facility] TIFFIN [facility] [employer] TIFF CARD [facility]   12/14/2021 9:30 AM SCHEDULE, [facility] TIFFIN CAR [employer] TIFF CARD [facility]   3/9/2022 10:40 AM Raquel Christianson MD TIFF CARD [facility]            Patient Active Problem List:     Allergic rhinitis     Severe sinus bradycardia     Abnormal resting ECG findings     Angina pectoris, unspecified (HCC)     GERD (gastroesophageal reflux disease)     PAF (paroxysmal atrial fibrillation) ([facility])     Visit for monitoring Tikosyn therapy     Adverse effect of amiodarone     Atrial fibrillation with RVR ([facility])     SSS (sick sinus syndrome) ([facility])     Pacemaker pocket hematoma, initial encounter     Chronic diastolic congestive heart failure (HCC)     Major depressive disorder     Acute cholecystitis     Back pain     Iron malabsorption     Iron deficiency anemia secondary to inadequate dietary iron intake     Current mild episode of major depressive disorder without prior episode (HCC)     Bronchiectasis without complication (HCC)     Chronic fatigue     Medication side effect, initial encounter

## 2021-04-19 NOTE — TELEPHONE ENCOUNTER
Called and spoke with daughter and informed her that if 1 capefull of Miralax is not enough she may use 2, verbalizes understanding.

## 2021-04-26 ENCOUNTER — OFFICE VISIT (OUTPATIENT)
Dept: PRIMARY CARE CLINIC | Age: 86
End: 2021-04-26
Payer: MEDICARE

## 2021-04-26 VITALS
BODY MASS INDEX: 21.17 KG/M2 | RESPIRATION RATE: 18 BRPM | HEIGHT: 60 IN | SYSTOLIC BLOOD PRESSURE: 118 MMHG | WEIGHT: 107.8 LBS | OXYGEN SATURATION: 97 % | DIASTOLIC BLOOD PRESSURE: 68 MMHG | TEMPERATURE: 98 F | HEART RATE: 57 BPM

## 2021-04-26 DIAGNOSIS — J42 ACUTE EXACERBATION OF CHRONIC BRONCHITIS (HCC): Primary | ICD-10-CM

## 2021-04-26 DIAGNOSIS — J20.9 ACUTE EXACERBATION OF CHRONIC BRONCHITIS (HCC): Primary | ICD-10-CM

## 2021-04-26 DIAGNOSIS — I48.11 LONGSTANDING PERSISTENT ATRIAL FIBRILLATION (HCC): ICD-10-CM

## 2021-04-26 PROCEDURE — 3023F SPIROM DOC REV: CPT | Performed by: NURSE PRACTITIONER

## 2021-04-26 PROCEDURE — 1036F TOBACCO NON-USER: CPT | Performed by: NURSE PRACTITIONER

## 2021-04-26 PROCEDURE — 4040F PNEUMOC VAC/ADMIN/RCVD: CPT | Performed by: NURSE PRACTITIONER

## 2021-04-26 PROCEDURE — G8926 SPIRO NO PERF OR DOC: HCPCS | Performed by: NURSE PRACTITIONER

## 2021-04-26 PROCEDURE — 1090F PRES/ABSN URINE INCON ASSESS: CPT | Performed by: NURSE PRACTITIONER

## 2021-04-26 PROCEDURE — 99214 OFFICE O/P EST MOD 30 MIN: CPT | Performed by: NURSE PRACTITIONER

## 2021-04-26 PROCEDURE — G8427 DOCREV CUR MEDS BY ELIG CLIN: HCPCS | Performed by: NURSE PRACTITIONER

## 2021-04-26 PROCEDURE — G8420 CALC BMI NORM PARAMETERS: HCPCS | Performed by: NURSE PRACTITIONER

## 2021-04-26 PROCEDURE — 1123F ACP DISCUSS/DSCN MKR DOCD: CPT | Performed by: NURSE PRACTITIONER

## 2021-04-26 RX ORDER — AZITHROMYCIN 250 MG/1
250 TABLET, FILM COATED ORAL SEE ADMIN INSTRUCTIONS
Qty: 6 TABLET | Refills: 0 | Status: SHIPPED | OUTPATIENT
Start: 2021-04-26 | End: 2021-05-01

## 2021-04-26 ASSESSMENT — ENCOUNTER SYMPTOMS
VOMITING: 0
COUGH: 1
ABDOMINAL PAIN: 0
HEARTBURN: 0
SORE THROAT: 0
DIARRHEA: 0
HEMOPTYSIS: 0
WHEEZING: 0
RHINORRHEA: 0
NAUSEA: 0
CONSTIPATION: 0
SHORTNESS OF BREATH: 0

## 2021-04-26 NOTE — PATIENT INSTRUCTIONS
SURVEY:    You may be receiving a survey from [employer] regarding your visit today. Please complete the survey to enable us to provide the highest quality of care to you and your family. If you cannot score us a very good on any question, please call the office to discuss how we could have made your experience a very good one. Thank you.

## 2021-04-26 NOTE — PROGRESS NOTES
Name: Varsha Vaughan  : 1933         Chief Complaint:     Chief Complaint   Patient presents with    Cough     c/o cough, weak, tired. History of Present Illness:      Varsha Vaughan is a 80 y.o.  female who presents with Cough (c/o cough, weak, tired. )      Aron Washington is here today for routine office visit. Atrial fibrillation-patient states she stopped taking Tikosyn and has had some episodes of atrial fibrillation. Her cardiologist is aware. She had one in the middle the night and when her defibrillator was downloaded that showed persistent atrial fib. She does have an appointment with him tomorrow. I do believe some of her cough is from the uncontrolled atrial fib. Cough  This is a recurrent problem. The current episode started 1 to 4 weeks ago. The problem has been waxing and waning. Episode frequency: Intermittently. The cough is non-productive. Pertinent negatives include no chest pain, chills, ear congestion, ear pain, fever, headaches, heartburn, hemoptysis, myalgias, nasal congestion, postnasal drip, rash, rhinorrhea, sore throat, shortness of breath, sweats, weight loss or wheezing. Nothing aggravates the symptoms. She has tried OTC cough suppressant and rest for the symptoms. The treatment provided mild relief. Her past medical history is significant for bronchitis, COPD and pneumonia.          Past Medical History:     Past Medical History:   Diagnosis Date    Abnormal resting ECG findings 12    Normal sinus rhythm with frequent PACs in a trigeminy pattern    Abnormal resting ECG findings 6/10/2013    Severe sinus bradycardia at 50 bpm.     Anxiety     Atrial fibrillation (HCC)     Cholecystitis     Chronic back pain     Pt. c/o upper back pain,,,,\"On the sides of my lungs\"    Hiatal hernia     Holter monitor, abnormal 12    Multiple episodes of SVT between 100 and 130 beats per minute most consistent with atrial fibrillation and/or atrial flutter with variable block.  Hypothyroidism     Insomnia     MAC (mycobacterium avium-intracellulare complex)     Normal cardiac stress test 12    low risk study.  Paroxysmal atrial fibrillation ([address] 2012    Found on holter monitor  and      Severe sinus bradycardia 6/10/13    At least partially drug induced.  Status post placement of implantable loop recorder 10/19/2017    LINQ IMPLANTED. [employer]    Subdural hematoma (HCC)       Reviewed all health maintenance requirements and ordered appropriate tests  Health Maintenance Due   Topic Date Due    Annual Wellness Visit (AWV)  Never done       Past Surgical History:     Past Surgical History:   Procedure Laterality Date    BRAIN SURGERY      Had a blood clot on the brain. Fell 10 feet.  CATARACT REMOVAL Bilateral      SECTION      x1    COLONOSCOPY      several over the years by Dr. Mohamud Jamison Left 10/19/2017    Nik Lambert Left 2019    Dr. Vicki Medina 2019    Analy Saangelina dual IS1 small DDD NOT MRI COMPATIBLE performed by Alysha Smith MD at [facility]      tumor removed    UPPER GASTROINTESTINAL ENDOSCOPY      Anjel        Medications:       Prior to Admission medications    Medication Sig Start Date End Date Taking? Authorizing Provider   azithromycin (ZITHROMAX) 250 MG tablet Take 1 tablet by mouth See Admin Instructions for 5 days 500mg on day 1 followed by 250mg on days 2 - 5 21 Yes CAMRON Lubin - CNP   nitroGLYCERIN (NITROSTAT) 0.4 MG SL tablet Place 1 tablet under the tongue every 5 minutes as needed for Chest pain up to max of 3 total doses.  If no relief after 1 dose, call 911. 21  Yes Tanisha Akhtar MD   ondansetron (ZOFRAN) 4 MG tablet Take 4 mg by mouth every 8 hours as needed for Nausea or Vomiting   Yes Historical MD Newton   omeprazole (PRILOSEC) 20 MG delayed release capsule Take 1 capsule by mouth Daily 2/8/21  Yes José Leon MD   apixaban (ELIQUIS) 2.5 MG TABS tablet Take 1 tablet by mouth 2 times daily 10/9/20  Yes José Leon MD   albuterol sulfate  (90 Base) MCG/ACT inhaler Inhale 2 puffs into the lungs every 6 hours as needed for Wheezing or Shortness of Breath 8/13/20 4/26/21 Yes Penelope Sagastume MD   dilTIAZem (CARDIZEM CD) 240 MG extended release capsule Take 1 capsule by mouth daily 3/11/20  Yes José Leon MD   metoprolol succinate (TOPROL XL) 200 MG extended release tablet Take 1 tablet by mouth daily 3/11/20  Yes José Leon MD   levothyroxine (SYNTHROID) 100 MCG tablet Take 1 tablet by mouth Daily 12/31/19  Yes CAMRON Spring CNP   acetaminophen (TYLENOL) 500 MG tablet Take 2 tablets by mouth 4 times daily as needed for Pain 12/21/19  Yes Renzo Dhaliwal MD   loratadine (CLARITIN) 10 MG tablet Take 1 tablet by mouth daily  Patient not taking: Reported on 4/26/2021 3/31/21   Reyes Braver Deats, APRN - CNP        Allergies:       Sulfur and Sulfa antibiotics    Social History:     Tobacco:    reports that she has never smoked. She has never used smokeless tobacco.  Alcohol:      reports no history of alcohol use. Drug Use:  reports no history of drug use. Family History:     Family History   Problem Relation Age of Onset    Stroke Father     Stroke Mother        Review of Systems:     Positive and Negative as described in HPI    Review of Systems   Constitutional: Positive for fatigue. Negative for chills, fever and weight loss. HENT: Negative for congestion, ear pain, postnasal drip, rhinorrhea and sore throat. Eyes: Negative for visual disturbance. Respiratory: Positive for cough. Negative for hemoptysis, shortness of breath and wheezing. Cardiovascular: Negative for chest pain and palpitations.    Gastrointestinal: Negative for abdominal pain, constipation, diarrhea, heartburn, nausea and vomiting. Genitourinary: Negative for difficulty urinating and dysuria. Musculoskeletal: Negative for gait problem, myalgias, neck pain and neck stiffness. Skin: Negative for rash. Neurological: Negative for dizziness, syncope, light-headedness and headaches. Physical Exam:   Vitals:  /68 (Site: Right Upper Arm, Position: Sitting, Cuff Size: Large Adult)   Pulse 57   Temp 98 °F (36.7 °C) (Temporal)   Resp 18   Ht 5' (1.524 m)   Wt 107 lb 12.8 oz (48.9 kg)   SpO2 97%   BMI 21.05 kg/m²     Physical Exam  Vitals signs and nursing note reviewed. Constitutional:       General: She is not in acute distress. Appearance: Normal appearance. She is not ill-appearing. HENT:      Mouth/Throat:      Mouth: Mucous membranes are moist.   Eyes:      General: No scleral icterus. Conjunctiva/sclera: Conjunctivae normal.   Neck:      Musculoskeletal: Normal range of motion and neck supple. Cardiovascular:      Rate and Rhythm: Rhythm irregularly irregular. Pulmonary:      Effort: Pulmonary effort is normal.      Breath sounds: Normal breath sounds. No wheezing, rhonchi or rales. Abdominal:      General: Bowel sounds are normal. There is no distension. Palpations: Abdomen is soft. Tenderness: There is no abdominal tenderness. Musculoskeletal:      Right lower leg: No edema. Left lower leg: No edema. Skin:     General: Skin is warm and dry. Findings: No rash. Neurological:      Mental Status: She is alert and oriented to person, place, and time.    Psychiatric:         Mood and Affect: Mood normal.         Behavior: Behavior normal.         Data:     Lab Results   Component Value Date     12/07/2020    K 4.4 12/07/2020     12/07/2020    CO2 25 12/07/2020    BUN 23 12/07/2020    CREATININE 1.11 12/07/2020    GLUCOSE 99 12/07/2020    GLUCOSE 92 02/07/2012    PROT 6.6 01/18/2020    LABALBU 3.2 01/18/2020    BILITOT 0.33 01/18/2020 ALKPHOS 69 01/18/2020    AST 18 12/07/2020    ALT 19 12/07/2020     Lab Results   Component Value Date    WBC 6.9 12/07/2020    RBC 4.16 12/07/2020    RBC 4.31 02/07/2012    HGB 13.2 12/07/2020    HCT 41.8 12/07/2020    .5 12/07/2020    MCH 31.7 12/07/2020    MCHC 31.6 12/07/2020    RDW 13.4 12/07/2020     12/07/2020     02/07/2012    MPV 8.9 12/07/2020     Lab Results   Component Value Date    TSH 4.59 12/07/2020     Lab Results   Component Value Date    CHOL 213 12/07/2020    HDL 72 12/07/2020       Assessment/Plan:      Diagnosis Orders   1. Acute exacerbation of chronic bronchitis (HCC)  azithromycin (ZITHROMAX) 250 MG tablet   2. Longstanding persistent atrial fibrillation ([address]       Although I do suspect that the atrial fib is causing some problem with a cough and fatigue I would like to treat her for acute bronchitis with Z-David. She will follow up with cardiology this week. 1.  Serene received counseling on the following healthy behaviors: nutrition, exercise and medication adherence  2. Patient given educational materials - see patient instructions  3. Was a self-tracking handout given in paper form or via [employer]? No  If yes, see orders or list here. 4.  Discussed use, benefit, and side effects of prescribed medications. Barriers to medication compliance addressed. All patient questions answered. Pt voiced understanding. 5.  Reviewed prior labs and health maintenance  6. Continue current medications, diet and exercise. Completed Refills   Requested Prescriptions     Signed Prescriptions Disp Refills    azithromycin (ZITHROMAX) 250 MG tablet 6 tablet 0     Sig: Take 1 tablet by mouth See Admin Instructions for 5 days 500mg on day 1 followed by 250mg on days 2 - 5         Return if symptoms worsen or fail to improve.

## 2021-04-27 ENCOUNTER — OFFICE VISIT (OUTPATIENT)
Dept: CARDIOLOGY | Age: 86
End: 2021-04-27
Payer: MEDICARE

## 2021-04-27 VITALS
BODY MASS INDEX: 21.05 KG/M2 | HEART RATE: 123 BPM | SYSTOLIC BLOOD PRESSURE: 145 MMHG | OXYGEN SATURATION: 97 % | RESPIRATION RATE: 18 BRPM | DIASTOLIC BLOOD PRESSURE: 90 MMHG | WEIGHT: 107.2 LBS | HEIGHT: 60 IN

## 2021-04-27 DIAGNOSIS — I10 ESSENTIAL HYPERTENSION: ICD-10-CM

## 2021-04-27 DIAGNOSIS — I48.91 ATRIAL FIBRILLATION WITH RVR (HCC): Primary | ICD-10-CM

## 2021-04-27 DIAGNOSIS — F34.1 DYSTHYMIA: ICD-10-CM

## 2021-04-27 DIAGNOSIS — I49.5 SSS (SICK SINUS SYNDROME) (HCC): ICD-10-CM

## 2021-04-27 DIAGNOSIS — Z95.0 CARDIAC PACEMAKER IN SITU: ICD-10-CM

## 2021-04-27 DIAGNOSIS — I50.32 CHRONIC DIASTOLIC CONGESTIVE HEART FAILURE (HCC): ICD-10-CM

## 2021-04-27 DIAGNOSIS — Z95.0 PACEMAKER: ICD-10-CM

## 2021-04-27 DIAGNOSIS — I48.0 PAF (PAROXYSMAL ATRIAL FIBRILLATION) (HCC): ICD-10-CM

## 2021-04-27 DIAGNOSIS — R53.83 TIREDNESS: ICD-10-CM

## 2021-04-27 PROBLEM — T82.837A PACEMAKER POCKET HEMATOMA, INITIAL ENCOUNTER: Status: RESOLVED | Noted: 2019-01-14 | Resolved: 2021-04-27

## 2021-04-27 PROBLEM — T50.905A MEDICATION SIDE EFFECT, INITIAL ENCOUNTER: Status: RESOLVED | Noted: 2021-03-08 | Resolved: 2021-04-27

## 2021-04-27 PROCEDURE — G8427 DOCREV CUR MEDS BY ELIG CLIN: HCPCS | Performed by: FAMILY MEDICINE

## 2021-04-27 PROCEDURE — 4040F PNEUMOC VAC/ADMIN/RCVD: CPT | Performed by: FAMILY MEDICINE

## 2021-04-27 PROCEDURE — 1123F ACP DISCUSS/DSCN MKR DOCD: CPT | Performed by: FAMILY MEDICINE

## 2021-04-27 PROCEDURE — 93010 ELECTROCARDIOGRAM REPORT: CPT | Performed by: FAMILY MEDICINE

## 2021-04-27 PROCEDURE — 99211 OFF/OP EST MAY X REQ PHY/QHP: CPT | Performed by: FAMILY MEDICINE

## 2021-04-27 PROCEDURE — G8420 CALC BMI NORM PARAMETERS: HCPCS | Performed by: FAMILY MEDICINE

## 2021-04-27 PROCEDURE — 1090F PRES/ABSN URINE INCON ASSESS: CPT | Performed by: FAMILY MEDICINE

## 2021-04-27 PROCEDURE — 1036F TOBACCO NON-USER: CPT | Performed by: FAMILY MEDICINE

## 2021-04-27 PROCEDURE — 93005 ELECTROCARDIOGRAM TRACING: CPT | Performed by: FAMILY MEDICINE

## 2021-04-27 PROCEDURE — 99214 OFFICE O/P EST MOD 30 MIN: CPT | Performed by: FAMILY MEDICINE

## 2021-04-27 RX ORDER — DILTIAZEM HYDROCHLORIDE 300 MG/1
300 CAPSULE, COATED, EXTENDED RELEASE ORAL DAILY
Qty: 90 CAPSULE | Refills: 3 | Status: SHIPPED | OUTPATIENT
Start: 2021-04-27 | End: 2021-05-05

## 2021-04-27 NOTE — PROGRESS NOTES
Alfred Nolasco am scribing for and in the presence of Hermelindo Hwang. Kev DAIGLE, MS, F.A.C.C. Patient: Natanael Urena  : 1933  Date of Visit: 2021    REASON FOR VISIT / CONSULTATION: Follow-up (Hx: PAF, SSS, tikosyn, chr fatigue. she has been having SOb, and weakness, she gets wore out easily. worsening last few months. She has chest pressure off and on when she exerts herself. she has palps Denies: dizziness, lightheaded)      Dear Blanca Palacios, APRN - CNP,    I had the pleasure of seeing your patient Natanael Urena in my office today. As you know, Ms. Kamilla Regan is a 80 y.o. female with a history of paroxysmal atrial fibrillation as well as intermittent episodes of lightheadedness and dizziness as well as several episodes of syncope and near syncope of unknown etiology. A Holter monitor showed several breakthrough brief episodes of  atrial fibrillation with RVR. Therefore I started her on Amiodarone 200 mg daily. Her last LINQ remote interrogation in  had shown about 1.1% percent of atrial fibrillation. Unfortunately, she developed some significant thyroid issues leading her amiodarone to be stop and instead started on Tikosyn therapy 2018 for PAF. Unfortunately, she was admitted on 18 to [facility] for a punctured lung and broken ribs due to her shoes and slipping and falling while trying to walk out of her house. Apparently this led to then need for a chest tube for what sounds to be a hemothorax which ultimately was removed. LINQ alert on 2018 showed atrial fibrillation. LINQ alert on 10/16/2018 showed atrial fibrillation increased from 2% to 8.7% with frequent RVR in the 160's that has been associated with lightheadedness and dizziness. LINQ alert on 18 showed A-Fib. Echocardiogram done 10/25/18 EF 65% evidence of moderate diastolic dysfunction is seen. 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  She had 6.3% atrial fibrillation burden on 2/6/19 which unfortunlatey went up to 8.8% on her 5/2/19 pacemaker check and is up to 9.3% on her 7/19 check. Pacemaker interrogated today in office with Rep showed her heart rate is fairly well controlled. Echo done on 8/27/2020 showed an ejection fraction of 60%, Mild pulmonary hypertension with an estimated right ventricular systolic pressure of 32 mmHg. Moderate tricuspid regurgitation. Diastology cannot be properly assessed due to the patients rhythm of what appears to be atrial fibrillation. Since the last time I saw Ms. Ruba Joseph, she reports not doing well at all. She has been having shortness of breath and near syncope. She is also reporting weakness. Her daughter reported severe leg swelling last week. She has a cough and was told it could be due to her atrial fibrillation. She had testing and was told there is no infection. She denies any chest pain, pressure or tightness. No falls or near falls. She denies any lightheaded/dizziness, palpitations, abdominal pain, or concerns with any medications. No bleeding issues. Past Medical History:   Diagnosis Date    Abnormal resting ECG findings 1/25/12    Normal sinus rhythm with frequent PACs in a trigeminy pattern    Abnormal resting ECG findings 6/10/2013    Severe sinus bradycardia at 50 bpm.     Anxiety     Atrial fibrillation (HCC)     Cholecystitis     Chronic back pain     Pt. c/o upper back pain,,,,\"On the sides of my lungs\"    Hiatal hernia     Holter monitor, abnormal 1/27/12    Multiple episodes of SVT between 100 and 130 beats per minute most consistent with atrial fibrillation and/or atrial flutter with variable block.  Hypothyroidism     Insomnia     MAC (mycobacterium avium-intracellulare complex)     Normal cardiac stress test 1/26/12    low risk study.     Paroxysmal atrial fibrillation ([facility] [address] 2/7/2012    Found on holter monitor 2/12 and 12/12     Severe sinus bradycardia 6/10/13    At least partially drug induced.  Status post placement of implantable loop recorder 10/19/2017    LINQ IMPLANTED. [employer]    Subdural hematoma (HCC)        CURRENT ALLERGIES: Sulfur and Sulfa antibiotics REVIEW OF SYSTEMS: 14 systems were reviewed. Pertinent positives and negatives as above, all else negative. Past Surgical History:   Procedure Laterality Date    BRAIN SURGERY      Had a blood clot on the brain. Fell 10 feet.  CATARACT REMOVAL Bilateral      SECTION      x1    COLONOSCOPY      several over the years by Dr. Milan Tan Left 10/19/2017    [address] Left 2019    Dr. Detsiny Sosa 2019    Chin Plaster dual IS1 small DDD NOT MRI COMPATIBLE performed by Hiram Beebe MD at [facility]      tumor removed    UPPER GASTROINTESTINAL ENDOSCOPY      Anjel    Social History:  Social History     Tobacco Use    Smoking status: Never Smoker    Smokeless tobacco: Never Used   Substance Use Topics    Alcohol use: No    Drug use: No        CURRENT MEDICATIONS:  Outpatient Medications Marked as Taking for the 21 encounter (Office Visit) with Tita Mendez MD   Medication Sig Dispense Refill    azithromycin (ZITHROMAX) 250 MG tablet Take 1 tablet by mouth See Admin Instructions for 5 days 500mg on day 1 followed by 250mg on days 2 - 5 6 tablet 0    nitroGLYCERIN (NITROSTAT) 0.4 MG SL tablet Place 1 tablet under the tongue every 5 minutes as needed for Chest pain up to max of 3 total doses.  If no relief after 1 dose, call 911. 25 tablet 3    loratadine (CLARITIN) 10 MG tablet Take 1 tablet by mouth daily 30 tablet 0    ondansetron (ZOFRAN) 4 MG tablet Take 4 mg by mouth every 8 hours as needed for Nausea or Vomiting      omeprazole (PRILOSEC) 20 MG delayed release capsule Take 1 capsule by mouth Daily 90 capsule 3    apixaban (ELIQUIS) 2.5 MG TABS tablet Take 1 tablet by mouth 2 times daily 180 tablet 3    albuterol sulfate  (90 Base) MCG/ACT inhaler Inhale 2 puffs into the lungs every 6 hours as needed for Wheezing or Shortness of Breath 1 Inhaler 11    dilTIAZem (CARDIZEM CD) 240 MG extended release capsule Take 1 capsule by mouth daily 90 capsule 3    metoprolol succinate (TOPROL XL) 200 MG extended release tablet Take 1 tablet by mouth daily 90 tablet 3    levothyroxine (SYNTHROID) 100 MCG tablet Take 1 tablet by mouth Daily 90 tablet 3    acetaminophen (TYLENOL) 500 MG tablet Take 2 tablets by mouth 4 times daily as needed for Pain         FAMILY HISTORY: family history includes Stroke in her father and mother. PHYSICAL EXAM:   BP (!) 152/101 (Site: Left Upper Arm, Position: Sitting, Cuff Size: Small Adult)   Pulse 123   Resp 18   Ht 5' (1.524 m)   Wt 107 lb 3.2 oz (48.6 kg)   SpO2 97%   BMI 20.94 kg/m²  Body mass index is 20.94 kg/m². Constitutional: She is oriented to person, place, and time. She appears well-developed and well-nourished. In no acute distress. HEENT: Normocephalic and atraumatic. No significant JVD was present. Carotid bruit is not present. No mass and no thyromegaly present. No lymphadenopathy present. Cardiovascular: Normal rate, irregularly irregular rhythm, normal heart sounds. Exam reveals no gallop and no friction rubs. 2/6 systolic murmur, 5th intercostal space on the LEFT in the mid-clavicular line (cardiac apex). Pulmonary/Chest: Effort normal and breath sounds normal. No respiratory distress. She has no wheezes, rhonchi or rales. Abdominal: Soft, non-tender. Bowel sounds and aorta are normal. She exhibits no organomegaly, mass or bruit. Extremities: Trace. No cyanosis or clubbing. 2+ radial and carotid pulses. Distal extremity pulses: 2+ bilaterally. Compression stockings in place. Neurological: She is alert and oriented to person, place, and time.  No evidence of gross cranial nerve deficit. Coordination appeared normal.   Skin: Skin is warm and dry. There is no rash or diaphoresis. Psychiatric: She has a normal mood and affect. Her speech is normal and behavior is normal.      MOST RECENT LABS ON RECORD:   Lab Results   Component Value Date    WBC 6.9 12/07/2020    HGB 13.2 12/07/2020    HCT 41.8 12/07/2020     12/07/2020    CHOL 213 (H) 12/07/2020    TRIG 147 12/07/2020    HDL 72 12/07/2020    ALT 19 12/07/2020    AST 18 12/07/2020     12/07/2020    K 4.4 12/07/2020     12/07/2020    CREATININE 1.11 (H) 12/07/2020    BUN 23 12/07/2020    CO2 25 12/07/2020    TSH 4.59 12/07/2020    INR 1.1 01/18/2020       ASSESSMENT:  1. Tiredness    2. Chronic diastolic congestive heart failure ([address]    3. PAF (paroxysmal atrial fibrillation) ([address]    4. Pacemaker    5. SSS (sick sinus syndrome) ([facility])    6. Essential hypertension    7. Cardiac pacemaker in situ    8. Dysthymia       PLAN:   · Fatigue and Tiredness of unclear etiology:   · I advised her this could be due to her increased heart rate. I will make medication adjustments to get better HR control that will hopefully help these symptoms. Chronic Diastolic Heart Failure: Currently Controlled   Beta Blocker: Continue Metoprolol succinate (Toprol XL) 200 mg daily. Nonpharmacologic management of Heart Failure: I told her to continue wearing lower extremity compression stockings and I advised her to try and keep his legs up whenever possible and to limit salt in her diet. Paroxysmal Atrial Fibrillation: Rate Control  Beta Blocker: Continue Metoprolol succinate (Toprol XL) 200 mg daily. Calcium Channel Blocker: INCREASE to diltiazem CD (Cardizem CD) 300 mg daily. I also discussed the potential side effects of this medication including lightheadedness and dizziness and instructed them to stop the medication of this occurs and call our office if this occurs.    Rhythm Control Agent: Not indicated [id number] Score for Atrial Fibrillation Stroke Risk   Risk   Factors  Component Value   C CHF Yes 1   H HTN Yes 1   A2 Age >= 76 Yes,  (80 y.o.) 2   D DM No 0   S2 Prior Stroke/TIA No 0   V Vascular Disease No 0   A Age 74-69 No,  (80 y.o.) 0   Sc Sex female 1    [id number]  Score  5   Score last updated 4/27/21 92:73 PM EDT    Click here for a link to the UpToDate guideline \"Atrial Fibrillation: Anticoagulation therapy to prevent embolization    Disclaimer: Risk Score calculation is dependent on accuracy of patient problem list and past encounter diagnosis. Stroke Risk: Her CHADS2-VASc score is 5/9 (6.7% stroke risk)  Anticoagulation: Continue Apixaban (Eliquis) 2.5 mg every 12 hours. I also reminded her to watch for signs of blood in her stool or black tarry stools and stop the medication immediately if this develops as this could be life threatening. · Essential Hypertension: Uncontrolled   Beta Blocker: Continue Metoprolol succinate (Toprol XL) 200 mg daily.  ACE Inibitor/ARB: Not indicated at this time.  Calcium Channel Blocker: INCREASE to diltiazem CD (Cardizem CD) 300 mg daily.  Diuretics: Not indicated at this time.  Additional Testing List: None    I do want her to monitor her blood pressure and heart rate at home and call our office with her home readings and I can make further medication adjustments if needed. · Dual Chamber Pacemaker:   · Indication for Device Placement: Sick Sinus Syndrome   · Interrogation Findings: Within normal limits and therefore no changes were made. Finally, I recommended that she continue her other medications and follow up with you as previously scheduled. FOLLOW UP:  I told Ms. Ricardo Mccollum to call my office if she had any problems, but otherwise told her to Return in about 3 weeks (around 5/18/2021). However, I would be happy to see her sooner should the need arise. Sincerely,  Anderson Mathias MD, MS, F.A.C.C.   [facility]) [address] [place] [id number] Cardiology Specialist   [address], [address] [place] [address], [facility]  Phone: [phone], Fax: [phone]    I believe that the risk of significant morbidity and mortality related to the patient's current medical conditions are: Intermediate. The documentation recorded by the scribe, accurately and completely reflects the services I personally performed and the decisions made by me. Chase Sanchez MD, MS, F.A.C.C.  April 27, 2021

## 2021-04-29 ENCOUNTER — TELEPHONE (OUTPATIENT)
Dept: CARDIOLOGY | Age: 86
End: 2021-04-29

## 2021-04-29 DIAGNOSIS — I20.9 ANGINA PECTORIS, UNSPECIFIED (HCC): ICD-10-CM

## 2021-04-29 RX ORDER — NITROGLYCERIN 0.4 MG/1
0.4 TABLET SUBLINGUAL EVERY 5 MIN PRN
Qty: 25 TABLET | Refills: 3 | Status: SHIPPED | OUTPATIENT
Start: 2021-04-29 | End: 2021-07-13 | Stop reason: SDUPTHER

## 2021-05-05 ENCOUNTER — TELEPHONE (OUTPATIENT)
Dept: CARDIOLOGY | Age: 86
End: 2021-05-05

## 2021-05-05 DIAGNOSIS — F34.1 DYSTHYMIA: ICD-10-CM

## 2021-05-05 DIAGNOSIS — I50.32 CHRONIC DIASTOLIC CONGESTIVE HEART FAILURE (HCC): ICD-10-CM

## 2021-05-05 DIAGNOSIS — Z95.0 CARDIAC PACEMAKER IN SITU: ICD-10-CM

## 2021-05-05 DIAGNOSIS — R53.83 TIREDNESS: ICD-10-CM

## 2021-05-05 DIAGNOSIS — I49.5 SSS (SICK SINUS SYNDROME) (HCC): ICD-10-CM

## 2021-05-05 DIAGNOSIS — I48.0 PAF (PAROXYSMAL ATRIAL FIBRILLATION) (HCC): ICD-10-CM

## 2021-05-05 RX ORDER — DILTIAZEM HYDROCHLORIDE 300 MG/1
150 CAPSULE, COATED, EXTENDED RELEASE ORAL DAILY
Qty: 90 CAPSULE | Refills: 3 | Status: CANCELLED
Start: 2021-05-05

## 2021-05-05 RX ORDER — DILTIAZEM HYDROCHLORIDE 120 MG/1
120 CAPSULE, COATED, EXTENDED RELEASE ORAL DAILY
Qty: 30 CAPSULE | Refills: 0 | Status: SHIPPED | OUTPATIENT
Start: 2021-05-05 | End: 2021-05-18 | Stop reason: SDUPTHER

## 2021-05-05 NOTE — TELEPHONE ENCOUNTER
Daughter did do download as well as calling in these vitals for the last 7 days.      106/83, 71  108/84, 98  85/58, 67  106/61, 63  84/54, 63  86/58, 68  96/58, 65

## 2021-05-05 NOTE — TELEPHONE ENCOUNTER
Please let patient know that she should cut diltiazem back to 120 if possible or stop and call with HR's tomorrow and/or Friday for additional changes. If feeling really bad though can come in ER and even admit if necessary.

## 2021-05-05 NOTE — TELEPHONE ENCOUNTER
What are her vitals. Please do not take these calls unless you ask vitals. I doubt that increasing her Cardizem caused this but that depends on her vitals.

## 2021-05-05 NOTE — TELEPHONE ENCOUNTER
Ms. Carolyn Laguerre daughter called and states she is barley walking and is very weak and going way down since increased Cardizem and last visit. She is not eating or drinking much other than coffee. Her daughter will send download and call back with BP and HR once she is there with her. I advised her to make sure she is staying hydrated.  Please advise    Thank you    Chrissie Tanner

## 2021-05-05 NOTE — TELEPHONE ENCOUNTER
As I said she does not know the readings yet once she gets to her she will call back with her numbers.

## 2021-05-07 ENCOUNTER — TELEPHONE (OUTPATIENT)
Dept: CARDIOLOGY | Age: 86
End: 2021-05-07

## 2021-05-07 NOTE — TELEPHONE ENCOUNTER
David Paul, Ms. Melissa Álvarez daughter called into the office today and just wanted to up date us on Ms. Goodwin BP and HR. Last night her BP and HR was 124/86 with a HR of 75 and then this AM it was 111/83 with a HR of 60 bpm. She does still feel as if her heart is racing. Also has had some dizziness here and there. David Paul does feel like her mother has just slowly been \"going down hill\" over the last three weeks. Thanks!

## 2021-05-09 NOTE — TELEPHONE ENCOUNTER
Please let patient know that that HR and BP are right where they should be and I am not sure what else I can do. Perhaps [facility] Might has other ideas of ways to improve how she is feeling.

## 2021-05-10 ENCOUNTER — TELEPHONE (OUTPATIENT)
Dept: CARDIOLOGY | Age: 86
End: 2021-05-10

## 2021-05-10 NOTE — TELEPHONE ENCOUNTER
LVM to let Ms. Olga Canalesr know that per Dr. Lovell Friday her BP and HR are right where they should be and he is not sure what else he can do. Perhaps [facility] Might has other ideas of ways to improve how she is feeling. I did tell them to call our office if they had any other questions. Thanks!

## 2021-05-17 ENCOUNTER — TELEPHONE (OUTPATIENT)
Dept: CARDIOLOGY | Age: 86
End: 2021-05-17

## 2021-05-17 NOTE — TELEPHONE ENCOUNTER
Patient has appointment with you tomorrow. Wanted to let you know that she is tired all of the time & never gets out of bed. She's not eating well and having issues with ADL's. She's frustrated and wanted to know if there was something you could do to help. I told her I would let you know and you could discuss at her appointment tomorrow.

## 2021-05-18 ENCOUNTER — OFFICE VISIT (OUTPATIENT)
Dept: CARDIOLOGY | Age: 86
End: 2021-05-18
Payer: MEDICARE

## 2021-05-18 VITALS
HEIGHT: 60 IN | BODY MASS INDEX: 20.62 KG/M2 | OXYGEN SATURATION: 97 % | DIASTOLIC BLOOD PRESSURE: 97 MMHG | WEIGHT: 105 LBS | HEART RATE: 73 BPM | RESPIRATION RATE: 18 BRPM | SYSTOLIC BLOOD PRESSURE: 152 MMHG

## 2021-05-18 DIAGNOSIS — I48.19 PERSISTENT ATRIAL FIBRILLATION (HCC): ICD-10-CM

## 2021-05-18 DIAGNOSIS — F32.A DEPRESSION, UNSPECIFIED DEPRESSION TYPE: Primary | ICD-10-CM

## 2021-05-18 DIAGNOSIS — Z95.0 PACEMAKER: ICD-10-CM

## 2021-05-18 DIAGNOSIS — I49.5 SSS (SICK SINUS SYNDROME) (HCC): ICD-10-CM

## 2021-05-18 DIAGNOSIS — R53.83 TIRED: ICD-10-CM

## 2021-05-18 DIAGNOSIS — I50.32 CHRONIC DIASTOLIC CONGESTIVE HEART FAILURE (HCC): ICD-10-CM

## 2021-05-18 DIAGNOSIS — R53.83 FATIGUE, UNSPECIFIED TYPE: ICD-10-CM

## 2021-05-18 DIAGNOSIS — I10 ESSENTIAL HYPERTENSION: ICD-10-CM

## 2021-05-18 PROCEDURE — 99214 OFFICE O/P EST MOD 30 MIN: CPT | Performed by: FAMILY MEDICINE

## 2021-05-18 PROCEDURE — 1123F ACP DISCUSS/DSCN MKR DOCD: CPT | Performed by: FAMILY MEDICINE

## 2021-05-18 PROCEDURE — 4040F PNEUMOC VAC/ADMIN/RCVD: CPT | Performed by: FAMILY MEDICINE

## 2021-05-18 PROCEDURE — G8427 DOCREV CUR MEDS BY ELIG CLIN: HCPCS | Performed by: FAMILY MEDICINE

## 2021-05-18 PROCEDURE — 1090F PRES/ABSN URINE INCON ASSESS: CPT | Performed by: FAMILY MEDICINE

## 2021-05-18 PROCEDURE — 99211 OFF/OP EST MAY X REQ PHY/QHP: CPT | Performed by: FAMILY MEDICINE

## 2021-05-18 PROCEDURE — G8420 CALC BMI NORM PARAMETERS: HCPCS | Performed by: FAMILY MEDICINE

## 2021-05-18 PROCEDURE — 1036F TOBACCO NON-USER: CPT | Performed by: FAMILY MEDICINE

## 2021-05-18 RX ORDER — ESCITALOPRAM OXALATE 10 MG/1
10 TABLET ORAL DAILY
Qty: 90 TABLET | Refills: 3 | Status: CANCELLED | OUTPATIENT
Start: 2021-05-18

## 2021-05-18 RX ORDER — SERTRALINE HYDROCHLORIDE 25 MG/1
25 TABLET, FILM COATED ORAL DAILY
Qty: 90 TABLET | Refills: 3 | Status: SHIPPED | OUTPATIENT
Start: 2021-05-18 | End: 2021-06-08 | Stop reason: SDUPTHER

## 2021-05-18 RX ORDER — DILTIAZEM HYDROCHLORIDE 120 MG/1
120 CAPSULE, COATED, EXTENDED RELEASE ORAL DAILY
Qty: 90 CAPSULE | Refills: 3 | Status: CANCELLED | OUTPATIENT
Start: 2021-05-18

## 2021-05-18 RX ORDER — DILTIAZEM HYDROCHLORIDE 120 MG/1
120 CAPSULE, COATED, EXTENDED RELEASE ORAL DAILY
Qty: 90 CAPSULE | Refills: 3 | Status: SHIPPED | OUTPATIENT
Start: 2021-05-18 | End: 2022-05-23

## 2021-05-18 NOTE — PATIENT INSTRUCTIONS
SURVEY:    You may be receiving a survey from [facility] regarding your visit today. Please complete the survey to enable us to provide the highest quality of care to you and your family. If you cannot score us a very good on any question, please call the office to discuss how we could have made your experience a very good one. Thank you. Kristine Tello was your MA today!

## 2021-05-18 NOTE — PROGRESS NOTES
Danya Flood am scribing for and in the presence of Jay Hess. Kev DAIGLE, MS, F.A.C.C. Patient: Jaydon Jacobson  : 1933  Date of Visit: May 18, 2021    REASON FOR VISIT / CONSULTATION: Follow-up (HX: CHF, PAF, Pacer, SSS, HTN. Pt states she is doing not to well. C/o: Tirdness and fatigue today feels ok. One episode of heart palpitoatns on  night. Denies: CP, Lighteaded/dizzienss, SOB. )      Dear [facility], APRN - CNP,    I had the pleasure of seeing your patient Jaydon Jacobson in my office today. As you know, Ms. Mccormick Born is a 80 y.o. female with a history of paroxysmal atrial fibrillation as well as intermittent episodes of lightheadedness and dizziness as well as several episodes of syncope and near syncope of unknown etiology. A Holter monitor showed several breakthrough brief episodes of  atrial fibrillation with RVR. Therefore I started her on Amiodarone 200 mg daily. Her last LINQ remote interrogation in  had shown about 1.1% percent of atrial fibrillation. Unfortunately, she developed some significant thyroid issues leading her amiodarone to be stop and instead started on Tikosyn therapy 2018 for PAF. Unfortunately, she was admitted on 18 to [facility] for a punctured lung and broken ribs due to her shoes and slipping and falling while trying to walk out of her house. Apparently this led to then need for a chest tube for what sounds to be a hemothorax which ultimately was removed. LINQ alert on 2018 showed atrial fibrillation. LINQ alert on 10/16/2018 showed atrial fibrillation increased from 2% to 8.7% with frequent RVR in the 160's that has been associated with lightheadedness and dizziness. LINQ alert on 18 showed A-Fib. Echocardiogram done 10/25/18 EF 65% evidence of moderate diastolic dysfunction is seen. 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  She had 6.3% atrial fibrillation burden on 19 which variable block.  Hypothyroidism     Insomnia     MAC (mycobacterium avium-intracellulare complex)     Normal cardiac stress test 12    low risk study.  Paroxysmal atrial fibrillation ([address] 2012    Found on holter monitor  and      Severe sinus bradycardia 6/10/13    At least partially drug induced.  Status post placement of implantable loop recorder 10/19/2017    LINQ IMPLANTED. [employer]    Subdural hematoma (HCC)        CURRENT ALLERGIES: Sulfur and Sulfa antibiotics REVIEW OF SYSTEMS: 14 systems were reviewed. Pertinent positives and negatives as above, all else negative. Past Surgical History:   Procedure Laterality Date    BRAIN SURGERY      Had a blood clot on the brain. Fell 10 feet.  CATARACT REMOVAL Bilateral      SECTION      x1    COLONOSCOPY      several over the years by Dr. Jyothi Anderson Left 10/19/2017    Nik [address] Left 2019    Dr. Carli Finley 2019    Xena Brown dual IS1 small DDD NOT MRI COMPATIBLE performed by Aleoj Szymanski MD at [facility] 106      tumor removed    UPPER GASTROINTESTINAL ENDOSCOPY      Anjel    Social History:  Social History     Tobacco Use    Smoking status: Never Smoker    Smokeless tobacco: Never Used   Vaping Use    Vaping Use: Never used   Substance Use Topics    Alcohol use: No    Drug use: No        CURRENT MEDICATIONS:  Outpatient Medications Marked as Taking for the 21 encounter (Office Visit) with Sunshine Wan MD   Medication Sig Dispense Refill    dilTIAZem (CARDIZEM CD) 120 MG extended release capsule Take 1 capsule by mouth daily 30 capsule 0    nitroGLYCERIN (NITROSTAT) 0.4 MG SL tablet Place 1 tablet under the tongue every 5 minutes as needed for Chest pain up to max of 3 total doses.  If no relief after 1 dose, call 911. 25 tablet 3    loratadine (CLARITIN) 10 MG tablet Take 1 tablet by mouth daily 30 tablet 0    ondansetron (ZOFRAN) 4 MG tablet Take 4 mg by mouth every 8 hours as needed for Nausea or Vomiting      omeprazole (PRILOSEC) 20 MG delayed release capsule Take 1 capsule by mouth Daily 90 capsule 3    apixaban (ELIQUIS) 2.5 MG TABS tablet Take 1 tablet by mouth 2 times daily 180 tablet 3    albuterol sulfate  (90 Base) MCG/ACT inhaler Inhale 2 puffs into the lungs every 6 hours as needed for Wheezing or Shortness of Breath 1 Inhaler 11    metoprolol succinate (TOPROL XL) 200 MG extended release tablet Take 1 tablet by mouth daily 90 tablet 3    levothyroxine (SYNTHROID) 100 MCG tablet Take 1 tablet by mouth Daily 90 tablet 3    acetaminophen (TYLENOL) 500 MG tablet Take 2 tablets by mouth 4 times daily as needed for Pain         FAMILY HISTORY: family history includes Stroke in her father and mother. PHYSICAL EXAM:   BP (!) 142/97 (Site: Left Upper Arm, Position: Sitting, Cuff Size: Small Adult)   Pulse 76   Resp 18   Ht 5' (1.524 m)   Wt 105 lb (47.6 kg)   SpO2 97%   BMI 20.51 kg/m²  Body mass index is 20.51 kg/m². Constitutional: She is oriented to person, place, and time. She appears well-developed and well-nourished. In no acute distress. HEENT: Normocephalic and atraumatic. No significant JVD was present. Carotid bruit is not present. No mass and no thyromegaly present. No lymphadenopathy present. Cardiovascular: Normal rate, irregularly irregular rhythm, normal heart sounds. Exam reveals no gallop and no friction rubs. 2/6 systolic murmur, 5th intercostal space on the LEFT in the mid-clavicular line (cardiac apex). Pulmonary/Chest: Effort normal and breath sounds normal. No respiratory distress. She has no wheezes, rhonchi or rales. Abdominal: Soft, non-tender. Bowel sounds and aorta are normal. She exhibits no organomegaly, mass or bruit. Extremities: Trace. No cyanosis or clubbing.  2+ radial and carotid pulses. Distal extremity pulses: 2+ bilaterally. Compression stockings in place. Neurological: She is alert and oriented to person, place, and time. No evidence of gross cranial nerve deficit. Coordination appeared normal.   Skin: Skin is warm and dry. There is no rash or diaphoresis. Psychiatric: She has a normal mood and affect. Her speech is normal and behavior is normal.      MOST RECENT LABS ON RECORD:   Lab Results   Component Value Date    WBC 6.9 12/07/2020    HGB 13.2 12/07/2020    HCT 41.8 12/07/2020     12/07/2020    CHOL 213 (H) 12/07/2020    TRIG 147 12/07/2020    HDL 72 12/07/2020    ALT 19 12/07/2020    AST 18 12/07/2020     12/07/2020    K 4.4 12/07/2020     12/07/2020    CREATININE 1.11 (H) 12/07/2020    BUN 23 12/07/2020    CO2 25 12/07/2020    TSH 4.59 12/07/2020    INR 1.1 01/18/2020       ASSESSMENT:  1. Tired    2. Fatigue, unspecified type    3. Confused    4. Depression, unspecified depression type    5. Chronic a-fib ([address]    6. Chronic diastolic congestive heart failure ([address]    7. Essential hypertension    8. Pacemaker    9. SSS (sick sinus syndrome) ([facility])       PLAN:   · Fatigue and Tiredness of unclear etiology/ Confused at times: I suspect that this is very consistent with Depression:   · I advised her this could be due to her now being in atrial fibrillation all the time. As below we did discuss this in great detail. · We did discuss the possibility of her having a mini stoke as well in great detail. We did discuss doing a CT or MRI if needed however this may not change our treatment options however she wanted to we could do this and she can follow up with her PCP or Neurologist. In the end she and her daughter were ok with not looking into this further just due to her treatments not going to change even with these tests. · We also discussed her possible uncontrolled depression issues and her mood changes.  Her daughter is trying to keep her moving daily. I did call CAMRON Duenas CNP today to discuss this further. · START Sertraline 25 mg daily in hopes to better control her depression and to see CAMRON Spring CNP in about two to three weeks. Chronic Atrial Fibrillation: Rate Control. We did review her most recent pacer check from from 5/5/2021 together in great detail. She is now in chronic atrial fibrillation. We did discuss this change may be causing her symptoms a bit. Her HR is controlled when she is in atrial fibrillation. Beta Blocker: Continue Metoprolol succinate (Toprol XL) 200 mg daily. Calcium Channel Blocker: Continue diltiazem CD (Cardizem CD) 120 mg once daily. [id number] Score for Atrial Fibrillation Stroke Risk   Risk   Factors  Component Value   C CHF Yes 1   H HTN Yes 1   A2 Age >= 76 Yes,  (80 y.o.) 2   D DM No 0   S2 Prior Stroke/TIA No 0   V Vascular Disease No 0   A Age 74-69 No,  (80 y.o.) 0   Sc Sex female 1    [id number]  Score  5   Score last updated 0/24/98 95:82 PM EDT  Click here for a link to the UpToDate guideline \"Atrial Fibrillation: Anticoagulation therapy to prevent embolization  Disclaimer: Risk Score calculation is dependent on accuracy of patient problem list and past encounter diagnosis. Stroke Risk: Her CHADS2-VASc score is 5/9 (6.7% stroke risk)  Anticoagulation: Continue Apixaban (Eliquis) 2.5 mg every 12 hours. I also reminded her to watch for signs of blood in her stool or black tarry stools and stop the medication immediately if this develops as this could be life threatening.  Chronic diastolic heart failure: New York Heart Association Class: IIa (Mild symptoms only in normal activities)   Beta Blocker: Continue Metoprolol succinate (Toprol XL) 200 mg daily.  Heart failure counseling: I told them to continue wearing lower extremity compression stockings and I advised them to try and keep their legs up whenever possible and to limit salt in their diet.     · Essential Hypertension: Uncontrolled   Beta Blocker: Continue Metoprolol succinate (Toprol XL) 200 mg daily.  Calcium Channel Blocker: Continue diltiazem CD (Cardizem CD) 120 mg once daily.  I was also a little bit concerned about her blood pressure which was elevated on 2 checks today but she insisted that her blood pressures at home and in your office are normal and therefore I told her to keep an eye on this and follow up with you as previously scheduled for continued monitoring and treatment of this problem. · Dual Chamber Pacemaker:   · Indication for Device Placement: Sick Sinus Syndrome   · Interrogation Findings: Within normal limits and therefore no changes were made. Finally, I recommended that she continue her other medications and follow up with you as previously scheduled. FOLLOW UP:  I told Ms. Olga Lima to call my office if she had any problems, but otherwise told her to Return in about 3 months (around 8/18/2021). However, I would be happy to see her sooner should the need arise. Sincerely,  Stephanie Delaney. Kev DAIGLE, MS, F.A.C.C. [facility] Cardiology Specialist  [address] [place] [address]  Phone: [phone], Fax: [phone]    I believe that the risk of significant morbidity and mortality related to the patient's current medical conditions are: Intermediate. >30 minutes were spent during prep work, discussion and exam of the patient, and follow up documentation and all of their questions were answered. The documentation recorded by the scribe, accurately and completely reflects the services I personally performed and the decisions made by me. Catalina Evans MD, MS, F.A.C.C.  May 18, 2021

## 2021-06-08 ENCOUNTER — TELEPHONE (OUTPATIENT)
Dept: CARDIOLOGY | Age: 86
End: 2021-06-08

## 2021-06-08 ENCOUNTER — OFFICE VISIT (OUTPATIENT)
Dept: PRIMARY CARE CLINIC | Age: 86
End: 2021-06-08
Payer: MEDICARE

## 2021-06-08 VITALS
SYSTOLIC BLOOD PRESSURE: 134 MMHG | HEIGHT: 60 IN | BODY MASS INDEX: 20.28 KG/M2 | OXYGEN SATURATION: 97 % | WEIGHT: 103.3 LBS | HEART RATE: 88 BPM | TEMPERATURE: 97.3 F | RESPIRATION RATE: 18 BRPM | DIASTOLIC BLOOD PRESSURE: 82 MMHG

## 2021-06-08 DIAGNOSIS — Z00.00 ROUTINE GENERAL MEDICAL EXAMINATION AT A HEALTH CARE FACILITY: Primary | ICD-10-CM

## 2021-06-08 DIAGNOSIS — F32.0 CURRENT MILD EPISODE OF MAJOR DEPRESSIVE DISORDER WITHOUT PRIOR EPISODE (HCC): ICD-10-CM

## 2021-06-08 PROCEDURE — 1123F ACP DISCUSS/DSCN MKR DOCD: CPT | Performed by: NURSE PRACTITIONER

## 2021-06-08 PROCEDURE — G0438 PPPS, INITIAL VISIT: HCPCS | Performed by: NURSE PRACTITIONER

## 2021-06-08 PROCEDURE — 4040F PNEUMOC VAC/ADMIN/RCVD: CPT | Performed by: NURSE PRACTITIONER

## 2021-06-08 RX ORDER — SERTRALINE HYDROCHLORIDE 25 MG/1
25 TABLET, FILM COATED ORAL DAILY
Qty: 90 TABLET | Refills: 3 | Status: SHIPPED | OUTPATIENT
Start: 2021-06-08 | End: 2021-07-19 | Stop reason: SDUPTHER

## 2021-06-08 SDOH — ECONOMIC STABILITY: FOOD INSECURITY: WITHIN THE PAST 12 MONTHS, THE FOOD YOU BOUGHT JUST DIDN'T LAST AND YOU DIDN'T HAVE MONEY TO GET MORE.: PATIENT DECLINED

## 2021-06-08 SDOH — ECONOMIC STABILITY: FOOD INSECURITY: WITHIN THE PAST 12 MONTHS, YOU WORRIED THAT YOUR FOOD WOULD RUN OUT BEFORE YOU GOT MONEY TO BUY MORE.: PATIENT DECLINED

## 2021-06-08 ASSESSMENT — PATIENT HEALTH QUESTIONNAIRE - PHQ9
SUM OF ALL RESPONSES TO PHQ QUESTIONS 1-9: 2
1. LITTLE INTEREST OR PLEASURE IN DOING THINGS: 1
SUM OF ALL RESPONSES TO PHQ QUESTIONS 1-9: 2
SUM OF ALL RESPONSES TO PHQ QUESTIONS 1-9: 2
SUM OF ALL RESPONSES TO PHQ9 QUESTIONS 1 & 2: 2
2. FEELING DOWN, DEPRESSED OR HOPELESS: 1

## 2021-06-08 ASSESSMENT — ENCOUNTER SYMPTOMS
CONSTIPATION: 0
SORE THROAT: 0
BACK PAIN: 0
NAUSEA: 0
COUGH: 0
SHORTNESS OF BREATH: 0
VISUAL CHANGE: 0
VOMITING: 0
WHEEZING: 0
DIARRHEA: 0
ABDOMINAL PAIN: 0
RHINORRHEA: 0
HEARTBURN: 0

## 2021-06-08 ASSESSMENT — SOCIAL DETERMINANTS OF HEALTH (SDOH): HOW HARD IS IT FOR YOU TO PAY FOR THE VERY BASICS LIKE FOOD, HOUSING, MEDICAL CARE, AND HEATING?: PATIENT DECLINED

## 2021-06-08 ASSESSMENT — LIFESTYLE VARIABLES: HOW OFTEN DO YOU HAVE A DRINK CONTAINING ALCOHOL: 0

## 2021-06-08 NOTE — PROGRESS NOTES
Medicare Annual Wellness Visit  Name: Juanjo Foley Date: 2021   MRN: [id number] Sex: Female   Age: 80 y.o. Ethnicity: Non-/Non    : 1933 Race: Graciela Hensley is here for Medicare AWV (AWV)    Screenings for behavioral, psychosocial and functional/safety risks, and cognitive dysfunction are all negative except as indicated below. These results, as well as other patient data from the [address] form, are documented in Flowsheets linked to this Encounter. Alejandro Packer is here today for a Medicare annual wellness visit and regular office visit. Depression-improving, patient states she started taking sertraline at the advice of her cardiologist.  She is feeling more positive and less fatigue during the day. Patient states she has energy to do things and she does not feel like laying on the couch all day long. See below for further comment. Mental Health Problem  The primary symptoms include dysphoric mood (Improving) and somatic symptoms. The primary symptoms do not include delusions, hallucinations, bizarre behavior or disorganized speech. The current episode started more than 1 month ago. This is a chronic problem. The dysphoric mood began more than 2 weeks ago. The mood has been improving since its onset. She characterizes the problem as moderate. The mood includes feelings of sadness. Her change in mood was precipitated by a stressful event. The somatic symptoms began more than 1 month ago. The somatic symptoms have been improving since their onset. The symptoms are moderate. Somatic symptoms include fatigue. Somatic symptoms do not include headaches, abdominal pain, heartburn, constipation, back pain, myalgias or impotence. The onset of the illness is precipitated by emotional stress. The degree of incapacity that she is experiencing as a consequence of her illness is moderate.  Sequelae of the illness include harmed interpersonal relations and SVT between 100 and 130 beats per minute most consistent with atrial fibrillation and/or atrial flutter with variable block.  Hypothyroidism     Insomnia     MAC (mycobacterium avium-intracellulare complex)     Normal cardiac stress test 12    low risk study.  Paroxysmal atrial fibrillation ([address] 2012    Found on holter monitor  and      Severe sinus bradycardia 6/10/13    At least partially drug induced.  Status post placement of implantable loop recorder 10/19/2017    LINQ IMPLANTED. [employer]    Subdural hematoma [facility])        Past Surgical History:   Procedure Laterality Date    BRAIN SURGERY      Had a blood clot on the brain. Fell 10 feet.      CATARACT REMOVAL Bilateral      SECTION      x1    COLONOSCOPY      several over the years by Dr. Jyothi Anderson Left 10/19/2017    [place] Left 2019    Dr. Carli Finley 2019    Xena Brown dual IS1 small DDD NOT MRI COMPATIBLE performed by Alejo Szymanski MD at [facility] 106      tumor removed    UPPER GASTROINTESTINAL ENDOSCOPY      Anjel         Family History   Problem Relation Age of Onset    Stroke Father     Stroke Mother        CareTeam (Including outside providers/suppliers regularly involved in providing care):   Patient Care Team:  CAMRON Spann CNP as PCP - General (Family Nurse Practitioner)  [address] [place] [address] as PCP - REHABILITATION [facility] Provider  MD Diane Arellano MD as Consulting Physician (Hematology and Oncology)    Wt Readings from Last 3 Encounters:   21 103 lb 4.8 oz (46.9 kg)   21 105 lb (47.6 kg)   21 107 lb 3.2 oz (48.6 kg)     Vitals:    21 1121   BP: 134/82   Position: Sitting   Pulse: 88   Resp: 18   Temp: 97.3 °F (36.3 °C)   TempSrc: Temporal   SpO2: 97%   Weight: 103 lb 4.8 oz (46.9 kg)   Height: 5' (1.524 m)     Body mass index is 20.17 kg/m². Based upon direct observation of the patient, evaluation of cognition reveals recent and remote memory intact. General Appearance: alert and oriented to person, place and time  Skin: warm and dry  Head: normocephalic and atraumatic  ENT: oropharynx clear and moist with normal mucous membranes  Pulmonary/Chest: clear to auscultation bilaterally- no wheezes, rales or rhonchi, normal air movement, no respiratory distress  Cardiovascular: normal rate and regular rhythm  Abdomen: soft, non-tender  Extremities: no edema  Musculoskeletal: normal range of motion, no joint swelling, deformity or tenderness  Neurologic: gait and coordination normal and speech normal    Patient's complete Health Risk Assessment and screening values have been reviewed and are found in Flowsheets. The following problems were reviewed today and where indicated follow up appointments were made and/or referrals ordered.     Positive Risk Factor Screenings with Interventions:            Hearing/Vision:  No exam data present  Hearing/Vision  Do you or your family notice any trouble with your hearing that hasn't been managed with hearing aids?: (!) Yes  Do you have difficulty driving, watching TV, or doing any of your daily activities because of your eyesight?: No  Have you had an eye exam within the past year?: Yes  Hearing/Vision Interventions:  · Hearing concerns:  patient declines any further evaluation/treatment for hearing issues      Personalized Preventive Plan   Current Health Maintenance Status  Immunization History   Administered Date(s) Administered    Influenza Vaccine, unspecified formulation 09/01/2016    Influenza Virus Vaccine 09/01/2014    Influenza Whole 11/12/2009    Influenza, High Dose (Fluzone 65 yrs and older) 10/22/2020    Influenza, Quadv, IM, PF (6 mo and older Fluzone, Flulaval, Fluarix, and 3 yrs and older Afluria) 11/02/2018, 09/30/2019    Pneumococcal Conjugate 13-valent ([id number]) 07/27/2018    Pneumococcal Conjugate 7-valent (Prevnar7) 04/12/2002    Pneumococcal Polysaccharide ([id number]) 04/30/2013, 10/22/2020        Health Maintenance   Topic Date Due    Annual Wellness Visit (AWV)  Never done    DTaP/Tdap/Td vaccine (1 - Tdap) 03/31/2022 (Originally 2/13/1952)    Shingles Vaccine (1 of 2) 03/31/2022 (Originally 2/13/1983)    Flu vaccine  Completed    Pneumococcal 65+ years Vaccine  Completed    COVID-19 Vaccine  Completed    Hepatitis A vaccine  Aged Out    Hepatitis B vaccine  Aged Out    Hib vaccine  Aged Out    Meningococcal (ACWY) vaccine  Aged Out     Recommendations for Scannx Due: see orders and patient instructions/AVS.  . Recommended screening schedule for the next 5-10 years is provided to the patient in written form: see Patient Satya Gaines was seen today for medicare awv. Diagnoses and all orders for this visit:    Routine general medical examination at a health care facility    Current mild episode of major depressive disorder without prior episode ([address]    Other orders  -     sertraline (ZOLOFT) 25 MG tablet;  Take 1 tablet by mouth daily

## 2021-06-08 NOTE — PATIENT INSTRUCTIONS
SURVEY:    You may be receiving a survey from [employer] regarding your visit today. Please complete the survey to enable us to provide the highest quality of care to you and your family. If you cannot score us a very good on any question, please call the office to discuss how we could have made your experience a very good one. Thank you. Rodolfo Gross, APRN-CNP  Nadeem Nicholson, APRN-CNP  Harshal Larry, AMANDA Coates, BLAYNE Curry    Personalized Preventive Plan for Daysi Hamilton - 6/8/2021  Medicare offers a range of preventive health benefits. Some of the tests and screenings are paid in full while other may be subject to a deductible, co-insurance, and/or copay. Some of these benefits include a comprehensive review of your medical history including lifestyle, illnesses that may run in your family, and various assessments and screenings as appropriate. After reviewing your medical record and screening and assessments performed today your provider may have ordered immunizations, labs, imaging, and/or referrals for you. A list of these orders (if applicable) as well as your Preventive Care list are included within your After Visit Summary for your review. Other Preventive Recommendations:    · A preventive eye exam performed by an eye specialist is recommended every 1-2 years to screen for glaucoma; cataracts, macular degeneration, and other eye disorders. · A preventive dental visit is recommended every 6 months. · Try to get at least 150 minutes of exercise per week or 10,000 steps per day on a pedometer . · Order or download the FREE \"Exercise & Physical Activity: Your Everyday Guide\" from [employer]. Call 2-771.690.1829 or search [employer] online. · You need 1847-4338 mg of calcium and 4932-0596 IU of vitamin D per day.  It is possible to meet your calcium requirement with diet alone, but a vitamin D supplement is usually necessary

## 2021-06-10 ENCOUNTER — NURSE ONLY (OUTPATIENT)
Dept: CARDIOLOGY | Age: 86
End: 2021-06-10
Payer: MEDICARE

## 2021-06-10 DIAGNOSIS — I49.5 SSS (SICK SINUS SYNDROME) (HCC): ICD-10-CM

## 2021-06-10 DIAGNOSIS — Z95.0 PACEMAKER: Primary | ICD-10-CM

## 2021-06-19 PROCEDURE — 93294 REM INTERROG EVL PM/LDLS PM: CPT | Performed by: FAMILY MEDICINE

## 2021-07-13 ENCOUNTER — TELEPHONE (OUTPATIENT)
Dept: CARDIOLOGY | Age: 86
End: 2021-07-13

## 2021-07-13 DIAGNOSIS — I20.9 ANGINA PECTORIS, UNSPECIFIED (HCC): ICD-10-CM

## 2021-07-13 RX ORDER — NITROGLYCERIN 0.4 MG/1
0.4 TABLET SUBLINGUAL EVERY 5 MIN PRN
Qty: 25 TABLET | Refills: 3 | Status: SHIPPED | OUTPATIENT
Start: 2021-07-13

## 2021-07-13 NOTE — TELEPHONE ENCOUNTER
Ms. Arthur Carty asked for [facility] refill. She reports she has chest discomfort and heaviness denies pain no change from before.  I advised her to come to ER if anything changes she verbalized understanding

## 2021-07-13 NOTE — TELEPHONE ENCOUNTER
Called and talked to [place] her daughter and she also thinks it is the normal for her. She states she believes it is due to how hot her house is and no air on. She reports she will check on her later.

## 2021-07-19 ENCOUNTER — TELEPHONE (OUTPATIENT)
Dept: PRIMARY CARE CLINIC | Age: 86
End: 2021-07-19

## 2021-07-19 RX ORDER — SERTRALINE HYDROCHLORIDE 25 MG/1
50 TABLET, FILM COATED ORAL DAILY
Qty: 90 TABLET | Refills: 3 | Status: SHIPPED
Start: 2021-07-19 | End: 2022-06-09 | Stop reason: ALTCHOICE

## 2021-07-19 NOTE — TELEPHONE ENCOUNTER
Okay to increase to 50 mg sertraline daily. New Rx sent. She may double what she has at this time until she is out. Thank you. Call with any problems.

## 2021-07-19 NOTE — TELEPHONE ENCOUNTER
Phone call from daughter, Marie Cadet, stating that Stephanie Lee was started on Zoloft and it seemed to work well for the first couple weeks but now she is back to her old self. States feeling sorry for self, sleeping all the time. Would like to know if dose could be increased.      Health Maintenance   Topic Date Due    DTaP/Tdap/Td vaccine (1 - Tdap) 03/31/2022 (Originally 2/13/1952)    Shingles Vaccine (1 of 2) 03/31/2022 (Originally 2/13/1983)    Flu vaccine (1) 09/01/2021    Annual Wellness Visit (AWV)  06/09/2022    Pneumococcal 65+ years Vaccine  Completed    COVID-19 Vaccine  Completed    Hepatitis A vaccine  Aged Out    Hepatitis B vaccine  Aged Out    Hib vaccine  Aged Out    Meningococcal (ACWY) vaccine  Aged Out             (applicable per patient's age: Cancer Screenings, Depression Screening, Fall Risk Screening, Immunizations)    LDL Cholesterol (mg/dL)   Date Value   12/07/2020 112     AST (U/L)   Date Value   12/07/2020 18     ALT (U/L)   Date Value   12/07/2020 19     BUN (mg/dL)   Date Value   12/07/2020 23      (goal A1C is < 7)   (goal LDL is <100) need 30-50% reduction from baseline     BP Readings from Last 3 Encounters:   06/08/21 134/82   05/18/21 (!) 152/97   04/27/21 (!) 145/90    (goal /80)      All Future Testing planned in [facility]:  Lab Frequency Next Occurrence   CBC Auto Differential q 2 months    Iron and TIBC q 2 months    Ferritin q 2 months    CBC Auto Differential         Next Visit Date:  Future Appointments   Date Time Provider Brittnee Rios   8/23/2021  9:20 AM [facility]   9/14/2021  8:45 AM SCHEDULE, [facility] [employer] [facility]   12/9/2021 11:00 AM CAMRON Licona CNP Tiff Prim Ca [facility]   12/14/2021  9:30 AM SCHEDULE, P [employer]OMENA [employer] TIFF CARD [facility]   3/9/2022 10:40 AM Darnell Shone, MD TIFF CARD [facility]   6/9/2022 11:00 AM Lazar Tess Might, APRN - CNP Tiff Prim Ca [facility]            Patient Active Problem List: Allergic rhinitis     Severe sinus bradycardia     Abnormal resting ECG findings     GERD (gastroesophageal reflux disease)     PAF (paroxysmal atrial fibrillation) (HCC)     Adverse effect of amiodarone     Atrial fibrillation with RVR (HCC)     SSS (sick sinus syndrome) (HCC)     Chronic diastolic congestive heart failure (HCC)     Major depressive disorder     Acute cholecystitis     Back pain     Iron malabsorption     Iron deficiency anemia secondary to inadequate dietary iron intake     Current mild episode of major depressive disorder without prior episode ([address]     Bronchiectasis without complication (HCC)     Chronic fatigue     Persistent atrial fibrillation ([address]

## 2021-07-19 NOTE — TELEPHONE ENCOUNTER
Called and spoke to daughter and informed her that Miriam Willett said it was ok to increase the Zoloft to 50mg and new script was sent to the pharmacy and she can double what she has no until she is out. To call office with any questions or problems. Verbalizes understanding.

## 2021-08-23 ENCOUNTER — OFFICE VISIT (OUTPATIENT)
Dept: CARDIOLOGY | Age: 86
End: 2021-08-23
Payer: MEDICARE

## 2021-08-23 VITALS
HEART RATE: 87 BPM | BODY MASS INDEX: 19.59 KG/M2 | WEIGHT: 99.8 LBS | OXYGEN SATURATION: 98 % | SYSTOLIC BLOOD PRESSURE: 117 MMHG | DIASTOLIC BLOOD PRESSURE: 82 MMHG | HEIGHT: 60 IN | RESPIRATION RATE: 16 BRPM

## 2021-08-23 DIAGNOSIS — R53.83 TIREDNESS: ICD-10-CM

## 2021-08-23 DIAGNOSIS — Z95.0 CARDIAC PACEMAKER IN SITU: ICD-10-CM

## 2021-08-23 DIAGNOSIS — R53.83 FATIGUE DUE TO DEPRESSION: ICD-10-CM

## 2021-08-23 DIAGNOSIS — I48.20 CHRONIC A-FIB (HCC): ICD-10-CM

## 2021-08-23 DIAGNOSIS — E03.1 CONGENITAL HYPOTHYROIDISM: ICD-10-CM

## 2021-08-23 DIAGNOSIS — I50.32 CHRONIC DIASTOLIC CONGESTIVE HEART FAILURE (HCC): Primary | ICD-10-CM

## 2021-08-23 DIAGNOSIS — F32.A FATIGUE DUE TO DEPRESSION: ICD-10-CM

## 2021-08-23 DIAGNOSIS — I10 ESSENTIAL HYPERTENSION: ICD-10-CM

## 2021-08-23 PROCEDURE — G8420 CALC BMI NORM PARAMETERS: HCPCS | Performed by: FAMILY MEDICINE

## 2021-08-23 PROCEDURE — 1123F ACP DISCUSS/DSCN MKR DOCD: CPT | Performed by: FAMILY MEDICINE

## 2021-08-23 PROCEDURE — 1036F TOBACCO NON-USER: CPT | Performed by: FAMILY MEDICINE

## 2021-08-23 PROCEDURE — 1090F PRES/ABSN URINE INCON ASSESS: CPT | Performed by: FAMILY MEDICINE

## 2021-08-23 PROCEDURE — 4040F PNEUMOC VAC/ADMIN/RCVD: CPT | Performed by: FAMILY MEDICINE

## 2021-08-23 PROCEDURE — 99214 OFFICE O/P EST MOD 30 MIN: CPT | Performed by: FAMILY MEDICINE

## 2021-08-23 PROCEDURE — 99211 OFF/OP EST MAY X REQ PHY/QHP: CPT | Performed by: FAMILY MEDICINE

## 2021-08-23 PROCEDURE — G8427 DOCREV CUR MEDS BY ELIG CLIN: HCPCS | Performed by: FAMILY MEDICINE

## 2021-08-23 RX ORDER — LEVOTHYROXINE SODIUM 0.1 MG/1
100 TABLET ORAL DAILY
Qty: 90 TABLET | Refills: 3 | Status: SHIPPED | OUTPATIENT
Start: 2021-08-23 | End: 2022-10-13 | Stop reason: SDUPTHER

## 2021-08-23 RX ORDER — OMEPRAZOLE 20 MG/1
20 CAPSULE, DELAYED RELEASE ORAL DAILY
Qty: 90 CAPSULE | Refills: 3 | Status: SHIPPED | OUTPATIENT
Start: 2021-08-23 | End: 2022-10-13 | Stop reason: SDUPTHER

## 2021-08-23 NOTE — PATIENT INSTRUCTIONS
SURVEY:    You may be receiving a survey from [employer] regarding your visit today. Please complete the survey to enable us to provide the highest quality of care to you and your family. If you cannot score us a very good on any question, please call the office to discuss how we could have made your experience a very good one. Thank you.
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
## 2021-08-31 ENCOUNTER — TELEPHONE (OUTPATIENT)
Dept: CARDIOLOGY | Age: 86
End: 2021-08-31

## 2021-08-31 NOTE — TELEPHONE ENCOUNTER
I am so sorry to hear that. I will certainly say a prayer for her and hope to see her back soon. Don't worry about the pacemaker check for now. They can check it up there if they need to. Thanks.
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
## 2021-10-05 PROCEDURE — 93294 REM INTERROG EVL PM/LDLS PM: CPT | Performed by: FAMILY MEDICINE
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
## 2021-10-12 PROBLEM — S02.611A CLOSED FRACTURE OF RIGHT CONDYLAR PROCESS OF MANDIBLE (HCC): Status: ACTIVE | Noted: 2021-10-12

## 2021-10-12 PROBLEM — S52.502A CLOSED FRACTURE OF LEFT DISTAL RADIUS: Status: ACTIVE | Noted: 2021-09-10

## 2021-10-12 PROBLEM — S82.001B OPEN FRACTURE OF RIGHT PATELLA: Status: ACTIVE | Noted: 2021-09-10

## 2021-10-20 ENCOUNTER — NURSE ONLY (OUTPATIENT)
Dept: CARDIOLOGY | Age: 86
End: 2021-10-20
Payer: MEDICARE

## 2021-10-20 DIAGNOSIS — Z95.0 CARDIAC PACEMAKER IN SITU: Primary | ICD-10-CM

## 2021-10-20 DIAGNOSIS — I49.5 SSS (SICK SINUS SYNDROME) (HCC): ICD-10-CM

## 2021-12-14 ENCOUNTER — NURSE ONLY (OUTPATIENT)
Dept: CARDIOLOGY | Age: 86
End: 2021-12-14

## 2021-12-14 DIAGNOSIS — Z95.0 CARDIAC PACEMAKER IN SITU: Primary | ICD-10-CM

## 2021-12-14 DIAGNOSIS — I49.5 SSS (SICK SINUS SYNDROME) (HCC): ICD-10-CM

## 2021-12-14 PROCEDURE — 93294 REM INTERROG EVL PM/LDLS PM: CPT | Performed by: FAMILY MEDICINE

## 2022-02-22 ENCOUNTER — HOSPITAL ENCOUNTER (OUTPATIENT)
Age: 87
Setting detail: SPECIMEN
Discharge: HOME | End: 2022-02-22
Payer: MEDICARE

## 2022-02-22 ENCOUNTER — TELEPHONE (OUTPATIENT)
Dept: PRIMARY CARE CLINIC | Age: 87
End: 2022-02-22

## 2022-02-22 PROBLEM — T46.2X5A ADVERSE EFFECT OF AMIODARONE: Status: ACTIVE | Noted: 2021-11-01

## 2022-02-22 PROBLEM — S82.091A OTHER FRACTURE OF RIGHT PATELLA, INITIAL ENCOUNTER FOR CLOSED FRACTURE: Status: ACTIVE | Noted: 2021-09-10

## 2022-02-22 LAB
-: ABNORMAL
BACTERIA: ABNORMAL
BILIRUBIN URINE: NEGATIVE
COLOR: YELLOW
EPITHELIAL CELLS UA: ABNORMAL /HPF (ref 0–25)
GLUCOSE URINE: NEGATIVE
KETONES, URINE: NEGATIVE
LEUKOCYTE ESTERASE, URINE: ABNORMAL
NITRITE, URINE: POSITIVE
PH UA: 6.5 (ref 5–9)
PROTEIN UA: NEGATIVE
RBC UA: ABNORMAL /HPF (ref 0–2)
SPECIFIC GRAVITY UA: 1.01 (ref 1.01–1.02)
TURBIDITY: ABNORMAL
URINE HGB: ABNORMAL
UROBILINOGEN, URINE: NORMAL
WBC UA: ABNORMAL /HPF (ref 0–5)

## 2022-02-22 PROCEDURE — 87186 SC STD MICRODIL/AGAR DIL: CPT

## 2022-02-22 PROCEDURE — 87077 CULTURE AEROBIC IDENTIFY: CPT

## 2022-02-22 PROCEDURE — 87086 URINE CULTURE/COLONY COUNT: CPT

## 2022-02-22 PROCEDURE — 81001 URINALYSIS AUTO W/SCOPE: CPT

## 2022-02-22 RX ORDER — CEPHALEXIN 500 MG/1
500 CAPSULE ORAL 2 TIMES DAILY
Qty: 14 CAPSULE | Refills: 0 | Status: SHIPPED | OUTPATIENT
Start: 2022-02-22 | End: 2022-05-02 | Stop reason: SDUPTHER

## 2022-02-22 NOTE — TELEPHONE ENCOUNTER
Called Lou at the [facility] and informed them that patient urine showed she has UTI and [facility] sent an antibiotic to the [facility].

## 2022-02-22 NOTE — TELEPHONE ENCOUNTER
----- Message from CAMRON Devine CNP sent at 2/22/2022 11:13 AM EST -----  Patient does have a UTI. Antibiotic sent to the pharmacy. Thank you.

## 2022-02-24 LAB
CULTURE: ABNORMAL
SPECIMEN DESCRIPTION: ABNORMAL

## 2022-03-01 DIAGNOSIS — R05.9 COUGH: Primary | ICD-10-CM

## 2022-03-16 DIAGNOSIS — R05.9 COUGH: ICD-10-CM

## 2022-03-17 ENCOUNTER — OFFICE VISIT (OUTPATIENT)
Dept: PRIMARY CARE CLINIC | Age: 87
End: 2022-03-17
Payer: MEDICARE

## 2022-03-17 VITALS
TEMPERATURE: 97.7 F | DIASTOLIC BLOOD PRESSURE: 62 MMHG | HEART RATE: 70 BPM | RESPIRATION RATE: 22 BRPM | SYSTOLIC BLOOD PRESSURE: 106 MMHG | OXYGEN SATURATION: 96 %

## 2022-03-17 DIAGNOSIS — I48.20 CHRONIC A-FIB (HCC): ICD-10-CM

## 2022-03-17 DIAGNOSIS — I50.32 CHRONIC DIASTOLIC CONGESTIVE HEART FAILURE (HCC): ICD-10-CM

## 2022-03-17 DIAGNOSIS — J41.0 SIMPLE CHRONIC BRONCHITIS (HCC): ICD-10-CM

## 2022-03-17 DIAGNOSIS — R53.81 PHYSICAL DECONDITIONING: Primary | ICD-10-CM

## 2022-03-17 DIAGNOSIS — R26.81 GAIT INSTABILITY: ICD-10-CM

## 2022-03-17 PROBLEM — J42 ACUTE EXACERBATION OF CHRONIC BRONCHITIS (HCC): Status: ACTIVE | Noted: 2022-03-17

## 2022-03-17 PROBLEM — J20.9 ACUTE EXACERBATION OF CHRONIC BRONCHITIS (HCC): Status: ACTIVE | Noted: 2022-03-17

## 2022-03-17 PROBLEM — F32.0 CURRENT MILD EPISODE OF MAJOR DEPRESSIVE DISORDER WITHOUT PRIOR EPISODE (HCC): Status: RESOLVED | Noted: 2020-06-01 | Resolved: 2022-03-17

## 2022-03-17 PROCEDURE — 4040F PNEUMOC VAC/ADMIN/RCVD: CPT | Performed by: NURSE PRACTITIONER

## 2022-03-17 PROCEDURE — G8420 CALC BMI NORM PARAMETERS: HCPCS | Performed by: NURSE PRACTITIONER

## 2022-03-17 PROCEDURE — 99214 OFFICE O/P EST MOD 30 MIN: CPT | Performed by: NURSE PRACTITIONER

## 2022-03-17 PROCEDURE — 1123F ACP DISCUSS/DSCN MKR DOCD: CPT | Performed by: NURSE PRACTITIONER

## 2022-03-17 PROCEDURE — G8427 DOCREV CUR MEDS BY ELIG CLIN: HCPCS | Performed by: NURSE PRACTITIONER

## 2022-03-17 PROCEDURE — 1036F TOBACCO NON-USER: CPT | Performed by: NURSE PRACTITIONER

## 2022-03-17 PROCEDURE — 1090F PRES/ABSN URINE INCON ASSESS: CPT | Performed by: NURSE PRACTITIONER

## 2022-03-17 PROCEDURE — G8484 FLU IMMUNIZE NO ADMIN: HCPCS | Performed by: NURSE PRACTITIONER

## 2022-03-17 PROCEDURE — 3023F SPIROM DOC REV: CPT | Performed by: NURSE PRACTITIONER

## 2022-03-17 RX ORDER — LOSARTAN POTASSIUM 25 MG/1
25 TABLET ORAL DAILY
COMMUNITY

## 2022-03-17 RX ORDER — DOCUSATE SODIUM 100 MG/1
100 CAPSULE, LIQUID FILLED ORAL 2 TIMES DAILY
COMMUNITY

## 2022-03-17 RX ORDER — LANOLIN ALCOHOL/MO/W.PET/CERES
3 CREAM (GRAM) TOPICAL DAILY
COMMUNITY

## 2022-03-17 RX ORDER — FUROSEMIDE 40 MG/1
40 TABLET ORAL DAILY
COMMUNITY

## 2022-03-17 RX ORDER — ESCITALOPRAM OXALATE 10 MG/1
10 TABLET ORAL DAILY
COMMUNITY

## 2022-03-17 ASSESSMENT — PATIENT HEALTH QUESTIONNAIRE - PHQ9
SUM OF ALL RESPONSES TO PHQ QUESTIONS 1-9: 0
1. LITTLE INTEREST OR PLEASURE IN DOING THINGS: 0
SUM OF ALL RESPONSES TO PHQ9 QUESTIONS 1 & 2: 0
SUM OF ALL RESPONSES TO PHQ QUESTIONS 1-9: 0
2. FEELING DOWN, DEPRESSED OR HOPELESS: 0

## 2022-03-17 ASSESSMENT — ENCOUNTER SYMPTOMS
VOMITING: 0
SPUTUM PRODUCTION: 0
DIARRHEA: 0
WHEEZING: 0
DIFFICULTY BREATHING: 0
SHORTNESS OF BREATH: 0
HOARSE VOICE: 0
ABDOMINAL PAIN: 0
CHEST TIGHTNESS: 0
HEMOPTYSIS: 0
CONSTIPATION: 0
COUGH: 0
SORE THROAT: 0
RHINORRHEA: 0
NAUSEA: 0
FREQUENT THROAT CLEARING: 0

## 2022-03-17 ASSESSMENT — COPD QUESTIONNAIRES: COPD: 1

## 2022-03-17 NOTE — PATIENT INSTRUCTIONS
SURVEY:     You may be receiving a survey from [employer] regarding your visit today. Please complete the survey to enable us to provide the highest quality of care to you and your family. If you cannot score us a very good on any question, please call the office to discuss how we could have made your experience a very good one. Thank you.   Tori Gross, APRN-ABELINO Rivero, CNP  Lajuan Kussmaul, LPN  Violet Hart, LPN  Ernestine Wolfe, CMA  Adrienne Sanabria, LUCHO Arenas, CMA  Bree, PCA
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
## 2022-05-02 RX ORDER — CEPHALEXIN 500 MG/1
500 CAPSULE ORAL 2 TIMES DAILY
Qty: 14 CAPSULE | Refills: 0 | Status: SHIPPED | OUTPATIENT
Start: 2022-05-02 | End: 2022-05-09

## 2022-05-03 ENCOUNTER — HOSPITAL ENCOUNTER (OUTPATIENT)
Age: 87
Setting detail: SPECIMEN
Discharge: HOME | End: 2022-05-03
Payer: MEDICARE

## 2022-05-03 LAB
-: ABNORMAL
BACTERIA: ABNORMAL
BILIRUBIN URINE: NEGATIVE
COLOR: YELLOW
EPITHELIAL CELLS UA: ABNORMAL /HPF (ref 0–25)
GLUCOSE URINE: NEGATIVE
KETONES, URINE: NEGATIVE
LEUKOCYTE ESTERASE, URINE: ABNORMAL
NITRITE, URINE: NEGATIVE
PH UA: 7.5 (ref 5–9)
PROTEIN UA: NEGATIVE
RBC UA: ABNORMAL /HPF (ref 0–2)
SPECIFIC GRAVITY UA: 1.01 (ref 1.01–1.02)
TURBIDITY: ABNORMAL
URINE HGB: ABNORMAL
UROBILINOGEN, URINE: NORMAL
WBC UA: ABNORMAL /HPF (ref 0–5)

## 2022-05-03 PROCEDURE — 87086 URINE CULTURE/COLONY COUNT: CPT

## 2022-05-03 PROCEDURE — 81001 URINALYSIS AUTO W/SCOPE: CPT

## 2022-05-04 LAB
CULTURE: NORMAL
SPECIMEN DESCRIPTION: NORMAL

## 2022-05-23 ENCOUNTER — OFFICE VISIT (OUTPATIENT)
Dept: CARDIOLOGY | Age: 87
End: 2022-05-23
Payer: MEDICARE

## 2022-05-23 ENCOUNTER — HOSPITAL ENCOUNTER (OUTPATIENT)
Age: 87
Discharge: HOME | End: 2022-05-23
Payer: MEDICARE

## 2022-05-23 VITALS
HEART RATE: 89 BPM | SYSTOLIC BLOOD PRESSURE: 117 MMHG | DIASTOLIC BLOOD PRESSURE: 66 MMHG | BODY MASS INDEX: 17.97 KG/M2 | WEIGHT: 92 LBS | RESPIRATION RATE: 17 BRPM | OXYGEN SATURATION: 98 %

## 2022-05-23 DIAGNOSIS — Z79.01 CHRONIC ANTICOAGULATION: ICD-10-CM

## 2022-05-23 DIAGNOSIS — I48.20 CHRONIC A-FIB (HCC): Primary | ICD-10-CM

## 2022-05-23 DIAGNOSIS — Z95.0 CARDIAC PACEMAKER IN SITU: ICD-10-CM

## 2022-05-23 DIAGNOSIS — I50.32 CHRONIC DIASTOLIC CONGESTIVE HEART FAILURE (HCC): ICD-10-CM

## 2022-05-23 DIAGNOSIS — R53.83 TIREDNESS: ICD-10-CM

## 2022-05-23 DIAGNOSIS — R53.83 OTHER FATIGUE: ICD-10-CM

## 2022-05-23 DIAGNOSIS — I49.5 SSS (SICK SINUS SYNDROME) (HCC): ICD-10-CM

## 2022-05-23 DIAGNOSIS — I10 PRIMARY HYPERTENSION: ICD-10-CM

## 2022-05-23 DIAGNOSIS — I48.20 CHRONIC A-FIB (HCC): ICD-10-CM

## 2022-05-23 DIAGNOSIS — Z45.018 FITTING AND ADJUSTMENT OF CARDIAC PACEMAKER: ICD-10-CM

## 2022-05-23 LAB
ABSOLUTE EOS #: 0.25 K/UL (ref 0–0.44)
ABSOLUTE IMMATURE GRANULOCYTE: 0.03 K/UL (ref 0–0.3)
ABSOLUTE LYMPH #: 2.94 K/UL (ref 1.1–3.7)
ABSOLUTE MONO #: 0.47 K/UL (ref 0.1–1.2)
ALT SERPL-CCNC: 11 U/L (ref 5–33)
ANION GAP SERPL CALCULATED.3IONS-SCNC: 9 MMOL/L (ref 9–17)
AST SERPL-CCNC: 17 U/L
BASOPHILS # BLD: 1 % (ref 0–2)
BASOPHILS ABSOLUTE: 0.05 K/UL (ref 0–0.2)
BUN BLDV-MCNC: 16 MG/DL (ref 8–23)
BUN/CREAT BLD: 17 (ref 9–20)
CALCIUM SERPL-MCNC: 9.4 MG/DL (ref 8.6–10.4)
CHLORIDE BLD-SCNC: 103 MMOL/L (ref 98–107)
CHOLESTEROL/HDL RATIO: 2.8
CHOLESTEROL: 173 MG/DL
CO2: 26 MMOL/L (ref 20–31)
CREAT SERPL-MCNC: 0.94 MG/DL (ref 0.5–0.9)
EOSINOPHILS RELATIVE PERCENT: 3 % (ref 1–4)
GFR AFRICAN AMERICAN: >60 ML/MIN
GFR NON-AFRICAN AMERICAN: 56 ML/MIN
GFR SERPL CREATININE-BSD FRML MDRD: ABNORMAL ML/MIN/{1.73_M2}
GFR SERPL CREATININE-BSD FRML MDRD: ABNORMAL ML/MIN/{1.73_M2}
GLUCOSE BLD-MCNC: 132 MG/DL (ref 70–99)
HCT VFR BLD CALC: 39.6 % (ref 36.3–47.1)
HDLC SERPL-MCNC: 62 MG/DL
HEMOGLOBIN: 12.1 G/DL (ref 11.9–15.1)
IMMATURE GRANULOCYTES: 0 %
LDL CHOLESTEROL: 78 MG/DL (ref 0–130)
LYMPHOCYTES # BLD: 37 % (ref 24–43)
MCH RBC QN AUTO: 28.1 PG (ref 25.2–33.5)
MCHC RBC AUTO-ENTMCNC: 30.6 G/DL (ref 28.4–34.8)
MCV RBC AUTO: 92.1 FL (ref 82.6–102.9)
MONOCYTES # BLD: 6 % (ref 3–12)
NRBC AUTOMATED: 0 PER 100 WBC
PDW BLD-RTO: 16 % (ref 11.8–14.4)
PLATELET # BLD: 320 K/UL (ref 138–453)
PMV BLD AUTO: 9.2 FL (ref 8.1–13.5)
POTASSIUM SERPL-SCNC: 4.3 MMOL/L (ref 3.7–5.3)
RBC # BLD: 4.3 M/UL (ref 3.95–5.11)
SEG NEUTROPHILS: 53 % (ref 36–65)
SEGMENTED NEUTROPHILS ABSOLUTE COUNT: 4.15 K/UL (ref 1.5–8.1)
SODIUM BLD-SCNC: 138 MMOL/L (ref 135–144)
TRIGL SERPL-MCNC: 166 MG/DL
WBC # BLD: 7.9 K/UL (ref 3.5–11.3)

## 2022-05-23 PROCEDURE — 80061 LIPID PANEL: CPT

## 2022-05-23 PROCEDURE — 1123F ACP DISCUSS/DSCN MKR DOCD: CPT | Performed by: FAMILY MEDICINE

## 2022-05-23 PROCEDURE — G8427 DOCREV CUR MEDS BY ELIG CLIN: HCPCS | Performed by: FAMILY MEDICINE

## 2022-05-23 PROCEDURE — 80048 BASIC METABOLIC PNL TOTAL CA: CPT

## 2022-05-23 PROCEDURE — 99214 OFFICE O/P EST MOD 30 MIN: CPT | Performed by: FAMILY MEDICINE

## 2022-05-23 PROCEDURE — 93280 PM DEVICE PROGR EVAL DUAL: CPT | Performed by: FAMILY MEDICINE

## 2022-05-23 PROCEDURE — 1090F PRES/ABSN URINE INCON ASSESS: CPT | Performed by: FAMILY MEDICINE

## 2022-05-23 PROCEDURE — 84460 ALANINE AMINO (ALT) (SGPT): CPT

## 2022-05-23 PROCEDURE — 1036F TOBACCO NON-USER: CPT | Performed by: FAMILY MEDICINE

## 2022-05-23 PROCEDURE — G8419 CALC BMI OUT NRM PARAM NOF/U: HCPCS | Performed by: FAMILY MEDICINE

## 2022-05-23 PROCEDURE — 99211 OFF/OP EST MAY X REQ PHY/QHP: CPT | Performed by: FAMILY MEDICINE

## 2022-05-23 PROCEDURE — 85025 COMPLETE CBC W/AUTO DIFF WBC: CPT

## 2022-05-23 PROCEDURE — 84450 TRANSFERASE (AST) (SGOT): CPT

## 2022-05-23 PROCEDURE — 36415 COLL VENOUS BLD VENIPUNCTURE: CPT

## 2022-05-23 NOTE — PATIENT INSTRUCTIONS
SURVEY:    You may be receiving a survey from [employer] regarding your visit today. Please complete the survey to enable us to provide the highest quality of care to you and your family. If you cannot score us a very good on any question, please call the office to discuss how we could have made your experience a very good one. Thank you.
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
## 2022-06-09 ENCOUNTER — OFFICE VISIT (OUTPATIENT)
Dept: PRIMARY CARE CLINIC | Age: 87
End: 2022-06-09
Payer: MEDICARE

## 2022-06-09 VITALS
HEART RATE: 84 BPM | SYSTOLIC BLOOD PRESSURE: 114 MMHG | RESPIRATION RATE: 18 BRPM | WEIGHT: 92 LBS | TEMPERATURE: 97.7 F | OXYGEN SATURATION: 94 % | BODY MASS INDEX: 18.06 KG/M2 | HEIGHT: 60 IN | DIASTOLIC BLOOD PRESSURE: 70 MMHG

## 2022-06-09 DIAGNOSIS — Z00.00 MEDICARE ANNUAL WELLNESS VISIT, SUBSEQUENT: Primary | ICD-10-CM

## 2022-06-09 DIAGNOSIS — E03.1 CONGENITAL HYPOTHYROIDISM: ICD-10-CM

## 2022-06-09 DIAGNOSIS — N18.31 STAGE 3A CHRONIC KIDNEY DISEASE (HCC): ICD-10-CM

## 2022-06-09 DIAGNOSIS — I50.32 CHRONIC DIASTOLIC CONGESTIVE HEART FAILURE (HCC): ICD-10-CM

## 2022-06-09 PROBLEM — N18.30 CHRONIC RENAL DISEASE, STAGE III (HCC): Status: ACTIVE | Noted: 2022-06-09

## 2022-06-09 PROCEDURE — G0439 PPPS, SUBSEQ VISIT: HCPCS | Performed by: NURSE PRACTITIONER

## 2022-06-09 PROCEDURE — 1123F ACP DISCUSS/DSCN MKR DOCD: CPT | Performed by: NURSE PRACTITIONER

## 2022-06-09 SDOH — ECONOMIC STABILITY: FOOD INSECURITY: WITHIN THE PAST 12 MONTHS, THE FOOD YOU BOUGHT JUST DIDN'T LAST AND YOU DIDN'T HAVE MONEY TO GET MORE.: PATIENT DECLINED

## 2022-06-09 SDOH — ECONOMIC STABILITY: FOOD INSECURITY: WITHIN THE PAST 12 MONTHS, YOU WORRIED THAT YOUR FOOD WOULD RUN OUT BEFORE YOU GOT MONEY TO BUY MORE.: PATIENT DECLINED

## 2022-06-09 ASSESSMENT — PATIENT HEALTH QUESTIONNAIRE - PHQ9
SUM OF ALL RESPONSES TO PHQ QUESTIONS 1-9: 0
4. FEELING TIRED OR HAVING LITTLE ENERGY: 0
8. MOVING OR SPEAKING SO SLOWLY THAT OTHER PEOPLE COULD HAVE NOTICED. OR THE OPPOSITE, BEING SO FIGETY OR RESTLESS THAT YOU HAVE BEEN MOVING AROUND A LOT MORE THAN USUAL: 0
6. FEELING BAD ABOUT YOURSELF - OR THAT YOU ARE A FAILURE OR HAVE LET YOURSELF OR YOUR FAMILY DOWN: 0
1. LITTLE INTEREST OR PLEASURE IN DOING THINGS: 0
9. THOUGHTS THAT YOU WOULD BE BETTER OFF DEAD, OR OF HURTING YOURSELF: 0
5. POOR APPETITE OR OVEREATING: 0
SUM OF ALL RESPONSES TO PHQ QUESTIONS 1-9: 0
3. TROUBLE FALLING OR STAYING ASLEEP: 0
2. FEELING DOWN, DEPRESSED OR HOPELESS: 0
SUM OF ALL RESPONSES TO PHQ QUESTIONS 1-9: 0
SUM OF ALL RESPONSES TO PHQ QUESTIONS 1-9: 0
10. IF YOU CHECKED OFF ANY PROBLEMS, HOW DIFFICULT HAVE THESE PROBLEMS MADE IT FOR YOU TO DO YOUR WORK, TAKE CARE OF THINGS AT HOME, OR GET ALONG WITH OTHER PEOPLE: 0
7. TROUBLE CONCENTRATING ON THINGS, SUCH AS READING THE NEWSPAPER OR WATCHING TELEVISION: 0
SUM OF ALL RESPONSES TO PHQ9 QUESTIONS 1 & 2: 0

## 2022-06-09 ASSESSMENT — ENCOUNTER SYMPTOMS
SHORTNESS OF BREATH: 0
ABDOMINAL PAIN: 0

## 2022-06-09 ASSESSMENT — SOCIAL DETERMINANTS OF HEALTH (SDOH): HOW HARD IS IT FOR YOU TO PAY FOR THE VERY BASICS LIKE FOOD, HOUSING, MEDICAL CARE, AND HEATING?: PATIENT DECLINED

## 2022-06-09 ASSESSMENT — LIFESTYLE VARIABLES: HOW OFTEN DO YOU HAVE A DRINK CONTAINING ALCOHOL: NEVER

## 2022-06-09 NOTE — PATIENT INSTRUCTIONS
Personalized Preventive Plan for Fredy Arreguin - 6/9/2022  Medicare offers a range of preventive health benefits. Some of the tests and screenings are paid in full while other may be subject to a deductible, co-insurance, and/or copay. Some of these benefits include a comprehensive review of your medical history including lifestyle, illnesses that may run in your family, and various assessments and screenings as appropriate. After reviewing your medical record and screening and assessments performed today your provider may have ordered immunizations, labs, imaging, and/or referrals for you. A list of these orders (if applicable) as well as your Preventive Care list are included within your After Visit Summary for your review. Other Preventive Recommendations:    · A preventive eye exam performed by an eye specialist is recommended every 1-2 years to screen for glaucoma; cataracts, macular degeneration, and other eye disorders. · A preventive dental visit is recommended every 6 months. · Try to get at least 150 minutes of exercise per week or 10,000 steps per day on a pedometer . · Order or download the FREE \"Exercise & Physical Activity: Your Everyday Guide\" from [employer]. Call 7-689.137.7633 or search [employer] online. · You need 7016-4752 mg of calcium and 8979-3486 IU of vitamin D per day. It is possible to meet your calcium requirement with diet alone, but a vitamin D supplement is usually necessary to meet this goal.  · When exposed to the sun, use a sunscreen that protects against both UVA and UVB radiation with an SPF of 30 or greater. Reapply every 2 to 3 hours or after sweating, drying off with a towel, or swimming. · Always wear a seat belt when traveling in a car. Always wear a helmet when riding a bicycle or motorcycle.

## 2022-06-09 NOTE — PROGRESS NOTES
Gastrointestinal: Negative for abdominal pain. Genitourinary: Negative for nocturia. Musculoskeletal: Negative for muscle weakness. Patient's complete Health Risk Assessment and screening values have been reviewed and are found in Flowsheets. The following problems were reviewed today and where indicated follow up appointments were made and/or referrals ordered. Positive Risk Factor Screenings with Interventions:    Fall Risk:  Do you feel unsteady or are you worried about falling? : (!) yes  2 or more falls in past year?: no  Fall with injury in past year?: no     Fall Risk Interventions:    · Home safety tips provided    Cognitive:   Words recalled: 0 Words Recalled (did not remember)  Clock Drawing Test (CDT):  (Unable to draw clock)  Total Score Interpretation: Abnormal Mini-Cog  Did the patient refuse to take the cognition test?: No    Cognitive Impairment Interventions:  · Patient declines any further evaluation/treatment for cognitive impairment            Health Habits/Nutrition:     Physical Activity: Inactive    Days of Exercise per Week: 0 days    Minutes of Exercise per Session: 0 min     Have you lost any weight without trying in the past 3 months?: No  Body mass index: (!) 17.97  Have you seen the dentist within the past year?: Yes    Health Habits/Nutrition Interventions:  · Nutritional issues:  educational materials for healthy, well-balanced diet provided    Hearing/Vision:  Do you or your family notice any trouble with your hearing that hasn't been managed with hearing aids?: No     Have you had an eye exam within the past year?: (!) No  No exam data present    Hearing/Vision Interventions:  · Vision concerns:  patient declines any further evaluation/treatment for this issue            Objective   Vitals:    06/09/22 1121   BP: 114/70   Position: Sitting   Pulse: 84   Resp: 18   Temp: 97.7 °F (36.5 °C)   TempSrc: Temporal   SpO2: 94%   Weight: 92 lb (41.7 kg)   Height: 5' (1.524 m)      Body mass index is 17.97 kg/m². General Appearance: alert and oriented to person, place and time  Skin: warm and dry  Head: normocephalic and atraumatic  Eyes: conjunctivae normal and sclera anicteric  ENT: oropharynx clear and moist with normal mucous membranes  Neck: neck supple and non tender without mass   Pulmonary/Chest: clear to auscultation bilaterally- no wheezes, rales or rhonchi, normal air movement, no respiratory distress  Cardiovascular: normal rate, regular rhythm and no carotid bruits  Abdomen: soft, non-tender  Extremities: no edema  Musculoskeletal: normal range of motion, no joint swelling, deformity or tenderness  Neurologic: speech normal       Allergies   Allergen Reactions    Elemental Sulfur Hives    Amiodarone      Adverse effects of amiordarone    Sulfa Antibiotics Rash     Prior to Visit Medications    Medication Sig Taking?  Authorizing Provider   Calcium 200 MG TABS Take 200 mg by mouth 3 times daily Yes Historical Provider, MD   escitalopram (LEXAPRO) 10 MG tablet Take 10 mg by mouth daily Yes Historical Provider, MD   furosemide (LASIX) 40 MG tablet Take 40 mg by mouth daily  Yes Historical Provider, MD   losartan (COZAAR) 25 MG tablet Take 25 mg by mouth daily Yes Historical Provider, MD   melatonin 3 MG TABS tablet Take 3 mg by mouth daily Yes Historical Provider, MD   docusate sodium (COLACE) 100 MG capsule Take 100 mg by mouth 2 times daily Yes Historical Provider, MD   apixaban (ELIQUIS) 2.5 MG TABS tablet Take 1 tablet by mouth 2 times daily Yes Darion Guerrero MD   omeprazole (PRILOSEC) 20 MG delayed release capsule Take 1 capsule by mouth Daily Yes Darion Guerrero MD   levothyroxine (SYNTHROID) 100 MCG tablet Take 1 tablet by mouth Daily Yes Asmita Gross, APRN - ABELINO   albuterol sulfate  (90 Base) MCG/ACT inhaler Inhale 2 puffs into the lungs every 6 hours as needed for Wheezing or Shortness of Breath Yes Man Clark MD   acetaminophen (TYLENOL) 500 MG tablet Take 2 tablets by mouth 4 times daily as needed for Pain Yes Ba Bolaños MD   nitroGLYCERIN (NITROSTAT) 0.4 MG SL tablet Place 1 tablet under the tongue every 5 minutes as needed for Chest pain up to max of 3 total doses. If no relief after 1 dose, call 911.   Patient not taking: Reported on 6/9/2022  Shelby Timmons MD   ondansetron [facility] 4 MG tablet Take 4 mg by mouth every 8 hours as needed for Nausea or Vomiting   Patient not taking: Reported on 6/9/2022  Historical Provider, MD Maurer (Including outside providers/suppliers regularly involved in providing care):   Patient Care Team:  [address] [place] [address], APRN - CNP as PCP - General (Family Nurse Practitioner)  [address] [place] [address], APRN - CNP as PCP - REHABILITATION [facility] Empaneled Provider  MD Nicolas Clifton MD as Consulting Physician (Hematology and Oncology)     Reviewed and updated this visit:  Tobacco  Allergies  Meds  Problems  Med Hx  Surg Hx  Soc Hx  Fam Hx

## 2022-06-15 ENCOUNTER — NURSE ONLY (OUTPATIENT)
Dept: CARDIOLOGY | Age: 87
End: 2022-06-15
Payer: MEDICARE

## 2022-06-15 DIAGNOSIS — I49.5 SSS (SICK SINUS SYNDROME) (HCC): Primary | ICD-10-CM

## 2022-06-15 DIAGNOSIS — Z95.0 CARDIAC PACEMAKER IN SITU: ICD-10-CM

## 2022-06-15 PROCEDURE — 93294 REM INTERROG EVL PM/LDLS PM: CPT | Performed by: FAMILY MEDICINE

## 2022-06-17 RX ORDER — METOPROLOL SUCCINATE 25 MG/1
25 TABLET, EXTENDED RELEASE ORAL DAILY
Qty: 90 TABLET | Refills: 3 | Status: SHIPPED | OUTPATIENT
Start: 2022-06-17 | End: 2022-06-21 | Stop reason: SDUPTHER

## 2022-06-17 RX ORDER — METOPROLOL SUCCINATE 25 MG/1
25 TABLET, EXTENDED RELEASE ORAL DAILY
COMMUNITY
End: 2022-06-17 | Stop reason: SDUPTHER

## 2022-06-17 NOTE — TELEPHONE ENCOUNTER
Per  HR is high, would like to start pt on Toprol xl 25 mg daily. Spoke to pt daughter who stated she will start Toprol XL 25 mg daily. Script sent to .

## 2022-06-22 RX ORDER — METOPROLOL SUCCINATE 25 MG/1
25 TABLET, EXTENDED RELEASE ORAL DAILY
Qty: 90 TABLET | Refills: 3 | Status: SHIPPED | OUTPATIENT
Start: 2022-06-22 | End: 2022-07-06 | Stop reason: DRUGHIGH

## 2022-06-30 ENCOUNTER — TELEPHONE (OUTPATIENT)
Dept: PRIMARY CARE CLINIC | Age: 87
End: 2022-06-30

## 2022-06-30 NOTE — TELEPHONE ENCOUNTER
Patients daughter phoned office requesting nebulizer be changed from PRN to morning and evening and PRN during day time hours. Patients daughter states that patient does not know/remember to ask nurses for nebulizer and notices that coughing is worse at those times.

## 2022-07-06 ENCOUNTER — OFFICE VISIT (OUTPATIENT)
Dept: CARDIOLOGY | Age: 87
End: 2022-07-06
Payer: MEDICARE

## 2022-07-06 VITALS — DIASTOLIC BLOOD PRESSURE: 69 MMHG | SYSTOLIC BLOOD PRESSURE: 104 MMHG | HEART RATE: 96 BPM

## 2022-07-06 DIAGNOSIS — R53.83 TIREDNESS: ICD-10-CM

## 2022-07-06 DIAGNOSIS — I10 PRIMARY HYPERTENSION: ICD-10-CM

## 2022-07-06 DIAGNOSIS — Z95.0 CARDIAC PACEMAKER IN SITU: ICD-10-CM

## 2022-07-06 DIAGNOSIS — I50.32 CHRONIC DIASTOLIC CONGESTIVE HEART FAILURE (HCC): ICD-10-CM

## 2022-07-06 DIAGNOSIS — I48.20 CHRONIC A-FIB (HCC): ICD-10-CM

## 2022-07-06 DIAGNOSIS — Z79.01 CURRENT USE OF LONG TERM ANTICOAGULATION: ICD-10-CM

## 2022-07-06 DIAGNOSIS — R53.83 OTHER FATIGUE: ICD-10-CM

## 2022-07-06 DIAGNOSIS — R06.02 SOB (SHORTNESS OF BREATH): ICD-10-CM

## 2022-07-06 PROCEDURE — 99214 OFFICE O/P EST MOD 30 MIN: CPT | Performed by: PHYSICIAN ASSISTANT

## 2022-07-06 PROCEDURE — 93005 ELECTROCARDIOGRAM TRACING: CPT | Performed by: PHYSICIAN ASSISTANT

## 2022-07-06 PROCEDURE — 1123F ACP DISCUSS/DSCN MKR DOCD: CPT | Performed by: PHYSICIAN ASSISTANT

## 2022-07-06 PROCEDURE — 1036F TOBACCO NON-USER: CPT | Performed by: PHYSICIAN ASSISTANT

## 2022-07-06 PROCEDURE — 93010 ELECTROCARDIOGRAM REPORT: CPT | Performed by: PHYSICIAN ASSISTANT

## 2022-07-06 PROCEDURE — G8427 DOCREV CUR MEDS BY ELIG CLIN: HCPCS | Performed by: PHYSICIAN ASSISTANT

## 2022-07-06 PROCEDURE — G8419 CALC BMI OUT NRM PARAM NOF/U: HCPCS | Performed by: PHYSICIAN ASSISTANT

## 2022-07-06 PROCEDURE — 99211 OFF/OP EST MAY X REQ PHY/QHP: CPT | Performed by: PHYSICIAN ASSISTANT

## 2022-07-06 PROCEDURE — 1090F PRES/ABSN URINE INCON ASSESS: CPT | Performed by: PHYSICIAN ASSISTANT

## 2022-07-06 RX ORDER — METOPROLOL SUCCINATE 50 MG/1
50 TABLET, EXTENDED RELEASE ORAL DAILY
Qty: 30 TABLET | Refills: 3 | Status: SHIPPED | OUTPATIENT
Start: 2022-07-06

## 2022-07-06 NOTE — PATIENT INSTRUCTIONS
SURVEY:    You may be receiving a survey from [employer] regarding your visit today. Please complete the survey to enable us to provide the highest quality of care to you and your family. If you cannot score us a very good on any question, please call the office to discuss how we could have made your experience a very good one. Thank you.
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
## 2022-07-18 DIAGNOSIS — R06.02 SOB (SHORTNESS OF BREATH): Primary | ICD-10-CM

## 2022-07-19 ENCOUNTER — TELEPHONE (OUTPATIENT)
Dept: PRIMARY CARE CLINIC | Age: 87
End: 2022-07-19

## 2022-07-19 DIAGNOSIS — R06.02 SOB (SHORTNESS OF BREATH): ICD-10-CM

## 2022-07-19 DIAGNOSIS — J20.9 ACUTE BRONCHITIS, UNSPECIFIED ORGANISM: Primary | ICD-10-CM

## 2022-07-19 RX ORDER — ALBUTEROL SULFATE 2.5 MG/3ML
2.5 SOLUTION RESPIRATORY (INHALATION) EVERY 6 HOURS PRN
Qty: 120 EACH | Refills: 3 | Status: SHIPPED | OUTPATIENT
Start: 2022-07-19

## 2022-07-19 NOTE — TELEPHONE ENCOUNTER
Called and let patients daughter know of normal results. She voiced if you recommended any other testing or anything as she is wheezing awful her daughter said and she is wheezing so hard and then starts coughing and cant stop.   Please advise thank you

## 2022-07-19 NOTE — TELEPHONE ENCOUNTER
----- Message from [address] [place] [id number], APRN - CNP sent at 7/19/2022 12:09 PM EDT -----  No changes noted since March 2022, stable.

## 2022-07-19 NOTE — TELEPHONE ENCOUNTER
Lou called and was advised of results. Patient has not been tested for COVID but they will test her and call with results.

## 2022-07-19 NOTE — TELEPHONE ENCOUNTER
Note from [facility] that the ipratropium-albuterol is not covered with her insurance and would cost $117.24 a box. They are requesting a cheaper alternative.

## 2022-07-20 RX ORDER — PREDNISONE 20 MG/1
20 TABLET ORAL 2 TIMES DAILY
Qty: 10 TABLET | Refills: 0 | Status: SHIPPED | OUTPATIENT
Start: 2022-07-20 | End: 2022-07-25

## 2022-07-20 RX ORDER — AZITHROMYCIN 250 MG/1
250 TABLET, FILM COATED ORAL SEE ADMIN INSTRUCTIONS
Qty: 6 TABLET | Refills: 0 | Status: SHIPPED | OUTPATIENT
Start: 2022-07-20 | End: 2022-07-25

## 2022-07-20 NOTE — TELEPHONE ENCOUNTER
Writer advised francisco of antibiotic and steroid. Attempted to call family and voicemail not set up.

## 2022-08-01 ENCOUNTER — HOSPITAL ENCOUNTER (OUTPATIENT)
Age: 87
Setting detail: SPECIMEN
Discharge: HOME | End: 2022-08-01
Payer: MEDICARE

## 2022-08-01 PROCEDURE — 81001 URINALYSIS AUTO W/SCOPE: CPT

## 2022-08-01 PROCEDURE — 87086 URINE CULTURE/COLONY COUNT: CPT

## 2022-08-02 ENCOUNTER — TELEPHONE (OUTPATIENT)
Dept: PRIMARY CARE CLINIC | Age: 87
End: 2022-08-02

## 2022-08-02 DIAGNOSIS — N30.01 ACUTE CYSTITIS WITH HEMATURIA: Primary | ICD-10-CM

## 2022-08-02 LAB
CULTURE: NORMAL
SPECIMEN DESCRIPTION: NORMAL

## 2022-08-02 RX ORDER — CEPHALEXIN 500 MG/1
500 CAPSULE ORAL 2 TIMES DAILY
Qty: 20 CAPSULE | Refills: 0 | Status: SHIPPED | OUTPATIENT
Start: 2022-08-02 | End: 2022-08-12

## 2022-08-02 NOTE — TELEPHONE ENCOUNTER
Spoke to Harsh Gotti, information given.   She will notify daughter to  script at [facility]

## 2022-08-02 NOTE — TELEPHONE ENCOUNTER
----- Message from CAMRON Goemz CNP sent at 8/2/2022  2:30 PM EDT -----  Notify we will start treating for a UTI and Keflex sent to pharmacy. Will call with urine culture results when we get them.

## 2022-08-03 ENCOUNTER — TELEPHONE (OUTPATIENT)
Dept: PRIMARY CARE CLINIC | Age: 87
End: 2022-08-03

## 2022-08-03 NOTE — TELEPHONE ENCOUNTER
----- Message from CAMRON Luke CNP sent at 8/3/2022  8:11 AM EDT -----  Please notify patient of normal urine culture results. She can discontinue antibiotics and keep hydrated.   Thanks [facility]

## 2022-08-04 ENCOUNTER — TELEPHONE (OUTPATIENT)
Dept: PRIMARY CARE CLINIC | Age: 87
End: 2022-08-04

## 2022-08-04 NOTE — TELEPHONE ENCOUNTER
Received a fax from [address] [place] [address] to stop antibiotic or not. I called daughter yesterday and let her know she can stop antibiotic as her urine culture came back normal. I let nurse know at \[facility]\"". She voiced understanding.

## 2022-08-17 ENCOUNTER — TELEPHONE (OUTPATIENT)
Dept: CARDIOLOGY | Age: 87
End: 2022-08-17

## 2022-08-17 NOTE — TELEPHONE ENCOUNTER
Call received from [address] at Dr. Nieto  office. Patient is to have procedure next some time next week for removal of bone fragment of knee. Dr. Mayi Fisher office would like to request Cardiac clearance based on her recent testing and last office visit on 07/06/2022. If patient can be cleared based on last notes, please advise when should  she should stop her Eliquis prior to her procedure.

## 2022-08-19 ENCOUNTER — HOSPITAL ENCOUNTER (OUTPATIENT)
Dept: NON INVASIVE DIAGNOSTICS | Age: 87
Discharge: HOME | End: 2022-08-19
Payer: MEDICARE

## 2022-08-19 DIAGNOSIS — I10 PRIMARY HYPERTENSION: ICD-10-CM

## 2022-08-19 DIAGNOSIS — I50.32 CHRONIC DIASTOLIC CONGESTIVE HEART FAILURE (HCC): ICD-10-CM

## 2022-08-19 DIAGNOSIS — Z95.0 CARDIAC PACEMAKER IN SITU: ICD-10-CM

## 2022-08-19 DIAGNOSIS — R06.02 SOB (SHORTNESS OF BREATH): ICD-10-CM

## 2022-08-19 DIAGNOSIS — I48.20 CHRONIC A-FIB (HCC): ICD-10-CM

## 2022-08-19 DIAGNOSIS — R53.83 TIREDNESS: ICD-10-CM

## 2022-08-19 DIAGNOSIS — R53.83 OTHER FATIGUE: ICD-10-CM

## 2022-08-19 LAB
LV EF: 60 %
LVEF MODALITY: NORMAL

## 2022-08-19 PROCEDURE — 93306 TTE W/DOPPLER COMPLETE: CPT

## 2022-08-22 ENCOUNTER — TELEPHONE (OUTPATIENT)
Dept: CARDIOLOGY | Age: 87
End: 2022-08-22

## 2022-08-22 NOTE — TELEPHONE ENCOUNTER
Please let patient know that technically that is up to the surgeon but 2-3 days is typically long enough. Thanks.

## 2022-08-22 NOTE — PROGRESS NOTES
[facility]   Preadmission Testing    Name: Rodriguez Eng  : 1933  Patient Phone: [phone] (home)     Procedure: BONE FRAGMENT REMOVAL RIGHT KNEE - Right    Date of Procedure: 22  Surgeon: Micki Caldera MD    Ht:  5' (152.4 cm)  Wt: 92 lb (41.7 kg)  Wt method: Allergies: Allergies   Allergen Reactions    Elemental Sulfur Hives    Amiodarone      Adverse effects of amiordarone    Sulfa Antibiotics Rash                There were no vitals filed for this visit. No LMP recorded. Patient is postmenopausal.    Do you take blood thinners? [x] Yes    [] No         Instructed to stop blood thinners prior to procedure? [x] Yes    [] No      [] N/A   Do you have sleep apnea? [] Yes    [x] No     Do you have acid reflux ? [x] Yes    [] No     Do you have  hiatal hernia? [] Yes    [x] No    Do you ever experience motion sickness? [] Yes    [x] No     Have you had a respiratory infection or sore throat in last 4 weeks before surgery? [] Yes    [x] No     Do you have poorly controlled asthma or COPD? Difficulty with intubation in past? [] Yes    [x] No      [] Yes    [x] No       Do you have a history of angina in the last month or symptomatic arrhythmia? [] Yes    [x] No     Do you have significant central nervous system disease? [] Yes    [x] No     Have you had an EKG, labs, or chest xray in last 12 months? If yes provide copies to anesthesia   [x] Yes    [] No       [x] Lab    [x] EKG    [] CXR     Have you had a stress test?     [x] Yes    [] No    When/where:2017    Was it normal?    [] Yes    [] No     Do you or your family have a history of Malignant Hyperthermia? [] Yes    [x] No           Do you smoke? [] Yes    [x] No      Please refrain from smoking on the day of surgery.       Patient instructed on: [x] NPO Status   [x] Meds to Take  [x] Ride Home  [x]No Jewelry/Contact Lenses/Nail [place]  [] Prep/Lax/Clear Liquids    [] Chlorhexidene     DOS Patient Needs [] HCG   [] Blood Sugar  [] PT/INR    [] T&S       COVID Vaccinated? [x] Yes    [] No                     Patient instructed on the pre-operative, intra-operative, and post-operative process? Yes  Medication instructions reviewed with patient?   Yes

## 2022-08-23 ENCOUNTER — HOSPITAL ENCOUNTER (OUTPATIENT)
Age: 87
Setting detail: SPECIMEN
Discharge: HOME | End: 2022-08-23
Payer: MEDICARE

## 2022-08-23 ENCOUNTER — TELEPHONE (OUTPATIENT)
Dept: CARDIOLOGY | Age: 87
End: 2022-08-23

## 2022-08-23 LAB
BUN BLDV-MCNC: 17 MG/DL (ref 8–23)
CREAT SERPL-MCNC: 1.01 MG/DL (ref 0.5–0.9)
GFR AFRICAN AMERICAN: >60 ML/MIN
GFR NON-AFRICAN AMERICAN: 52 ML/MIN
GFR SERPL CREATININE-BSD FRML MDRD: ABNORMAL ML/MIN/{1.73_M2}
GFR SERPL CREATININE-BSD FRML MDRD: ABNORMAL ML/MIN/{1.73_M2}

## 2022-08-23 PROCEDURE — P9603 ONE-WAY ALLOW PRORATED MILES: HCPCS

## 2022-08-23 PROCEDURE — 36415 COLL VENOUS BLD VENIPUNCTURE: CPT

## 2022-08-23 PROCEDURE — 82565 ASSAY OF CREATININE: CPT

## 2022-08-23 PROCEDURE — 84520 ASSAY OF UREA NITROGEN: CPT

## 2022-08-23 NOTE — TELEPHONE ENCOUNTER
----- Message from Emelyn Rodriguez PA-C sent at 8/23/2022  8:17 AM EDT -----  Please let them know their echo looks good, will discuss at follow up appointment. Thanks.

## 2022-08-26 ENCOUNTER — ANESTHESIA (OUTPATIENT)
Dept: OPERATING ROOM | Age: 87
End: 2022-08-26
Payer: MEDICARE

## 2022-08-26 ENCOUNTER — HOSPITAL ENCOUNTER (OUTPATIENT)
Age: 87
Setting detail: OUTPATIENT SURGERY
Discharge: HOME | End: 2022-08-26
Attending: ORTHOPAEDIC SURGERY | Admitting: ORTHOPAEDIC SURGERY
Payer: MEDICARE

## 2022-08-26 ENCOUNTER — ANESTHESIA EVENT (OUTPATIENT)
Dept: OPERATING ROOM | Age: 87
End: 2022-08-26
Payer: MEDICARE

## 2022-08-26 ENCOUNTER — HOSPITAL ENCOUNTER (OUTPATIENT)
Dept: PREADMISSION TESTING | Age: 87
Setting detail: SPECIMEN
Discharge: HOME | End: 2022-08-26

## 2022-08-26 VITALS
HEART RATE: 85 BPM | OXYGEN SATURATION: 90 % | SYSTOLIC BLOOD PRESSURE: 112 MMHG | BODY MASS INDEX: 18.06 KG/M2 | TEMPERATURE: 96.8 F | WEIGHT: 92 LBS | HEIGHT: 60 IN | DIASTOLIC BLOOD PRESSURE: 91 MMHG | RESPIRATION RATE: 16 BRPM

## 2022-08-26 PROCEDURE — 3700000000 HC ANESTHESIA ATTENDED CARE: Performed by: ORTHOPAEDIC SURGERY

## 2022-08-26 PROCEDURE — 64447 NJX AA&/STRD FEMORAL NRV IMG: CPT | Performed by: NURSE ANESTHETIST, CERTIFIED REGISTERED

## 2022-08-26 PROCEDURE — 7100000010 HC PHASE II RECOVERY - FIRST 15 MIN: Performed by: ORTHOPAEDIC SURGERY

## 2022-08-26 PROCEDURE — 2580000003 HC RX 258: Performed by: ORTHOPAEDIC SURGERY

## 2022-08-26 PROCEDURE — A4217 STERILE WATER/SALINE, 500 ML: HCPCS | Performed by: ORTHOPAEDIC SURGERY

## 2022-08-26 PROCEDURE — 3600000013 HC SURGERY LEVEL 3 ADDTL 15MIN: Performed by: ORTHOPAEDIC SURGERY

## 2022-08-26 PROCEDURE — 2709999900 HC NON-CHARGEABLE SUPPLY: Performed by: ORTHOPAEDIC SURGERY

## 2022-08-26 PROCEDURE — 6360000002 HC RX W HCPCS: Performed by: ORTHOPAEDIC SURGERY

## 2022-08-26 PROCEDURE — 3600000003 HC SURGERY LEVEL 3 BASE: Performed by: ORTHOPAEDIC SURGERY

## 2022-08-26 PROCEDURE — 6360000002 HC RX W HCPCS: Performed by: NURSE ANESTHETIST, CERTIFIED REGISTERED

## 2022-08-26 PROCEDURE — 7100000011 HC PHASE II RECOVERY - ADDTL 15 MIN: Performed by: ORTHOPAEDIC SURGERY

## 2022-08-26 PROCEDURE — 3700000001 HC ADD 15 MINUTES (ANESTHESIA): Performed by: ORTHOPAEDIC SURGERY

## 2022-08-26 PROCEDURE — 2500000003 HC RX 250 WO HCPCS: Performed by: NURSE ANESTHETIST, CERTIFIED REGISTERED

## 2022-08-26 RX ORDER — SODIUM CHLORIDE, SODIUM LACTATE, POTASSIUM CHLORIDE, CALCIUM CHLORIDE 600; 310; 30; 20 MG/100ML; MG/100ML; MG/100ML; MG/100ML
INJECTION, SOLUTION INTRAVENOUS CONTINUOUS
Status: DISCONTINUED | OUTPATIENT
Start: 2022-08-26 | End: 2022-08-26 | Stop reason: HOSPADM

## 2022-08-26 RX ORDER — MAGNESIUM HYDROXIDE 1200 MG/15ML
LIQUID ORAL CONTINUOUS PRN
Status: COMPLETED | OUTPATIENT
Start: 2022-08-26 | End: 2022-08-26

## 2022-08-26 RX ORDER — SODIUM CHLORIDE 0.9 % (FLUSH) 0.9 %
5-40 SYRINGE (ML) INJECTION PRN
Status: DISCONTINUED | OUTPATIENT
Start: 2022-08-26 | End: 2022-08-26 | Stop reason: HOSPADM

## 2022-08-26 RX ORDER — CEFAZOLIN SODIUM 2 G/50ML
2000 SOLUTION INTRAVENOUS
Status: COMPLETED | OUTPATIENT
Start: 2022-08-26 | End: 2022-08-26

## 2022-08-26 RX ORDER — LIDOCAINE HYDROCHLORIDE 10 MG/ML
INJECTION, SOLUTION EPIDURAL; INFILTRATION; INTRACAUDAL; PERINEURAL PRN
Status: DISCONTINUED | OUTPATIENT
Start: 2022-08-26 | End: 2022-08-26 | Stop reason: SDUPTHER

## 2022-08-26 RX ORDER — PROPOFOL 10 MG/ML
INJECTION, EMULSION INTRAVENOUS CONTINUOUS PRN
Status: DISCONTINUED | OUTPATIENT
Start: 2022-08-26 | End: 2022-08-26 | Stop reason: SDUPTHER

## 2022-08-26 RX ORDER — SODIUM CHLORIDE 0.9 % (FLUSH) 0.9 %
5-40 SYRINGE (ML) INJECTION EVERY 12 HOURS SCHEDULED
Status: DISCONTINUED | OUTPATIENT
Start: 2022-08-26 | End: 2022-08-26 | Stop reason: HOSPADM

## 2022-08-26 RX ORDER — SODIUM CHLORIDE 9 MG/ML
INJECTION, SOLUTION INTRAVENOUS PRN
Status: DISCONTINUED | OUTPATIENT
Start: 2022-08-26 | End: 2022-08-26 | Stop reason: HOSPADM

## 2022-08-26 RX ORDER — POLYETHYLENE GLYCOL 3350 17 G/17G
17 POWDER ORAL DAILY
COMMUNITY

## 2022-08-26 RX ADMIN — LIDOCAINE HYDROCHLORIDE 50 MG: 10 INJECTION, SOLUTION EPIDURAL; INFILTRATION; INTRACAUDAL; PERINEURAL at 11:11

## 2022-08-26 RX ADMIN — CEFAZOLIN SODIUM 2000 MG: 2 SOLUTION INTRAVENOUS at 11:07

## 2022-08-26 RX ADMIN — SODIUM CHLORIDE, POTASSIUM CHLORIDE, SODIUM LACTATE AND CALCIUM CHLORIDE: 600; 310; 30; 20 INJECTION, SOLUTION INTRAVENOUS at 09:50

## 2022-08-26 RX ADMIN — PROPOFOL 50 MCG/KG/MIN: 10 INJECTION, EMULSION INTRAVENOUS at 11:11

## 2022-08-26 ASSESSMENT — PAIN - FUNCTIONAL ASSESSMENT: PAIN_FUNCTIONAL_ASSESSMENT: NONE - DENIES PAIN

## 2022-08-26 NOTE — ANESTHESIA POSTPROCEDURE EVALUATION
Department of Anesthesiology  Postprocedure Note    Patient: Noel Gonzalez  MRN: [id number]  YOB: 1933  Date of evaluation: 8/26/2022      Procedure Summary     Date: 08/26/22 Room / Location: [address] / [facility]    Anesthesia Start: 1107 Anesthesia Stop: 7921    Procedure: BONE FRAGMENT REMOVAL RIGHT KNEE (Right: Knee) Diagnosis:       Fragmented bone      (Fragmented bone [T14. 8XXA])    Surgeons: Pedro Johnson MD Responsible Provider: CAMRON Rodriguez CRNA    Anesthesia Type: TIVA, Regional ASA Status: 4          Anesthesia Type: TIVA, Regional    Alexandria Phase I: Alexandria Score: 10    Alexandria Phase II: Alexandria Score: 10      Anesthesia Post Evaluation    Patient location during evaluation: bedside  Patient participation: complete - patient participated  Level of consciousness: awake and alert  Pain score: 0  Airway patency: patent  Nausea & Vomiting: no nausea and no vomiting  Complications: no  Cardiovascular status: blood pressure returned to baseline and hemodynamically stable  Respiratory status: acceptable, spontaneous ventilation and nonlabored ventilation  Hydration status: euvolemic  Multimodal analgesia pain management approach

## 2022-08-26 NOTE — PROGRESS NOTES

## 2022-08-26 NOTE — OP NOTE
Operative Note      Patient: Vamsi Rodriguez  YOB: 1933  MRN: [id number]    Date of Procedure: 8/26/2022    Pre-Op Diagnosis: Fragmented bone [T14. 8XXA]    Post-Op Diagnosis: Same       Procedure(s):  BONE FRAGMENT REMOVAL RIGHT KNEE    Surgeon(s):  Rossy Gabriel MD    Assistant:   * No surgical staff found *    Anesthesia: General    Estimated Blood Loss (mL): Minimal    Complications: None    Specimens:   * No specimens in log *    Implants:  * No implants in log *      Drains: * No LDAs found *    Findings: Complete removal of loose bone fragment    Detailed Description of Procedure:   Patient is previously undergone a patella ORIF. She fell requiring a revision patella ORIF. Patient recovered after this but recently has noticed bony prominence over the patella and in pending skin breakdown. Patient now presents for removal of this bony fragment. After informed consent was obtained the patient was brought to the operating room. Preoperatively a regional block was placed. Patient was sedated and the right leg was prepped and draped in the usual sterile fashion. A 1 and half centimeter incision is made overlying the bony fragment. There was no full-thickness skin breakdown but this was impending. Using a 15 blade soft tissue attached to the bony fragment was sharply dissected. Complete removal of the bone fragments was achieved and with this there was no more pressure upon the skin. The wound was irrigated. Vicryl was used to close deep layers and then skin was closed in standard fashion in layers. Sterile dressing was placed. Patient was brought to the recovery room in stable condition. There are no intraoperative or immediate postoperative complications.     Electronically signed by Harini Bishop MD on 8/26/2022 at 3:51 PM

## 2022-08-26 NOTE — ANESTHESIA PROCEDURE NOTES
Peripheral Block    Patient location during procedure: pre-op  Reason for block: post-op pain management and at surgeon's request  Start time: 8/26/2022 10:30 AM  End time: 8/26/2022 10:30 AM  Staffing  Performed: resident/CRNA   Resident/CRNA: CAMRON Al CRNA  Preanesthetic Checklist  Completed: patient identified, IV checked, site marked, risks and benefits discussed, surgical/procedural consents, equipment checked, pre-op evaluation, timeout performed, anesthesia consent given, oxygen available, monitors applied/VS acknowledged, fire risk safety assessment completed and verbalized and blood product R/B/A discussed and consented  Peripheral Block   Patient position: supine  Prep: ChloraPrep  Provider prep: mask and sterile gloves  Patient monitoring: cardiac monitor, continuous pulse ox, frequent blood pressure checks, IV access and responsive to questions  Block type: Femoral  Laterality: right  Injection technique: single-shot  Guidance: nerve stimulator and ultrasound guided  Infiltration strength: 0.5 %    Needle   Needle type: insulated echogenic nerve stimulator needle   Needle gauge: 21 G  Needle localization: anatomical landmarks, nerve stimulator and ultrasound guidance  Needle length: 5 cm  Assessment   Injection assessment: negative aspiration for heme, no paresthesia on injection, local visualized surrounding nerve on ultrasound, low pressure verified by pressure monitor and no intravascular symptoms  Slow fractionated injection: yes  Hemodynamics: stable  Real-time US image taken/store: yes  Outcomes: patient tolerated procedure well and uncomplicated
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
## 2022-08-26 NOTE — DISCHARGE INSTRUCTIONS
SAME DAY SURGERY INSTRUCTIONS  1. Do not drive or operate hazardous machinery for 24 hours. 2. Do not make important personal or business decisions for 24 hours. 3. Do not drink alcoholic beverages. 4. Do not smoke tobacco products. 5. Eat light foods initially (i.e., Jell-O, soups, etc) and drink plenty of fluids. 6. If your bandages become soaked with a bright red blood, place another dressing pad over your bandages. (Do not remove original bandage.) Call your surgeon for further instructions. A small amount of bright red blood is to be expected. 7. Limit your activities. Do not engage in heavy work until your surgeon gives you permission. 8. Report the following signs or any questions regarding your physical condition to your surgeon immediately:   Excessive swelling of, or around, the wound area   Redness   Temperature of 100 (degrees F) or above    Excessive pain  9. Call your surgeon, [phone], for any questions regarding your surgery. 10. Call for an appointment to see your surgeon in 2 weeks. SPECIAL INSTRUCTIONS AND MEDICATIONS  1. Elevate high. 2. Move toes/ankle to improve circulation. 3.  Tylenol as needed for pain relief  4. Keep your dressing on an dry unless instructed differently by your physician. 5. Use ice as instructed. 6. Remove operative bandages: 8/30. Then, you may shower if wound is clean and dry. .  8. Use walker for ambulation  9. Weight bearing status: as tolerated  10. Keep incisions dry.     [unfilled]  8/26/2022 12:41 PM

## 2022-09-28 ENCOUNTER — TELEPHONE (OUTPATIENT)
Dept: FAMILY MEDICINE CLINIC | Age: 87
End: 2022-09-28

## 2022-09-28 DIAGNOSIS — Z23 NEED FOR INFLUENZA VACCINATION: Primary | ICD-10-CM

## 2022-09-28 NOTE — TELEPHONE ENCOUNTER
Assisted Living at the [facility] has sent a fax asking for an order to Administer the Flu shot to [facility].  Please order and I will print and fax back  Thanks

## 2022-10-13 ENCOUNTER — OFFICE VISIT (OUTPATIENT)
Dept: PRIMARY CARE CLINIC | Age: 87
End: 2022-10-13
Payer: MEDICARE

## 2022-10-13 ENCOUNTER — HOSPITAL ENCOUNTER (OUTPATIENT)
Age: 87
Discharge: HOME | End: 2022-10-13
Payer: MEDICARE

## 2022-10-13 VITALS
TEMPERATURE: 97.7 F | DIASTOLIC BLOOD PRESSURE: 68 MMHG | RESPIRATION RATE: 18 BRPM | OXYGEN SATURATION: 97 % | HEART RATE: 84 BPM | SYSTOLIC BLOOD PRESSURE: 114 MMHG

## 2022-10-13 DIAGNOSIS — E03.1 CONGENITAL HYPOTHYROIDISM: ICD-10-CM

## 2022-10-13 DIAGNOSIS — I10 ESSENTIAL HYPERTENSION: Primary | ICD-10-CM

## 2022-10-13 DIAGNOSIS — K21.9 GASTROESOPHAGEAL REFLUX DISEASE WITHOUT ESOPHAGITIS: ICD-10-CM

## 2022-10-13 LAB
ANION GAP SERPL CALCULATED.3IONS-SCNC: 9 MMOL/L (ref 9–17)
BUN BLDV-MCNC: 17 MG/DL (ref 8–23)
BUN/CREAT BLD: 16 (ref 9–20)
CALCIUM SERPL-MCNC: 9.6 MG/DL (ref 8.6–10.4)
CHLORIDE BLD-SCNC: 107 MMOL/L (ref 98–107)
CO2: 26 MMOL/L (ref 20–31)
CREAT SERPL-MCNC: 1.09 MG/DL (ref 0.5–0.9)
GFR SERPL CREATININE-BSD FRML MDRD: 49 ML/MIN/1.73M2
GLUCOSE BLD-MCNC: 96 MG/DL (ref 70–99)
POTASSIUM SERPL-SCNC: 4.4 MMOL/L (ref 3.7–5.3)
SODIUM BLD-SCNC: 142 MMOL/L (ref 135–144)
THYROXINE, FREE: 1.61 NG/DL (ref 0.93–1.7)
TSH SERPL DL<=0.05 MIU/L-ACNC: 0.16 UIU/ML (ref 0.3–5)

## 2022-10-13 PROCEDURE — 1036F TOBACCO NON-USER: CPT | Performed by: NURSE PRACTITIONER

## 2022-10-13 PROCEDURE — 80048 BASIC METABOLIC PNL TOTAL CA: CPT

## 2022-10-13 PROCEDURE — G8427 DOCREV CUR MEDS BY ELIG CLIN: HCPCS | Performed by: NURSE PRACTITIONER

## 2022-10-13 PROCEDURE — 1090F PRES/ABSN URINE INCON ASSESS: CPT | Performed by: NURSE PRACTITIONER

## 2022-10-13 PROCEDURE — 1123F ACP DISCUSS/DSCN MKR DOCD: CPT | Performed by: NURSE PRACTITIONER

## 2022-10-13 PROCEDURE — G8419 CALC BMI OUT NRM PARAM NOF/U: HCPCS | Performed by: NURSE PRACTITIONER

## 2022-10-13 PROCEDURE — 99214 OFFICE O/P EST MOD 30 MIN: CPT | Performed by: NURSE PRACTITIONER

## 2022-10-13 PROCEDURE — 84443 ASSAY THYROID STIM HORMONE: CPT

## 2022-10-13 PROCEDURE — 84439 ASSAY OF FREE THYROXINE: CPT

## 2022-10-13 PROCEDURE — 36415 COLL VENOUS BLD VENIPUNCTURE: CPT

## 2022-10-13 PROCEDURE — G8484 FLU IMMUNIZE NO ADMIN: HCPCS | Performed by: NURSE PRACTITIONER

## 2022-10-13 RX ORDER — OMEPRAZOLE 20 MG/1
20 CAPSULE, DELAYED RELEASE ORAL DAILY
Qty: 90 CAPSULE | Refills: 3 | Status: SHIPPED | OUTPATIENT
Start: 2022-10-13

## 2022-10-13 RX ORDER — LEVOTHYROXINE SODIUM 0.1 MG/1
100 TABLET ORAL DAILY
Qty: 90 TABLET | Refills: 3 | Status: SHIPPED | OUTPATIENT
Start: 2022-10-13

## 2022-10-13 ASSESSMENT — PATIENT HEALTH QUESTIONNAIRE - PHQ9
SUM OF ALL RESPONSES TO PHQ9 QUESTIONS 1 & 2: 0
8. MOVING OR SPEAKING SO SLOWLY THAT OTHER PEOPLE COULD HAVE NOTICED. OR THE OPPOSITE, BEING SO FIGETY OR RESTLESS THAT YOU HAVE BEEN MOVING AROUND A LOT MORE THAN USUAL: 0
4. FEELING TIRED OR HAVING LITTLE ENERGY: 0
SUM OF ALL RESPONSES TO PHQ QUESTIONS 1-9: 0
9. THOUGHTS THAT YOU WOULD BE BETTER OFF DEAD, OR OF HURTING YOURSELF: 0
5. POOR APPETITE OR OVEREATING: 0
10. IF YOU CHECKED OFF ANY PROBLEMS, HOW DIFFICULT HAVE THESE PROBLEMS MADE IT FOR YOU TO DO YOUR WORK, TAKE CARE OF THINGS AT HOME, OR GET ALONG WITH OTHER PEOPLE: 0
SUM OF ALL RESPONSES TO PHQ QUESTIONS 1-9: 0
6. FEELING BAD ABOUT YOURSELF - OR THAT YOU ARE A FAILURE OR HAVE LET YOURSELF OR YOUR FAMILY DOWN: 0
7. TROUBLE CONCENTRATING ON THINGS, SUCH AS READING THE NEWSPAPER OR WATCHING TELEVISION: 0
3. TROUBLE FALLING OR STAYING ASLEEP: 0
2. FEELING DOWN, DEPRESSED OR HOPELESS: 0
SUM OF ALL RESPONSES TO PHQ QUESTIONS 1-9: 0
SUM OF ALL RESPONSES TO PHQ QUESTIONS 1-9: 0
1. LITTLE INTEREST OR PLEASURE IN DOING THINGS: 0

## 2022-10-13 ASSESSMENT — ENCOUNTER SYMPTOMS
SHORTNESS OF BREATH: 0
WHEEZING: 0
SORE THROAT: 0
BLURRED VISION: 0
COUGH: 0
ABDOMINAL PAIN: 0
VOMITING: 0
GLOBUS SENSATION: 0
NAUSEA: 0
DIARRHEA: 0
BELCHING: 0
RHINORRHEA: 0
CONSTIPATION: 0
HEARTBURN: 0

## 2022-10-13 NOTE — PROGRESS NOTES
Name: Fransisca Krishnan  : 1933         Chief Complaint:     Chief Complaint   Patient presents with    Hypertension     Routine check. Forgetful    Gastroesophageal Reflux    Hypothyroidism       History of Present Illness:      Fransisca Krishnan is a 80 y.o.  female who presents with Hypertension (Routine check. Forgetful), Gastroesophageal Reflux, and Hypothyroidism      Kojo Monet is here today for a routine office visit. Congenital hypothyroidism-stable, patient compliant with medication and administration. Please complain of daily fatigue but this is more related to her cardiac issues. Hypertension  This is a chronic problem. The current episode started more than 1 year ago. The problem is unchanged. The problem is controlled. Pertinent negatives include no anxiety, blurred vision, chest pain, headaches, neck pain, palpitations, peripheral edema or shortness of breath. Risk factors for coronary artery disease include sedentary lifestyle, post-menopausal state and stress. Past treatments include angiotensin blockers, diuretics and beta blockers. The current treatment provides moderate improvement. There are no compliance problems. Hypertensive end-organ damage includes CAD/MI and heart failure. There is no history of kidney disease or CVA. There is no history of chronic renal disease. Gastroesophageal Reflux  She reports no abdominal pain, no belching, no chest pain, no coughing, no early satiety, no globus sensation, no heartburn, no nausea, no sore throat or no wheezing. This is a chronic problem. The current episode started more than 1 year ago. The problem occurs rarely. The problem has been resolved. The symptoms are aggravated by certain foods. Associated symptoms include fatigue. Pertinent negatives include no melena or weight loss. Risk factors include lack of exercise and caffeine use. She has tried a PPI for the symptoms. The treatment provided significant relief.  Past procedures do not include an EGD or a UGI. Past invasive treatments do not include gastroplasty, gastroplication or reflux surgery. Past Medical History:     Past Medical History:   Diagnosis Date    Abnormal resting ECG findings 12    Normal sinus rhythm with frequent PACs in a trigeminy pattern    Abnormal resting ECG findings 6/10/2013    Severe sinus bradycardia at 50 bpm.     Anxiety     Atrial fibrillation (HCC)     Cholecystitis     Chronic back pain     Pt. c/o upper back pain,,,,\"On the sides of my lungs\"    Hiatal hernia     Holter monitor, abnormal 12    Multiple episodes of SVT between 100 and 130 beats per minute most consistent with atrial fibrillation and/or atrial flutter with variable block. Hypothyroidism     Insomnia     MAC (mycobacterium avium-intracellulare complex)     Normal cardiac stress test 12    low risk study. Paroxysmal atrial fibrillation ([facility] [address] 2012    Found on holter monitor  and      Severe sinus bradycardia 6/10/13    At least partially drug induced. Status post placement of implantable loop recorder 10/19/2017    LINQ IMPLANTED. [employer]    Subdural hematoma       Reviewed all health maintenance requirements and ordered appropriate tests  There are no preventive care reminders to display for this patient. Past Surgical History:     Past Surgical History:   Procedure Laterality Date    BRAIN SURGERY      Had a blood clot on the brain. Fell 10 feet.      CATARACT REMOVAL Bilateral      SECTION      x1    COLONOSCOPY      several over the years by Dr. Trina Ngo Left 10/19/2017    [address] [place] [address] ARTHROSCOPY Right 2022    BONE FRAGMENT REMOVAL RIGHT KNEE performed by Cecilia Groves MD at [address] [facility] [address] 2019    Dr. Darcy Rodriguez N/A 2019    PACEMAKER INSERTION PERMANENT ADDRS1 Adapta dual IS1 small DDD NOT MRI COMPATIBLE performed by Keyonna Montana MD at [address] [facility]      tumor removed    UPPER GASTROINTESTINAL ENDOSCOPY  2013    Anjel        Medications:       Prior to Admission medications    Medication Sig Start Date End Date Taking? Authorizing Provider   omeprazole (PRILOSEC) 20 MG delayed release capsule Take 1 capsule by mouth Daily 10/13/22  Yes Jamil Mark Might, APRN - CNP   levothyroxine (SYNTHROID) 100 MCG tablet Take 1 tablet by mouth Daily 10/13/22  Yes Jamil Mark Might, APRN - ABELINO   polyethylene glycol (MIRALAX) 17 GM/SCOOP POWD powder Take 17 g by mouth daily   Yes Historical Provider, MD   metoprolol succinate (TOPROL XL) 50 MG extended release tablet Take 1 tablet by mouth daily 7/6/22  Yes Sharon Robbins PA-C   Calcium 200 MG TABS Take 200 mg by mouth 3 times daily   Yes Historical Provider, MD   escitalopram (LEXAPRO) 10 MG tablet Take 10 mg by mouth daily   Yes Historical Provider, MD   furosemide (LASIX) 40 MG tablet Take 40 mg by mouth daily    Yes Historical Provider, MD   losartan (COZAAR) 25 MG tablet Take 25 mg by mouth daily   Yes Historical Provider, MD   melatonin 3 MG TABS tablet Take 3 mg by mouth daily   Yes Historical Provider, MD   docusate sodium (COLACE) 100 MG capsule Take 100 mg by mouth 2 times daily   Yes Historical Provider, MD   apixaban (ELIQUIS) 2.5 MG TABS tablet Take 1 tablet by mouth 2 times daily 8/23/21  Yes Pablo Sosa MD   acetaminophen (TYLENOL) 500 MG tablet Take 2 tablets by mouth 4 times daily as needed for Pain 12/21/19  Yes Tanna Quijano MD   albuterol (PROVENTIL) (2.5 MG/3ML) 0.083% nebulizer solution Take 3 mLs by nebulization every 6 hours as needed for Wheezing  Patient not taking: Reported on 10/13/2022 7/19/22   Jamil Mark Might, APRN - CNP   nitroGLYCERIN (NITROSTAT) 0.4 MG SL tablet Place 1 tablet under the tongue every 5 minutes as needed for Chest pain up to max of 3 total doses. If no relief after 1 dose, call 911.   Patient not taking: No sig reported 7/13/21   Sherine Leigh MD   ondansetron (ZOFRAN) 4 MG tablet Take 4 mg by mouth every 8 hours as needed for Nausea or Vomiting   Patient not taking: No sig reported    Historical Provider, MD   albuterol sulfate  (90 Base) MCG/ACT inhaler Inhale 2 puffs into the lungs every 6 hours as needed for Wheezing or Shortness of Breath  Patient not taking: Reported on 10/13/2022 8/13/20 7/6/22  Kirti Sheets MD        Allergies:       Elemental sulfur, Amiodarone, and Sulfa antibiotics    Social History:     Tobacco:    reports that she has never smoked. She has never used smokeless tobacco.  Alcohol:      reports no history of alcohol use. Drug Use:  reports no history of drug use. Family History:     Family History   Problem Relation Age of Onset    Stroke Father     Stroke Mother        Review of Systems:     Positive and Negative as described in HPI    Review of Systems   Constitutional:  Positive for fatigue. Negative for chills, fever and weight loss. HENT:  Negative for congestion, rhinorrhea and sore throat. Eyes:  Negative for blurred vision and visual disturbance. Respiratory:  Negative for cough, shortness of breath and wheezing. Cardiovascular:  Negative for chest pain and palpitations. Gastrointestinal:  Negative for abdominal pain, constipation, diarrhea, heartburn, melena, nausea and vomiting. Genitourinary:  Negative for difficulty urinating and dysuria. Musculoskeletal:  Negative for gait problem, neck pain and neck stiffness. Skin:  Negative for rash. Neurological:  Negative for dizziness, syncope, light-headedness and headaches. Physical Exam:   Vitals:  /68 (Position: Sitting)   Pulse 84   Temp 97.7 °F (36.5 °C) (Temporal)   Resp 18   SpO2 97%     Physical Exam  Vitals and nursing note reviewed. Constitutional:       General: She is not in acute distress. Appearance: She is well-developed.    HENT:      Mouth/Throat:      Mouth: Mucous membranes are moist.   Eyes:      General: No scleral icterus. Conjunctiva/sclera: Conjunctivae normal.   Cardiovascular:      Rate and Rhythm: Regular rhythm. Bradycardia present. Heart sounds: Murmur heard. Pulmonary:      Effort: Pulmonary effort is normal.      Breath sounds: Normal breath sounds. Abdominal:      General: Bowel sounds are normal. There is no distension. Palpations: Abdomen is soft. Tenderness: There is no abdominal tenderness. Musculoskeletal:      Cervical back: Normal range of motion and neck supple. Right lower leg: No edema. Left lower leg: No edema. Lymphadenopathy:      Cervical: No cervical adenopathy. Skin:     General: Skin is warm and dry. Findings: No rash. Neurological:      Mental Status: She is alert and oriented to person, place, and time.    Psychiatric:         Mood and Affect: Mood normal.         Behavior: Behavior normal.       Data:     Lab Results   Component Value Date/Time     10/13/2022 09:42 AM    K 4.4 10/13/2022 09:42 AM     10/13/2022 09:42 AM    CO2 26 10/13/2022 09:42 AM    BUN 17 10/13/2022 09:42 AM    CREATININE 1.09 10/13/2022 09:42 AM    GLUCOSE 96 10/13/2022 09:42 AM    GLUCOSE 92 02/07/2012 11:05 AM    PROT 6.6 01/18/2020 08:01 AM    LABALBU 3.2 01/18/2020 08:01 AM    BILITOT 0.33 01/18/2020 08:01 AM    ALKPHOS 69 01/18/2020 08:01 AM    AST 17 05/23/2022 01:43 PM    ALT 11 05/23/2022 01:43 PM     Lab Results   Component Value Date/Time    WBC 7.9 05/23/2022 01:43 PM    RBC 4.30 05/23/2022 01:43 PM    RBC 4.31 02/07/2012 11:05 AM    HGB 12.1 05/23/2022 01:43 PM    HCT 39.6 05/23/2022 01:43 PM    MCV 92.1 05/23/2022 01:43 PM    MCH 28.1 05/23/2022 01:43 PM    MCHC 30.6 05/23/2022 01:43 PM    RDW 16.0 05/23/2022 01:43 PM     05/23/2022 01:43 PM     02/07/2012 11:05 AM    MPV 9.2 05/23/2022 01:43 PM     Lab Results   Component Value Date/Time    TSH 0.16 10/13/2022 09:42 AM     Lab Results   Component Value Date/Time    CHOL 173 05/23/2022 01:43 PM    HDL 62 05/23/2022 01:43 PM       Assessment/Plan:      Diagnosis Orders   1. Essential hypertension  CBC with Auto Differential    ALT    AST    Basic Metabolic Panel    Lipid Panel    omeprazole (PRILOSEC) 20 MG delayed release capsule      2. Gastroesophageal reflux disease without esophagitis        3. Congenital hypothyroidism  levothyroxine (SYNTHROID) 100 MCG tablet        We will continue all current medications. Blood pressure is well controlled. Labs are stable. We will see her back in 6 months for routine check, sooner if any issues. She will continue all visits with specialist.      Scottie العراقي received counseling on the following healthy behaviors: nutrition, exercise, and medication adherence  2. Patient given educational materials - see patient instructions  3. Was a self-tracking handout given in paper form or via [employer]? No  If yes, see orders or list here. 4.  Discussed use, benefit, and side effects of prescribed medications. Barriers to medication compliance addressed. All patient questions answered. Pt voiced understanding. 5.  Reviewed prior labs and health maintenance  6. Continue current medications, diet and exercise. Completed Refills   Requested Prescriptions     Signed Prescriptions Disp Refills    omeprazole (PRILOSEC) 20 MG delayed release capsule 90 capsule 3     Sig: Take 1 capsule by mouth Daily    levothyroxine (SYNTHROID) 100 MCG tablet 90 tablet 3     Sig: Take 1 tablet by mouth Daily         Return in about 6 months (around 4/13/2023) for Check up.

## 2022-10-13 NOTE — PATIENT INSTRUCTIONS
SURVEY:     You may be receiving a survey from [employer] regarding your visit today. Please complete the survey to enable us to provide the highest quality of care to you and your family. If you cannot score us a very good on any question, please call the office to discuss how we could have made your experience a very good one.      Thank you,    Lola Gross, APRN-CNP  Alayna Alvarez, APRN-CNP  Anahi Tubbs, LPN  Maggie Greene, CMA  Jeancarlos Cables, CMA  Deepa, CMA  Bree, PCA  Yany, PM

## 2022-12-02 ENCOUNTER — APPOINTMENT (OUTPATIENT)
Dept: GENERAL RADIOLOGY | Age: 87
DRG: 177 | End: 2022-12-02
Payer: MEDICARE

## 2022-12-02 ENCOUNTER — HOSPITAL ENCOUNTER (INPATIENT)
Age: 87
LOS: 2 days | Discharge: HOME | DRG: 177 | End: 2022-12-04
Attending: EMERGENCY MEDICINE | Admitting: FAMILY MEDICINE
Payer: MEDICARE

## 2022-12-02 ENCOUNTER — APPOINTMENT (OUTPATIENT)
Dept: CT IMAGING | Age: 87
DRG: 177 | End: 2022-12-02
Payer: MEDICARE

## 2022-12-02 DIAGNOSIS — J44.1 COPD EXACERBATION (HCC): ICD-10-CM

## 2022-12-02 DIAGNOSIS — U07.1 COVID-19: Primary | ICD-10-CM

## 2022-12-02 PROBLEM — I50.33 ACUTE ON CHRONIC DIASTOLIC CONGESTIVE HEART FAILURE (HCC): Status: ACTIVE | Noted: 2019-06-03

## 2022-12-02 PROBLEM — N30.00 ACUTE CYSTITIS WITHOUT HEMATURIA: Status: ACTIVE | Noted: 2022-12-02

## 2022-12-02 LAB
ABSOLUTE EOS #: 0.34 K/UL (ref 0–0.44)
ABSOLUTE IMMATURE GRANULOCYTE: <0.03 K/UL (ref 0–0.3)
ABSOLUTE LYMPH #: 2.47 K/UL (ref 1.1–3.7)
ABSOLUTE MONO #: 0.67 K/UL (ref 0.1–1.2)
ALBUMIN SERPL-MCNC: 3.9 G/DL (ref 3.5–5.2)
ALBUMIN/GLOBULIN RATIO: 1.2 (ref 1–2.5)
ALP BLD-CCNC: 76 U/L (ref 35–104)
ALT SERPL-CCNC: 10 U/L (ref 5–33)
ANION GAP SERPL CALCULATED.3IONS-SCNC: 11 MMOL/L (ref 9–17)
AST SERPL-CCNC: 16 U/L
BASOPHILS # BLD: 0 % (ref 0–2)
BASOPHILS ABSOLUTE: 0.03 K/UL (ref 0–0.2)
BILIRUB SERPL-MCNC: 0.4 MG/DL (ref 0.3–1.2)
BUN BLDV-MCNC: 18 MG/DL (ref 8–23)
BUN/CREAT BLD: 16 (ref 9–20)
C-REACTIVE PROTEIN: 35.8 MG/L (ref 0–5)
CALCIUM SERPL-MCNC: 9.8 MG/DL (ref 8.6–10.4)
CHLORIDE BLD-SCNC: 102 MMOL/L (ref 98–107)
CO2: 28 MMOL/L (ref 20–31)
CREAT SERPL-MCNC: 1.15 MG/DL (ref 0.5–0.9)
D-DIMER QUANTITATIVE: 2.53 MG/L FEU (ref 0–0.59)
EKG ATRIAL RATE: 108 BPM
EKG Q-T INTERVAL: 394 MS
EKG QRS DURATION: 82 MS
EKG QTC CALCULATION (BAZETT): 540 MS
EKG R AXIS: -37 DEGREES
EKG T AXIS: -149 DEGREES
EKG VENTRICULAR RATE: 113 BPM
EOSINOPHILS RELATIVE PERCENT: 4 % (ref 1–4)
FIBRINOGEN: 383 MG/DL (ref 179–518)
GFR SERPL CREATININE-BSD FRML MDRD: 46 ML/MIN/1.73M2
GLUCOSE BLD-MCNC: 89 MG/DL (ref 70–99)
HCT VFR BLD CALC: 39.2 % (ref 36.3–47.1)
HEMOGLOBIN: 12.8 G/DL (ref 11.9–15.1)
IMMATURE GRANULOCYTES: 0 %
LACTATE DEHYDROGENASE: 137 U/L (ref 135–214)
LYMPHOCYTES # BLD: 32 % (ref 24–43)
MCH RBC QN AUTO: 31 PG (ref 25.2–33.5)
MCHC RBC AUTO-ENTMCNC: 32.7 G/DL (ref 28.4–34.8)
MCV RBC AUTO: 94.9 FL (ref 82.6–102.9)
MONOCYTES # BLD: 9 % (ref 3–12)
NRBC AUTOMATED: 0 PER 100 WBC
PDW BLD-RTO: 14.9 % (ref 11.8–14.4)
PLATELET # BLD: 207 K/UL (ref 138–453)
PMV BLD AUTO: 9.6 FL (ref 8.1–13.5)
POTASSIUM SERPL-SCNC: 4.1 MMOL/L (ref 3.7–5.3)
PRO-BNP: 6529 PG/ML
PROCALCITONIN: 0.07 NG/ML
RBC # BLD: 4.13 M/UL (ref 3.95–5.11)
SEG NEUTROPHILS: 55 % (ref 36–65)
SEGMENTED NEUTROPHILS ABSOLUTE COUNT: 4.31 K/UL (ref 1.5–8.1)
SODIUM BLD-SCNC: 141 MMOL/L (ref 135–144)
TOTAL PROTEIN: 7.1 G/DL (ref 6.4–8.3)
TROPONIN, HIGH SENSITIVITY: 38 NG/L (ref 0–14)
TROPONIN, HIGH SENSITIVITY: 41 NG/L (ref 0–14)
WBC # BLD: 7.8 K/UL (ref 3.5–11.3)

## 2022-12-02 PROCEDURE — 97166 OT EVAL MOD COMPLEX 45 MIN: CPT

## 2022-12-02 PROCEDURE — 85025 COMPLETE CBC W/AUTO DIFF WBC: CPT

## 2022-12-02 PROCEDURE — 83880 ASSAY OF NATRIURETIC PEPTIDE: CPT

## 2022-12-02 PROCEDURE — 87040 BLOOD CULTURE FOR BACTERIA: CPT

## 2022-12-02 PROCEDURE — 6360000002 HC RX W HCPCS: Performed by: INTERNAL MEDICINE

## 2022-12-02 PROCEDURE — 71260 CT THORAX DX C+: CPT | Performed by: NURSE PRACTITIONER

## 2022-12-02 PROCEDURE — 6370000000 HC RX 637 (ALT 250 FOR IP): Performed by: NURSE PRACTITIONER

## 2022-12-02 PROCEDURE — 71045 X-RAY EXAM CHEST 1 VIEW: CPT

## 2022-12-02 PROCEDURE — 93005 ELECTROCARDIOGRAM TRACING: CPT | Performed by: EMERGENCY MEDICINE

## 2022-12-02 PROCEDURE — 6370000000 HC RX 637 (ALT 250 FOR IP): Performed by: FAMILY MEDICINE

## 2022-12-02 PROCEDURE — 85384 FIBRINOGEN ACTIVITY: CPT

## 2022-12-02 PROCEDURE — 2580000003 HC RX 258: Performed by: NURSE PRACTITIONER

## 2022-12-02 PROCEDURE — 97530 THERAPEUTIC ACTIVITIES: CPT

## 2022-12-02 PROCEDURE — 6360000002 HC RX W HCPCS: Performed by: NURSE PRACTITIONER

## 2022-12-02 PROCEDURE — 96374 THER/PROPH/DIAG INJ IV PUSH: CPT

## 2022-12-02 PROCEDURE — 94669 MECHANICAL CHEST WALL OSCILL: CPT

## 2022-12-02 PROCEDURE — 97162 PT EVAL MOD COMPLEX 30 MIN: CPT

## 2022-12-02 PROCEDURE — 84145 PROCALCITONIN (PCT): CPT

## 2022-12-02 PROCEDURE — 6370000000 HC RX 637 (ALT 250 FOR IP): Performed by: EMERGENCY MEDICINE

## 2022-12-02 PROCEDURE — 83615 LACTATE (LD) (LDH) ENZYME: CPT

## 2022-12-02 PROCEDURE — 99222 1ST HOSP IP/OBS MODERATE 55: CPT | Performed by: INTERNAL MEDICINE

## 2022-12-02 PROCEDURE — 80053 COMPREHEN METABOLIC PANEL: CPT

## 2022-12-02 PROCEDURE — 6360000002 HC RX W HCPCS: Performed by: EMERGENCY MEDICINE

## 2022-12-02 PROCEDURE — 6360000004 HC RX CONTRAST MEDICATION: Performed by: NURSE PRACTITIONER

## 2022-12-02 PROCEDURE — 1200000000 HC SEMI PRIVATE

## 2022-12-02 PROCEDURE — 36415 COLL VENOUS BLD VENIPUNCTURE: CPT

## 2022-12-02 PROCEDURE — 86140 C-REACTIVE PROTEIN: CPT

## 2022-12-02 PROCEDURE — 85379 FIBRIN DEGRADATION QUANT: CPT

## 2022-12-02 PROCEDURE — 94640 AIRWAY INHALATION TREATMENT: CPT

## 2022-12-02 PROCEDURE — 99285 EMERGENCY DEPT VISIT HI MDM: CPT

## 2022-12-02 PROCEDURE — 94761 N-INVAS EAR/PLS OXIMETRY MLT: CPT

## 2022-12-02 PROCEDURE — 2580000003 HC RX 258: Performed by: INTERNAL MEDICINE

## 2022-12-02 PROCEDURE — XW033E5 INTRODUCTION OF REMDESIVIR ANTI-INFECTIVE INTO PERIPHERAL VEIN, PERCUTANEOUS APPROACH, NEW TECHNOLOGY GROUP 5: ICD-10-PCS | Performed by: INTERNAL MEDICINE

## 2022-12-02 PROCEDURE — 94664 DEMO&/EVAL PT USE INHALER: CPT

## 2022-12-02 PROCEDURE — 2700000000 HC OXYGEN THERAPY PER DAY

## 2022-12-02 PROCEDURE — 93010 ELECTROCARDIOGRAM REPORT: CPT | Performed by: INTERNAL MEDICINE

## 2022-12-02 PROCEDURE — 84484 ASSAY OF TROPONIN QUANT: CPT

## 2022-12-02 RX ORDER — ALBUTEROL SULFATE 90 UG/1
2 AEROSOL, METERED RESPIRATORY (INHALATION) EVERY 6 HOURS PRN
Status: DISCONTINUED | OUTPATIENT
Start: 2022-12-02 | End: 2022-12-04 | Stop reason: HOSPADM

## 2022-12-02 RX ORDER — POLYETHYLENE GLYCOL 3350 17 G/17G
17 POWDER, FOR SOLUTION ORAL DAILY
Status: DISCONTINUED | OUTPATIENT
Start: 2022-12-02 | End: 2022-12-04 | Stop reason: HOSPADM

## 2022-12-02 RX ORDER — ERGOCALCIFEROL 1.25 MG/1
50000 CAPSULE ORAL WEEKLY
Status: DISCONTINUED | OUTPATIENT
Start: 2022-12-02 | End: 2022-12-04 | Stop reason: HOSPADM

## 2022-12-02 RX ORDER — ASCORBIC ACID 500 MG
1000 TABLET ORAL 4 TIMES DAILY
Status: DISCONTINUED | OUTPATIENT
Start: 2022-12-02 | End: 2022-12-04 | Stop reason: HOSPADM

## 2022-12-02 RX ORDER — PANTOPRAZOLE SODIUM 40 MG/1
40 TABLET, DELAYED RELEASE ORAL
Status: DISCONTINUED | OUTPATIENT
Start: 2022-12-03 | End: 2022-12-04 | Stop reason: HOSPADM

## 2022-12-02 RX ORDER — ESCITALOPRAM OXALATE 5 MG/1
10 TABLET ORAL DAILY
Status: DISCONTINUED | OUTPATIENT
Start: 2022-12-02 | End: 2022-12-04 | Stop reason: HOSPADM

## 2022-12-02 RX ORDER — LANOLIN ALCOHOL/MO/W.PET/CERES
3 CREAM (GRAM) TOPICAL NIGHTLY
Status: DISCONTINUED | OUTPATIENT
Start: 2022-12-02 | End: 2022-12-04 | Stop reason: HOSPADM

## 2022-12-02 RX ORDER — ZINC SULFATE 50(220)MG
100 CAPSULE ORAL DAILY
Status: DISCONTINUED | OUTPATIENT
Start: 2022-12-02 | End: 2022-12-04 | Stop reason: HOSPADM

## 2022-12-02 RX ORDER — ACETAMINOPHEN 500 MG
1000 TABLET ORAL 4 TIMES DAILY PRN
Status: DISCONTINUED | OUTPATIENT
Start: 2022-12-02 | End: 2022-12-04 | Stop reason: HOSPADM

## 2022-12-02 RX ORDER — LOSARTAN POTASSIUM 25 MG/1
25 TABLET ORAL DAILY
Status: DISCONTINUED | OUTPATIENT
Start: 2022-12-02 | End: 2022-12-04 | Stop reason: HOSPADM

## 2022-12-02 RX ORDER — ALBUTEROL SULFATE 2.5 MG/3ML
2.5 SOLUTION RESPIRATORY (INHALATION) ONCE
Status: COMPLETED | OUTPATIENT
Start: 2022-12-02 | End: 2022-12-02

## 2022-12-02 RX ORDER — ALBUTEROL SULFATE 2.5 MG/3ML
2.5 SOLUTION RESPIRATORY (INHALATION) EVERY 4 HOURS PRN
Status: DISCONTINUED | OUTPATIENT
Start: 2022-12-02 | End: 2022-12-04 | Stop reason: HOSPADM

## 2022-12-02 RX ORDER — ALBUTEROL SULFATE 2.5 MG/3ML
2.5 SOLUTION RESPIRATORY (INHALATION) EVERY 6 HOURS PRN
Status: DISCONTINUED | OUTPATIENT
Start: 2022-12-02 | End: 2022-12-02

## 2022-12-02 RX ORDER — METHYLPREDNISOLONE SODIUM SUCCINATE 125 MG/2ML
60 INJECTION, POWDER, LYOPHILIZED, FOR SOLUTION INTRAMUSCULAR; INTRAVENOUS ONCE
Status: COMPLETED | OUTPATIENT
Start: 2022-12-02 | End: 2022-12-02

## 2022-12-02 RX ORDER — ONDANSETRON 4 MG/1
4 TABLET, FILM COATED ORAL EVERY 8 HOURS PRN
Status: DISCONTINUED | OUTPATIENT
Start: 2022-12-02 | End: 2022-12-04 | Stop reason: HOSPADM

## 2022-12-02 RX ORDER — FUROSEMIDE 40 MG/1
40 TABLET ORAL DAILY
Status: DISCONTINUED | OUTPATIENT
Start: 2022-12-02 | End: 2022-12-04 | Stop reason: HOSPADM

## 2022-12-02 RX ORDER — SODIUM CHLORIDE 0.9 % (FLUSH) 0.9 %
5-40 SYRINGE (ML) INJECTION PRN
Status: DISCONTINUED | OUTPATIENT
Start: 2022-12-02 | End: 2022-12-04 | Stop reason: HOSPADM

## 2022-12-02 RX ORDER — LEVOTHYROXINE SODIUM 0.1 MG/1
100 TABLET ORAL DAILY
Status: DISCONTINUED | OUTPATIENT
Start: 2022-12-03 | End: 2022-12-04 | Stop reason: HOSPADM

## 2022-12-02 RX ORDER — IPRATROPIUM BROMIDE AND ALBUTEROL SULFATE 2.5; .5 MG/3ML; MG/3ML
1 SOLUTION RESPIRATORY (INHALATION) 4 TIMES DAILY
Status: DISCONTINUED | OUTPATIENT
Start: 2022-12-02 | End: 2022-12-02

## 2022-12-02 RX ORDER — ACETAMINOPHEN 650 MG/1
650 SUPPOSITORY RECTAL EVERY 6 HOURS PRN
Status: DISCONTINUED | OUTPATIENT
Start: 2022-12-02 | End: 2022-12-04 | Stop reason: HOSPADM

## 2022-12-02 RX ORDER — CALCIUM CARBONATE 200(500)MG
250 TABLET,CHEWABLE ORAL 3 TIMES DAILY
Status: DISCONTINUED | OUTPATIENT
Start: 2022-12-02 | End: 2022-12-04 | Stop reason: HOSPADM

## 2022-12-02 RX ORDER — ACETAMINOPHEN 325 MG/1
650 TABLET ORAL EVERY 6 HOURS PRN
Status: DISCONTINUED | OUTPATIENT
Start: 2022-12-02 | End: 2022-12-04 | Stop reason: HOSPADM

## 2022-12-02 RX ORDER — FUROSEMIDE 10 MG/ML
40 INJECTION INTRAMUSCULAR; INTRAVENOUS DAILY
Status: DISCONTINUED | OUTPATIENT
Start: 2022-12-02 | End: 2022-12-04 | Stop reason: HOSPADM

## 2022-12-02 RX ORDER — SODIUM CHLORIDE 0.9 % (FLUSH) 0.9 %
5-40 SYRINGE (ML) INJECTION EVERY 12 HOURS SCHEDULED
Status: DISCONTINUED | OUTPATIENT
Start: 2022-12-02 | End: 2022-12-04 | Stop reason: HOSPADM

## 2022-12-02 RX ORDER — IPRATROPIUM BROMIDE AND ALBUTEROL SULFATE 2.5; .5 MG/3ML; MG/3ML
1 SOLUTION RESPIRATORY (INHALATION) ONCE
Status: COMPLETED | OUTPATIENT
Start: 2022-12-02 | End: 2022-12-02

## 2022-12-02 RX ORDER — SODIUM CHLORIDE 9 MG/ML
25 INJECTION, SOLUTION INTRAVENOUS PRN
Status: DISCONTINUED | OUTPATIENT
Start: 2022-12-02 | End: 2022-12-04 | Stop reason: HOSPADM

## 2022-12-02 RX ORDER — IPRATROPIUM BROMIDE AND ALBUTEROL SULFATE 2.5; .5 MG/3ML; MG/3ML
1 SOLUTION RESPIRATORY (INHALATION) 3 TIMES DAILY
COMMUNITY

## 2022-12-02 RX ORDER — METOPROLOL SUCCINATE 50 MG/1
50 TABLET, EXTENDED RELEASE ORAL DAILY
Status: DISCONTINUED | OUTPATIENT
Start: 2022-12-02 | End: 2022-12-04 | Stop reason: HOSPADM

## 2022-12-02 RX ORDER — DOCUSATE SODIUM 100 MG/1
100 CAPSULE, LIQUID FILLED ORAL 2 TIMES DAILY
Status: DISCONTINUED | OUTPATIENT
Start: 2022-12-02 | End: 2022-12-04 | Stop reason: HOSPADM

## 2022-12-02 RX ORDER — GUAIFENESIN/DEXTROMETHORPHAN 100-10MG/5
5 SYRUP ORAL EVERY 4 HOURS PRN
Status: DISCONTINUED | OUTPATIENT
Start: 2022-12-02 | End: 2022-12-04 | Stop reason: HOSPADM

## 2022-12-02 RX ORDER — IPRATROPIUM BROMIDE AND ALBUTEROL SULFATE 2.5; .5 MG/3ML; MG/3ML
1 SOLUTION RESPIRATORY (INHALATION) 4 TIMES DAILY
Status: DISCONTINUED | OUTPATIENT
Start: 2022-12-02 | End: 2022-12-04 | Stop reason: HOSPADM

## 2022-12-02 RX ADMIN — POLYETHYLENE GLYCOL 3350 17 G: 17 POWDER, FOR SOLUTION ORAL at 12:01

## 2022-12-02 RX ADMIN — OXYCODONE HYDROCHLORIDE AND ACETAMINOPHEN 1000 MG: 500 TABLET ORAL at 21:02

## 2022-12-02 RX ADMIN — ANTACID TABLETS 250 MG: 500 TABLET, CHEWABLE ORAL at 21:02

## 2022-12-02 RX ADMIN — OXYCODONE HYDROCHLORIDE AND ACETAMINOPHEN 1000 MG: 500 TABLET ORAL at 17:09

## 2022-12-02 RX ADMIN — SODIUM CHLORIDE, PRESERVATIVE FREE 10 ML: 5 INJECTION INTRAVENOUS at 21:02

## 2022-12-02 RX ADMIN — OXYCODONE HYDROCHLORIDE AND ACETAMINOPHEN 1000 MG: 500 TABLET ORAL at 12:01

## 2022-12-02 RX ADMIN — IPRATROPIUM BROMIDE AND ALBUTEROL SULFATE 3 ML: .5; 3 SOLUTION RESPIRATORY (INHALATION) at 16:45

## 2022-12-02 RX ADMIN — IPRATROPIUM BROMIDE AND ALBUTEROL SULFATE 3 ML: .5; 3 SOLUTION RESPIRATORY (INHALATION) at 20:37

## 2022-12-02 RX ADMIN — Medication 3 MG: at 21:02

## 2022-12-02 RX ADMIN — DOCUSATE SODIUM 100 MG: 100 CAPSULE, LIQUID FILLED ORAL at 21:02

## 2022-12-02 RX ADMIN — ANTACID TABLETS 250 MG: 500 TABLET, CHEWABLE ORAL at 14:45

## 2022-12-02 RX ADMIN — IOPAMIDOL 75 ML: 755 INJECTION, SOLUTION INTRAVENOUS at 13:51

## 2022-12-02 RX ADMIN — METOPROLOL SUCCINATE 50 MG: 50 TABLET, EXTENDED RELEASE ORAL at 12:01

## 2022-12-02 RX ADMIN — DOCUSATE SODIUM 100 MG: 100 CAPSULE, LIQUID FILLED ORAL at 12:01

## 2022-12-02 RX ADMIN — FUROSEMIDE 40 MG: 10 INJECTION, SOLUTION INTRAMUSCULAR; INTRAVENOUS at 14:45

## 2022-12-02 RX ADMIN — ANTACID TABLETS 250 MG: 500 TABLET, CHEWABLE ORAL at 12:01

## 2022-12-02 RX ADMIN — FUROSEMIDE 40 MG: 40 TABLET ORAL at 12:01

## 2022-12-02 RX ADMIN — REMDESIVIR 200 MG: 100 INJECTION, POWDER, LYOPHILIZED, FOR SOLUTION INTRAVENOUS at 14:47

## 2022-12-02 RX ADMIN — APIXABAN 2.5 MG: 2.5 TABLET, FILM COATED ORAL at 21:02

## 2022-12-02 RX ADMIN — ERGOCALCIFEROL 50000 UNITS: 1.25 CAPSULE ORAL at 12:01

## 2022-12-02 RX ADMIN — IPRATROPIUM BROMIDE AND ALBUTEROL SULFATE 1 AMPULE: .5; 3 SOLUTION RESPIRATORY (INHALATION) at 07:49

## 2022-12-02 RX ADMIN — ALBUTEROL SULFATE 2.5 MG: 2.5 SOLUTION RESPIRATORY (INHALATION) at 08:59

## 2022-12-02 RX ADMIN — ESCITALOPRAM 10 MG: 5 TABLET, FILM COATED ORAL at 12:01

## 2022-12-02 RX ADMIN — METHYLPREDNISOLONE SODIUM SUCCINATE 60 MG: 125 INJECTION, POWDER, FOR SOLUTION INTRAMUSCULAR; INTRAVENOUS at 08:47

## 2022-12-02 RX ADMIN — ZINC SULFATE 220 MG (50 MG) CAPSULE 100 MG: CAPSULE at 12:01

## 2022-12-02 RX ADMIN — LOSARTAN POTASSIUM 25 MG: 25 TABLET, FILM COATED ORAL at 12:01

## 2022-12-02 RX ADMIN — SODIUM CHLORIDE, PRESERVATIVE FREE 10 ML: 5 INJECTION INTRAVENOUS at 12:03

## 2022-12-02 RX ADMIN — APIXABAN 2.5 MG: 2.5 TABLET, FILM COATED ORAL at 12:01

## 2022-12-02 ASSESSMENT — PAIN SCALES - GENERAL
PAINLEVEL_OUTOF10: 0
PAINLEVEL_OUTOF10: 0

## 2022-12-02 NOTE — H&P
ANIRUDH Michel  Covid-History and Physical     Patient: Heber Palomino  Date of Admission: 12/2/2022  6:59 AM  Date of Evaluation: 12/2/2022      Subjective:      Chief Complaint:    Chief Complaint   Patient presents with    Positive For Covid-19     Congestion, hypoxia per assisted living, pt 93% on room air current         History Obtained From:  patient, electronic medical record  PCP: CAMRON Justin CNP    History of Present Illness:   Heber Palomino is a 80 y.o. female who  presented to the emergency room with complaints of   nasal congestion and cough. She is fully vaccinated against Covid-19. She  is from an assisted living facility. .  Symptoms were first noticed  \"a while ago\" . She tested positive for Covid-19 on 12/1/2022. She denies  fever chills, chest pain or palpitations, shortness of breath, dizziness or syncope. .      Review of Systems:  Constitutional:negative  for fevers, and negative for chills. Respiratory: negative for shortness of breath, positive for cough, and negative for wheezing  Cardiovascular: negative for chest pain, negative for palpitations, and negative for syncope  Gastrointestinal: negative for abdominal pain, negative for nausea,negative for vomiting, negative for diarrhea, negative for constipation, and negative for hematochezia or melena  Genitourinary: negative for dysuria, negative for urinary urgency, negative for urinary frequency, and negative for hematuria  Neurological: negative for unilateral weakness, numbness or tingling.     All other systems were reviewed with the patient and are negative except as stated above      History:      Past Medical History:        Diagnosis Date    Abnormal resting ECG findings 1/25/12    Normal sinus rhythm with frequent PACs in a trigeminy pattern    Abnormal resting ECG findings 6/10/2013    Severe sinus bradycardia at 50 bpm.     Acute cystitis without hematuria 12/2/2022    Acute on chronic diastolic congestive heart failure ([address] 6/3/2019    Anxiety     Atrial fibrillation (HCC)     Cholecystitis     Chronic back pain     Pt. c/o upper back pain,,,,\"On the sides of my lungs\"    Hiatal hernia     Holter monitor, abnormal 12    Multiple episodes of SVT between 100 and 130 beats per minute most consistent with atrial fibrillation and/or atrial flutter with variable block. Hypothyroidism     Insomnia     MAC (mycobacterium avium-intracellulare complex)     Normal cardiac stress test 12    low risk study. Paroxysmal atrial fibrillation ([address] 2012    Found on holter monitor  and      Severe sinus bradycardia 6/10/13    At least partially drug induced. Status post placement of implantable loop recorder 10/19/2017    LINQ IMPLANTED. [employer]    Subdural hematoma        Past Surgical History:        Procedure Laterality Date    BRAIN SURGERY      Had a blood clot on the brain. Fell 10 feet. CATARACT REMOVAL Bilateral      SECTION      x1    COLONOSCOPY      several over the years by Dr. Jayashree Ngo Left 10/19/2017    [facility] ARTHROSCOPY Right 2022    BONE FRAGMENT REMOVAL RIGHT KNEE performed by Melly Bhakta MD at [facility] Left 2019    Dr. Marivel Spear 2019    Christelle Ortega dual IS1 small DDD NOT MRI COMPATIBLE performed by Kaelyn Berrios MD at [facility]      tumor removed    UPPER GASTROINTESTINAL ENDOSCOPY      Anjel       Medications Prior to Admission:    Prior to Admission medications    Medication Sig Start Date End Date Taking?  Authorizing Provider   ipratropium-albuterol (DUONEB) 0.5-2.5 (3) MG/3ML SOLN nebulizer solution Inhale 1 vial into the lungs in the morning, at noon, and at bedtime   Yes Historical Provider, MD   omeprazole (PRILOSEC) 20 MG delayed release capsule Take 1 capsule by mouth Daily 10/13/22   Javy Gross APRN - CNP   levothyroxine (SYNTHROID) 100 MCG tablet Take 1 tablet by mouth Daily 10/13/22   Javy Gross APRN - ABELINO   polyethylene glycol (MIRALAX) 17 GM/SCOOP POWD powder Take 17 g by mouth daily    Historical Provider, MD   albuterol (PROVENTIL) (2.5 MG/3ML) 0.083% nebulizer solution Take 3 mLs by nebulization every 6 hours as needed for Wheezing  Patient not taking: No sig reported 7/19/22   Javy Gross APRN - ABELINO   metoprolol succinate (TOPROL XL) 50 MG extended release tablet Take 1 tablet by mouth daily 7/6/22   Isa Robbins PA-C   Calcium 200 MG TABS Take 400 mg by mouth 3 times daily    Historical Provider, MD   escitalopram (LEXAPRO) 10 MG tablet Take 10 mg by mouth daily    Historical Provider, MD   furosemide (LASIX) 40 MG tablet Take 40 mg by mouth daily     Historical Provider, MD   losartan (COZAAR) 25 MG tablet Take 25 mg by mouth daily    Historical Provider, MD   melatonin 3 MG TABS tablet Take 3 mg by mouth daily    Historical Provider, MD   docusate sodium (COLACE) 100 MG capsule Take 100 mg by mouth 2 times daily    Historical Provider, MD   apixaban (ELIQUIS) 2.5 MG TABS tablet Take 1 tablet by mouth 2 times daily 8/23/21   Merissa Solano MD   nitroGLYCERIN (NITROSTAT) 0.4 MG SL tablet Place 1 tablet under the tongue every 5 minutes as needed for Chest pain up to max of 3 total doses. If no relief after 1 dose, call 911. Patient taking differently: Place 0.4 mg under the tongue every 5 minutes as needed for Chest pain up to max of 3 total doses.  If no relief after 1 dose, call 911. 7/13/21   Merissa Solano MD   ondansetron (ZOFRAN) 4 MG tablet Take 4 mg by mouth every 8 hours as needed for Nausea or Vomiting    Historical Provider, MD   albuterol sulfate  (90 Base) MCG/ACT inhaler Inhale 2 puffs into the lungs every 6 hours as needed for Wheezing or Shortness of Breath 8/13/20 7/6/22  Eliezer Sylvester MD   acetaminophen (TYLENOL) 500 MG tablet Take 2 tablets by mouth 4 times daily as needed for Pain 19   Davidson Conway MD       Allergies:  Elemental sulfur, Amiodarone, and Sulfa antibiotics    Social History:   TOBACCO:   reports that she has never smoked. She has never used smokeless tobacco.  ETOH:   reports no history of alcohol use. Family History:       Problem Relation Age of Onset    Stroke Father     Stroke Mother            Objective:    VITALS:  Temp: (!) 96.3 °F (35.7 °C)  BP: 100/62  Resp: 20  Heart Rate: (!) 101  SpO2: 94 % on 2 L nasal cannula  SpO2 range:   SpO2  Av.8 %  Min: 88 %  Max: 95 %  -----------------------------------------------------------------  EXAM:  GEN:    Awake, alert and oriented x3. EYES:  EOMI, pupils equal   NECK: Supple. No lymphadenopathy. No carotid bruit  CVS:    irregularly irregular, no audible murmur  PULM:   rhonchi right mid and bilateral bases , no acute respiratory distress  ABD:    Bowels sounds normal.  Abdomen is soft. No distention. no tenderness to palpation. EXT:   no edema bilaterally . No calf tenderness. NEURO: Moves all extremities. Motor and sensory are grossly intact  SKIN:  No rashes.   No skin lesions.    -----------------------------------------------------------------    DATA:  CBC:   Lab Results   Component Value Date    WBC 7.8 2022    RBC 4.13 2022    HGB 12.8 2022    HCT 39.2 2022    MCV 94.9 2022     2022      CMP:   Lab Results   Component Value Date    GLUCOSE 89 2022    BUN 18 2022    CREATININE 1.15 (H) 2022     2022    K 4.1 2022    CALCIUM 9.8 2022     2022    CO2 28 2022    PROT 7.1 2022    LABALBU 3.9 2022    BILITOT 0.4 2022    ALKPHOS 76 2022    ALT 10 2022    AST 16 2022     UA:   Lab Results   Component Value Date    COLORU Yellow 2022    CLARITYU clear 10/23/2019    SPECGRAV 1.015 2022 WBCUA 10 TO 20 08/01/2022    RBCUA 2 TO 5 08/01/2022    EPITHUA 0 TO 2 08/01/2022    LEUKOCYTESUR LARGE (A) 08/01/2022    GLUCOSEU NEGATIVE 08/01/2022    BLOODU neg 10/23/2019    KETUA NEGATIVE 08/01/2022    PROTEINU NEGATIVE 08/01/2022    HGBUR 1+ (A) 08/01/2022    CASTUA NOT REPORTED 01/17/2020    CRYSTUA NOT REPORTED 01/17/2020    BACTERIA 2+ (A) 08/01/2022    YEAST NOT REPORTED 01/17/2020     Lactic Acid:   Lab Results   Component Value Date    LACTA 1.6 12/17/2019     CRP:   Recent Labs     12/02/22  1100   CRP 35.8*     D-Dimer:  Lab Results   Component Value Date    DDIMER 2.53 (H) 12/02/2022     PT/INR:  Lab Results   Component Value Date/Time    PROTIME 12.0 01/18/2020 08:01 AM    INR 1.1 01/18/2020 08:01 AM     High Sensitivity Troponin:  Recent Labs     12/02/22  0735 12/02/22  0850   TROPHS 41* 38*     ABGs:   Lab Results   Component Value Date/Time    FIO2 TRAUMA 08/12/2018 06:01 PM         EKG reviewed        Imaging Data:  XR CHEST PORTABLE   Final Result   Mild pulmonary vascular congestion with small bilateral pleural effusions. CT CHEST PULMONARY EMBOLISM W CONTRAST    (Results Pending)         Assessment / Plan:      This patient requires inpatient admission because of Covid-19 viral infection  Estimated length of stay is 4 days  Reviewed patient's symptoms and data results including labs and imaging studies per the ER MD notes at time of admission  ID consult regarding initiation of Remdesivir  Received IV Solu-Medrol in ER    Decadron-contraindicated  ID consult regarding Actemra administration   Continue Mucinex-DM  Supplemental O2 to keep SpO2 > 92%  Encouraged continued use of Acapella  Encouraged intermittent prone positioning as tolerated  Infectious Disease consult initiated   Continue respiratory therapy protocol   Continue airborne precautions  Acapella  Trend D-dimer-elevated  CT chest rule out pulmonary emboli  Trend CRP  Trend CMP, CBC with differential  Trend fibrinogen  Acute on chronic diastolic CHF  Hold oral Lasix  IV Lasix 40 mg daily  Echocardiogram  Daily weight  Continue Toprol-XL  Acute hypoxia  Supplemental oxygen to maintain SPO2 greater than 92%  Acapella  Nebs  PAF  Continue Eliquis, Toprol-XL  UTI  Urine culture  IV Rocephin  CKD  Trend labs  Monitor output  DVT prophylaxis: already on chronic anticoagulation  Peptic ulcer prophylaxis: Pepcid  High risk medications: none      Core Measures:     Decubitus ulcer present on admission: No  CODE STATUS: FULL CODE  Nutrition Status: good   Physical therapy: yes  Old Charts reviewed: yes  EKG Reviewed: yes  Advance Directive Addressed: Yes - in chart    CAMRON Cuevas CNP , CAMRON-CNP  12/2/2022  1:00 PM
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
## 2022-12-02 NOTE — PROGRESS NOTES
RESPIRATORY ASSESSMENT PROTOCOL                                                                                              Patient Name: Houston Marie Room#: [id number] : 1933     Admitting diagnosis: COPD exacerbation ([facility] [J44.1]  COVID-19 virus infection [U07.1]  COVID-19 [U07.1]       Medical History:   Past Medical History:   Diagnosis Date    Abnormal resting ECG findings 12    Normal sinus rhythm with frequent PACs in a trigeminy pattern    Abnormal resting ECG findings 6/10/2013    Severe sinus bradycardia at 50 bpm.     Anxiety     Atrial fibrillation (HCC)     Cholecystitis     Chronic back pain     Pt. c/o upper back pain,,,,\"On the sides of my lungs\"    Hiatal hernia     Holter monitor, abnormal 12    Multiple episodes of SVT between 100 and 130 beats per minute most consistent with atrial fibrillation and/or atrial flutter with variable block. Hypothyroidism     Insomnia     MAC (mycobacterium avium-intracellulare complex)     Normal cardiac stress test 12    low risk study. Paroxysmal atrial fibrillation ([facility] 2012    Found on holter monitor  and      Severe sinus bradycardia 6/10/13    At least partially drug induced. Status post placement of implantable loop recorder 10/19/2017    LINQ IMPLANTED.  [employer]    Subdural hematoma        PATIENT ASSESSMENT    LABORATORY DATA  Hematology:   Lab Results   Component Value Date/Time    WBC 7.8 2022 07:35 AM    RBC 4.13 2022 07:35 AM    RBC 4.31 2012 11:05 AM    HGB 12.8 2022 07:35 AM    HCT 39.2 2022 07:35 AM     2022 07:35 AM     2012 11:05 AM     Chemistry:  No results found for: PHART, LHU8BLO, PO2ART, X0FEDCGM, MPY8QTK, PBEA    VITALS  Heart Rate: (!) 101   Resp: 20  BP: 100/62  SpO2: 94 % O2 Device: Nasal cannula  Temp: (!) 96.3 °F (35.7 °C)    SKIN COLOR  [x] Normal  [] Pale  [] Dusky  [] Cyanotic    RESPIRATORY PATTERN  [x] Normal  [] Dyspnea  [] Cheyne-Roa  [] Kussmaul  [] Biots    AMBULATORY  [] Yes  [] No  [x] With Assistance    Patient Acuity 0 1 2 3 4 Score   Level of Concious (LOC) [x]  Alert & Oriented or Pt normal LOC []  Confused;follows directions []  Confused & uncooper-ative []  Obtunded []  Comatose 0   Respiratory Rate  (RR) []  Reg. rate & pattern. 12 - 20 bpm  []  Increased RR. Greater than 20 bpm   [x]  SOB w/ exertion or RR greater than 24 bpm []  Access- ory muscle use at rest. Abn.  resp. []  SOB at rest.   2   Bilateral Breath Sounds (BBS) []  Clear []  Diminish-ed bases  []  Diminish-ed t/o, or rales   [x]  Sporadic, scattered wheezes or rhonchi []  Persistentwheezes and, or absent BBS 3   Cough [x]  Strong, effective, & non-prod. []  Effective & prod. Less than 25 ml (2 TBSP) over past 24 hrs []  Ineffective & non-prod to less than 25 ML over past 24 hrs []  Ineffective and, or greater than 25 ml sputum prod. past 24 hrs. []  Nonspon- taneous; Requires suctioning 0   Pulmonary History  (PULM HX) []  No smoking and no chronic pulmonaryhistory []  Former smoker. Quit over 12 mos. ago []  Current smoker or quit w/ in 12 mos []  Pulm. History and, or 20 pk/yr smoking hx [x]  Admitted w/ acute pulm. dx and, or has been admitted w/ pulm. dx 2 or more times over past 12 mos 4   Surgical History this Admit  (SURG HX) [x]  No surgery []  General surgery []  Lower abdominal []  Thoracic or upper abdominal   []  Thoracic w/ pulm. disease 0   Chest X-Ray (CXR)/CT Scan []  Clear or not applicable []  Not available [x]  Atelect- asis or pleural effusions []  Localized infiltrate or pulm. edema []  Con-solidated Infiltrates, bilateral, or in more than 1 lobe 2   Slow or Forced VC, FEV1 OR PEFR (PULM FXN)  [x]  80% or greater, or not indicated []  Pt. unable to perform []  FEV1 or PEFR or VC 51-79%.   []  FEV1 or PEFR or VC  30-49%   []  FEV1 or PEFR or VC less than 30%   0   TOTAL ACUITY: 11       CARE PLAN    If Acuity Level is 2, 3, or 4 in any of the following:    [x] BILATERAL BREATH SOUNDS (BBS)     [x] PULMONARY HISTORY (PULM HX)  [] PULMONARY FUNCTION (PULM FX)    Goal: Improve respiratory functions in patients with airway disease and decrease WOB    [x] AEROSOL PROTOCOL    Total Acuity:   16-32  []  Secondary Assessment in 24 hrs Total Acuity:  9-15  [x]  Secondary Assessment in 24 hrs Total Acuity:  4-8  []  Secondary Assessment in 48 hrs Total Acuity:  0-3  []  Secondary Assessment in 72 hrs   HHN AEROSOL THERAPY with  [physician-ordered bronchodilator(s)] q 4 & Albuterol PRN q2 hrs. Breath-Actuated Neb if BBS Acuity = 4, and pt. can use MP. Notify physician if condition deteriorates. HHN AEROSOL THERAPY with  [physician-ordered bronchodilator(s)]  QID and Albuterol PRN q4 hrs. Breath-Actuated Neb if BBS Acuity = 4, and pt. can use MP. Notify physician if condition deteriorates. MDI THERAPY with  2 actuations of [physician-ordered bronchodilator(s)] via spacer TID Albuterol and PRNq4 hrs. If unable to utilize MDI: HHN [physician-ordered bronchodilator(s)] TID and Albuterol PRN q4 hrs. Notify physician if condition deteriorates. MDI THERAPY with  [physician-ordered bronchodilator(s)] via spacer TID PRN. If unable to utilize MDI: HHN [physician-ordered bronchodilator(s)] TID PRN. Notify physician if condition deteriorates. If Acuity Level is 2, 3, or 4 in any of the following:    [] COUGH     [] SURGICAL HISTORY (SURG HX)  [x] CHEST XRAY (CXR)    Goal: Improvement in sputum mobilization in patients with ineffective airway clearance. Reverse atelectasis.     [x] Bronchopulmonary Hygiene Protocol    Total Acuity:   16-32  []  Secondary Assessment in 24 hrs Total Acuity:  9-15  [x]  Secondary Assessment in 24 hrs Total Acuity:  4-8  []  Secondary Assessment in 48 hrs Total Acuity:  0-3  []  Secondary Assessment in 72 hrs   METANEB QID with [physician-ordered bronchodilator(s)] if CXR Acuity = 4; otherwise:  PD&P, PEP, or Vest QID & PRN  NT Sxn PRN for ineffective cough  METANEB QID with [physician-ordered bronchodilator(s)] if CXR Acuity = 4; otherwise:  PD&P, PEP, or Vest TID & PRN  NT Sxn PRN for ineffective cough  Instruct patient to self-perform IS q1hr WA   Directed Cough self-performed q1hr WA     If Acuity Level is 2 or above in the following:    [] PULMONARY HISTORY (PULM HX)    Goal: Assist patient in quitting smoking to slow or stop the progression of lung disease.     [] Smoking Cessation Protocol    SMOKING CESSATION EDUCATION provided according to policy [id number]: (akira with an X)  ____Yes    ____ No     ____ NA    Smoking Cessation Booklet given:  ____Yes  ____No ____Patient Henrietta Jason

## 2022-12-02 NOTE — CONSULTS
Remdesivir Initiation Note    This patient meets criteria for initiation of remdesivir based on the following:  Proven COVID-19  Moderate disease (Requiring supplemental O2)  Acceptable hepatic function (ALT within 5 times ULN)    Exclusion Criteria:  Severe disease requiring invasive or non-invasive mechanical ventilation (includes HFNC & BiPAP)  Could consider use in patients requiring high flow if early on in the disease course (based on symptom duration)  Use of more than 1 vasopressor prior to remdesivir initiation  Already improving on supportive treatment and/or impending discharge  Patients in whom the clinical team think death is in the immediate short-term where remdesivir is unlikely to change the clinical outcome     Liver function tests will be monitored daily while on remdesivir.    Latest Reference Range & Units 12/2/22 07:35   ALT 5 - 33 U/L 10   AST <32 U/L 16       Thank you for the consult,  Derrell Grimes PharmD, 12/2/2022 1:42 PM

## 2022-12-02 NOTE — CONSULTS
[facility] - Initial Consult Note COVID 19 Patient  Today's Date and Time: 12/2/2022, 12:25 PM    Impression :     COVID 19 Confirmed Infection  Covid tests:  12-1-22: Positive at her [facility]  Vaccine status:  1-13-21 Yosi Pester  2-10-21  Moderna  12-2-21: Pfizer  8-2-22  Moderna  Cough  A fib   CHF  Pleural effusions  Allergy to sulfa    Recommendations:   Antibiotic treatment:  Monitor off antibiotics  Covid Rx:    Remdesivir: Requested 12-2-22  Decadron: Not indicated  Actemra: Not indicated  Monoclonal antibodies: No longer available  Diuresis as tolerated    Medical Decision Making/Summary/Discussion:12/2/2022     Patient admitted with COVID 19 infection    Infection Control Recommendations   [place] Precautions  Airborne isolation  Droplet Isolation  Isolate until 12-11-22    Antimicrobial Stewardship Recommendations     Discontinuation of therapy  Coordination of Outpatient Care:   Estimated Length of IV antimicrobials:TBD  Patient will need Midline Catheter Insertion: TBD  Patient will need PICC line Insertion: No  Patient will need: Home IV , [place],  SNF,  LTAC:TBD  Patient will need outpatient wound care:No    Chief complaint/reason for consultation:   Concern for COVID infection      History of Present Illness:   Aashish Luis is a 80y.o.-year-old  female who was initially admitted on 12/2/2022. Patient seen at the request of Dr. Blanka Devine. INITIAL HISTORY:    Patient presented through ER on 12-2-22 with complaints of nasal congestion and low 02 saturations at the [facility] care facility where she resides. She has received 4 prior vaccine doses. Tested positive for Covid at the [facility]. She suffers from some degree of senility thus her information is not always accurate. However, she denied fevers, chest pain, SOB or GI symptoms. She admitted to non productive cough and requiring respiratory treatments. In ER she had tachycardia.  02 sat of 92 which improved with 2 L/min nasal 02. CXR: 12-2-22: Vascular congestion with small bilateral effusions      Patient admitted because of concerns with COVID 19.    CURRENT EVALUATION : 12/2/2022    Afebrile  VS stable, tachycardic     Patient exhibiting respiratory distress. Yes  Respiratory secretions: no    Patient receiving supplemental oxygen. 2 L/min nasal 02  RR 20  02 sat 92-->94    NEWS Score: 0-4 Low risk group; 5-6: Medium risk group; 7 or above: High risk group  Parameters 3 2 1 0 1 2 3   Age    < 65   ? 65   RR ? 8  9-11 12-20  21-24 ? 25   O2 Sats ? 91 92-93 94-95 ? 96      Suppl O2  Yes  No      SBP ? 90  101-110 111-219   ? 220   HR ? 40  41-50 51-90  111-130 ? 131   Consciousness    Alert   Drowsiness, lethargy, or confusion   Temperature ? 35.0 C (95.0 F)  35.1-36.0 C 95.1-96.9 F 36.1-38.0 C 97.0-100.4 F 38.1-39.0 C 100.5-102.3 F ? 39.1 C ? 102.4 F      NEWS Score:  12-2-22: 9  at High risk of requiring ICU care      Overall Daily Picture: Worsening    Presence of secondary bacterial Infection:    No   Additional antibiotics: no    Labs, X rays reviewed: 12/2/2022    BUN: 18  Cr: 1.15    WBC: 7.8  Hb: 12.8  Plat: 207    Absolute Neutrophils: 4.3  Absolute Lymphocytes: 2.47  Neutrophil/Lymphocyte Ratio: 1.7 Low risk    CRP: 35.8   Ferritin:  LDH:     Pro Calcitonin:  ProBNP 6,529    Cultures:  Urine:    Blood:    Sputum :    Wound:      CXR:   12-2-22: Vascular congestion with small bilateral effusions  CAT:      Discussed with RN, IM. I have personally reviewed the past medical history, past surgical history, medications, social history, and family history, and I have updated the database accordingly.   Past Medical History:     Past Medical History:   Diagnosis Date    Abnormal resting ECG findings 1/25/12    Normal sinus rhythm with frequent PACs in a trigeminy pattern    Abnormal resting ECG findings 6/10/2013    Severe sinus bradycardia at 50 bpm.     Anxiety     Atrial fibrillation (HCC)     Cholecystitis     Chronic back pain     Pt. c/o upper back pain,,,,\"On the sides of my lungs\"    Hiatal hernia     Holter monitor, abnormal 12    Multiple episodes of SVT between 100 and 130 beats per minute most consistent with atrial fibrillation and/or atrial flutter with variable block. Hypothyroidism     Insomnia     MAC (mycobacterium avium-intracellulare complex)     Normal cardiac stress test 12    low risk study. Paroxysmal atrial fibrillation ([address] 2012    Found on holter monitor  and      Severe sinus bradycardia 6/10/13    At least partially drug induced. Status post placement of implantable loop recorder 10/19/2017    LINQ IMPLANTED. [employer]    Subdural hematoma        Past Surgical  History:     Past Surgical History:   Procedure Laterality Date    BRAIN SURGERY      Had a blood clot on the brain. Fell 10 feet.      CATARACT REMOVAL Bilateral      SECTION      x1    COLONOSCOPY      several over the years by Dr. Addis Campso Left 10/19/2017    [facility]) [place]/ [address] ARTHROSCOPY Right 2022    BONE FRAGMENT REMOVAL RIGHT KNEE performed by Ivette Enciso MD at [address] [facility] [address] Left 2019    Dr. Joni Tellez N/A 2019    Alphonsus Ponds dual IS1 small DDD NOT MRI COMPATIBLE performed by Mariia Worley MD at [address] [facility]      tumor removed    UPPER GASTROINTESTINAL ENDOSCOPY      Anjel       Medications:      apixaban  2.5 mg Oral BID    calcium carbonate  250 mg Oral TID    docusate sodium  100 mg Oral BID    escitalopram  10 mg Oral Daily    furosemide  40 mg Oral Daily    [START ON 12/3/2022] levothyroxine  100 mcg Oral Daily    losartan  25 mg Oral Daily    melatonin  3 mg Oral Nightly    metoprolol succinate  50 mg Oral Daily    [START ON 12/3/2022] pantoprazole  40 mg Oral QAM AC polyethylene glycol  17 g Oral Daily    sodium chloride flush  5-40 mL IntraVENous 2 times per day    ascorbic acid  1,000 mg Oral 4x Daily    zinc sulfate  100 mg Oral Daily    vitamin D  50,000 Units Oral Weekly    ipratropium-albuterol  1 vial Inhalation 4x daily       Social History:     Social History     Socioeconomic History    Marital status:      Spouse name: Not on file    Number of children: Not on file    Years of education: Not on file    Highest education level: Not on file   Occupational History    Not on file   Tobacco Use    Smoking status: Never    Smokeless tobacco: Never   Vaping Use    Vaping Use: Never used   Substance and Sexual Activity    Alcohol use: No    Drug use: No    Sexual activity: Not Currently   Other Topics Concern    Not on file   Social History Narrative    ** Merged History Encounter **          Social Determinants of Health     Financial Resource Strain: Unknown    Difficulty of Paying Living Expenses: Patient refused   Food Insecurity: Unknown    Worried About Running Out of Food in the Last Year: Patient refused    Ran Out of Food in the Last Year: Patient refused   Transportation Needs: Not on file   Physical Activity: Inactive    Days of Exercise per Week: 0 days    Minutes of Exercise per Session: 0 min   Stress: Not on file   Social Connections: Not on file   Intimate Partner Violence: Not on file   Housing Stability: Not on file       Family History:     Family History   Problem Relation Age of Onset    Stroke Father     Stroke Mother         Allergies:   Elemental sulfur, Amiodarone, and Sulfa antibiotics     Review of Systems:       Constitutional: No fevers or chills. No systemic complaints  Head: No headaches  Eyes: No double vision or blurry vision. No conjunctival inflammation. ENT: No sore throat or runny nose. . No hearing loss, tinnitus or vertigo. Nasal congestion. Cardiovascular: No chest pain or palpitations. No Shortness of breath. No [place].  A fib.  Lung: No Shortness of breath. Has dry cough. No sputum production  Abdomen: No nausea, vomiting, diarrhea, or abdominal pain. Harden Beech No cramps. Genitourinary: No increased urinary frequency, or dysuria. No hematuria. No suprapubic or CVA pain  Musculoskeletal: No muscle aches or pains. No joint effusions, swelling or deformities  Hematologic: No bleeding or bruising. Neurologic: No headache, weakness, numbness, or tingling. Forgetful. Integument: No rash, no ulcers. Psychiatric: No depression. Endocrine: No polyuria, no polydipsia, no polyphagia. Physical Examination :   Patient Vitals for the past 8 hrs:   BP Temp Temp src Pulse Resp SpO2 Height Weight   12/02/22 1201 100/62 -- -- (!) 101 -- -- -- --   12/02/22 1055 -- -- -- -- -- -- 5' (1.524 m) --   12/02/22 1015 100/62 (!) 96.3 °F (35.7 °C) Temporal (!) 101 20 -- -- --   12/02/22 0946 -- -- -- -- -- 94 % -- --   12/02/22 0945 (!) 103/57 -- -- -- -- -- -- --   12/02/22 0932 -- -- -- -- -- 95 % -- --   12/02/22 0858 -- -- -- -- -- (!) 88 % -- --   12/02/22 0753 -- -- -- -- -- 93 % -- --   12/02/22 0703 127/80 -- -- -- -- -- -- 99 lb (44.9 kg)   12/02/22 0701 -- 96.9 °F (36.1 °C) Tympanic (!) 101 18 94 % -- --     General Appearance: Awake, alert, and in no apparent distress  Head:  Normocephalic, no trauma  Eyes: Pupils equal, round, reactive to light; sclera anicteric; conjunctivae pink. No embolic phenomena. ENT: Oropharynx clear, without erythema, exudate, or thrush. No tenderness of sinuses. Mouth/throat: mucosa pink and moist. No lesions. Edentulous. Neck:Supple, without lymphadenopathy. Thyroid normal, No bruits. Pulmonary/Chest: Decreased breath sounds   Cardiovascular: Irregular rate and rhythm without murmurs, rubs, or gallops. Tachycardia. Pacemaker in place  Abdomen: Soft, non tender. Bowel sounds normal. No organomegaly  All four Extremities: No cyanosis, clubbing, edema, or effusions.   Neurologic: No gross sensory or motor deficits. Skin: Warm and dry with good turgor. Signs of peripheral arterial insufficiency. No ulcerations. No open wounds. Medical Decision Making -Laboratory:   I have independently reviewed/ordered the following labs:    CBC with Differential:   Recent Labs     12/02/22  0735   WBC 7.8   HGB 12.8   HCT 39.2      LYMPHOPCT 32   MONOPCT 9     BMP:   Recent Labs     12/02/22  0735      K 4.1      CO2 28   BUN 18   CREATININE 1.15*     Hepatic Function Panel:   Recent Labs     12/02/22  0735   PROT 7.1   LABALBU 3.9   BILITOT 0.4   ALKPHOS 76   ALT 10   AST 16     No results for input(s): RPR in the last 72 hours. No results for input(s): HIV in the last 72 hours. No results for input(s): BC in the last 72 hours. Lab Results   Component Value Date/Time    MUCUS NOT REPORTED 01/17/2020 02:22 PM    RBC 4.13 12/02/2022 07:35 AM    RBC 4.31 02/07/2012 11:05 AM    TRICHOMONAS NOT REPORTED 01/17/2020 02:22 PM    WBC 7.8 12/02/2022 07:35 AM    YEAST NOT REPORTED 01/17/2020 02:22 PM    TURBIDITY SLIGHTLY CLOUDY 08/01/2022 01:00 PM     Lab Results   Component Value Date/Time    CREATININE 1.15 12/02/2022 07:35 AM    GLUCOSE 89 12/02/2022 07:35 AM    GLUCOSE 92 02/07/2012 11:05 AM       Medical Decision Making-Imaging:     EXAMINATION:   ONE XRAY VIEW OF THE CHEST       12/2/2022 7:29 am       COMPARISON:   03/31/2021       HISTORY:   ORDERING SYSTEM PROVIDED HISTORY: cough   TECHNOLOGIST PROVIDED HISTORY:   cough       FINDINGS:   Cardiomediastinal silhouette is stable. Mild pulmonary vascular congestion   with small bilateral pleural effusions. No pneumothorax. No gross bony   abnormality. Impression   Mild pulmonary vascular congestion with small bilateral pleural effusions.            Medical Decision [id number]:       Medical Decision Making-Other:     Note:  Labs, medications, radiologic studies were reviewed with personal review of films  Large amounts of data were reviewed  Discussed with nursing Staff, Discharge planner  Infection Control and Prevention measures reviewed  All prior entries were reviewed  Administer medications as ordered  Prognosis: Guarded  Discharge planning reviewed  Follow up as outpatient. Thank you for allowing us to participate in the care of this patient. Please call with questions.     Sarah Matta MD  Pager: [phone] - Office: [phone]
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
## 2022-12-02 NOTE — PROGRESS NOTES
Pt taking her oxygen out of nose from time to time. Sats 95%, fixed oxygen and lowered to 1L at this time.
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
## 2022-12-02 NOTE — PROGRESS NOTES
Told pt we need to get a urine from her and since she is incontinent most of the time that we would need to cath her for a sample. Pt is sitting up in recliner and was not willing to get back in bed at this time. Told her we can try later if she is willing. Call light and cell phone in reach.

## 2022-12-02 NOTE — PROGRESS NOTES
Physical Therapy  Facility/Department: [facility] AT THE [facility] MED SURG  Physical Therapy Initial Assessment    Name: Millicent Travis  : 1933  MRN: [id number]  Date of Service: 2022    Discharge Recommendations:  Continue to assess pending progress, 24 hour supervision or assist, Home with assist PRN, Long Term Care with PT          Patient Diagnosis(es): The primary encounter diagnosis was COVID-19. A diagnosis of COPD exacerbation ([address] was also pertinent to this visit. Past Medical History:  has a past medical history of Abnormal resting ECG findings, Abnormal resting ECG findings, Acute cystitis without hematuria, Acute on chronic diastolic congestive heart failure ([address] Anxiety, Atrial fibrillation ([address] Cholecystitis, Chronic back pain, Hiatal hernia, Holter monitor, abnormal, Hypothyroidism, Insomnia, MAC (mycobacterium avium-intracellulare complex), Normal cardiac stress test, Paroxysmal atrial fibrillation (HCC), Severe sinus bradycardia, Status post placement of implantable loop recorder, and Subdural hematoma. Past Surgical History:  has a past surgical history that includes Foot surgery;  section; Thyroid surgery; brain surgery; Cataract removal (Bilateral); Insertable Cardiac Monitor (Left, 10/19/2017); Pacemaker insertion (Left, 2019); Pacemaker insertion (N/A, 2019); Colonoscopy; Upper gastrointestinal endoscopy (); and Knee arthroscopy (Right, 2022). Assessment   Assessment: Patient is 80year old female with dx of COPD exacerbation who presents with decreased B LE strength, decreased functional mobility and endurance and decreased safety and balance during transfers and ambulation and would benefit from physical therapy to address all concerns and safely return to [facility].   Treatment Diagnosis: General weakness  Therapy Prognosis: Good  Decision Making: Medium Complexity  Requires PT Follow-Up: Yes  Activity Tolerance  Activity Tolerance: Patient tolerated evaluation without incident     Plan   Physcial Therapy Plan  General Plan: 2 times a day 7 days a week (daily on weekends)  Current Treatment Recommendations: Strengthening, ROM, Balance training, Functional mobility training, Gait training, Stair training, Neuromuscular re-education, Transfer training, Home exercise program, Safety education & training, Patient/Caregiver education & training, Endurance training, Therapeutic activities  Safety Devices  Type of Devices: All lilian prominences offloaded, Patient at risk for falls, All fall risk precautions in place, Left in chair, Call light within reach, Nurse notified, Gait belt, Chair alarm in place     Restrictions  Restrictions/Precautions  Restrictions/Precautions: General Precautions, Fall Risk, Isolation     Subjective   General  Chart Reviewed: Yes  Patient assessed for rehabilitation services?: Yes  Response To Previous Treatment: Not applicable  Family / Caregiver Present: No  Referring Practitioner: ANIRUDH Mann  Referral Date : 12/02/22  Diagnosis: COPD exacerbation, J44.1  Follows Commands: Within Functional Limits  Subjective  Subjective: Patient agreeable to PT eval at this time.          Social/Functional History  Social/Functional History  Lives With: Other (comment)  Type of Home: Assisted living  Home Layout: One level  Home Access: Level entry  Home Equipment: adriana Thompson  Has the patient had two or more falls in the past year or any fall with injury in the past year?: Yes  Receives Help From: Other (comment)  ADL Assistance: Needs assistance  Homemaking Assistance: Needs assistance  Homemaking Responsibilities: No  Ambulation Assistance: Non-ambulatory  Transfer Assistance: Needs assistance  Active : No  Additional Comments: Pt lives at [facility] and is non ambulatory, uses walker and assist x1 for stand pivot transfers from bed to w/c  Vision/Hearing  Vision  Vision: Within Functional Limits  Hearing  Hearing: Exceptions to [facility]  Hearing Exceptions: Hard of hearing/hearing concerns    Cognition   Orientation  Overall Orientation Status: Impaired  Orientation Level: Oriented to person     Objective   Heart Rate: (!) 101  Heart Rate Source: Monitor  BP: 100/62  BP Location: Left upper arm  BP Method: Automatic  Patient Position: High fowlers  MAP (Calculated): 75  Resp: 20  SpO2: 94 %  O2 Device: Nasal cannula        Gross Assessment  AROM: Within functional limits  Strength: Generally decreased, functional                    Bed mobility  Supine to Sit: Minimal assistance  Scooting: Minimal assistance  Transfers  Sit to Stand: Minimal Assistance;Contact guard assistance  Stand to Sit: Minimal Assistance;Contact guard assistance  Ambulation  Device: Rolling Walker  Assistance: Contact guard assistance;Minimal assistance  Distance: Pt amb 5ft bed to chair with FWW and CG/minAx1. Balance  Sitting - Static: Good  Sitting - Dynamic: Fair;+  Standing - Static: Fair;-  Standing - Dynamic: Poor           AM-PAC Score     AM-PAC Inpatient Mobility without Stair Climbing Raw Score : 10 (12/02/22 1405)  AM-PAC Inpatient without Stair Climbing T-Scale Score : 34.07 (12/02/22 1405)  Mobility Inpatient CMS 0-100% Score: 71.66 (12/02/22 1405)  Mobility Inpatient without Stair CMS G-Code Modifier : CL (12/02/22 1405)       Goals  Short Term Goals  Time Frame for Short Term Goals: 20 days  Short Term Goal 1: Patient to complete all transfers with CGA and FWW with no LOB to decrease fall risk. Short Term Goal 2: Patient to complete all bed mobility with SUP and have good dynamic sitting balance to decrease fall risk. Short Term Goal 3: Patient to tolerate 20-30 min of ther ex/act to improve functional strength. Short Term Goal 4: Patient to have Fair static standing balance to decrease fall risk. Education  Patient Education  Education Given To: Patient; Family  Education Provided: Role of Therapy;Plan of Care;Transfer Training  Education Method: Verbal  Barriers to Learning: Hearing;Cognition  Education Outcome: Verbalized understanding;Continued education needed      Therapy Time   Individual Concurrent Group Co-treatment   Time In 1245         Time Out 1310         Minutes 25         Timed Code Treatment Minutes: [address], PT, DPT

## 2022-12-02 NOTE — PROGRESS NOTES
Comprehensive Nutrition Assessment    Type and Reason for Visit:  Initial    Nutrition Recommendations/Plan:   Modify diet to include easy to chew. Start 4 oz strawberry ensure enlive TID with meals. Malnutrition Assessment:  Malnutrition Status: At risk for malnutrition (Comment) (12/02/22 1055)    Context:  Acute Illness     Findings of the 6 clinical characteristics of malnutrition:  Energy Intake:  75% or less of estimated energy requirements for 7 or more days  Weight Loss:  No significant weight loss     Body Fat Loss:  Unable to assess     Muscle Mass Loss:  Unable to assess    Fluid Accumulation:  No significant fluid accumulation     Strength:  Not Performed    Nutrition Assessment:    Predicted suboptimal oral intakes r/t impaired respiratory function aeb Pt admitted with COVID-19, reports does not have much of an appetite, never has. States she needs soft foods-will modify diet, can't get used to wearing dentures. Pt is on vitamin C, Vitamin D, and zinc to aid recovery. 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 No taste changes reported. She agrees to strawberry ensure enlive with meals via phone call due to droplet precautions. Nutrition Related Findings:    unable to assess due to droplet precautions Wound Type: None       Current Nutrition Intake & Therapies:    Average Meal Intake: Unable to assess  Average Supplements Intake: None Ordered  ADULT DIET; Regular; Low Fat/Low Chol/High Fiber/2 gm Na    Anthropometric Measures:  Height: 5' (152.4 cm)  Ideal Body Weight (IBW): 100 lbs (45 kg)    Admission Body Weight: 99 lb (44.9 kg)  Current Body Weight: 99 lb (44.9 kg), 99 % IBW.  Weight Source: Bed Scale  Current BMI (kg/m2): 19.3  Usual Body Weight: 92 lb (41.7 kg)  % Weight Change (Calculated): 7.6  Weight Adjustment For: No Adjustment                 BMI Categories: Underweight (BMI less than 22) age over 72    Estimated Daily Nutrient Needs:  Energy Requirements Based On: Kcal/kg  Weight Used for Energy Requirements: Current  Energy (kcal/day): 1751-2251 (30-33/kg)  Weight Used for Protein Requirements: Current  Protein (g/day): 58-67g (1.3-1.5g/kg)  Method Used for Fluid Requirements: 1 ml/kcal  Fluid (ml/day): 1347 ml    Nutrition Diagnosis:   Predicted inadequate energy intake related to impaired respiratory function as evidenced by other (comment) (COVID-19)    Nutrition Interventions:   Food and/or Nutrient Delivery: Start Oral Nutrition Supplement, Modify Current Diet  Nutrition Education/Counseling: No recommendation at this time  Coordination of Nutrition Care: Continue to monitor while inpatient  Plan of Care discussed with: Patient    Goals:     Goals: PO intake 75% or greater  Recent Labs     12/02/22  0735      K 4.1      CO2 28   BUN 18   CREATININE 1.15*   GLUCOSE 89   ALT 10   ALKPHOS 76      Lab Results   Component Value Date/Time    LABALBU 3.9 12/02/2022 07:35 AM          Nutrition Monitoring and Evaluation:   Behavioral-Environmental Outcomes: None Identified  Food/Nutrient Intake Outcomes: Food and Nutrient Intake, Supplement Intake  Physical Signs/Symptoms Outcomes: Biochemical Data, Weight    Discharge Planning:    Continue Oral Nutrition Supplement     Harpreet Presley, [address],   Contact: [phone]

## 2022-12-02 NOTE — PROGRESS NOTES
Turned pt oxygen off seeing she is sating 95-96 on 1L. Currently 95-97% on RA. Replaced probe as well d/t pt removing it.

## 2022-12-03 ENCOUNTER — APPOINTMENT (OUTPATIENT)
Dept: GENERAL RADIOLOGY | Age: 87
DRG: 177 | End: 2022-12-03
Payer: MEDICARE

## 2022-12-03 LAB
ABSOLUTE EOS #: <0.03 K/UL (ref 0–0.44)
ABSOLUTE IMMATURE GRANULOCYTE: <0.03 K/UL (ref 0–0.3)
ABSOLUTE LYMPH #: 2.5 K/UL (ref 1.1–3.7)
ABSOLUTE MONO #: 0.59 K/UL (ref 0.1–1.2)
ALBUMIN SERPL-MCNC: 3.7 G/DL (ref 3.5–5.2)
ALBUMIN/GLOBULIN RATIO: 1.3 (ref 1–2.5)
ALP BLD-CCNC: 71 U/L (ref 35–104)
ALT SERPL-CCNC: 9 U/L (ref 5–33)
ANION GAP SERPL CALCULATED.3IONS-SCNC: 12 MMOL/L (ref 9–17)
AST SERPL-CCNC: 17 U/L
BACTERIA: ABNORMAL
BASOPHILS # BLD: 0 % (ref 0–2)
BASOPHILS ABSOLUTE: <0.03 K/UL (ref 0–0.2)
BILIRUB SERPL-MCNC: 0.2 MG/DL (ref 0.3–1.2)
BILIRUBIN URINE: NEGATIVE
BUN BLDV-MCNC: 29 MG/DL (ref 8–23)
BUN/CREAT BLD: 26 (ref 9–20)
C-REACTIVE PROTEIN: 42.6 MG/L (ref 0–5)
CALCIUM SERPL-MCNC: 8.9 MG/DL (ref 8.6–10.4)
CHLORIDE BLD-SCNC: 100 MMOL/L (ref 98–107)
CO2: 27 MMOL/L (ref 20–31)
COLOR: YELLOW
CREAT SERPL-MCNC: 1.1 MG/DL (ref 0.5–0.9)
D-DIMER QUANTITATIVE: 0.56 MG/L FEU (ref 0–0.59)
EOSINOPHILS RELATIVE PERCENT: 0 % (ref 1–4)
EPITHELIAL CELLS UA: ABNORMAL /HPF (ref 0–25)
FIBRINOGEN: 352 MG/DL (ref 179–518)
GFR SERPL CREATININE-BSD FRML MDRD: 48 ML/MIN/1.73M2
GLUCOSE BLD-MCNC: 88 MG/DL (ref 70–99)
GLUCOSE URINE: NEGATIVE
HCT VFR BLD CALC: 32 % (ref 36.3–47.1)
HEMOGLOBIN: 10.7 G/DL (ref 11.9–15.1)
IMMATURE GRANULOCYTES: 0 %
KETONES, URINE: NEGATIVE
LEUKOCYTE ESTERASE, URINE: ABNORMAL
LYMPHOCYTES # BLD: 32 % (ref 24–43)
MAGNESIUM: 1.9 MG/DL (ref 1.6–2.6)
MCH RBC QN AUTO: 30.7 PG (ref 25.2–33.5)
MCHC RBC AUTO-ENTMCNC: 33.4 G/DL (ref 28.4–34.8)
MCV RBC AUTO: 91.7 FL (ref 82.6–102.9)
MONOCYTES # BLD: 8 % (ref 3–12)
NITRITE, URINE: NEGATIVE
NRBC AUTOMATED: 0 PER 100 WBC
PDW BLD-RTO: 15 % (ref 11.8–14.4)
PH UA: 6 (ref 5–9)
PLATELET # BLD: 192 K/UL (ref 138–453)
PMV BLD AUTO: 9.6 FL (ref 8.1–13.5)
POTASSIUM SERPL-SCNC: 3.5 MMOL/L (ref 3.7–5.3)
PROTEIN UA: NEGATIVE
RBC # BLD: 3.49 M/UL (ref 3.95–5.11)
RBC UA: ABNORMAL /HPF (ref 0–2)
SEG NEUTROPHILS: 60 % (ref 36–65)
SEGMENTED NEUTROPHILS ABSOLUTE COUNT: 4.7 K/UL (ref 1.5–8.1)
SODIUM BLD-SCNC: 139 MMOL/L (ref 135–144)
SPECIFIC GRAVITY UA: 1.02 (ref 1.01–1.02)
TOTAL PROTEIN: 6.6 G/DL (ref 6.4–8.3)
TURBIDITY: CLEAR
URINE HGB: ABNORMAL
UROBILINOGEN, URINE: NORMAL
VITAMIN D 25-HYDROXY: 23.6 NG/ML
WBC # BLD: 7.8 K/UL (ref 3.5–11.3)
WBC UA: ABNORMAL /HPF (ref 0–5)

## 2022-12-03 PROCEDURE — 87186 SC STD MICRODIL/AGAR DIL: CPT

## 2022-12-03 PROCEDURE — 86140 C-REACTIVE PROTEIN: CPT

## 2022-12-03 PROCEDURE — 2580000003 HC RX 258: Performed by: NURSE PRACTITIONER

## 2022-12-03 PROCEDURE — 94664 DEMO&/EVAL PT USE INHALER: CPT

## 2022-12-03 PROCEDURE — 2700000000 HC OXYGEN THERAPY PER DAY

## 2022-12-03 PROCEDURE — 83735 ASSAY OF MAGNESIUM: CPT

## 2022-12-03 PROCEDURE — 87077 CULTURE AEROBIC IDENTIFY: CPT

## 2022-12-03 PROCEDURE — 2580000003 HC RX 258: Performed by: INTERNAL MEDICINE

## 2022-12-03 PROCEDURE — 94669 MECHANICAL CHEST WALL OSCILL: CPT

## 2022-12-03 PROCEDURE — 71045 X-RAY EXAM CHEST 1 VIEW: CPT

## 2022-12-03 PROCEDURE — 6370000000 HC RX 637 (ALT 250 FOR IP): Performed by: NURSE PRACTITIONER

## 2022-12-03 PROCEDURE — 1200000000 HC SEMI PRIVATE

## 2022-12-03 PROCEDURE — 94640 AIRWAY INHALATION TREATMENT: CPT

## 2022-12-03 PROCEDURE — 85384 FIBRINOGEN ACTIVITY: CPT

## 2022-12-03 PROCEDURE — 81001 URINALYSIS AUTO W/SCOPE: CPT

## 2022-12-03 PROCEDURE — 94761 N-INVAS EAR/PLS OXIMETRY MLT: CPT

## 2022-12-03 PROCEDURE — 97110 THERAPEUTIC EXERCISES: CPT

## 2022-12-03 PROCEDURE — 85379 FIBRIN DEGRADATION QUANT: CPT

## 2022-12-03 PROCEDURE — 82306 VITAMIN D 25 HYDROXY: CPT

## 2022-12-03 PROCEDURE — 6360000002 HC RX W HCPCS: Performed by: NURSE PRACTITIONER

## 2022-12-03 PROCEDURE — 6360000002 HC RX W HCPCS: Performed by: INTERNAL MEDICINE

## 2022-12-03 PROCEDURE — 93005 ELECTROCARDIOGRAM TRACING: CPT | Performed by: INTERNAL MEDICINE

## 2022-12-03 PROCEDURE — 85025 COMPLETE CBC W/AUTO DIFF WBC: CPT

## 2022-12-03 PROCEDURE — 6370000000 HC RX 637 (ALT 250 FOR IP): Performed by: FAMILY MEDICINE

## 2022-12-03 PROCEDURE — 80053 COMPREHEN METABOLIC PANEL: CPT

## 2022-12-03 PROCEDURE — 36415 COLL VENOUS BLD VENIPUNCTURE: CPT

## 2022-12-03 PROCEDURE — 87086 URINE CULTURE/COLONY COUNT: CPT

## 2022-12-03 PROCEDURE — 99232 SBSQ HOSP IP/OBS MODERATE 35: CPT | Performed by: INTERNAL MEDICINE

## 2022-12-03 PROCEDURE — 97530 THERAPEUTIC ACTIVITIES: CPT

## 2022-12-03 RX ADMIN — ANTACID TABLETS 250 MG: 500 TABLET, CHEWABLE ORAL at 12:02

## 2022-12-03 RX ADMIN — OXYCODONE HYDROCHLORIDE AND ACETAMINOPHEN 1000 MG: 500 TABLET ORAL at 08:57

## 2022-12-03 RX ADMIN — IPRATROPIUM BROMIDE AND ALBUTEROL SULFATE 3 ML: .5; 3 SOLUTION RESPIRATORY (INHALATION) at 16:18

## 2022-12-03 RX ADMIN — ANTACID TABLETS 250 MG: 500 TABLET, CHEWABLE ORAL at 21:39

## 2022-12-03 RX ADMIN — OXYCODONE HYDROCHLORIDE AND ACETAMINOPHEN 1000 MG: 500 TABLET ORAL at 12:02

## 2022-12-03 RX ADMIN — IPRATROPIUM BROMIDE AND ALBUTEROL SULFATE 3 ML: .5; 3 SOLUTION RESPIRATORY (INHALATION) at 05:21

## 2022-12-03 RX ADMIN — SODIUM CHLORIDE, PRESERVATIVE FREE 10 ML: 5 INJECTION INTRAVENOUS at 21:40

## 2022-12-03 RX ADMIN — SODIUM CHLORIDE, PRESERVATIVE FREE 10 ML: 5 INJECTION INTRAVENOUS at 08:57

## 2022-12-03 RX ADMIN — LOSARTAN POTASSIUM 25 MG: 25 TABLET, FILM COATED ORAL at 08:57

## 2022-12-03 RX ADMIN — APIXABAN 2.5 MG: 2.5 TABLET, FILM COATED ORAL at 21:39

## 2022-12-03 RX ADMIN — IPRATROPIUM BROMIDE AND ALBUTEROL SULFATE 3 ML: .5; 3 SOLUTION RESPIRATORY (INHALATION) at 10:54

## 2022-12-03 RX ADMIN — DOCUSATE SODIUM 100 MG: 100 CAPSULE, LIQUID FILLED ORAL at 21:39

## 2022-12-03 RX ADMIN — GUAIFENESIN AND DEXTROMETHORPHAN 5 ML: 100; 10 SYRUP ORAL at 14:31

## 2022-12-03 RX ADMIN — OXYCODONE HYDROCHLORIDE AND ACETAMINOPHEN 1000 MG: 500 TABLET ORAL at 17:02

## 2022-12-03 RX ADMIN — IPRATROPIUM BROMIDE AND ALBUTEROL SULFATE 3 ML: .5; 3 SOLUTION RESPIRATORY (INHALATION) at 20:37

## 2022-12-03 RX ADMIN — ZINC SULFATE 220 MG (50 MG) CAPSULE 100 MG: CAPSULE at 08:57

## 2022-12-03 RX ADMIN — POLYETHYLENE GLYCOL 3350 17 G: 17 POWDER, FOR SOLUTION ORAL at 08:57

## 2022-12-03 RX ADMIN — PANTOPRAZOLE SODIUM 40 MG: 40 TABLET, DELAYED RELEASE ORAL at 06:59

## 2022-12-03 RX ADMIN — ANTACID TABLETS 250 MG: 500 TABLET, CHEWABLE ORAL at 08:58

## 2022-12-03 RX ADMIN — DOCUSATE SODIUM 100 MG: 100 CAPSULE, LIQUID FILLED ORAL at 08:57

## 2022-12-03 RX ADMIN — ACETAMINOPHEN 1000 MG: 500 TABLET ORAL at 07:03

## 2022-12-03 RX ADMIN — REMDESIVIR 100 MG: 100 INJECTION, POWDER, LYOPHILIZED, FOR SOLUTION INTRAVENOUS at 14:30

## 2022-12-03 RX ADMIN — Medication 3 MG: at 21:39

## 2022-12-03 RX ADMIN — APIXABAN 2.5 MG: 2.5 TABLET, FILM COATED ORAL at 08:57

## 2022-12-03 RX ADMIN — FUROSEMIDE 40 MG: 10 INJECTION, SOLUTION INTRAMUSCULAR; INTRAVENOUS at 08:57

## 2022-12-03 RX ADMIN — OXYCODONE HYDROCHLORIDE AND ACETAMINOPHEN 1000 MG: 500 TABLET ORAL at 21:39

## 2022-12-03 RX ADMIN — LEVOTHYROXINE SODIUM 100 MCG: 100 TABLET ORAL at 06:58

## 2022-12-03 RX ADMIN — ESCITALOPRAM 10 MG: 5 TABLET, FILM COATED ORAL at 08:57

## 2022-12-03 RX ADMIN — METOPROLOL SUCCINATE 50 MG: 50 TABLET, EXTENDED RELEASE ORAL at 08:57

## 2022-12-03 ASSESSMENT — PAIN - FUNCTIONAL ASSESSMENT
PAIN_FUNCTIONAL_ASSESSMENT: ACTIVITIES ARE NOT PREVENTED
PAIN_FUNCTIONAL_ASSESSMENT: ACTIVITIES ARE NOT PREVENTED

## 2022-12-03 ASSESSMENT — PAIN DESCRIPTION - DESCRIPTORS
DESCRIPTORS: ACHING
DESCRIPTORS: PRESSURE

## 2022-12-03 ASSESSMENT — PAIN SCALES - GENERAL
PAINLEVEL_OUTOF10: 3
PAINLEVEL_OUTOF10: 4

## 2022-12-03 ASSESSMENT — PAIN DESCRIPTION - LOCATION
LOCATION: CHEST
LOCATION: GENERALIZED

## 2022-12-03 NOTE — PLAN OF CARE
Problem: Discharge Planning  Goal: Discharge to home or other facility with appropriate resources  Outcome: Progressing  Flowsheets (Taken 12/2/2022 2312)  Discharge to home or other facility with appropriate resources: Identify barriers to discharge with patient and caregiver     Problem: Safety - Adult  Goal: Free from fall injury  Outcome: Progressing  Flowsheets (Taken 12/2/2022 2312)  Free From Fall Injury: Instruct family/caregiver on patient safety     Problem: ABCDS Injury Assessment  Goal: Absence of physical injury  Outcome: Progressing  Flowsheets (Taken 12/2/2022 2312)  Absence of Physical Injury: Implement safety measures based on patient assessment     Problem: Skin/Tissue Integrity  Goal: Absence of new skin breakdown  Description: 1. Monitor for areas of redness and/or skin breakdown  2. Assess vascular access sites hourly  3. Every 4-6 hours minimum:  Change oxygen saturation probe site  4. Every 4-6 hours:  If on nasal continuous positive airway pressure, respiratory therapy assess nares and determine need for appliance change or resting period. Outcome: Progressing  Note: No new skin issues.      Problem: Pain  Goal: Verbalizes/displays adequate comfort level or baseline comfort level  Outcome: Progressing  Flowsheets (Taken 12/2/2022 2312)  Verbalizes/displays adequate comfort level or baseline comfort level:   Encourage patient to monitor pain and request assistance   Administer analgesics based on type and severity of pain and evaluate response   Consider cultural and social influences on pain and pain management   Assess pain using appropriate pain scale   Implement non-pharmacological measures as appropriate and evaluate response   Notify Licensed Independent Practitioner if interventions unsuccessful or patient reports new pain     Problem: Nutrition Deficit:  Goal: Optimize nutritional status  12/2/2022 2312 by Mariann Burrows RN  Outcome: Progressing  Flowsheets (Taken 12/2/2022 2312)  Nutrient intake appropriate for improving, restoring, or maintaining nutritional needs: Monitor oral intake, labs, and treatment plans

## 2022-12-03 NOTE — PROGRESS NOTES
Patient sitting in chair, complains of chest pressure. Ekg, vitals and assessment completed.  Dr Bessie Cifuentes, will wait for response

## 2022-12-03 NOTE — PROGRESS NOTES
Occupational Therapy  Facility/Department: [facility] MED SURG  Daily Treatment Note  NAME: Juana Mendiola  : 1933  MRN: [id number]    Date of Service: 12/3/2022    Discharge Recommendations:  Continue to assess pending progress         Patient Diagnosis(es): The primary encounter diagnosis was COVID-19. A diagnosis of COPD exacerbation ([address] was also pertinent to this visit. Assessment    Activity Tolerance: Patient tolerated treatment well  Discharge Recommendations: Continue to assess pending progress      Plan   Occupational Therapy Plan  Times Per Week: 7x/wk  Times Per Day: Once a day  Current Treatment Recommendations: Strengthening; Functional mobility training; Safety education & training;Self-Care / ADL     Restrictions  Restrictions/Precautions  Restrictions/Precautions: General Precautions; Fall Risk;Isolation    Subjective   Subjective  Subjective: Pt sitting up in bedside chair upon arrival. Pt agreed to participate in therapy session. Pain: Pt reported 3/10 \"all over\" pain. Orientation  Orientation Level: Oriented to person;Disoriented to time;Disoriented to place; Disoriented to situation           Objective    Vitals             OT Exercises  Exercise Treatment: Pt tolerated BUE AROM X 7 planes x 15 reps x 1 set to increase UE strength and endurance in order to ease completion of ADL tasks. Pt required RBs as needed secondary to fatigue. Safety Devices  Type of Devices: Call light within reach; Left in bed; Chair alarm in place     Patient Education  Education Given To: Patient  Education Provided: Role of Therapy;Plan of Care  Education Method: Verbal  Barriers to Learning: Cognition  Education Outcome: Continued education needed    Goals  Short Term Goals  Time Frame for Short Term Goals: 20 visits  Short Term Goal 1: Pt. will tolerate 15 mins of BUE ther ex/act while maintaining SpO2 >90% to increase overall strength/activity tolerance for fxl tasks.   Short Term Goal 2: Pt. will complete toilet transfers with SBA with reduced risk for falls. Short Term Goal 3: Pt. will engage in simple ADL tasks with decreased assist for safe return to [facility]ENN[facility]ER.        Therapy Time   Individual Concurrent Group Co-treatment   Time In 1107         Time Out 1130         Minutes 23                 ENEDINA Mcbride/JESSICA

## 2022-12-03 NOTE — PROGRESS NOTES
Patient noted to be incontinent of urine. Brief changed. Linens changed. Patient cleaned up. Patient denies any further needs or concerns. Water pitcher refilled. Linens taken out of room. Call light and over bed table in reach. Side rails up times two.

## 2022-12-03 NOTE — PROGRESS NOTES
[facility] [address] Patient  Today's Date and Time: 12/3/2022, 1:59 PM    Impression :     COVID 19 Confirmed Infection  Covid tests:  12-1-22: Positive at her [facility]  Vaccine status:  1-13-21 Moderna  2-10-21  Moderna  12-2-21: Pfizer  8-2-22  Moderna  Cough  A fib   CHF  Pleural effusions  Allergy to sulfa    Recommendations:   Antibiotic treatment:  Monitor off antibiotics  Covid Rx:    Remdesivir: Requested 12-2-22. Stop date 12-6-22  Decadron: Not indicated  Actemra: Not indicated  Monoclonal antibodies: No longer available  Diuresis as tolerated    Medical Decision Making/Summary/Discussion:12/3/2022     Patient admitted with COVID 19 infection    Infection Control Recommendations   [place] Precautions  Airborne isolation  Droplet Isolation  Isolate until 12-11-22    Antimicrobial Stewardship Recommendations     Discontinuation of therapy  Coordination of Outpatient Care:   Estimated Length of IV antimicrobials:TBD  Patient will need Midline Catheter Insertion: TBD  Patient will need PICC line Insertion: No  Patient will need: Home IV , [place],  SNF,  LTAC:TBD  Patient will need outpatient wound care:No    Chief complaint/reason for consultation:   Concern for COVID infection      History of Present Illness:   Brandon Benedict is a 80y.o.-year-old  female who was initially admitted on 12/2/2022. Patient seen at the request of Dr. Adolfo Jenkins. INITIAL HISTORY:    Patient presented through ER on 12-2-22 with complaints of nasal congestion and low 02 saturations at the [facility] care facility where she resides. She has received 4 prior vaccine doses. Tested positive for Covid at the [facility]. She suffers from some degree of senility thus her information is not always accurate. However, she denied fevers, chest pain, SOB or GI symptoms. She admitted to non productive cough and requiring respiratory treatments. In ER she had tachycardia. 02 sat of 92 which improved with 2 L/min nasal 02. CXR: 12-2-22: Vascular congestion with small bilateral effusions      Patient admitted because of concerns with COVID 19.    CURRENT EVALUATION : 12/3/2022    Afebrile  VS stable. Tachycardia improved  Off nasal 02. On room air    Patient stable  No complaints  No new issues per RN    Patient exhibiting respiratory distress. Yes  Respiratory secretions: no    Patient receiving supplemental oxygen. 2 L/min nasal 02-->Room air  RR 20-->18  02 sat 92-->94-->95    NEWS Score: 0-4 Low risk group; 5-6: Medium risk group; 7 or above: High risk group  Parameters 3 2 1 0 1 2 3   Age    < 65   ? 65   RR ? 8  9-11 12-20  21-24 ? 25   O2 Sats ? 91 92-93 94-95 ? 96      Suppl O2  Yes  No      SBP ? 90  101-110 111-219   ? 220   HR ? 40  41-50 51-90  111-130 ? 131   Consciousness    Alert   Drowsiness, lethargy, or confusion   Temperature ? 35.0 C (95.0 F)  35.1-36.0 C 95.1-96.9 F 36.1-38.0 C 97.0-100.4 F 38.1-39.0 C 100.5-102.3 F ? 39.1 C ? 102.4 F      NEWS Score:  12-2-22: 9  at High risk of requiring ICU care    Overall Daily Picture:     Improving    Presence of secondary bacterial Infection:    No   Additional antibiotics: no    Labs, X rays reviewed: 12/3/2022    BUN: 18-->29  Cr: 1.15-->1.10    WBC: 7.8  Hb: 12.8-->10.7  Plat: 207-->192    Absolute Neutrophils: 4.3  Absolute Lymphocytes: 2.47  Neutrophil/Lymphocyte Ratio: 1.7 Low risk    CRP: 35.8 -->42.6  Ferritin:  LDH:     Pro Calcitonin:  ProBNP 6,529    Cultures:  Urine:    Blood:    Sputum :    Wound:      CXR:   12-2-22: Vascular congestion with small bilateral effusions  CAT:      Discussed with RN, JABIER. I have personally reviewed the past medical history, past surgical history, medications, social history, and family history, and I have updated the database accordingly.   Past Medical History:     Past Medical History:   Diagnosis Date    Abnormal resting ECG findings 1/25/12    Normal sinus rhythm with frequent PACs in a trigeminy pattern    Abnormal resting ECG findings 6/10/2013    Severe sinus bradycardia at 50 bpm.     Acute cystitis without hematuria 2022    Acute on chronic diastolic congestive heart failure ([address] 6/3/2019    Anxiety     Atrial fibrillation (HCC)     Cholecystitis     Chronic back pain     Pt. c/o upper back pain,,,,\"On the sides of my lungs\"    Hiatal hernia     Holter monitor, abnormal 12    Multiple episodes of SVT between 100 and 130 beats per minute most consistent with atrial fibrillation and/or atrial flutter with variable block. Hypothyroidism     Insomnia     MAC (mycobacterium avium-intracellulare complex)     Normal cardiac stress test 12    low risk study. Paroxysmal atrial fibrillation ([address] 2012    Found on holter monitor  and      Severe sinus bradycardia 6/10/13    At least partially drug induced. Status post placement of implantable loop recorder 10/19/2017    LINQ IMPLANTED. [employer]    Subdural hematoma        Past Surgical  History:     Past Surgical History:   Procedure Laterality Date    BRAIN SURGERY      Had a blood clot on the brain. Fell 10 feet.      CATARACT REMOVAL Bilateral      SECTION      x1    COLONOSCOPY      several over the years by Dr. Syd Schwartz 10/19/2017    [facility] ARTHROSCOPY Right 2022    BONE FRAGMENT REMOVAL RIGHT KNEE performed by Elvis Aschoff, MD at [address] [facility] [address] Schwartz 2019    Dr. Maikol Jackson 2019    [place] dual IS1 small DDD NOT MRI COMPATIBLE performed by Rose Taylor MD at [address]      tumor removed    UPPER GASTROINTESTINAL ENDOSCOPY      Anjel       Medications:      apixaban  2.5 mg Oral BID    calcium carbonate  250 mg Oral TID    docusate sodium  100 mg Oral BID    escitalopram  10 mg Oral Daily    [Held by provider] furosemide  40 mg Oral Daily    levothyroxine  100 mcg Oral Daily    losartan  25 mg Oral Daily    melatonin  3 mg Oral Nightly    metoprolol succinate  50 mg Oral Daily    pantoprazole  40 mg Oral QAM AC    polyethylene glycol  17 g Oral Daily    sodium chloride flush  5-40 mL IntraVENous 2 times per day    ascorbic acid  1,000 mg Oral 4x Daily    zinc sulfate  100 mg Oral Daily    vitamin D  50,000 Units Oral Weekly    ipratropium-albuterol  1 vial Inhalation 4x daily    furosemide  40 mg IntraVENous Daily    remdesivir IVPB  100 mg IntraVENous Q24H       Social History:     Social History     Socioeconomic History    Marital status:       Spouse name: Not on file    Number of children: Not on file    Years of education: Not on file    Highest education level: Not on file   Occupational History    Not on file   Tobacco Use    Smoking status: Never    Smokeless tobacco: Never   Vaping Use    Vaping Use: Never used   Substance and Sexual Activity    Alcohol use: No    Drug use: No    Sexual activity: Not Currently   Other Topics Concern    Not on file   Social History Narrative    ** Merged History Encounter **          Social Determinants of Health     Financial Resource Strain: Unknown    Difficulty of Paying Living Expenses: Patient refused   Food Insecurity: Unknown    Worried About Running Out of Food in the Last Year: Patient refused    Ran Out of Food in the Last Year: Patient refused   Transportation Needs: Not on file   Physical Activity: Inactive    Days of Exercise per Week: 0 days    Minutes of Exercise per Session: 0 min   Stress: Not on file   Social Connections: Not on file   Intimate Partner Violence: Not on file   Housing Stability: Not on file       Family History:     Family History   Problem Relation Age of Onset    Stroke Father     Stroke Mother         Allergies:   Elemental sulfur, Amiodarone, and Sulfa antibiotics     Review of Systems: Constitutional: No fevers or chills. No systemic complaints  Head: No headaches  Eyes: No double vision or blurry vision. No conjunctival inflammation. ENT: No sore throat or runny nose. . No hearing loss, tinnitus or vertigo. Nasal congestion. Cardiovascular: No chest pain or palpitations. No Shortness of breath. No [place]. A fib. Lung: No Shortness of breath. Has dry cough. No sputum production  Abdomen: No nausea, vomiting, diarrhea, or abdominal pain. Deo Mering No cramps. Genitourinary: No increased urinary frequency, or dysuria. No hematuria. No suprapubic or CVA pain  Musculoskeletal: No muscle aches or pains. No joint effusions, swelling or deformities  Hematologic: No bleeding or bruising. Neurologic: No headache, weakness, numbness, or tingling. Forgetful. Integument: No rash, no ulcers. Psychiatric: No depression. Endocrine: No polyuria, no polydipsia, no polyphagia. Physical Examination :   Patient Vitals for the past 8 hrs:   BP Temp Temp src Pulse Resp SpO2   12/03/22 0703 101/72 97 °F (36.1 °C) Temporal (!) 112 18 92 %       General Appearance: Awake, alert, and in no apparent distress  Head:  Normocephalic, no trauma  Eyes: Pupils equal, round, reactive to light; sclera anicteric; conjunctivae pink. No embolic phenomena. ENT: Oropharynx clear, without erythema, exudate, or thrush. No tenderness of sinuses. Mouth/throat: mucosa pink and moist. No lesions. Edentulous. Neck:Supple, without lymphadenopathy. Thyroid normal, No bruits. Pulmonary/Chest: Decreased breath sounds   Cardiovascular: Irregular rate and rhythm without murmurs, rubs, or gallops. Tachycardia. Pacemaker in place  Abdomen: Soft, non tender. Bowel sounds normal. No organomegaly  All four Extremities: No cyanosis, clubbing, edema, or effusions. Neurologic: No gross sensory or motor deficits. Skin: Warm and dry with good turgor. Signs of peripheral arterial insufficiency. No ulcerations. No open wounds.     Medical Decision Making -Laboratory:   I have independently reviewed/ordered the following labs:    CBC with Differential:   Recent Labs     12/02/22  0735 12/03/22  0610   WBC 7.8 7.8   HGB 12.8 10.7*   HCT 39.2 32.0*    192   LYMPHOPCT 32 32   MONOPCT 9 8       BMP:   Recent Labs     12/02/22  0735 12/03/22  0610    139   K 4.1 3.5*    100   CO2 28 27   BUN 18 29*   CREATININE 1.15* 1.10*   MG  --  1.9       Hepatic Function Panel:   Recent Labs     12/02/22  0735 12/03/22  0610   PROT 7.1 6.6   LABALBU 3.9 3.7   BILITOT 0.4 0.2*   ALKPHOS 76 71   ALT 10 9   AST 16 17       No results for input(s): RPR in the last 72 hours. No results for input(s): HIV in the last 72 hours. No results for input(s): BC in the last 72 hours. Lab Results   Component Value Date/Time    MUCUS NOT REPORTED 01/17/2020 02:22 PM    RBC 3.49 12/03/2022 06:10 AM    RBC 4.31 02/07/2012 11:05 AM    TRICHOMONAS NOT REPORTED 01/17/2020 02:22 PM    WBC 7.8 12/03/2022 06:10 AM    YEAST NOT REPORTED 01/17/2020 02:22 PM    TURBIDITY Clear 12/03/2022 10:15 AM     Lab Results   Component Value Date/Time    CREATININE 1.10 12/03/2022 06:10 AM    GLUCOSE 88 12/03/2022 06:10 AM    GLUCOSE 92 02/07/2012 11:05 AM       Medical Decision Making-Imaging:     EXAMINATION:   ONE XRAY VIEW OF THE CHEST       12/2/2022 7:29 am       COMPARISON:   03/31/2021       HISTORY:   ORDERING SYSTEM PROVIDED HISTORY: cough   TECHNOLOGIST PROVIDED HISTORY:   cough       FINDINGS:   Cardiomediastinal silhouette is stable. Mild pulmonary vascular congestion   with small bilateral pleural effusions. No pneumothorax. No gross bony   abnormality. Impression   Mild pulmonary vascular congestion with small bilateral pleural effusions.            Medical Decision [place]:       Medical Decision Making-Other:     Note:  Labs, medications, radiologic studies were reviewed with personal review of films  Large amounts of data were reviewed  Discussed with nursing Staff, Discharge planner  Infection Control and Prevention measures reviewed  All prior entries were reviewed  Administer medications as ordered  Prognosis: Guarded  Discharge planning reviewed  Follow up as outpatient. Thank you for allowing us to participate in the care of this patient. Please call with questions.     Bonita Contreras MD  Pager: [phone] - Office: [phone]

## 2022-12-03 NOTE — PROGRESS NOTES
Patient in bed, awake. Alert and oriented to self and place only. Disoriented to time and situation. Patient denies of pain, shortness of breath, numbness, tingling, or chest pain. Attempted to educated patient on medication to be given tonight and physician's orders to be completed. Patient unable to express understanding. Writer encouraged patient to ask questions. Patient denies of any questions at this time. Assessment completed at this time. Noted patient to have end expiratory wheezes in bilateral lungs. Cough noted. Bowel sounds active in all four quadrants. No edema noted. Patient denies of needs or concerns at this time. Call light and over bed table in reach. Side rails up times two.

## 2022-12-03 NOTE — PROGRESS NOTES
Attempted to collect urine sample from patient. Unable to obtain at this time. Patient cleaned up. Brief changed. Noted small bowel movement and incontinence of urine. Reassessment completed at this time. Vitals taken and documented. Patient encouraged to ask questions. Patient denies pain or complaints. Call light and bed side table within reach. Side rails up times two.

## 2022-12-03 NOTE — PROGRESS NOTES
Physical Therapy  Facility/Department: [address] [place] [address] MED SURG  Daily Treatment Note  NAME: Jonathan Naik  : 1933  MRN: [id number]    Date of Service: 12/3/2022    Discharge Recommendations:  Continue to assess pending progress, 24 hour supervision or assist, Home with assist PRN, Long Term Care with PT        Patient Diagnosis(es): The primary encounter diagnosis was COVID-19. A diagnosis of COPD exacerbation ([address] was also pertinent to this visit. Assessment   Assessment: Patient able to complete bed mobility with only SBA today. Continues to require CG/minAx1 for amb 5ft bed to chair. Will continue  Activity Tolerance: Patient tolerated treatment well     Plan    Physcial Therapy Plan  General Plan: 2 times a day 7 days a week  Current Treatment Recommendations: Strengthening;ROM;Balance training;Functional mobility training;Gait training;Stair training;Neuromuscular re-education;Transfer training;Home exercise program;Safety education & training;Patient/Caregiver education & training; Endurance training; Therapeutic activities     Restrictions  Restrictions/Precautions  Restrictions/Precautions: General Precautions, Fall Risk, Isolation     Subjective    Subjective  Subjective: Pt denies pain and agrees to PT treatment  Orientation  Orientation Level: Oriented to person;Disoriented to time;Disoriented to place; Disoriented to situation     Objective   Vitals     Bed Mobility Training  Bed Mobility Training: Yes  Overall Level of Assistance: Stand-by assistance  Supine to Sit: Stand-by assistance;Assist X1  Sit to Supine: Stand-by assistance;Assist X1  Scooting: Stand-by assistance;Assist X1  Balance  Sitting: Intact  Standing: Impaired  Standing - Static: Fair  Standing - Dynamic: Occasional  Transfer Training  Transfer Training: Yes  Sit to Stand: Contact-guard assistance;Assist X1  Stand to Sit: Contact-guard assistance;Assist X1  Gait Training  Gait Training: Yes  Gait  Overall Level of Assistance: Contact-guard assistance;Minimum assistance;Assist X1  Base of Support: Narrowed  Step Length: Left shortened;Right shortened  Gait Abnormalities: Shuffling gait;Scissoring  Distance (ft): 5 Feet  Assistive Device: Walker, rolling     PT Exercises  Exercise Treatment: Seated B LE ther ex x15 all major joints and planes with vc's for technique     Safety Devices  Type of Devices: Left in chair;Chair alarm in place;Nurse notified;Call light within reach; Patient at risk for falls; All lilian prominences offloaded; All fall risk precautions in place       Goals  Short Term Goals  Time Frame for Short Term Goals: 20 days  Short Term Goal 1: Patient to complete all transfers with CGA and FWW with no LOB to decrease fall risk. Short Term Goal 2: Patient to complete all bed mobility with SUP and have good dynamic sitting balance to decrease fall risk. Short Term Goal 3: Patient to tolerate 20-30 min of ther ex/act to improve functional strength. Short Term Goal 4: Patient to have Fair static standing balance to decrease fall risk. Education  Patient Education  Education Given To: Patient; Family  Education Provided: Role of Therapy;Plan of Care;Transfer Training  Education Method: Verbal  Barriers to Learning: Hearing;Cognition  Education Outcome: Verbalized understanding;Continued education needed    Therapy Time   Individual Concurrent Group Co-treatment   Time In 1010         Time Out 1034         Minutes 24         Timed Code Treatment Minutes: 2345 [facility] Erasmo Man PT, DPT

## 2022-12-03 NOTE — PROGRESS NOTES
RESPIRATORY ASSESSMENT PROTOCOL                                                                                              Patient Name: Vamsi Rodriguez Room#: 9444/8719-04 : 1933     Admitting diagnosis: COPD exacerbation ([facility] [J44.1]  COVID-19 virus infection [U07.1]  COVID-19 [U07.1]       Medical History:   Past Medical History:   Diagnosis Date    Abnormal resting ECG findings 12    Normal sinus rhythm with frequent PACs in a trigeminy pattern    Abnormal resting ECG findings 6/10/2013    Severe sinus bradycardia at 50 bpm.     Acute cystitis without hematuria 2022    Acute on chronic diastolic congestive heart failure ([facility] 6/3/2019    Anxiety     Atrial fibrillation (HCC)     Cholecystitis     Chronic back pain     Pt. c/o upper back pain,,,,\"On the sides of my lungs\"    Hiatal hernia     Holter monitor, abnormal 12    Multiple episodes of SVT between 100 and 130 beats per minute most consistent with atrial fibrillation and/or atrial flutter with variable block. Hypothyroidism     Insomnia     MAC (mycobacterium avium-intracellulare complex)     Normal cardiac stress test 12    low risk study. Paroxysmal atrial fibrillation ([facility] 2012    Found on holter monitor  and      Severe sinus bradycardia 6/10/13    At least partially drug induced. Status post placement of implantable loop recorder 10/19/2017    LINQ IMPLANTED.  [employer]    Subdural hematoma        PATIENT ASSESSMENT    LABORATORY DATA  Hematology:   Lab Results   Component Value Date/Time    WBC 7.8 2022 06:10 AM    RBC 3.49 2022 06:10 AM    RBC 4.31 2012 11:05 AM    HGB 10.7 2022 06:10 AM    HCT 32.0 2022 06:10 AM     2022 06:10 AM     2012 11:05 AM     Chemistry:  No results found for: PHART, UWQ0ALX, PO2ART, A6MBCDAJ, AMH2HXB, PBEA    VITALS  Heart Rate: (!) 112   Resp: 18  BP: 101/72  SpO2: 92 % O2 Device: None (Room air)  Temp: 97 °F (36.1 °C)    SKIN COLOR  [x] Normal  [] Pale  [] Dusky  [] Cyanotic    RESPIRATORY PATTERN  [x] Normal  [] Dyspnea  [] Cheyne-Roa  [] Kussmaul  [] Biots    AMBULATORY  [] Yes  [x] No  [x] With Assistance      Patient Acuity 0 1 2 3 4 Score   Level of Concious (LOC) []  Alert & Oriented or Pt normal LOC [x]  Confused;follows directions []  Confused & uncooper-ative []  Obtunded []  Comatose 1   Respiratory Rate  (RR) []  Reg. rate & pattern. 12 - 20 bpm  []  Increased RR. Greater than 20 bpm   [x]  SOB w/ exertion or RR greater than 24 bpm []  Access- ory muscle use at rest. Abn.  resp. []  SOB at rest.   2   Bilateral Breath Sounds (BBS) []  Clear []  Diminish-ed bases  [x]  Diminish-ed t/o, or rales   []  Sporadic, scattered wheezes or rhonchi []  Persistentwheezes and, or absent BBS 2   Cough [x]  Strong, effective, & non-prod. []  Effective & prod. Less than 25 ml (2 TBSP) over past 24 hrs []  Ineffective & non-prod to less than 25 ML over past 24 hrs []  Ineffective and, or greater than 25 ml sputum prod. past 24 hrs. []  Nonspon- taneous; Requires suctioning 0   Pulmonary History  (PULM HX) []  No smoking and no chronic pulmonaryhistory []  Former smoker. Quit over 12 mos. ago []  Current smoker or quit w/ in 12 mos []  Pulm. History and, or 20 pk/yr smoking hx [x]  Admitted w/ acute pulm. dx and, or has been admitted w/ pulm. dx 2 or more times over past 12 mos 4   Surgical History this Admit  (SURG HX) [x]  No surgery []  General surgery []  Lower abdominal []  Thoracic or upper abdominal   []  Thoracic w/ pulm. disease 0   Chest X-Ray (CXR)/CT Scan []  Clear or not applicable []  Not available [x]  Atelect- asis or pleural effusions []  Localized infiltrate or pulm. edema []  Con-solidated Infiltrates, bilateral, or in more than 1 lobe 2   Slow or Forced VC, FEV1 OR PEFR (PULM FXN)  [x]  80% or greater, or not indicated []  Pt. unable to perform []  FEV1 or PEFR or VC 51-79%.   []  FEV1 or PEFR or VC 30-49%   []  FEV1 or PEFR or VC less than 30%   0   TOTAL ACUITY: 11       CARE PLAN    If Acuity Level is 2, 3, or 4 in any of the following:    [x] BILATERAL BREATH SOUNDS (BBS)     [x] PULMONARY HISTORY (PULM HX)  [] PULMONARY FUNCTION (PULM FX)    Goal: Improve respiratory functions in patients with airway disease and decrease WOB    [x] AEROSOL PROTOCOL    Total Acuity:   16-32  []  Secondary Assessment in 24 hrs Total Acuity:  9-15  [x]  Secondary Assessment in 24 hrs Total Acuity:  4-8  []  Secondary Assessment in 48 hrs Total Acuity:  0-3  []  Secondary Assessment in 72 hrs   HHN AEROSOL THERAPY with  [physician-ordered bronchodilator(s)] q 4 & Albuterol PRN q2 hrs. Breath-Actuated Neb if BBS Acuity = 4, and pt. can use MP. Notify physician if condition deteriorates. HHN AEROSOL THERAPY with  [physician-ordered bronchodilator(s)]  QID and Albuterol PRN q4 hrs. Breath-Actuated Neb if BBS Acuity = 4, and pt. can use MP. Notify physician if condition deteriorates. MDI THERAPY with  2 actuations of [physician-ordered bronchodilator(s)] via spacer TID Albuterol and PRNq4 hrs. If unable to utilize MDI: HHN [physician-ordered bronchodilator(s)] TID and Albuterol PRN q4 hrs. Notify physician if condition deteriorates. MDI THERAPY with  [physician-ordered bronchodilator(s)] via spacer TID PRN. If unable to utilize MDI: HHN [physician-ordered bronchodilator(s)] TID PRN. Notify physician if condition deteriorates. If Acuity Level is 2, 3, or 4 in any of the following:    [] COUGH     [] SURGICAL HISTORY (SURG HX)  [x] CHEST XRAY (CXR)    Goal: Improvement in sputum mobilization in patients with ineffective airway clearance. Reverse atelectasis.     [x] Bronchopulmonary Hygiene Protocol    Total Acuity:   16-32  []  Secondary Assessment in 24 hrs Total Acuity:  9-15  [x]  Secondary Assessment in 24 hrs Total Acuity:  4-8  []  Secondary Assessment in 48 hrs Total Acuity:  0-3  []  Secondary Assessment in 72 hrs   METANEB QID with [physician-ordered bronchodilator(s)] if CXR Acuity = 4; otherwise:  PD&P, PEP, or Vest QID & PRN  NT Sxn PRN for ineffective cough  METANEB QID with [physician-ordered bronchodilator(s)] if CXR Acuity = 4; otherwise:  PD&P, PEP, or Vest TID & PRN  NT Sxn PRN for ineffective cough  Instruct patient to self-perform IS q1hr WA   Directed Cough self-performed q1hr WA     If Acuity Level is 2 or above in the following:    [] PULMONARY HISTORY (PULM HX)    Goal: Assist patient in quitting smoking to slow or stop the progression of lung disease.     [] Smoking Cessation Protocol    SMOKING CESSATION EDUCATION provided according to policy BF_[id number]: (akira with an X)  ____Yes    ____ No     ____ NA    Smoking Cessation Booklet given:  ____Yes  ____No ____Patient Rhiannon Aly
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
## 2022-12-04 VITALS
HEIGHT: 60 IN | TEMPERATURE: 97.4 F | WEIGHT: 104.4 LBS | SYSTOLIC BLOOD PRESSURE: 98 MMHG | HEART RATE: 98 BPM | OXYGEN SATURATION: 95 % | DIASTOLIC BLOOD PRESSURE: 65 MMHG | BODY MASS INDEX: 20.5 KG/M2 | RESPIRATION RATE: 18 BRPM

## 2022-12-04 LAB
ABSOLUTE EOS #: 0.23 K/UL (ref 0–0.44)
ABSOLUTE IMMATURE GRANULOCYTE: <0.03 K/UL (ref 0–0.3)
ABSOLUTE LYMPH #: 2.56 K/UL (ref 1.1–3.7)
ABSOLUTE MONO #: 0.43 K/UL (ref 0.1–1.2)
ALBUMIN SERPL-MCNC: 3.6 G/DL (ref 3.5–5.2)
ALBUMIN/GLOBULIN RATIO: 1.2 (ref 1–2.5)
ALP BLD-CCNC: 68 U/L (ref 35–104)
ALT SERPL-CCNC: 10 U/L (ref 5–33)
ANION GAP SERPL CALCULATED.3IONS-SCNC: 11 MMOL/L (ref 9–17)
AST SERPL-CCNC: 17 U/L
BASOPHILS # BLD: 0 % (ref 0–2)
BASOPHILS ABSOLUTE: 0.03 K/UL (ref 0–0.2)
BILIRUB SERPL-MCNC: 0.2 MG/DL (ref 0.3–1.2)
BUN BLDV-MCNC: 31 MG/DL (ref 8–23)
BUN/CREAT BLD: 27 (ref 9–20)
C-REACTIVE PROTEIN: 25 MG/L (ref 0–5)
CALCIUM SERPL-MCNC: 9.1 MG/DL (ref 8.6–10.4)
CHLORIDE BLD-SCNC: 100 MMOL/L (ref 98–107)
CO2: 28 MMOL/L (ref 20–31)
CREAT SERPL-MCNC: 1.14 MG/DL (ref 0.5–0.9)
D-DIMER QUANTITATIVE: 0.65 MG/L FEU (ref 0–0.59)
EKG ATRIAL RATE: 288 BPM
EKG Q-T INTERVAL: 394 MS
EKG QRS DURATION: 84 MS
EKG QTC CALCULATION (BAZETT): 476 MS
EKG R AXIS: 50 DEGREES
EKG T AXIS: -46 DEGREES
EKG VENTRICULAR RATE: 88 BPM
EOSINOPHILS RELATIVE PERCENT: 3 % (ref 1–4)
FIBRINOGEN: 361 MG/DL (ref 179–518)
GFR SERPL CREATININE-BSD FRML MDRD: 46 ML/MIN/1.73M2
GLUCOSE BLD-MCNC: 71 MG/DL (ref 70–99)
HCT VFR BLD CALC: 34.4 % (ref 36.3–47.1)
HEMOGLOBIN: 11.1 G/DL (ref 11.9–15.1)
IMMATURE GRANULOCYTES: 0 %
LYMPHOCYTES # BLD: 37 % (ref 24–43)
MCH RBC QN AUTO: 29.8 PG (ref 25.2–33.5)
MCHC RBC AUTO-ENTMCNC: 32.3 G/DL (ref 28.4–34.8)
MCV RBC AUTO: 92.2 FL (ref 82.6–102.9)
MONOCYTES # BLD: 6 % (ref 3–12)
NRBC AUTOMATED: 0 PER 100 WBC
PDW BLD-RTO: 15.2 % (ref 11.8–14.4)
PLATELET # BLD: 222 K/UL (ref 138–453)
PMV BLD AUTO: 10 FL (ref 8.1–13.5)
POTASSIUM SERPL-SCNC: 3.9 MMOL/L (ref 3.7–5.3)
RBC # BLD: 3.73 M/UL (ref 3.95–5.11)
SEG NEUTROPHILS: 54 % (ref 36–65)
SEGMENTED NEUTROPHILS ABSOLUTE COUNT: 3.67 K/UL (ref 1.5–8.1)
SODIUM BLD-SCNC: 139 MMOL/L (ref 135–144)
TOTAL PROTEIN: 6.5 G/DL (ref 6.4–8.3)
WBC # BLD: 6.9 K/UL (ref 3.5–11.3)

## 2022-12-04 PROCEDURE — 86140 C-REACTIVE PROTEIN: CPT

## 2022-12-04 PROCEDURE — 99232 SBSQ HOSP IP/OBS MODERATE 35: CPT | Performed by: INTERNAL MEDICINE

## 2022-12-04 PROCEDURE — 2580000003 HC RX 258: Performed by: INTERNAL MEDICINE

## 2022-12-04 PROCEDURE — 93010 ELECTROCARDIOGRAM REPORT: CPT | Performed by: INTERNAL MEDICINE

## 2022-12-04 PROCEDURE — 6360000002 HC RX W HCPCS: Performed by: NURSE PRACTITIONER

## 2022-12-04 PROCEDURE — 2580000003 HC RX 258: Performed by: NURSE PRACTITIONER

## 2022-12-04 PROCEDURE — 94669 MECHANICAL CHEST WALL OSCILL: CPT

## 2022-12-04 PROCEDURE — 94761 N-INVAS EAR/PLS OXIMETRY MLT: CPT

## 2022-12-04 PROCEDURE — 97110 THERAPEUTIC EXERCISES: CPT

## 2022-12-04 PROCEDURE — 94640 AIRWAY INHALATION TREATMENT: CPT

## 2022-12-04 PROCEDURE — 6370000000 HC RX 637 (ALT 250 FOR IP): Performed by: FAMILY MEDICINE

## 2022-12-04 PROCEDURE — 85384 FIBRINOGEN ACTIVITY: CPT

## 2022-12-04 PROCEDURE — 6370000000 HC RX 637 (ALT 250 FOR IP): Performed by: NURSE PRACTITIONER

## 2022-12-04 PROCEDURE — 97535 SELF CARE MNGMENT TRAINING: CPT

## 2022-12-04 PROCEDURE — 80053 COMPREHEN METABOLIC PANEL: CPT

## 2022-12-04 PROCEDURE — 85379 FIBRIN DEGRADATION QUANT: CPT

## 2022-12-04 PROCEDURE — 6360000002 HC RX W HCPCS: Performed by: INTERNAL MEDICINE

## 2022-12-04 PROCEDURE — 36415 COLL VENOUS BLD VENIPUNCTURE: CPT

## 2022-12-04 PROCEDURE — 85025 COMPLETE CBC W/AUTO DIFF WBC: CPT

## 2022-12-04 RX ORDER — LEVOFLOXACIN 500 MG/1
500 TABLET, FILM COATED ORAL DAILY
Qty: 7 TABLET | Refills: 0 | Status: SHIPPED | OUTPATIENT
Start: 2022-12-04 | End: 2022-12-11

## 2022-12-04 RX ADMIN — ACETAMINOPHEN 1000 MG: 500 TABLET ORAL at 13:21

## 2022-12-04 RX ADMIN — DOCUSATE SODIUM 100 MG: 100 CAPSULE, LIQUID FILLED ORAL at 08:37

## 2022-12-04 RX ADMIN — IPRATROPIUM BROMIDE AND ALBUTEROL SULFATE 3 ML: .5; 3 SOLUTION RESPIRATORY (INHALATION) at 11:05

## 2022-12-04 RX ADMIN — PANTOPRAZOLE SODIUM 40 MG: 40 TABLET, DELAYED RELEASE ORAL at 06:37

## 2022-12-04 RX ADMIN — ANTACID TABLETS 250 MG: 500 TABLET, CHEWABLE ORAL at 12:12

## 2022-12-04 RX ADMIN — IPRATROPIUM BROMIDE AND ALBUTEROL SULFATE 3 ML: .5; 3 SOLUTION RESPIRATORY (INHALATION) at 06:03

## 2022-12-04 RX ADMIN — ESCITALOPRAM 10 MG: 5 TABLET, FILM COATED ORAL at 08:37

## 2022-12-04 RX ADMIN — OXYCODONE HYDROCHLORIDE AND ACETAMINOPHEN 1000 MG: 500 TABLET ORAL at 12:12

## 2022-12-04 RX ADMIN — LEVOTHYROXINE SODIUM 100 MCG: 100 TABLET ORAL at 06:37

## 2022-12-04 RX ADMIN — REMDESIVIR 100 MG: 100 INJECTION, POWDER, LYOPHILIZED, FOR SOLUTION INTRAVENOUS at 14:07

## 2022-12-04 RX ADMIN — ZINC SULFATE 220 MG (50 MG) CAPSULE 100 MG: CAPSULE at 08:37

## 2022-12-04 RX ADMIN — SODIUM CHLORIDE, PRESERVATIVE FREE 10 ML: 5 INJECTION INTRAVENOUS at 08:37

## 2022-12-04 RX ADMIN — OXYCODONE HYDROCHLORIDE AND ACETAMINOPHEN 1000 MG: 500 TABLET ORAL at 08:37

## 2022-12-04 RX ADMIN — LOSARTAN POTASSIUM 25 MG: 25 TABLET, FILM COATED ORAL at 08:37

## 2022-12-04 RX ADMIN — ANTACID TABLETS 250 MG: 500 TABLET, CHEWABLE ORAL at 08:37

## 2022-12-04 RX ADMIN — METOPROLOL SUCCINATE 50 MG: 50 TABLET, EXTENDED RELEASE ORAL at 08:37

## 2022-12-04 RX ADMIN — POLYETHYLENE GLYCOL 3350 17 G: 17 POWDER, FOR SOLUTION ORAL at 08:37

## 2022-12-04 RX ADMIN — APIXABAN 2.5 MG: 2.5 TABLET, FILM COATED ORAL at 08:37

## 2022-12-04 ASSESSMENT — PAIN DESCRIPTION - LOCATION: LOCATION: NECK

## 2022-12-04 ASSESSMENT — PAIN SCALES - GENERAL: PAINLEVEL_OUTOF10: 8

## 2022-12-04 ASSESSMENT — PAIN DESCRIPTION - DESCRIPTORS: DESCRIPTORS: ACHING

## 2022-12-04 NOTE — PROGRESS NOTES
Patient resting comfortably at this time. Patient denies any pain and any other needs at this time. Patient alert & oriented to self and location. Able to follow commands. Assessment and vitals as charted. Chair locked, gripper socks on, call light within reach and able to use appropriately. Will continue to monitor this shift.
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
## 2022-12-04 NOTE — PROGRESS NOTES
Physical Therapy  Facility/Department: [facility] MED SURG  Daily Treatment Note  NAME: Lissette Aden  : 1933  MRN: [id number]    Date of Service: 2022    Discharge Recommendations:  Continue to assess pending progress, 24 hour supervision or assist, Home with assist PRN, Long Term Care with PT        Patient Diagnosis(es): The primary encounter diagnosis was COVID-19. A diagnosis of COPD exacerbation ([address] was also pertinent to this visit. Assessment   Activity Tolerance: Patient tolerated treatment well     Plan    Physcial Therapy Plan  General Plan: 2 times a day 7 days a week  Current Treatment Recommendations: Strengthening;ROM;Balance training;Functional mobility training;Gait training;Stair training;Neuromuscular re-education;Transfer training;Home exercise program;Safety education & training;Patient/Caregiver education & training; Endurance training; Therapeutic activities     Restrictions  Restrictions/Precautions  Restrictions/Precautions: General Precautions, Fall Risk, Isolation     Subjective    Subjective  Subjective: Pt in recliner upon arrival and is agreeable to treatment. Pt reports not feeling well. Pain: denies  Orientation  Overall Orientation Status: Within Functional Limits     Objective   Bed Mobility Training  Bed Mobility Training: No  Transfer Training  Transfer Training: No  Gait Training  Gait Training: No (Pt is non- ambulatory at facility, except for transfers)     PT Exercises  Exercise Treatment: Seated and reclined B LE ther ex x15 reps, v/c for technique     Safety Devices  Type of Devices: Call light within reach; Chair alarm in place; Left in chair       Goals  Short Term Goals  Time Frame for Short Term Goals: 20 days  Short Term Goal 1: Patient to complete all transfers with CGA and FWW with no LOB to decrease fall risk. Short Term Goal 2: Patient to complete all bed mobility with SUP and have good dynamic sitting balance to decrease fall risk.   Short Term Goal 3: Patient to tolerate 20-30 min of ther ex/act to improve functional strength. Short Term Goal 4: Patient to have Fair static standing balance to decrease fall risk.     Education  Patient Education  Education Given To: Patient  Education Provided: Role of Therapy;Plan of Care  Education Method: Verbal  Barriers to Learning: Cognition  Education Outcome: Verbalized understanding;Continued education needed    Therapy Time   Individual Concurrent Group Co-treatment   Time In 1422         Time Out 1247         Minutes [address] [place] [address]

## 2022-12-04 NOTE — PROGRESS NOTES
Occupational Therapy  Facility/Department: formerly [facility] AT THE [facility] MED SURG  Daily Treatment Note  NAME: Garrett Montenegro  : 1933  MRN: [id number]    Date of Service: 2022    Discharge Recommendations:  Continue to assess pending progress         Patient Diagnosis(es): The primary encounter diagnosis was COVID-19. A diagnosis of COPD exacerbation ([address] was also pertinent to this visit. Assessment    Activity Tolerance: Patient tolerated treatment well  Discharge Recommendations: Continue to assess pending progress      Plan   Occupational Therapy Plan  Times Per Week: 7x/wk  Times Per Day: Once a day  Current Treatment Recommendations: Strengthening; Functional mobility training; Safety education & training;Self-Care / ADL     Restrictions  Restrictions/Precautions  Restrictions/Precautions: General Precautions; Fall Risk;Isolation    Subjective   Subjective  Subjective: Pt up to bathroom upon arrival with nurse present. Pt agreed to participate in therapy session. Pain: Pt reported 8/10 neck pain this date. Orientation  Overall Orientation Status: Within Functional Limits  Pain: denies           Objective    Vitals     Bed Mobility Training  Bed Mobility Training: No  Transfer Training  Transfer Training: Yes  Sit to Stand: Contact-guard assistance;Assist X1  Stand to Sit: Contact-guard assistance;Assist X1  Toilet Transfer: Contact-guard assistance;Assist X1  Gait Training  Gait Training: No (Pt is non- ambulatory at facility, except for transfers)  Gait  Overall Level of Assistance: Contact-guard assistance;Minimum assistance;Assist X1  Assistive Device: Walker, rolling     ADL  Toileting: Minimal assistance  OT Exercises  Exercise Treatment: Pt tolerated BUE AROM X 5 planes x 15 reps x 1 set to increase UE strength and endurance in order to ease completion of ADL tasks. Pt required RBs as needed secondary to fatigue. Safety Devices  Type of Devices: Call light within reach; Left in chair;Chair alarm in place Patient Education  Education Given To: Patient  Education Provided: Role of Therapy;Plan of Care  Education Method: Verbal  Barriers to Learning: Cognition  Education Outcome: Continued education needed    Goals  Short Term Goals  Time Frame for Short Term Goals: 20 visits  Short Term Goal 1: Pt. will tolerate 15 mins of BUE ther ex/act while maintaining SpO2 >90% to increase overall strength/activity tolerance for fxl tasks. Short Term Goal 2: Pt. will complete toilet transfers with SBA with reduced risk for falls. Short Term Goal 3: Pt. will engage in simple ADL tasks with decreased assist for safe return to jail.        Therapy Time   Individual Concurrent Group Co-treatment   Time In 1312         Time Out 1339         Minutes 27                 ENEDINA Mcbride/JESSICA

## 2022-12-04 NOTE — PROGRESS NOTES
Patient reassessed at this time. Patient alert & oriented to self only. Denies any pain and any other needs at this time. Assessment and vitals as charted. Bed alarm on, bed locked, gripper socks on, call light within reach and able to use appropriately. Will continue to monitor this shift.

## 2022-12-04 NOTE — PROGRESS NOTES
[facility] [address] Patient  Today's Date and Time: 12/4/2022, 1:24 PM    Impression :     COVID 19 Confirmed Infection  Covid tests:  12-1-22: Positive at her [facility]  Vaccine status:  1-13-21 Moderna  2-10-21  Moderna  12-2-21: Pfizer  8-2-22  Moderna  Cough  A fib   CHF  Pleural effusions  Allergy to sulfa    Recommendations:   Antibiotic treatment:  Monitor off antibiotics  Covid Rx:    Remdesivir: Requested 12-2-22. Stop date 12-6-22  Decadron: Not indicated  Actemra: Not indicated  Monoclonal antibodies: No longer available  Diuresis as tolerated    Medical Decision Making/Summary/Discussion:12/4/2022     Patient admitted with COVID 19 infection    Infection Control Recommendations   [place] Precautions  Airborne isolation  Droplet Isolation  Isolate until 12-11-22    Antimicrobial Stewardship Recommendations     Discontinuation of therapy  Coordination of Outpatient Care:   Estimated Length of IV antimicrobials:TBD  Patient will need Midline Catheter Insertion: TBD  Patient will need PICC line Insertion: No  Patient will need: Home IV , [place],  SNF,  LTAC:TBD  Patient will need outpatient wound care:No    Chief complaint/reason for consultation:   Concern for COVID infection      History of Present Illness:   Brandon Benedict is a 80y.o.-year-old  female who was initially admitted on 12/2/2022. Patient seen at the request of Dr. Adolfo Jenkins. INITIAL HISTORY:    Patient presented through ER on 12-2-22 with complaints of nasal congestion and low 02 saturations at the [facility] care facility where she resides. She has received 4 prior vaccine doses. Tested positive for Covid at the [facility]. She suffers from some degree of senility thus her information is not always accurate. However, she denied fevers, chest pain, SOB or GI symptoms. She admitted to non productive cough and requiring respiratory treatments. In ER she had tachycardia. 02 sat of 92 which improved with 2 L/min nasal 02. CXR: 12-2-22: Vascular congestion with small bilateral effusions      Patient admitted because of concerns with COVID 19.    CURRENT EVALUATION : 12/4/2022    Afebrile  VS stable  Off nasal 02. On room air    Patient feels better  No complaints  No new issues per RN  Patient to be discharged today    Patient exhibiting respiratory distress. No  Respiratory secretions: no    Patient receiving supplemental oxygen. 2 L/min nasal 02-->Room air  RR 20-->18  02 sat 92-->94-->95    NEWS Score: 0-4 Low risk group; 5-6: Medium risk group; 7 or above: High risk group  Parameters 3 2 1 0 1 2 3   Age    < 65   ? 65   RR ? 8  9-11 12-20  21-24 ? 25   O2 Sats ? 91 92-93 94-95 ? 96      Suppl O2  Yes  No      SBP ? 90  101-110 111-219   ? 220   HR ? 40  41-50 51-90  111-130 ? 131   Consciousness    Alert   Drowsiness, lethargy, or confusion   Temperature ? 35.0 C (95.0 F)  35.1-36.0 C 95.1-96.9 F 36.1-38.0 C 97.0-100.4 F 38.1-39.0 C 100.5-102.3 F ? 39.1 C ? 102.4 F      NEWS Score:  12-2-22: 9  at High risk of requiring ICU care    Overall Daily Picture:     Improving    Presence of secondary bacterial Infection:    No   Additional antibiotics: no    Labs, X rays reviewed: 12/4/2022    BUN: 18-->29-->31  Cr: 1.15-->1.10-->1.14    WBC: 7.8-->6.9  Hb: 12.8-->10.7-->11.1  Plat: 207-->192-->222    Absolute Neutrophils: 4.3  Absolute Lymphocytes: 2.47  Neutrophil/Lymphocyte Ratio: 1.7 Low risk    CRP: 35.8 -->42.6-->25  Ferritin:  LDH:     Pro Calcitonin:  ProBNP 6,529    Cultures:  Urine:    Blood:    Sputum :    Wound:      CXR:   12-2-22: Vascular congestion with small bilateral effusions  CAT:      Discussed with RN, JABIER. I have personally reviewed the past medical history, past surgical history, medications, social history, and family history, and I have updated the database accordingly.   Past Medical History:     Past Medical History:   Diagnosis Date Abnormal resting ECG findings 12    Normal sinus rhythm with frequent PACs in a trigeminy pattern    Abnormal resting ECG findings 6/10/2013    Severe sinus bradycardia at 50 bpm.     Acute cystitis without hematuria 2022    Acute on chronic diastolic congestive heart failure ([address] 6/3/2019    Anxiety     Atrial fibrillation (HCC)     Cholecystitis     Chronic back pain     Pt. c/o upper back pain,,,,\"On the sides of my lungs\"    Hiatal hernia     Holter monitor, abnormal 12    Multiple episodes of SVT between 100 and 130 beats per minute most consistent with atrial fibrillation and/or atrial flutter with variable block. Hypothyroidism     Insomnia     MAC (mycobacterium avium-intracellulare complex)     Normal cardiac stress test 12    low risk study. Paroxysmal atrial fibrillation ([address] 2012    Found on holter monitor  and      Severe sinus bradycardia 6/10/13    At least partially drug induced. Status post placement of implantable loop recorder 10/19/2017    LINQ IMPLANTED. [employer]    Subdural hematoma        Past Surgical  History:     Past Surgical History:   Procedure Laterality Date    BRAIN SURGERY      Had a blood clot on the brain. Fell 10 feet.      CATARACT REMOVAL Bilateral      SECTION      x1    COLONOSCOPY      several over the years by Dr. Blu Mahmood Left 10/19/2017    [facility] ARTHROSCOPY Right 2022    BONE FRAGMENT REMOVAL RIGHT KNEE performed by Mary Lou Mendez MD at [facility] Left 2019    Dr. Nicole Olmedo 2019    [place] dual IS1 small DDD NOT MRI COMPATIBLE performed by Phan Barbosa MD at [facility]      tumor removed    UPPER GASTROINTESTINAL ENDOSCOPY      Anjel       Medications:      apixaban  2.5 mg Oral BID    calcium carbonate  250 mg Oral TID docusate sodium  100 mg Oral BID    escitalopram  10 mg Oral Daily    [Held by provider] furosemide  40 mg Oral Daily    levothyroxine  100 mcg Oral Daily    losartan  25 mg Oral Daily    melatonin  3 mg Oral Nightly    metoprolol succinate  50 mg Oral Daily    pantoprazole  40 mg Oral QAM AC    polyethylene glycol  17 g Oral Daily    sodium chloride flush  5-40 mL IntraVENous 2 times per day    ascorbic acid  1,000 mg Oral 4x Daily    zinc sulfate  100 mg Oral Daily    vitamin D  50,000 Units Oral Weekly    ipratropium-albuterol  1 vial Inhalation 4x daily    furosemide  40 mg IntraVENous Daily    remdesivir IVPB  100 mg IntraVENous Q24H       Social History:     Social History     Socioeconomic History    Marital status:       Spouse name: Not on file    Number of children: Not on file    Years of education: Not on file    Highest education level: Not on file   Occupational History    Not on file   Tobacco Use    Smoking status: Never    Smokeless tobacco: Never   Vaping Use    Vaping Use: Never used   Substance and Sexual Activity    Alcohol use: No    Drug use: No    Sexual activity: Not Currently   Other Topics Concern    Not on file   Social History Narrative    ** Merged History Encounter **          Social Determinants of Health     Financial Resource Strain: Unknown    Difficulty of Paying Living Expenses: Patient refused   Food Insecurity: Unknown    Worried About Running Out of Food in the Last Year: Patient refused    Ran Out of Food in the Last Year: Patient refused   Transportation Needs: Not on file   Physical Activity: Inactive    Days of Exercise per Week: 0 days    Minutes of Exercise per Session: 0 min   Stress: Not on file   Social Connections: Not on file   Intimate Partner Violence: Not on file   Housing Stability: Not on file       Family History:     Family History   Problem Relation Age of Onset    Stroke Father     Stroke Mother         Allergies:   Elemental sulfur, Amiodarone, and Sulfa antibiotics     Review of Systems:       Constitutional: No fevers or chills. No systemic complaints  Head: No headaches  Eyes: No double vision or blurry vision. No conjunctival inflammation. ENT: No sore throat or runny nose. . No hearing loss, tinnitus or vertigo. Nasal congestion. Cardiovascular: No chest pain or palpitations. No Shortness of breath. No [place]. A fib. Lung: No Shortness of breath. Has dry cough. No sputum production  Abdomen: No nausea, vomiting, diarrhea, or abdominal pain. [place] No cramps. Genitourinary: No increased urinary frequency, or dysuria. No hematuria. No suprapubic or CVA pain  Musculoskeletal: No muscle aches or pains. No joint effusions, swelling or deformities  Hematologic: No bleeding or bruising. Neurologic: No headache, weakness, numbness, or tingling. Forgetful. Integument: No rash, no ulcers. Psychiatric: No depression. Endocrine: No polyuria, no polydipsia, no polyphagia. Physical Examination :   Patient Vitals for the past 8 hrs:   BP Temp Temp src Pulse Resp SpO2   12/04/22 1311 98/65 97.4 °F (36.3 °C) Temporal 98 18 95 %   12/04/22 0645 101/72 98.4 °F (36.9 °C) Temporal 96 18 93 %       General Appearance: Awake, alert, and in no apparent distress  Head:  Normocephalic, no trauma  Eyes: Pupils equal, round, reactive to light; sclera anicteric; conjunctivae pink. No embolic phenomena. ENT: Oropharynx clear, without erythema, exudate, or thrush. No tenderness of sinuses. Mouth/throat: mucosa pink and moist. No lesions. Edentulous. Neck:Supple, without lymphadenopathy. Thyroid normal, No bruits. Pulmonary/Chest: Decreased breath sounds   Cardiovascular: Irregular rate and rhythm without murmurs, rubs, or gallops. Tachycardia. Pacemaker in place  Abdomen: Soft, non tender. Bowel sounds normal. No organomegaly  All four Extremities: No cyanosis, clubbing, edema, or effusions. Neurologic: No gross sensory or motor deficits.   Skin: Warm and dry with good turgor. Signs of peripheral arterial insufficiency. No ulcerations. No open wounds. Medical Decision Making -Laboratory:   I have independently reviewed/ordered the following labs:    CBC with Differential:   Recent Labs     12/03/22  0610 12/04/22  0555   WBC 7.8 6.9   HGB 10.7* 11.1*   HCT 32.0* 34.4*    222   LYMPHOPCT 32 37   MONOPCT 8 6       BMP:   Recent Labs     12/03/22  0610 12/04/22  0555    139   K 3.5* 3.9    100   CO2 27 28   BUN 29* 31*   CREATININE 1.10* 1.14*   MG 1.9  --        Hepatic Function Panel:   Recent Labs     12/03/22  0610 12/04/22  0555   PROT 6.6 6.5   LABALBU 3.7 3.6   BILITOT 0.2* 0.2*   ALKPHOS 71 68   ALT 9 10   AST 17 17       No results for input(s): RPR in the last 72 hours. No results for input(s): HIV in the last 72 hours. No results for input(s): BC in the last 72 hours. Lab Results   Component Value Date/Time    MUCUS NOT REPORTED 01/17/2020 02:22 PM    RBC 3.73 12/04/2022 05:55 AM    RBC 4.31 02/07/2012 11:05 AM    TRICHOMONAS NOT REPORTED 01/17/2020 02:22 PM    WBC 6.9 12/04/2022 05:55 AM    YEAST NOT REPORTED 01/17/2020 02:22 PM    TURBIDITY Clear 12/03/2022 10:15 AM     Lab Results   Component Value Date/Time    CREATININE 1.14 12/04/2022 05:55 AM    GLUCOSE 71 12/04/2022 05:55 AM    GLUCOSE 92 02/07/2012 11:05 AM       Medical Decision Making-Imaging:     EXAMINATION:   ONE XRAY VIEW OF THE CHEST       12/2/2022 7:29 am       COMPARISON:   03/31/2021       HISTORY:   ORDERING SYSTEM PROVIDED HISTORY: cough   TECHNOLOGIST PROVIDED HISTORY:   cough       FINDINGS:   Cardiomediastinal silhouette is stable. Mild pulmonary vascular congestion   with small bilateral pleural effusions. No pneumothorax. No gross bony   abnormality. Impression   Mild pulmonary vascular congestion with small bilateral pleural effusions.            Medical Decision [id number]:       Medical Decision Making-Other:     Note:  Labs, medications, radiologic studies were reviewed with personal review of films  Large amounts of data were reviewed  Discussed with nursing Staff, Discharge planner  Infection Control and Prevention measures reviewed  All prior entries were reviewed  Administer medications as ordered  Prognosis: Guarded  Discharge planning reviewed  Follow up as outpatient. Thank you for allowing us to participate in the care of this patient. Please call with questions.     Berenice Ceballos MD  Pager: [phone] - Office: [phone]

## 2022-12-04 NOTE — DISCHARGE SUMMARY
Discharge Summary    Montana Erickson  :  1933  MRN:  [id number]    Admit date:  2022      Discharge date:    2022    Admitting Physician:  Solitario Gonzalez MD    Discharge Diagnoses:      Principal Problem:    COVID-19 virus infection  Active Problems:    PAF (paroxysmal atrial fibrillation) (HCC)    SSS (sick sinus syndrome) (HCC)    Chronic fatigue    Chronic renal disease, stage III ([facility] [521695]    Acute cystitis without hematuria    Acute on chronic diastolic congestive heart failure ([facility]    Essential hypertension  Resolved Problems:    * No resolved hospital problems. *      Active Hospital Problems    Diagnosis Date Noted    Chronic fatigue [R53.82] 2021     Priority: High    SSS (sick sinus syndrome) ([facility] [I49.5] 2019     Priority: High    PAF (paroxysmal atrial fibrillation) ([facility] [I48.0] 2018     Priority: High    COVID-19 virus infection [U07.1] 2022     Priority: Medium    Acute cystitis without hematuria [N30.00] 2022     Priority: Medium    Chronic renal disease, stage III [facility] [912358] [N18.30] 2022     Priority: Medium    Essential hypertension [I10] 2021    Acute on chronic diastolic congestive heart failure ([facility] [I50.33] 2019       Discharge Medications:         Medication List        START taking these medications      levoFLOXacin 500 MG tablet  Commonly known as: LEVAQUIN  Take 1 tablet by mouth daily for 7 days            CHANGE how you take these medications      albuterol sulfate  (90 Base) MCG/ACT inhaler  Commonly known as: PROVENTIL;VENTOLIN;PROAIR  Inhale 2 puffs into the lungs every 6 hours as needed for Wheezing or Shortness of Breath  What changed: Another medication with the same name was removed. Continue taking this medication, and follow the directions you see here.             CONTINUE taking these medications      acetaminophen 500 MG tablet  Commonly known as: TYLENOL  Take 2 tablets by mouth 4 times daily as needed for Pain     apixaban 2.5 MG Tabs tablet  Commonly known as: Eliquis  Take 1 tablet by mouth 2 times daily     Calcium 200 MG Tabs     docusate sodium 100 MG capsule  Commonly known as: COLACE     escitalopram 10 MG tablet  Commonly known as: LEXAPRO     furosemide 40 MG tablet  Commonly known as: LASIX     ipratropium-albuterol 0.5-2.5 (3) MG/3ML Soln nebulizer solution  Commonly known as: DUONEB     levothyroxine 100 MCG tablet  Commonly known as: SYNTHROID  Take 1 tablet by mouth Daily     losartan 25 MG tablet  Commonly known as: COZAAR     melatonin 3 MG Tabs tablet     metoprolol succinate 50 MG extended release tablet  Commonly known as: Toprol XL  Take 1 tablet by mouth daily     omeprazole 20 MG delayed release capsule  Commonly known as: PRILOSEC  Take 1 capsule by mouth Daily     polyethylene glycol 17 GM/SCOOP Powd powder  Commonly known as: MIRALAX            STOP taking these medications      ondansetron 4 MG tablet  Commonly known as: ZOFRAN            ASK your doctor about these medications      nitroGLYCERIN 0.4 MG SL tablet  Commonly known as: NITROSTAT  Place 1 tablet under the tongue every 5 minutes as needed for Chest pain up to max of 3 total doses. If no relief after 1 dose, call 911. Where to Get Your Medications        These medications were sent to [facility], [place], [address]  [address] [facility], [address] [place] [address]      Phone: [phone]   levoFLOXacin 500 MG tablet         Consultants:  none     Hospital Course:   Shabbir Rivas is a 80 y.o. female admitted with COVID-19 virus infection with weakness. Improved considerably through admission. Discharged back to facility. Exam: Regular. Clear lung sounds just a little trace wheeze. Not very impressive. No edema. Ambulated.     Condition: Good    Disposition:   Home/assisted living    DC summary time : 35 minutes    Patient will be followed by Laila Boogie Might, APRN - CNP in 1-2 weeks    Signed:  Nicol Orozco MD  12/4/2022, 8:26 AM

## 2022-12-05 LAB
CULTURE: ABNORMAL
SPECIMEN DESCRIPTION: ABNORMAL

## 2022-12-07 LAB
CULTURE: NORMAL
CULTURE: NORMAL
Lab: NORMAL
Lab: NORMAL
SPECIMEN DESCRIPTION: NORMAL
SPECIMEN DESCRIPTION: NORMAL

## 2022-12-07 NOTE — PROGRESS NOTES
Physician Progress Note      PATIENT:               Heidi George  CSN #:                  [id number]  :                       1933  ADMIT DATE:       2022 6:59 AM  DISCH DATE:        2022 3:44 PM  RESPONDING  PROVIDER #:        Arcadio Camacho MD          QUERY TEXT:    Patient admitted with COVID. Documentation reflects acute on chronic diastolic   CHF in H/P dated . If possible, please document in the progress notes   and discharge summary if acute on chronic diastolic CHF was: The medical record reflects the following:  Risk Factors: Hx CHF  Clinical Indicators: Echo from 2022-->estimated ejection fraction of 60%;   BNP 6529; CXR-->Mild pulmonary vascular congestion with small bilateral   pleural effusions; CT CHEST--> Moderate left and mild to moderate right   pleural effusions noted;  Treatment: IV lasix x2 then switched to PO, BNP level, CXR, monitoring,   hospital admission, daily weights, I/O Q8H  Options provided:  -- Acute on chronic diastolic CHF confirmed after study  -- Acute on chronic diastolic CHF ruled out after study  -- Other - I will add my own diagnosis  -- Disagree - Not applicable / Not valid  -- Disagree - Clinically unable to determine / Unknown  -- Refer to Clinical Documentation Reviewer    PROVIDER RESPONSE TEXT:    Acute on chronic diastolic CHF confirmed after study.     Query created by: Darwin Ludwig on 2022 8:03 AM      Electronically signed by:  Arcadio Camacho MD 2022 8:08 AM

## 2022-12-27 DIAGNOSIS — R05.1 ACUTE COUGH: Primary | ICD-10-CM

## 2022-12-28 ENCOUNTER — HOSPITAL ENCOUNTER (OUTPATIENT)
Age: 87
Discharge: HOME | End: 2022-12-28
Payer: MEDICARE

## 2022-12-28 ENCOUNTER — HOSPITAL ENCOUNTER (OUTPATIENT)
Age: 87
Discharge: HOME | End: 2022-12-30
Payer: MEDICARE

## 2022-12-28 ENCOUNTER — HOSPITAL ENCOUNTER (OUTPATIENT)
Dept: GENERAL RADIOLOGY | Age: 87
Discharge: HOME | End: 2022-12-30
Payer: MEDICARE

## 2022-12-28 DIAGNOSIS — R05.1 ACUTE COUGH: ICD-10-CM

## 2022-12-28 LAB
FLU A ANTIGEN: NEGATIVE
FLU B ANTIGEN: NEGATIVE

## 2022-12-28 PROCEDURE — 71046 X-RAY EXAM CHEST 2 VIEWS: CPT

## 2022-12-28 PROCEDURE — 87804 INFLUENZA ASSAY W/OPTIC: CPT

## 2022-12-29 ENCOUNTER — TELEPHONE (OUTPATIENT)
Dept: PRIMARY CARE CLINIC | Age: 87
End: 2022-12-29

## 2022-12-29 DIAGNOSIS — J18.9 COMMUNITY ACQUIRED PNEUMONIA, UNSPECIFIED LATERALITY: Primary | ICD-10-CM

## 2022-12-29 RX ORDER — AZITHROMYCIN 250 MG/1
250 TABLET, FILM COATED ORAL SEE ADMIN INSTRUCTIONS
Qty: 6 TABLET | Refills: 0 | Status: SHIPPED | OUTPATIENT
Start: 2022-12-29 | End: 2023-01-03

## 2022-12-29 RX ORDER — AMOXICILLIN AND CLAVULANATE POTASSIUM 500; 125 MG/1; MG/1
1 TABLET, FILM COATED ORAL 2 TIMES DAILY
Qty: 14 TABLET | Refills: 0 | Status: SHIPPED | OUTPATIENT
Start: 2022-12-29 | End: 2023-01-05

## 2022-12-29 NOTE — TELEPHONE ENCOUNTER
----- Message from [address] [place] [address], [place] - CNP sent at 12/29/2022  2:02 PM EST -----  I believe she is developing pneumonia. Antibiotics sent to the pharmacy. Thank you.

## 2022-12-29 NOTE — TELEPHONE ENCOUNTER
----- Message from [address] [place] [address], [place] - CNP sent at 12/29/2022  7:40 AM EST -----  Results are normal, please call patient and make them aware. Still awaiting CXR results. Thank you.

## 2023-04-27 DIAGNOSIS — E03.1 CONGENITAL HYPOTHYROIDISM: Primary | ICD-10-CM

## 2023-04-27 DIAGNOSIS — I10 ESSENTIAL HYPERTENSION: ICD-10-CM

## 2023-05-02 ENCOUNTER — HOSPITAL ENCOUNTER (OUTPATIENT)
Age: 88
Setting detail: SPECIMEN
Discharge: HOME | End: 2023-05-02
Payer: MEDICARE

## 2023-05-02 DIAGNOSIS — E03.1 CONGENITAL HYPOTHYROIDISM: ICD-10-CM

## 2023-05-02 DIAGNOSIS — I10 ESSENTIAL HYPERTENSION: ICD-10-CM

## 2023-05-02 LAB
ABSOLUTE EOS #: 0.37 K/UL (ref 0–0.44)
ABSOLUTE IMMATURE GRANULOCYTE: <0.03 K/UL (ref 0–0.3)
ABSOLUTE LYMPH #: 2.74 K/UL (ref 1.1–3.7)
ABSOLUTE MONO #: 0.52 K/UL (ref 0.1–1.2)
ALT SERPL-CCNC: 7 U/L (ref 5–33)
ANION GAP SERPL CALCULATED.3IONS-SCNC: 7 MMOL/L (ref 9–17)
AST SERPL-CCNC: 15 U/L
BASOPHILS # BLD: 0 % (ref 0–2)
BASOPHILS ABSOLUTE: 0.03 K/UL (ref 0–0.2)
BUN SERPL-MCNC: 18 MG/DL (ref 8–23)
BUN/CREAT BLD: 17 (ref 9–20)
CALCIUM SERPL-MCNC: 9.6 MG/DL (ref 8.6–10.4)
CHLORIDE SERPL-SCNC: 106 MMOL/L (ref 98–107)
CHOLEST SERPL-MCNC: 115 MG/DL
CHOLESTEROL/HDL RATIO: 2.4
CO2 SERPL-SCNC: 28 MMOL/L (ref 20–31)
CREAT SERPL-MCNC: 1.05 MG/DL (ref 0.5–0.9)
EOSINOPHILS RELATIVE PERCENT: 5 % (ref 1–4)
GFR SERPL CREATININE-BSD FRML MDRD: 50 ML/MIN/1.73M2
GLUCOSE SERPL-MCNC: 79 MG/DL (ref 70–99)
HCT VFR BLD AUTO: 33.8 % (ref 36.3–47.1)
HDLC SERPL-MCNC: 48 MG/DL
HGB BLD-MCNC: 10.9 G/DL (ref 11.9–15.1)
IMMATURE GRANULOCYTES: 0 %
LDLC SERPL CALC-MCNC: 52 MG/DL (ref 0–130)
LYMPHOCYTES # BLD: 37 % (ref 24–43)
MCH RBC QN AUTO: 31.8 PG (ref 25.2–33.5)
MCHC RBC AUTO-ENTMCNC: 32.2 G/DL (ref 28.4–34.8)
MCV RBC AUTO: 98.5 FL (ref 82.6–102.9)
MONOCYTES # BLD: 7 % (ref 3–12)
NRBC AUTOMATED: 0 PER 100 WBC
PDW BLD-RTO: 14.6 % (ref 11.8–14.4)
PLATELET # BLD AUTO: 174 K/UL (ref 138–453)
PMV BLD AUTO: 9.8 FL (ref 8.1–13.5)
POTASSIUM SERPL-SCNC: 4.3 MMOL/L (ref 3.7–5.3)
RBC # BLD: 3.43 M/UL (ref 3.95–5.11)
SEG NEUTROPHILS: 51 % (ref 36–65)
SEGMENTED NEUTROPHILS ABSOLUTE COUNT: 3.79 K/UL (ref 1.5–8.1)
SODIUM SERPL-SCNC: 141 MMOL/L (ref 135–144)
T4 FREE SERPL-MCNC: 2.1 NG/DL (ref 0.9–1.7)
TRIGL SERPL-MCNC: 77 MG/DL
TSH SERPL-ACNC: 0.03 UIU/ML (ref 0.3–5)
WBC # BLD AUTO: 7.5 K/UL (ref 3.5–11.3)

## 2023-05-02 PROCEDURE — 80061 LIPID PANEL: CPT

## 2023-05-02 PROCEDURE — 84439 ASSAY OF FREE THYROXINE: CPT

## 2023-05-02 PROCEDURE — 80048 BASIC METABOLIC PNL TOTAL CA: CPT

## 2023-05-02 PROCEDURE — 84460 ALANINE AMINO (ALT) (SGPT): CPT

## 2023-05-02 PROCEDURE — 84443 ASSAY THYROID STIM HORMONE: CPT

## 2023-05-02 PROCEDURE — 84450 TRANSFERASE (AST) (SGOT): CPT

## 2023-05-02 PROCEDURE — 85025 COMPLETE CBC W/AUTO DIFF WBC: CPT

## 2023-05-02 PROCEDURE — P9604 ONE-WAY ALLOW PRORATED TRIP: HCPCS

## 2023-05-02 PROCEDURE — 36415 COLL VENOUS BLD VENIPUNCTURE: CPT

## 2023-05-03 ENCOUNTER — TELEPHONE (OUTPATIENT)
Dept: PRIMARY CARE CLINIC | Age: 88
End: 2023-05-03

## 2023-05-03 DIAGNOSIS — E03.1 CONGENITAL HYPOTHYROIDISM: Primary | ICD-10-CM

## 2023-05-03 RX ORDER — LEVOTHYROXINE SODIUM 88 UG/1
88 TABLET ORAL DAILY
Qty: 90 TABLET | Refills: 3 | Status: SHIPPED | OUTPATIENT
Start: 2023-05-03

## 2023-05-03 NOTE — TELEPHONE ENCOUNTER
Patient's daughter notified and labs and medication change sent to [facility] for nursing staff.

## 2023-05-03 NOTE — TELEPHONE ENCOUNTER
----- Message from [address] [place] [address], CAM[place] - CNP sent at 5/3/2023  8:55 AM EDT -----  Decrease levothyroxine to 88 mcg daily. New Rx sent. We will recheck her labs in the fall. Thank you.

## 2023-05-25 ENCOUNTER — NURSE ONLY (OUTPATIENT)
Dept: CARDIOLOGY | Age: 88
End: 2023-05-25

## 2023-05-25 DIAGNOSIS — Z95.0 CARDIAC PACEMAKER IN SITU: ICD-10-CM

## 2023-05-25 DIAGNOSIS — I49.5 SSS (SICK SINUS SYNDROME) (HCC): ICD-10-CM

## 2023-05-25 DIAGNOSIS — I48.20 CHRONIC A-FIB (HCC): Primary | ICD-10-CM

## 2023-06-26 NOTE — CARE COORDINATION
[place] - Telemetry  Gastroenterology  Consult Note    Patient Name: Alex Lovell  MRN: [id number]  Admission Date: 6/25/2023  Hospital Length of Stay: 0 days  Code Status: Full Code   Attending Provider: Erendira Salmon MD   Consulting Provider: Jose Mckeon MD  Primary Care Physician: No primary care provider on file.  Principal Problem:Pyelonephritis    Inpatient consult to Gastroenterology  Consult performed by: Jose Mckeon MD  Consult ordered by: Nery Blackmon MD        Subjective:     HPI:  This is 73 y/o lady hx DM, htn, lung cancer, apparent PUD in past here for bilateral flank pain.  Gi consulted for melena.  Here in town for family reunion. Developed black looking stool. No vomiting. No raditaing symptoms. Does have flank pain and dysuria as well. + fatigue as well. No alleviating factors. Generalized malaise.     Noted hgb lower than baseline.   She reports hx of PUD in past, maybe 5 years ago    Also had colonoscopy in past, she cant recall exactly when, but reports it was fairly unremarkable.      Past Medical History:   Diagnosis Date    Asthma     Atherosclerosis of aorta     Cancer     lung adenoCA stage IA ([id number]) dx 7/2012    COPD (chronic obstructive pulmonary disease)     Depression     Diabetes mellitus     Esophagitis     Hiatal hernia     Hyperlipidemia     Hypertension     Hypothyroidism, unspecified     Insomnia     Iron deficiency anemia, unspecified     Morbid obesity     Sleep apnea        Past Surgical History:   Procedure Laterality Date    RESECTION, LUNG Left     LLL wedge resection       Review of patient's allergies indicates:  No Known Allergies  Family History    None       Tobacco Use    Smoking status: Never    Smokeless tobacco: Never   Substance and Sexual Activity    Alcohol use: Not Currently    Drug use: Never    Sexual activity: Not Currently     Review of Systems   Constitutional:  Positive for appetite change and fatigue. Negative for  Reason For Call Today:  -Attempted to reach Butch Evans today on both home phone and mobile phone to follow up on Serene's medications, follow up on Oncology and Cardiology appointments. Review medication changes per Dr. Yusuf Calix. Follow up on drinking supplemental shakes. -Unable to reach Butch Evans today at either number listed   -Left a voicemail message requesting a return phone call back to this AC when patient is able to [phone], office hours given. Future Appointments   Date Time Provider Brittnee Rios   4/20/2020 10:00 AM Corinne Fuel, MD TIFF CARD [facility]   4/28/2020  8:00 AM SCHEDULE, [facility] TIFFIN CAR [employer] TIFF CARD [facility]   5/19/2020 11:00 AM CAMRON Orourke - CNP Tiff Prim Ca [facility]   6/2/2020  8:00 AM SCHEDULE, [facility] [employer] TIFF CARD [facility]   7/7/2020  9:30 AM SCHEDULE, [facility] [employer] TIFF CARD [facility]   9/2/2020 10:00 AM Leslye Samano MD tiff onc spe [facility]   9/15/2020 10:00 AM SCHEDULE, [facility] [employer] TIFF CARD [facility]   3/8/2021 10:20 AM Corinne Fuel, MD TIFF CARD [facility]       Care Coordination Plan of Care: This nurse Care Coordinator will await call back from patient, and if no return call; will attempt to reach patient back again next week. chills and fever.   HENT:  Negative for mouth sores and nosebleeds.    Eyes:  Negative for pain and redness.   Respiratory:  Negative for cough and shortness of breath.    Cardiovascular:  Negative for chest pain and palpitations.   Gastrointestinal:  Positive for blood in stool. Negative for vomiting.   Genitourinary:  Negative for dysuria and hematuria.   Musculoskeletal:  Negative for arthralgias and joint swelling.   Neurological:  Negative for seizures and facial asymmetry.   Psychiatric/Behavioral:  Negative for agitation and confusion.    Objective:     Vital Signs (Most Recent):  Temp: 98.6 °F (37 °C) (06/26/23 1122)  Pulse: 105 (06/26/23 1213)  Resp: 18 (06/26/23 1220)  BP: 130/84 (06/26/23 1122)  SpO2: 98 % (06/26/23 1130) Vital Signs (24h Range):  Temp:  [96.4 °F (35.8 °C)-98.7 °F (37.1 °C)] 98.6 °F (37 °C)  Pulse:  [] 105  Resp:  [16-22] 18  SpO2:  [96 %-100 %] 98 %  BP: (125-181)/() 130/84     Weight: 97.4 kg (214 lb 11.7 oz) (06/25/23 2246)  Body mass index is 39.27 kg/m².    No intake or output data in the 24 hours ending 06/26/23 1338    Lines/Drains/Airways       Drain  Duration             Female External Urinary Catheter 06/25/23 2300 <1 day              Peripheral Intravenous Line  Duration                  Peripheral IV - Single Lumen 06/25/23 1817 20 G Right Antecubital <1 day                     Physical Exam  Vitals reviewed.   Constitutional:       General: She is not in acute distress.     Appearance: She is not diaphoretic.   HENT:      Head: Normocephalic and atraumatic.   Eyes:      General: No scleral icterus.     Conjunctiva/sclera: Conjunctivae normal.   Cardiovascular:      Rate and Rhythm: Normal rate.      Heart sounds: Normal heart sounds.   Pulmonary:      Effort: Pulmonary effort is normal. No respiratory distress.      Breath sounds: Normal breath sounds. No stridor.   Abdominal:      General: There is no distension.      Palpations: Abdomen is soft. There is no  mass.      Tenderness: There is no abdominal tenderness. There is no guarding or rebound.   Musculoskeletal:         General: No tenderness or deformity.      Cervical back: Neck supple.   Lymphadenopathy:      Cervical: No cervical adenopathy.   Skin:     General: Skin is warm and dry.      Findings: No rash.   Neurological:      Mental Status: She is alert and oriented to person, place, and time.      Gait: Gait normal.   Psychiatric:         Mood and Affect: Mood normal.         Behavior: Behavior normal.        Significant Labs:  CBC:   Recent Labs   Lab 06/25/23  1815 06/26/23  0344   WBC 9.64 9.19   HGB 8.5* 8.2*   HCT 29.2* 27.7*    377     BMP:   Recent Labs   Lab 06/26/23  0344   *      K 4.0      CO2 20*   BUN 26*   CREATININE 1.5*   CALCIUM 9.0     CMP:   Recent Labs   Lab 06/25/23 1815 06/26/23  0344   * 131*   CALCIUM 9.1 9.0   ALBUMIN 3.6  --    PROT 8.0  --     138   K 3.8 4.0   CO2 18* 20*    106   BUN 23 26*   CREATININE 1.4 1.5*   ALKPHOS 86  --    ALT 17  --    AST 26  --    BILITOT 0.6  --        Significant Imaging:  Imaging results within the past 24 hours have been reviewed.    Assessment/Plan:     GI  Melena  With assoc symptomatic anemia  Hx of PUD in past    Recs:  Protonix IV BID  Intravascular resuscitation/support with IVFs   Serial H/H's and pRBCs transfusion as indicated  Discontinue all NSAIDs and Heparin products  Please correct any coagulopathy with platelets and FFP to a goal of platelets >50K and INR <2.0  Maintain IV access with 2 large bore IVs  Npo now  Plan for EGD today    Please notify GI team if there is significant change in patient's clinical status          Thank you for your consult. I will follow-up with patient. Please contact us if you have any additional questions.    Jose Mckeon MD  Gastroenterology  [facility] [facility], [facility]

## 2023-09-12 RX ORDER — MIRTAZAPINE 7.5 MG/1
7.5 TABLET, FILM COATED ORAL NIGHTLY
Qty: 90 TABLET | Refills: 1 | Status: SHIPPED | OUTPATIENT
Start: 2023-09-12

## 2023-09-17 ENCOUNTER — HOSPITAL ENCOUNTER (OUTPATIENT)
Age: 88
Setting detail: SPECIMEN
Discharge: HOME | End: 2023-09-17

## 2023-09-17 LAB
BACTERIA URNS QL MICRO: ABNORMAL
BILIRUB UR QL STRIP: NEGATIVE
CLARITY UR: CLEAR
COLOR UR: YELLOW
EPI CELLS #/AREA URNS HPF: ABNORMAL /HPF (ref 0–25)
GLUCOSE UR STRIP-MCNC: NEGATIVE MG/DL
HGB UR QL STRIP.AUTO: ABNORMAL
KETONES UR STRIP-MCNC: NEGATIVE MG/DL
LEUKOCYTE ESTERASE UR QL STRIP: ABNORMAL
NITRITE UR QL STRIP: POSITIVE
PH UR STRIP: 6 [PH] (ref 5–9)
PROT UR STRIP-MCNC: NEGATIVE MG/DL
RBC #/AREA URNS HPF: ABNORMAL /HPF (ref 0–2)
SP GR UR STRIP: 1.01 (ref 1.01–1.02)
UROBILINOGEN UR STRIP-ACNC: NORMAL EU/DL (ref 0–1)
WBC #/AREA URNS HPF: ABNORMAL /HPF (ref 0–5)

## 2023-09-18 ENCOUNTER — TELEPHONE (OUTPATIENT)
Dept: PRIMARY CARE CLINIC | Age: 88
End: 2023-09-18

## 2023-09-18 LAB
MICROORGANISM SPEC CULT: NORMAL
SPECIMEN DESCRIPTION: NORMAL

## 2023-09-18 RX ORDER — CEPHALEXIN 500 MG/1
500 CAPSULE ORAL 2 TIMES DAILY
Qty: 14 CAPSULE | Refills: 0 | Status: SHIPPED | OUTPATIENT
Start: 2023-09-18 | End: 2023-09-25

## 2023-09-18 RX ORDER — LOPERAMIDE HYDROCHLORIDE 2 MG/1
2 CAPSULE ORAL 4 TIMES DAILY PRN
Qty: 30 CAPSULE | Refills: 0 | Status: SHIPPED | OUTPATIENT
Start: 2023-09-18 | End: 2023-09-28

## 2023-09-18 NOTE — TELEPHONE ENCOUNTER
----- Message from [address] [facility], APRN - CNP sent at 9/18/2023  9:41 AM EDT -----  Keflex Rx sent to [facility] for UTI

## 2023-10-04 ENCOUNTER — TELEPHONE (OUTPATIENT)
Dept: PRIMARY CARE CLINIC | Age: 88
End: 2023-10-04

## 2023-10-04 DIAGNOSIS — R09.89 CHEST CONGESTION: Primary | ICD-10-CM

## 2023-10-04 NOTE — TELEPHONE ENCOUNTER
Bruno Hercules MD, saw and evaluated the patient  I have discussed the patient with the resident/non-physician practitioner and agree with the resident's/non-physician practitioner's findings, Plan of Care, and MDM as documented in the resident's/non-physician practitioner's note, except where noted  All available labs and Radiology studies were reviewed  At this point I agree with the current assessment done in the Emergency Department  I have conducted an independent evaluation of this patient including a focused history of:    Emergency Department Note- Gwen Stephens 32 y o  female MRN: [id number]    Unit/Bed#: ED 06 Encounter: [id number]    Gwen Stephens is a 32 y o  female who presents with   Chief Complaint   Patient presents with    Abscess     Pt hs abscess on her right buttock, first noticed yesterday, but worse now  Pt is also 38 weeks pregnant,  Pt scheduled to have a c section on thursday         History of Present Illness   HPI:  Gwen Stephens is a 32 y o  female who presents for evaluation of:    Right buttock, gluteal pain since yesterday  The patient has had a history of recurrent abscesses and hidradenitis suppurative  The patient denies any associated fevers and chills at home  The pain is primarily located in her right gluteal area  The patient is able to defecate without difficulty  The patient denies any trauma to the area  The patient is a  2 para 26; her estimated gestational age is 37 weeks  The pregnancy has been uncomplicated to now  The patient is scheduled to have a  section on Thursday  Review of Systems   Constitutional: Negative for fatigue and fever  Skin: Negative for rash and wound  All other systems reviewed and are negative        Historical Information   Past Medical History:   Diagnosis Date     delivery delivered     RESOLVED: [id number]    Disease of thyroid gland     hyperthyroid    Hidradenitis suppurativa CXR ordered. Thank you. LAST ASSESSED: [id number]    History of early menarche     [de-identified] PERIOD AT 6YEARS OLD    Hyperemesis gravidarum     RESOLVED: [id number]    Hypertension in pregnancy, pre-eclampsia     prior preg    Migraine     Sickle cell trait ([facility] [address] )      Past Surgical History:   Procedure Laterality Date    ABSCESS DRAINAGE  2016    INCISION AND DRAINAGE OF SKIN ABSCESS; LABIAL CYST REMOVAL - [address]     SECTION      CHOLECYSTECTOMY      NV EXC SWEAT GLAND LESN INGUIN,SIMPL Right 2016    Procedure: EXCISION GROIN CYST HIDRADENITIS;  Surgeon: Toribio Curling, DO;  [facility];  Service: General    TONSILLECTOMY      VULVA BIOPSY N/A 2016    Procedure: INCISION AND DRAINAGE, EXCISION OF LABIAL CYST ;  Surgeon: Jhoana Jones DO;  Location[facility];  Service:      Social History   History   Alcohol Use No     Comment: (HISTORY)     History   Drug Use No     History   Smoking Status    Never Smoker   Smokeless Tobacco    Never Used     Family History: non-contributory    Meds/Allergies   all medications and allergies reviewed  No Known Allergies    Objective   First Vitals:   Blood Pressure: 159/76 (18)  Pulse: 87 (18)  Temperature: 97 6 °F (36 4 °C) (18)  Respirations: 20 (18)  Height: 5' 4" (162 6 cm) (18)  Weight - Scale: 88 5 kg (195 lb) (18)  SpO2: 100 % (18)    Current Vitals:   Blood Pressure: 133/69 (18)  Pulse: 86 (18)  Temperature: 97 6 °F (36 4 °C) (18)  Respirations: 18 (18)  Height: 5' 4" (162 6 cm) (18)  Weight - Scale: 88 5 kg (195 lb) (18)  SpO2: 99 % (18)    No intake or output data in the 24 hours ending 18    Invasive Devices          No matching active lines, drains, or airways          Physical Exam   Constitutional: She is oriented to person, place, and time   She appears well-developed and well-nourished  HENT:   Head: Normocephalic and atraumatic  Musculoskeletal: Normal range of motion  She exhibits no deformity  Neurological: She is alert and oriented to person, place, and time  Skin: Skin is warm and dry  Psychiatric: She has a normal mood and affect  Her behavior is normal  Judgment and thought content normal    Nursing note and vitals reviewed  Medical Decision Makin  Right gluteal abscess separate and distinct from anus: Likely perianal abscess; plan to administer local anesthetic, lidocaine with epinephrine to anesthetize the area  Plan to perform incision and drainage of perianal abscess  No results found for this or any previous visit (from the past 36 hour(s))  No orders to display         Portions of the record may have been created with voice recognition software  Occasional wrong word or "sound a like" substitutions may have occurred due to the inherent limitations of voice recognition software  Read the chart carefully and recognize, using context, where substitutions have occurred

## 2023-10-13 ENCOUNTER — OFFICE VISIT (OUTPATIENT)
Dept: PRIMARY CARE CLINIC | Age: 88
End: 2023-10-13

## 2023-10-13 VITALS
BODY MASS INDEX: 18.46 KG/M2 | HEART RATE: 68 BPM | RESPIRATION RATE: 18 BRPM | DIASTOLIC BLOOD PRESSURE: 68 MMHG | SYSTOLIC BLOOD PRESSURE: 118 MMHG | TEMPERATURE: 97.3 F | OXYGEN SATURATION: 100 % | HEIGHT: 60 IN | WEIGHT: 94 LBS

## 2023-10-13 DIAGNOSIS — E03.1 CONGENITAL HYPOTHYROIDISM: ICD-10-CM

## 2023-10-13 DIAGNOSIS — Z00.00 MEDICARE ANNUAL WELLNESS VISIT, SUBSEQUENT: Primary | ICD-10-CM

## 2023-10-13 DIAGNOSIS — N18.31 STAGE 3A CHRONIC KIDNEY DISEASE (HCC): ICD-10-CM

## 2023-10-13 DIAGNOSIS — I48.0 PAROXYSMAL ATRIAL FIBRILLATION (HCC): ICD-10-CM

## 2023-10-13 DIAGNOSIS — I50.32 CHRONIC DIASTOLIC CONGESTIVE HEART FAILURE (HCC): ICD-10-CM

## 2023-10-13 PROBLEM — J20.9 ACUTE EXACERBATION OF CHRONIC BRONCHITIS (HCC): Status: RESOLVED | Noted: 2022-03-17 | Resolved: 2023-10-13

## 2023-10-13 PROBLEM — J42 ACUTE EXACERBATION OF CHRONIC BRONCHITIS (HCC): Status: RESOLVED | Noted: 2022-03-17 | Resolved: 2023-10-13

## 2023-10-13 RX ORDER — ESCITALOPRAM OXALATE 20 MG/1
20 TABLET ORAL DAILY
Qty: 90 TABLET | Refills: 1 | Status: SHIPPED | OUTPATIENT
Start: 2023-10-13

## 2023-10-13 SDOH — ECONOMIC STABILITY: INCOME INSECURITY: HOW HARD IS IT FOR YOU TO PAY FOR THE VERY BASICS LIKE FOOD, HOUSING, MEDICAL CARE, AND HEATING?: NOT HARD AT ALL

## 2023-10-13 SDOH — ECONOMIC STABILITY: HOUSING INSECURITY
IN THE LAST 12 MONTHS, WAS THERE A TIME WHEN YOU DID NOT HAVE A STEADY PLACE TO SLEEP OR SLEPT IN A SHELTER (INCLUDING NOW)?: NO

## 2023-10-13 SDOH — ECONOMIC STABILITY: FOOD INSECURITY: WITHIN THE PAST 12 MONTHS, THE FOOD YOU BOUGHT JUST DIDN'T LAST AND YOU DIDN'T HAVE MONEY TO GET MORE.: NEVER TRUE

## 2023-10-13 SDOH — ECONOMIC STABILITY: FOOD INSECURITY: WITHIN THE PAST 12 MONTHS, YOU WORRIED THAT YOUR FOOD WOULD RUN OUT BEFORE YOU GOT MONEY TO BUY MORE.: NEVER TRUE

## 2023-10-13 ASSESSMENT — PATIENT HEALTH QUESTIONNAIRE - PHQ9
7. TROUBLE CONCENTRATING ON THINGS, SUCH AS READING THE NEWSPAPER OR WATCHING TELEVISION: 0
5. POOR APPETITE OR OVEREATING: 0
SUM OF ALL RESPONSES TO PHQ QUESTIONS 1-9: 0
1. LITTLE INTEREST OR PLEASURE IN DOING THINGS: 0
3. TROUBLE FALLING OR STAYING ASLEEP: 0
8. MOVING OR SPEAKING SO SLOWLY THAT OTHER PEOPLE COULD HAVE NOTICED. OR THE OPPOSITE, BEING SO FIGETY OR RESTLESS THAT YOU HAVE BEEN MOVING AROUND A LOT MORE THAN USUAL: 0
9. THOUGHTS THAT YOU WOULD BE BETTER OFF DEAD, OR OF HURTING YOURSELF: 0
4. FEELING TIRED OR HAVING LITTLE ENERGY: 0
10. IF YOU CHECKED OFF ANY PROBLEMS, HOW DIFFICULT HAVE THESE PROBLEMS MADE IT FOR YOU TO DO YOUR WORK, TAKE CARE OF THINGS AT HOME, OR GET ALONG WITH OTHER PEOPLE: 0
6. FEELING BAD ABOUT YOURSELF - OR THAT YOU ARE A FAILURE OR HAVE LET YOURSELF OR YOUR FAMILY DOWN: 0
SUM OF ALL RESPONSES TO PHQ QUESTIONS 1-9: 0
SUM OF ALL RESPONSES TO PHQ QUESTIONS 1-9: 0
SUM OF ALL RESPONSES TO PHQ9 QUESTIONS 1 & 2: 0
2. FEELING DOWN, DEPRESSED OR HOPELESS: 0
SUM OF ALL RESPONSES TO PHQ QUESTIONS 1-9: 0

## 2023-10-13 ASSESSMENT — LIFESTYLE VARIABLES
HOW OFTEN DO YOU HAVE A DRINK CONTAINING ALCOHOL: NEVER
HOW MANY STANDARD DRINKS CONTAINING ALCOHOL DO YOU HAVE ON A TYPICAL DAY: PATIENT DOES NOT DRINK

## 2023-10-13 NOTE — PROGRESS NOTES
ProviderTorsten MD   omeprazole (PRILOSEC) 20 MG delayed release capsule Take 1 capsule by mouth Daily Yes Cheri Gross APRN - CNP   polyethylene glycol (MIRALAX) 17 GM/SCOOP POWD powder Take 17 g by mouth daily Yes Torsten Glez MD   metoprolol succinate (TOPROL XL) 50 MG extended release tablet Take 1 tablet by mouth daily Yes Sharon Robbins PA-C   Calcium 200 MG TABS Take 400 mg by mouth 3 times daily Yes ProviderTorsten MD   furosemide (LASIX) 40 MG tablet Take 1 tablet by mouth daily Yes ProviderTorsten MD   losartan (COZAAR) 25 MG tablet Take 1 tablet by mouth daily Yes ProviderTorsten MD   melatonin 3 MG TABS tablet Take 1 tablet by mouth daily Yes ProviderTorsten MD   docusate sodium (COLACE) 100 MG capsule Take 1 capsule by mouth 2 times daily Yes ProviderTorsten MD   apixaban (ELIQUIS) 2.5 MG TABS tablet Take 1 tablet by mouth 2 times daily Yes Aster Davison MD   nitroGLYCERIN (NITROSTAT) 0.4 MG SL tablet Place 1 tablet under the tongue every 5 minutes as needed for Chest pain up to max of 3 total doses. If no relief after 1 dose, call 911. Patient taking differently: Place 1 tablet under the tongue every 5 minutes as needed for Chest pain up to max of 3 total doses. If no relief after 1 dose, call 911.  Yes Aster Davison MD   albuterol sulfate  (90 Base) MCG/ACT inhaler Inhale 2 puffs into the lungs every 6 hours as needed for Wheezing or Shortness of Breath Yes Angela Diaz MD   acetaminophen (TYLENOL) 500 MG tablet Take 2 tablets by mouth 4 times daily as needed for Pain Yes Richi Ceballos MD       [facility] (Including outside providers/suppliers regularly involved in providing care):   Patient Care Team:  Cheri Gross APRN - CNP as PCP - General (Family Nurse Practitioner)  Cheri Gross APRN - CNP as PCP - Empaneled Provider  MD Jessenia Juárez MD as Consulting Physician (Hematology and Oncology)

## 2023-10-15 PROBLEM — F33.9 MAJOR DEPRESSIVE DISORDER, RECURRENT, UNSPECIFIED (HCC): Status: ACTIVE | Noted: 2023-10-15

## 2023-10-15 PROBLEM — F33.0 MAJOR DEPRESSIVE DISORDER, RECURRENT, MILD (HCC): Status: ACTIVE | Noted: 2023-10-15

## 2023-10-15 PROBLEM — J47.9 BRONCHIECTASIS WITHOUT COMPLICATION (HCC): Status: RESOLVED | Noted: 2020-06-01 | Resolved: 2023-10-15

## 2023-10-15 PROBLEM — F33.1 MAJOR DEPRESSIVE DISORDER, RECURRENT, MODERATE (HCC): Status: ACTIVE | Noted: 2023-10-15

## 2023-10-15 ASSESSMENT — ENCOUNTER SYMPTOMS
HAIR LOSS: 0
CONSTIPATION: 0
HOARSE VOICE: 0
VISUAL CHANGE: 0
SHORTNESS OF BREATH: 0
ABDOMINAL PAIN: 0
DIARRHEA: 0

## 2023-11-15 ENCOUNTER — OFFICE VISIT (OUTPATIENT)
Dept: CARDIOLOGY | Age: 88
End: 2023-11-15
Payer: MEDICARE

## 2023-11-15 ENCOUNTER — HOSPITAL ENCOUNTER (OUTPATIENT)
Age: 88
Discharge: HOME | End: 2023-11-15
Payer: MEDICARE

## 2023-11-15 VITALS
OXYGEN SATURATION: 90 % | HEART RATE: 88 BPM | SYSTOLIC BLOOD PRESSURE: 90 MMHG | BODY MASS INDEX: 18.65 KG/M2 | HEIGHT: 60 IN | RESPIRATION RATE: 18 BRPM | DIASTOLIC BLOOD PRESSURE: 56 MMHG | WEIGHT: 95 LBS

## 2023-11-15 DIAGNOSIS — Z79.01 CHRONIC ANTICOAGULATION: ICD-10-CM

## 2023-11-15 DIAGNOSIS — I50.32 CHRONIC DIASTOLIC CONGESTIVE HEART FAILURE (HCC): ICD-10-CM

## 2023-11-15 DIAGNOSIS — Z95.0 CARDIAC PACEMAKER IN SITU: ICD-10-CM

## 2023-11-15 DIAGNOSIS — D68.69 SECONDARY HYPERCOAGULABILITY DISORDER (HCC): ICD-10-CM

## 2023-11-15 DIAGNOSIS — I49.5 SSS (SICK SINUS SYNDROME) (HCC): ICD-10-CM

## 2023-11-15 DIAGNOSIS — R53.83 OTHER FATIGUE: ICD-10-CM

## 2023-11-15 DIAGNOSIS — I48.20 CHRONIC A-FIB (HCC): Primary | ICD-10-CM

## 2023-11-15 DIAGNOSIS — R53.83 TIREDNESS: ICD-10-CM

## 2023-11-15 DIAGNOSIS — I10 PRIMARY HYPERTENSION: ICD-10-CM

## 2023-11-15 DIAGNOSIS — I48.20 CHRONIC A-FIB (HCC): ICD-10-CM

## 2023-11-15 LAB
ALBUMIN SERPL-MCNC: 4 G/DL (ref 3.5–5.2)
ALBUMIN/GLOB SERPL: 1.3 {RATIO} (ref 1–2.5)
ALP SERPL-CCNC: 75 U/L (ref 35–104)
ALT SERPL-CCNC: 10 U/L (ref 5–33)
ANION GAP SERPL CALCULATED.3IONS-SCNC: 9 MMOL/L (ref 9–17)
AST SERPL-CCNC: 19 U/L
BILIRUB SERPL-MCNC: 0.3 MG/DL (ref 0.3–1.2)
BUN SERPL-MCNC: 17 MG/DL (ref 8–23)
BUN/CREAT SERPL: 19 (ref 9–20)
CALCIUM SERPL-MCNC: 9.7 MG/DL (ref 8.6–10.4)
CHLORIDE SERPL-SCNC: 104 MMOL/L (ref 98–107)
CO2 SERPL-SCNC: 28 MMOL/L (ref 20–31)
CREAT SERPL-MCNC: 0.9 MG/DL (ref 0.5–0.9)
ERYTHROCYTE [DISTWIDTH] IN BLOOD BY AUTOMATED COUNT: 13.2 % (ref 11.8–14.4)
GFR SERPL CREATININE-BSD FRML MDRD: >60 ML/MIN/1.73M2
GLUCOSE SERPL-MCNC: 70 MG/DL (ref 70–99)
HCT VFR BLD AUTO: 38.1 % (ref 36.3–47.1)
HGB BLD-MCNC: 11.9 G/DL (ref 11.9–15.1)
MCH RBC QN AUTO: 31.6 PG (ref 25.2–33.5)
MCHC RBC AUTO-ENTMCNC: 31.2 G/DL (ref 28.4–34.8)
MCV RBC AUTO: 101.1 FL (ref 82.6–102.9)
NRBC BLD-RTO: 0 PER 100 WBC
PLATELET # BLD AUTO: 178 K/UL (ref 138–453)
PMV BLD AUTO: 9.8 FL (ref 8.1–13.5)
POTASSIUM SERPL-SCNC: 3.7 MMOL/L (ref 3.7–5.3)
PROT SERPL-MCNC: 7 G/DL (ref 6.4–8.3)
RBC # BLD AUTO: 3.77 M/UL (ref 3.95–5.11)
SODIUM SERPL-SCNC: 141 MMOL/L (ref 135–144)
WBC OTHER # BLD: 6.5 K/UL (ref 3.5–11.3)

## 2023-11-15 PROCEDURE — 93005 ELECTROCARDIOGRAM TRACING: CPT | Performed by: FAMILY MEDICINE

## 2023-11-15 PROCEDURE — 36415 COLL VENOUS BLD VENIPUNCTURE: CPT

## 2023-11-15 PROCEDURE — 93288 INTERROG EVL PM/LDLS PM IP: CPT | Performed by: FAMILY MEDICINE

## 2023-11-15 PROCEDURE — 99211 OFF/OP EST MAY X REQ PHY/QHP: CPT | Performed by: FAMILY MEDICINE

## 2023-11-15 PROCEDURE — 80053 COMPREHEN METABOLIC PANEL: CPT

## 2023-11-15 PROCEDURE — 85027 COMPLETE CBC AUTOMATED: CPT

## 2023-11-15 RX ORDER — FUROSEMIDE 20 MG/1
20 TABLET ORAL DAILY
Qty: 90 TABLET | Refills: 3 | Status: SHIPPED | OUTPATIENT
Start: 2023-11-15

## 2023-11-15 NOTE — PATIENT INSTRUCTIONS
SURVEY:    You may be receiving a survey from [employer] regarding your visit today. Please complete the survey to enable us to provide the highest quality of care to you and your family. If you cannot score us a very good on any question, please call the office to discuss how we could have made your experience a very good one. Thank you.

## 2023-11-15 NOTE — PROGRESS NOTES
episodes of SVT between 100 and 130 beats per minute most consistent with atrial fibrillation and/or atrial flutter with variable block. Hypothyroidism     Insomnia     MAC (mycobacterium avium-intracellulare complex)     Normal cardiac stress test 12    low risk study. Paroxysmal atrial fibrillation ([address]) 2012    Found on holter monitor  and      Severe sinus bradycardia 6/10/13    At least partially drug induced. Status post placement of implantable loop recorder 10/19/2017    LINQ IMPLANTED. [employer]    Subdural hematoma (HCC)        CURRENT ALLERGIES: Elemental sulfur, Amiodarone, Sulfur, and Sulfa antibiotics REVIEW OF SYSTEMS: 14 systems were reviewed. Pertinent positives and negatives as above, all else negative. Past Surgical History:   Procedure Laterality Date    BRAIN SURGERY      Had a blood clot on the brain. Fell 10 feet.      CATARACT REMOVAL Bilateral      SECTION      x1    COLONOSCOPY      several over the years by Dr. Michelle Nelson Left 10/19/2017    [address] ARTHROSCOPY Right 2022    BONE FRAGMENT REMOVAL RIGHT KNEE performed by Allie Fong MD at [address] [place] [address] Left 2019    Dr. Caceres Do 2019    [facility] dual IS1 small DDD NOT MRI COMPATIBLE performed by Dayami Green MD at [facility]      tumor removed    UPPER GASTROINTESTINAL ENDOSCOPY      Anjel    Social History:  Social History     Tobacco Use    Smoking status: Never    Smokeless tobacco: Never   Vaping Use    Vaping Use: Never used   Substance Use Topics    Alcohol use: No    Drug use: No        CURRENT MEDICATIONS:  Outpatient Medications Marked as Taking for the 11/15/23 encounter (Office Visit) with Jake Segovia MD   Medication Sig Dispense Refill    escitalopram (LEXAPRO) 20 MG tablet Take 1 tablet by

## 2023-11-16 ENCOUNTER — TELEPHONE (OUTPATIENT)
Dept: CARDIOLOGY | Age: 88
End: 2023-11-16

## 2023-11-16 NOTE — TELEPHONE ENCOUNTER
----- Message from Chuck Butler MD sent at 11/15/2023 11:44 PM EST -----  Let Ms. Maryellen Arora know their test result was ok. Will discuss at next visit. Thanks.

## 2024-01-09 ENCOUNTER — TELEPHONE (OUTPATIENT)
Dept: PRIMARY CARE CLINIC | Age: 89
End: 2024-01-09

## 2024-01-11 ENCOUNTER — HOSPITAL ENCOUNTER (OUTPATIENT)
Age: 89
Setting detail: SPECIMEN
Discharge: HOME | End: 2024-01-11
Payer: MEDICARE

## 2024-01-11 DIAGNOSIS — E03.1 CONGENITAL HYPOTHYROIDISM: ICD-10-CM

## 2024-01-11 LAB
ALT SERPL-CCNC: 10 U/L (ref 5–33)
ANION GAP SERPL CALCULATED.3IONS-SCNC: 8 MMOL/L (ref 9–17)
AST SERPL-CCNC: 18 U/L
BASOPHILS # BLD: 0.03 K/UL (ref 0–0.2)
BASOPHILS NFR BLD: 1 % (ref 0–2)
BUN SERPL-MCNC: 17 MG/DL (ref 8–23)
BUN/CREAT SERPL: 17 (ref 9–20)
CALCIUM SERPL-MCNC: 9.4 MG/DL (ref 8.6–10.4)
CHLORIDE SERPL-SCNC: 105 MMOL/L (ref 98–107)
CO2 SERPL-SCNC: 27 MMOL/L (ref 20–31)
CREAT SERPL-MCNC: 1 MG/DL (ref 0.5–0.9)
EOSINOPHIL # BLD: 0.28 K/UL (ref 0–0.44)
EOSINOPHILS RELATIVE PERCENT: 4 % (ref 1–4)
ERYTHROCYTE [DISTWIDTH] IN BLOOD BY AUTOMATED COUNT: 13.5 % (ref 11.8–14.4)
GFR SERPL CREATININE-BSD FRML MDRD: 54 ML/MIN/1.73M2
GLUCOSE SERPL-MCNC: 72 MG/DL (ref 70–99)
HCT VFR BLD AUTO: 35.4 % (ref 36.3–47.1)
HGB BLD-MCNC: 11.2 G/DL (ref 11.9–15.1)
IMM GRANULOCYTES # BLD AUTO: <0.03 K/UL (ref 0–0.3)
IMM GRANULOCYTES NFR BLD: 0 %
LYMPHOCYTES NFR BLD: 2.64 K/UL (ref 1.1–3.7)
LYMPHOCYTES RELATIVE PERCENT: 42 % (ref 24–43)
MCH RBC QN AUTO: 31 PG (ref 25.2–33.5)
MCHC RBC AUTO-ENTMCNC: 31.6 G/DL (ref 28.4–34.8)
MCV RBC AUTO: 98.1 FL (ref 82.6–102.9)
MONOCYTES NFR BLD: 0.37 K/UL (ref 0.1–1.2)
MONOCYTES NFR BLD: 6 % (ref 3–12)
NEUTROPHILS NFR BLD: 47 % (ref 36–65)
NEUTS SEG NFR BLD: 3.03 K/UL (ref 1.5–8.1)
NRBC BLD-RTO: 0 PER 100 WBC
PLATELET # BLD AUTO: 201 K/UL (ref 138–453)
PMV BLD AUTO: 9.9 FL (ref 8.1–13.5)
POTASSIUM SERPL-SCNC: 4.3 MMOL/L (ref 3.7–5.3)
RBC # BLD AUTO: 3.61 M/UL (ref 3.95–5.11)
SODIUM SERPL-SCNC: 140 MMOL/L (ref 135–144)
T4 FREE SERPL-MCNC: 1.6 NG/DL (ref 0.93–1.7)
TSH SERPL DL<=0.05 MIU/L-ACNC: 0.04 UIU/ML (ref 0.3–5)
WBC OTHER # BLD: 6.4 K/UL (ref 3.5–11.3)

## 2024-01-11 PROCEDURE — 84443 ASSAY THYROID STIM HORMONE: CPT

## 2024-01-11 PROCEDURE — 84460 ALANINE AMINO (ALT) (SGPT): CPT

## 2024-01-11 PROCEDURE — 84439 ASSAY OF FREE THYROXINE: CPT

## 2024-01-11 PROCEDURE — P9603 ONE-WAY ALLOW PRORATED MILES: HCPCS

## 2024-01-11 PROCEDURE — 80048 BASIC METABOLIC PNL TOTAL CA: CPT

## 2024-01-11 PROCEDURE — 84450 TRANSFERASE (AST) (SGOT): CPT

## 2024-01-11 PROCEDURE — 85025 COMPLETE CBC W/AUTO DIFF WBC: CPT

## 2024-01-11 PROCEDURE — 36415 COLL VENOUS BLD VENIPUNCTURE: CPT

## 2024-01-15 ENCOUNTER — TELEPHONE (OUTPATIENT)
Dept: PRIMARY CARE CLINIC | Age: 89
End: 2024-01-15

## 2024-01-15 RX ORDER — LEVOTHYROXINE SODIUM 0.07 MG/1
75 TABLET ORAL DAILY
Qty: 90 TABLET | Refills: 1 | Status: SHIPPED | OUTPATIENT
Start: 2024-01-15

## 2024-01-15 NOTE — TELEPHONE ENCOUNTER
----- Message from CAMRON Carpenter CNP sent at 1/15/2024  8:17 AM EST -----  Decrease levothyroxine to 75 mcg daily. Thank you. New Rx sent.

## 2024-03-04 DIAGNOSIS — R05.1 ACUTE COUGH: Primary | ICD-10-CM

## 2024-03-05 ENCOUNTER — TELEPHONE (OUTPATIENT)
Dept: PRIMARY CARE CLINIC | Age: 89
End: 2024-03-05

## 2024-03-05 ENCOUNTER — HOSPITAL ENCOUNTER (OUTPATIENT)
Age: 89
Setting detail: SPECIMEN
Discharge: HOME | End: 2024-03-05
Payer: MEDICARE

## 2024-03-05 LAB
FLUAV AG SPEC QL: NEGATIVE
FLUBV AG SPEC QL: NEGATIVE

## 2024-03-05 PROCEDURE — 87804 INFLUENZA ASSAY W/OPTIC: CPT

## 2024-03-05 NOTE — TELEPHONE ENCOUNTER
----- Message from CAMRON Carpenter CNP sent at 3/5/2024  9:04 AM EST -----  Results are normal, please call patient and make them aware.

## 2024-03-25 DIAGNOSIS — N30.00 ACUTE CYSTITIS WITHOUT HEMATURIA: Primary | ICD-10-CM

## 2024-03-25 RX ORDER — CEPHALEXIN 500 MG/1
500 CAPSULE ORAL 2 TIMES DAILY
Qty: 20 CAPSULE | Refills: 0 | Status: SHIPPED | OUTPATIENT
Start: 2024-03-25 | End: 2024-04-04

## 2024-04-09 ENCOUNTER — TELEPHONE (OUTPATIENT)
Dept: PRIMARY CARE CLINIC | Age: 89
End: 2024-04-09

## 2024-04-09 RX ORDER — METOPROLOL SUCCINATE 25 MG/1
TABLET, EXTENDED RELEASE ORAL
COMMUNITY
Start: 2024-03-14 | End: 2024-04-10 | Stop reason: ALTCHOICE

## 2024-04-09 NOTE — TELEPHONE ENCOUNTER
Fax from [facility] asking that per family request they would like to d/c the calcium citrate, losartan and metoprolol and they would also like to d/c blood pressure monitoring.  They would also like to know if increasing the lexapro to 40 mg would with with the patients sadness and crying.

## 2024-04-09 NOTE — TELEPHONE ENCOUNTER
1.  I will need to review her recent list of blood pressures.    2.  Escitalopram cannot be dosed any higher.  We could discuss other options for her sadness.    3.  Does she plan on having her continue to follow with cardiology?    4.  Can discontinue calcium if they would like.

## 2024-04-10 NOTE — TELEPHONE ENCOUNTER
I did sign the form yesterday??    Are they planning on continuing with cardiology?    What is her pulse readings if they have them?    Thank you.

## 2024-04-10 NOTE — TELEPHONE ENCOUNTER
Writer spoke with [facility] and one of her nurses stated they didn't keep a pulse log but she stated its usually between 56-64.  The family wishes to stop medications and not follow up with cardiology because they want her to have a peaceful end of life and taking medications is hard for her.  Nurse at [facility] states that the family is not ready for hospice but is wanting to treat her like she is on hospice and that is why they want to stop the medications and not follow up with Cardiology.  [facility] was faxed this morning and will be refaxed because [facility] states they did not receive the original fax even though we received a conformation.

## 2024-04-12 ENCOUNTER — TELEPHONE (OUTPATIENT)
Dept: PRIMARY CARE CLINIC | Age: 89
End: 2024-04-12

## 2024-04-12 NOTE — TELEPHONE ENCOUNTER
Hospice services form emailed to [email or web address] as [facility] not [facility] through.  Brittani wraren.

## 2024-04-21 ENCOUNTER — HOSPITAL ENCOUNTER (OUTPATIENT)
Age: 89
Setting detail: SPECIMEN
Discharge: HOME | End: 2024-04-21
Payer: MEDICARE

## 2024-04-21 LAB
C DIFF GDH + TOXINS A+B STL QL IA.RAPID: NEGATIVE
SPECIMEN DESCRIPTION: NORMAL

## 2024-04-21 PROCEDURE — 87449 NOS EACH ORGANISM AG IA: CPT

## 2024-04-21 PROCEDURE — 87324 CLOSTRIDIUM AG IA: CPT

## 2024-05-31 ENCOUNTER — NURSE ONLY (OUTPATIENT)
Dept: CARDIOLOGY | Age: 89
End: 2024-05-31

## 2024-05-31 DIAGNOSIS — Z95.0 CARDIAC PACEMAKER IN SITU: Primary | ICD-10-CM

## 2024-05-31 DIAGNOSIS — I49.5 SSS (SICK SINUS SYNDROME) (HCC): ICD-10-CM

## 2024-07-01 NOTE — ADDENDUM NOTE
Addended by: ENZO VELARDE on: 4/10/2024 11:12 AM     Modules accepted: Orders     Continue Zoloft 75 mg daily and Remeron 15 mg at bedtime  Neuropsych consulted patient deemed to have impaired capacity.  Discussed with  and plan for guardianship

## (undated) DEVICE — KIT WRNCH CARD W/ NO2 TORQ RATCH WHT HEX FOR CARD PACEMKR

## (undated) DEVICE — CHLORAPREP 26ML ORANGE

## (undated) DEVICE — COVER US PRB W15XL120CM W/ GEL RUBBERBAND TAPE STRP FLD GEN

## (undated) DEVICE — MARKER,SKIN,WI/RULER AND LABELS: Brand: MEDLINE

## (undated) DEVICE — DRAPE,U/ SHT,SPLIT,PLAS,STERIL: Brand: MEDLINE

## (undated) DEVICE — TOWEL,OR,DSP,ST,NATURAL,DLX,4/PK,20PK/CS: Brand: MEDLINE

## (undated) DEVICE — DUP USE 291573 ELECTRODE PACING/ECG-ADLT

## (undated) DEVICE — TUBING, SUCTION, 1/4" X 10', STRAIGHT: Brand: MEDLINE

## (undated) DEVICE — 3M™ COBAN™ NL STERILE NON-LATEX SELF-ADHERENT WRAP, 2084S, 4 IN X 5 YD (10 CM X 4,5 M), 18 ROLLS/CASE: Brand: 3M™ COBAN™

## (undated) DEVICE — T-DRAPE,EXTREMITY,STERILE: Brand: MEDLINE

## (undated) DEVICE — TUBING PMP L16FT MAIN DISP FOR AR-6400 AR-6475

## (undated) DEVICE — INTRODUCER SHTH 7FR L13CM NDL 18GA GWIRE 0.035IN SYR VLV

## (undated) DEVICE — ELECTRODE ES AD CRD L15FT DISP FOR PT BELOW 30LB REM

## (undated) DEVICE — SOLUTION IV 500ML 0.9% SOD CHL PH 5 INJ USP VIAFLX PLAS

## (undated) DEVICE — DECANTER FLD 9IN ST BG FOR ASEP TRNSF OF FLD

## (undated) DEVICE — PENCIL ES L3M BTTN SWCH HOLSTER W/ BLDE ELECTRD EDGE

## (undated) DEVICE — TIP ELECSURG BOVIE

## (undated) DEVICE — 3M™ TEGADERM™ +PAD FILM DRESSING WITH NON-ADHERENT PAD, 3586, 3-1/2 IN X 4 IN (9 CM X 10 CM), 25/CAR, 4 CAR/CS: Brand: 3M™ TEGADERM™

## (undated) DEVICE — NDLCTR: FOAM/MAG 40CT 64/CS: Brand: MEDICAL ACTION INDUSTRIES

## (undated) DEVICE — GAUZE,SPONGE,4"X4",16PLY,XRAY,STRL,LF: Brand: MEDLINE

## (undated) DEVICE — GLOVE SURG SZ 65 CRM LTX FREE POLYISOPRENE POLYMER BEAD ANTI

## (undated) DEVICE — INTENDED FOR TISSUE SEPARATION, AND OTHER PROCEDURES THAT REQUIRE A SHARP SURGICAL BLADE TO PUNCTURE OR CUT.: Brand: BARD-PARKER ® CARBON RIB-BACK BLADES

## (undated) DEVICE — TIP SUCT FLNG TIP ON OFF CTRL YANK

## (undated) DEVICE — SUTURE ETHLN SZ 3-0 L18IN NONABSORBABLE BLK FS-1 L24MM 3/8 663H

## (undated) DEVICE — YANKAUER OPEN TIP WITH ON/OFF SWITCH: Brand: ARGYLE

## (undated) DEVICE — SUTURE VCRL SZ 2-0 L27IN ABSRB UD L26MM CT-2 1/2 CIR J269H

## (undated) DEVICE — CUSTOM PACK: Brand: UNBRANDED

## (undated) DEVICE — SKIN MARKER,REGULAR TIP WITH RULER: Brand: DEVON

## (undated) DEVICE — DBD-PACK,LAPAROTOMY,2 REINFORCED GOWNS: Brand: MEDLINE

## (undated) DEVICE — CONVERTORS STOCKINETTE: Brand: CONVERTORS

## (undated) DEVICE — ZIMMER® STERILE DISPOSABLE TOURNIQUET CUFF WITH PLC, SINGLE PORT, SINGLE BLADDER, 24 IN. (61 CM)

## (undated) DEVICE — GLOVE SURG SZ 75 CRM LTX FREE POLYISOPRENE POLYMER BEAD ANTI

## (undated) DEVICE — DEVICE VASC CLSR 24CM FEM OR RAD ART EXT MECH PRSS DSG POST

## (undated) DEVICE — SHEET,DRAPE,53X77,STERILE: Brand: MEDLINE

## (undated) DEVICE — NEEDLE HYPO 25GA L1.5IN BLU POLYPR HUB S STL REG BVL STR

## (undated) DEVICE — PAD,ABDOMINAL,8"X7.5",ST,LF,20/BX: Brand: MEDLINE INDUSTRIES, INC.

## (undated) DEVICE — Device

## (undated) DEVICE — TOWEL,OR,DSP,ST,BLUE,STD,4/PK,20PK/CS: Brand: MEDLINE

## (undated) DEVICE — SPONGE GZ W4XL4IN COT 12 PLY TYP VII WVN C FLD DSGN

## (undated) DEVICE — 4-PORT MANIFOLD: Brand: NEPTUNE 2

## (undated) DEVICE — 35 ML SYRINGE LUER-LOCK TIP: Brand: MONOJECT

## (undated) DEVICE — SYRINGE MED 20ML STD CLR PLAS LUERLOCK TIP N CTRL DISP

## (undated) DEVICE — SYRINGE BULB 50/CS 48/PLT: Brand: MEDEGEN MEDICAL PRODUCTS, LLC

## (undated) DEVICE — SYRINGE,EAR/ULCER, 2 OZ, STERILE: Brand: MEDLINE

## (undated) DEVICE — GOWN,PREVENTION PLUS,XL,ST,24/CS: Brand: MEDLINE

## (undated) DEVICE — DRESSING PETRO W3XL8IN OIL EMUL N ADH GZ KNIT IMPREG CELOS

## (undated) DEVICE — GLOVE SURG SZ 7 CRM LTX FREE POLYISOPRENE POLYMER BEAD ANTI

## (undated) DEVICE — SPONGE LAP W18XL18IN WHT COT 4 PLY FLD STRUNG RADPQ DISP ST

## (undated) DEVICE — BANDAGE COMPR W6INXL5YD WHT BGE POLY COT M E WRP WV HK AND

## (undated) DEVICE — TABLE COVER: Brand: CONVERTORS

## (undated) DEVICE — GOWN,AURORA,NONRNF,XL,30/CS: Brand: MEDLINE

## (undated) DEVICE — 1840 FOAM BLOCK NEEDLE COUNTER: Brand: DEVON

## (undated) DEVICE — SUTURE VCRL SZ 3-0 L27IN ABSRB UD L26MM SH 1/2 CIR J416H

## (undated) DEVICE — SUTURE MCRYL SZ 4-0 L18IN ABSRB UD L19MM PS-2 3/8 CIR PRIM Y496G

## (undated) DEVICE — 3M™ STERI-DRAPE™ U-DRAPE, LONG 1019: Brand: STERI-DRAPE™

## (undated) DEVICE — SUTURE VCRL SZ 4-0 L27IN ABSRB UD L19MM FS-2 3/8 CIR REV J422H

## (undated) DEVICE — SOLUTION IV 1000ML 0.9% SOD CHL PH 5 INJ USP VIAFLX PLAS

## (undated) DEVICE — PADDING CAST W3INXL4YD POLY POR SPUN DACRON SYN VERSATILE

## (undated) DEVICE — SUTURE PERMAHAND SZ 0 L30IN NONABSORBABLE BLK L26MM SH 1/2 K834H

## (undated) DEVICE — SOLUTION IV IRRIG 0.9% NACL 3000ML BAG 2B7477

## (undated) DEVICE — PACK,ARTHROSCOPY I,SIRUS: Brand: MEDLINE

## (undated) DEVICE — CUFF REPROC TRNQT SPSB W/ PLC BRWN 34IN

## (undated) DEVICE — MEDI-VAC NON-CONDUCTIVE SUCTION TUBING 6MM X 6.1M (20 FT.) L: Brand: CARDINAL HEALTH

## (undated) DEVICE — GLOVE SURG SZ 75 L12IN FNGR THK79MIL GRN LTX FREE

## (undated) DEVICE — BLADE SHV L13CM DIA4MM EXCALIBUR AGG COOLCUT

## (undated) DEVICE — DRAPE,UTILTY,TAPE,15X26, 4EA/PK: Brand: MEDLINE

## (undated) DEVICE — ENDOSCOPIC ELECTROSURGICAL(5)

## (undated) DEVICE — 3M™ STERI-STRIP™ COMPOUND BENZOIN TINCTURE 40 BAGS/CARTON 4 CARTONS/CASE C1544: Brand: 3M™ STERI-STRIP™

## (undated) DEVICE — SOLUTION IRRIG 500ML 0.9% SOD CHL USP POUR PLAS BTL

## (undated) DEVICE — STRIP,CLOSURE,WOUND,MEDI-STRIP,1/2X4: Brand: MEDLINE

## (undated) DEVICE — GLOVE SURG SZ 85 L12IN FNGR THK87MIL WHT LTX FREE

## (undated) DEVICE — 3M™ STERI-STRIP™ REINFORCED ADHESIVE SKIN CLOSURES, R1547, 1/2 IN X 4 IN (12 MM X 100 MM), 6 STRIPS/ENVELOPE: Brand: 3M™ STERI-STRIP™

## (undated) DEVICE — DRAPE,UTILITY,XL,4/PK,STERILE: Brand: MEDLINE